# Patient Record
Sex: FEMALE | Race: WHITE | NOT HISPANIC OR LATINO | Employment: OTHER | URBAN - METROPOLITAN AREA
[De-identification: names, ages, dates, MRNs, and addresses within clinical notes are randomized per-mention and may not be internally consistent; named-entity substitution may affect disease eponyms.]

---

## 2017-05-12 ENCOUNTER — TRANSCRIBE ORDERS (OUTPATIENT)
Dept: ADMINISTRATIVE | Facility: HOSPITAL | Age: 72
End: 2017-05-12

## 2017-05-12 ENCOUNTER — APPOINTMENT (OUTPATIENT)
Dept: LAB | Facility: MEDICAL CENTER | Age: 72
End: 2017-05-12
Payer: COMMERCIAL

## 2017-05-12 DIAGNOSIS — C34.90 MALIGNANT NEOPLASM OF LUNG, UNSPECIFIED LATERALITY, UNSPECIFIED PART OF LUNG (HCC): Primary | ICD-10-CM

## 2017-05-12 DIAGNOSIS — C34.90 MALIGNANT NEOPLASM OF UNSPECIFIED PART OF UNSPECIFIED BRONCHUS OR LUNG (HCC): ICD-10-CM

## 2017-05-12 LAB
ALBUMIN SERPL BCP-MCNC: 3.8 G/DL (ref 3.5–5)
ALP SERPL-CCNC: 95 U/L (ref 46–116)
ALT SERPL W P-5'-P-CCNC: 30 U/L (ref 12–78)
ANION GAP SERPL CALCULATED.3IONS-SCNC: 7 MMOL/L (ref 4–13)
AST SERPL W P-5'-P-CCNC: 19 U/L (ref 5–45)
BASOPHILS # BLD AUTO: 0.03 THOUSANDS/ΜL (ref 0–0.1)
BASOPHILS NFR BLD AUTO: 0 % (ref 0–1)
BILIRUB SERPL-MCNC: 0.37 MG/DL (ref 0.2–1)
BUN SERPL-MCNC: 12 MG/DL (ref 5–25)
CALCIUM ALBUM COR SERPL-MCNC: 10.6 MG/DL (ref 8.3–10.1)
CALCIUM SERPL-MCNC: 10.4 MG/DL (ref 8.3–10.1)
CHLORIDE SERPL-SCNC: 105 MMOL/L (ref 100–108)
CO2 SERPL-SCNC: 28 MMOL/L (ref 21–32)
CREAT SERPL-MCNC: 0.65 MG/DL (ref 0.6–1.3)
EOSINOPHIL # BLD AUTO: 0.17 THOUSAND/ΜL (ref 0–0.61)
EOSINOPHIL NFR BLD AUTO: 2 % (ref 0–6)
ERYTHROCYTE [DISTWIDTH] IN BLOOD BY AUTOMATED COUNT: 13.2 % (ref 11.6–15.1)
GFR SERPL CREATININE-BSD FRML MDRD: >60 ML/MIN/1.73SQ M
GLUCOSE P FAST SERPL-MCNC: 98 MG/DL (ref 65–99)
HCT VFR BLD AUTO: 46.8 % (ref 34.8–46.1)
HGB BLD-MCNC: 15.8 G/DL (ref 11.5–15.4)
LYMPHOCYTES # BLD AUTO: 2.02 THOUSANDS/ΜL (ref 0.6–4.47)
LYMPHOCYTES NFR BLD AUTO: 27 % (ref 14–44)
MCH RBC QN AUTO: 33.5 PG (ref 26.8–34.3)
MCHC RBC AUTO-ENTMCNC: 33.8 G/DL (ref 31.4–37.4)
MCV RBC AUTO: 99 FL (ref 82–98)
MONOCYTES # BLD AUTO: 0.43 THOUSAND/ΜL (ref 0.17–1.22)
MONOCYTES NFR BLD AUTO: 6 % (ref 4–12)
NEUTROPHILS # BLD AUTO: 4.75 THOUSANDS/ΜL (ref 1.85–7.62)
NEUTS SEG NFR BLD AUTO: 65 % (ref 43–75)
NRBC BLD AUTO-RTO: 0 /100 WBCS
PLATELET # BLD AUTO: 293 THOUSANDS/UL (ref 149–390)
PMV BLD AUTO: 11.5 FL (ref 8.9–12.7)
POTASSIUM SERPL-SCNC: 4.7 MMOL/L (ref 3.5–5.3)
PROT SERPL-MCNC: 7.9 G/DL (ref 6.4–8.2)
RBC # BLD AUTO: 4.71 MILLION/UL (ref 3.81–5.12)
SODIUM SERPL-SCNC: 140 MMOL/L (ref 136–145)
WBC # BLD AUTO: 7.43 THOUSAND/UL (ref 4.31–10.16)

## 2017-05-12 PROCEDURE — 36415 COLL VENOUS BLD VENIPUNCTURE: CPT

## 2017-05-12 PROCEDURE — 85025 COMPLETE CBC W/AUTO DIFF WBC: CPT

## 2017-05-12 PROCEDURE — 80053 COMPREHEN METABOLIC PANEL: CPT

## 2017-05-15 ENCOUNTER — HOSPITAL ENCOUNTER (OUTPATIENT)
Dept: CT IMAGING | Facility: HOSPITAL | Age: 72
Discharge: HOME | End: 2017-05-15
Attending: INTERNAL MEDICINE
Payer: COMMERCIAL

## 2017-05-15 DIAGNOSIS — C34.90 MALIGNANT NEOPLASM OF LUNG, UNSPECIFIED LATERALITY, UNSPECIFIED PART OF LUNG (HCC): ICD-10-CM

## 2017-05-15 PROCEDURE — 74177 CT ABD & PELVIS W/CONTRAST: CPT

## 2017-05-15 PROCEDURE — 71260 CT THORAX DX C+: CPT

## 2017-05-15 RX ADMIN — IOHEXOL 100 ML: 350 INJECTION, SOLUTION INTRAVENOUS at 13:26

## 2017-05-22 ENCOUNTER — ALLSCRIPTS OFFICE VISIT (OUTPATIENT)
Dept: OTHER | Facility: OTHER | Age: 72
End: 2017-05-22

## 2017-06-08 ENCOUNTER — HOSPITAL ENCOUNTER (OUTPATIENT)
Dept: RADIOLOGY | Age: 72
Discharge: HOME | End: 2017-06-08
Payer: COMMERCIAL

## 2017-06-08 DIAGNOSIS — C34.90 MALIGNANT NEOPLASM OF LUNG, UNSPECIFIED LATERALITY, UNSPECIFIED PART OF LUNG (HCC): ICD-10-CM

## 2017-06-08 LAB — GLUCOSE SERPL-MCNC: 107 MG/DL (ref 65–140)

## 2017-06-08 PROCEDURE — A9552 F18 FDG: HCPCS

## 2017-06-08 PROCEDURE — 78815 PET IMAGE W/CT SKULL-THIGH: CPT

## 2017-06-08 PROCEDURE — 82948 REAGENT STRIP/BLOOD GLUCOSE: CPT

## 2017-06-08 RX ADMIN — IOHEXOL 5 ML: 240 INJECTION, SOLUTION INTRATHECAL; INTRAVASCULAR; INTRAVENOUS; ORAL at 12:14

## 2017-06-12 ENCOUNTER — TRANSCRIBE ORDERS (OUTPATIENT)
Dept: ADMINISTRATIVE | Facility: HOSPITAL | Age: 72
End: 2017-06-12

## 2017-06-12 DIAGNOSIS — C34.90 MALIGNANT NEOPLASM OF LUNG, UNSPECIFIED LATERALITY, UNSPECIFIED PART OF LUNG (HCC): Primary | ICD-10-CM

## 2017-11-13 ENCOUNTER — HOSPITAL ENCOUNTER (OUTPATIENT)
Dept: CT IMAGING | Facility: HOSPITAL | Age: 72
Discharge: HOME | End: 2017-11-13
Attending: INTERNAL MEDICINE
Payer: COMMERCIAL

## 2017-11-13 DIAGNOSIS — C34.90 MALIGNANT NEOPLASM OF UNSPECIFIED PART OF UNSPECIFIED BRONCHUS OR LUNG (HCC): ICD-10-CM

## 2017-11-13 PROCEDURE — 71250 CT THORAX DX C-: CPT

## 2017-11-20 ENCOUNTER — ALLSCRIPTS OFFICE VISIT (OUTPATIENT)
Dept: OTHER | Facility: OTHER | Age: 72
End: 2017-11-20

## 2017-11-20 ENCOUNTER — TRANSCRIBE ORDERS (OUTPATIENT)
Dept: ADMINISTRATIVE | Facility: HOSPITAL | Age: 72
End: 2017-11-20

## 2017-11-20 DIAGNOSIS — C34.90 MALIGNANT NEOPLASM OF LUNG, UNSPECIFIED LATERALITY, UNSPECIFIED PART OF LUNG (HCC): Primary | ICD-10-CM

## 2017-11-21 NOTE — PROGRESS NOTES
Assessment    1  Malignant neoplasm of bronchus or lung (162 9) (C34 90)    Plan  Malignant neoplasm of bronchus or lung    · Follow-up visit in 6 months Evaluation and Treatment  Follow-up  Status: Complete Done: 36FWR6989 02:20PM   Ordered;Malignant neoplasm of bronchus or lung; Ordered By: Lyndsay Enriquez Performed:  Due: 07NIW7569; Last Updated By: Thu Peters; 11/20/2017 2:31:28 PM   · * CT CHEST WO CONTRAST; Status:Need Information - Financial Authorization; Requested for:63Rpo3682 01:00PM;    Perform:[facility]; RBL:33HZY5977; Last Updated By:Deann Nolasco; 11/20/2017 2:34:25 PM;Ordered;neoplasm of bronchus or lung; Ordered By:Donna Hansen;            CT CHEST, ABD, PELVIS W/CONTRAST  Status: Need Information - Financial Authorization  Requested for: 97EAP9884 01:00PM Ordered; For: Lung Cancer (162 9); Ordered By: Lyndsay Enriquez  Perform: [facility]  Due: 17YGV8010; Last Updated By: Ruchi Charles  CT CHEST, ABD, PELVIS W/CONTRAST  Status: Need Information - Financial Authorization  Requested for: 51RKW6810 01:00PM Ordered; For: Lung Cancer (162 9); Ordered By: Lyndsay Enriqeuz  Perform: [facility]  Due: 01VZU3286; Last Updated By: Ruchi Charles   Discussion/Summary  Discussion Summary:   A 74year old female with stage IIIA non-small cell lung cancer with adenocarcinoma, diagnosed in February 2013  She completed concurrent chemoradiation followed by 2 cycle of consolidation chemotherapy with carboplatin and paclitaxel with minimal side effects, resulting in complete remission  She is still active smoker  She has significant COPD  Based on the recent imaging study, she has no evidence of recurrence  She continued to have left pleural based opacity as well as subtle density in the left upper lobe measuring 7 mm  I recommended her to have CT scan of chest without contrast in 6 months followed by office visit  She is in agreement with my recommendations  Chief Complaint  Chief Complaint: Chief Complaint:  The patient presents to the office today with Non-small cell lung cancer with adenocarcinoma histology, stage IIIA, diagnosed in February 2013  History of Present Illness  HPI: Non-small cell lung cancer with adenocarcinoma histology, stage IIIA, diagnosed in February 2013  Previous Therapy:   1  Concurrent chemoradiation with weekly carboplatin and paclitaxel completed on April 22, 20132 cycle of consolidation chemotherapy with carboplatin and paclitaxel completed in June 2013  Current Therapy: Observation   Disease Status: No evidence of progressive disease  Interval History: A 74year old female with stage IIIA non-small cell lung cancer with adenocarcinoma histology  Her lung cancer was diagnosed incidentally  She underwent concurrent chemoradiation with weekly low-dose carboplatin and paclitaxel followed by 2 cycles of consolidation chemotherapy which was completed on June 2013 resulted in at least good partial response  She came in today for routine followup  She continued to have intentional weight loss  She has to smoked 1 pack and half of cigarettes per day  She has chronic cough with sputum production but no hemoptysis  She has exertion or shortness of breast  She denied any pain  Her previous back pain and leg pain has improved, after she lost significant weight  Recent CT scan showed stable left pleural based opacity  Her performance status is 1/4 on the ECOG scale  Review of Systems  Complete-Female:  Constitutional: No fever, no chills, feels well, no tiredness, no recent weight gain or weight loss  Eyes: No complaints of eye pain, no red eyes, no eyesight problems, no discharge, no dry eyes, no itching of eyes  ENT: no complaints of earache, no loss of hearing, no nose bleeds, no nasal discharge, no sore throat, no hoarseness    Cardiovascular: No complaints of slow heart rate, no fast heart rate, no chest pain, no palpitations, no leg claudication, no lower extremity edema  Respiratory: cough, but-- as noted in HPI  Gastrointestinal: No complaints of abdominal pain, no constipation, no nausea or vomiting, no diarrhea, no bloody stools  Genitourinary: No complaints of dysuria, no incontinence, no pelvic pain, no dysmenorrhea, no vaginal discharge or bleeding  Musculoskeletal: No complaints of arthralgias, no myalgias, no joint swelling or stiffness, no limb pain or swelling  Integumentary: No complaints of skin rash or lesions, no itching, no skin wounds, no breast pain or lump  Neurological: No complaints of headache, no confusion, no convulsions, no numbness, no dizziness or fainting, no tingling, no limb weakness, no difficulty walking  Psychiatric: Not suicidal, no sleep disturbance, no anxiety or depression, no change in personality, no emotional problems  Endocrine: No complaints of proptosis, no hot flashes, no muscle weakness, no deepening of the voice, no feelings of weakness  Hematologic/Lymphatic: No complaints of swollen glands, no swollen glands in the neck, does not bleed easily, does not bruise easily  ROS Reviewed:   ROS reviewed  Active Problems  1  Adenocarcinoma of bladder (188 9) (C67 9)   2  Chest pain (786 50) (R07 9)   3  Chronic obstructive pulmonary disease (496) (J44 9)   4  Cough (786 2) (R05)   5  Elevated C-reactive protein (790 95) (R79 82)   6  Flu vaccine need (V04 81) (Z23)   7  Hypercholesterolemia (272 0) (E78 00)   8  Hyperlipidemia (272 4) (E78 5)   9  Hypothyroidism (244 9) (E03 9)   10  Malignant neoplasm of bronchus or lung (162 9) (C34 90)   11  Myalgia (729 1) (M79 1)   12  Need for vaccination with 13-polyvalent pneumococcal conjugate vaccine (V03 82) (Z23)   13  Neoplasm of skin (239 2) (D49 2)   14  Non-small cell carcinoma of lung (162 9) (C34 90)   15  Obesity (278 00) (E66 9)   16  Pain of both hip joints (719 45) (M25 551,M25 552)   17  Shortness of breath (786 05) (R06 02)   18  Squamous cell carcinoma of leg (173 72) (C44 721)   19  Tobacco use (305 1) (Z72 0)    Past Medical History  1  History of chronic obstructive lung disease (V12 69) (Z87 09)   2  History of hypothyroidism (V12 29) (Z86 39)    Surgical History  1  History of Cholecystectomy   2  History of Cholecystectomy   3  History of Diagnostic Cystoscopy   4  History of Diagnostic Cystoscopy   5  History of Hysterectomy   6  History of Hysterectomy   7  History of Transurethral Resection    Family History  Mother    1  Family history of chronic obstructive pulmonary disease (V17 6) (Z82 5)    Social History     · Current every day smoker (305 1) (F17 200)   · Current Every Day Smoker (305 1)   · Never Drank Alcohol   · Tobacco use (305 1) (Z72 0)    Current Meds   1  Aspirin 81 MG TABS; Take 1 tablet daily Recorded   2  Calcium 600 MG Oral Tablet; 1 TAB BID Recorded   3  Fish Oil 1200 MG Oral Capsule; Take as directed Recorded   4  GNP Vitamin E 400 UNIT Oral Capsule; 1 TAB DAILY Recorded   5  GTF Chromium 200 MCG Oral Tablet; Take 1 tablet daily Recorded   6  Levothyroxine Sodium 125 MCG Oral Tablet; take one tablet by mouth one time daily; Therapy: 56DJA2292 to (Evaluate:23Gny0472)  Requested for: 53Hce0648; Last Rx:91Zox1507 Ordered   7  Multivitamins TABS; Take 1 tablet daily Recorded   8  Spiriva HandiHaler 18 MCG Inhalation Capsule; inhale contents of 1 capsule once daily; Therapy: 01Apr2016 to (Brandi Dakotah)  Requested for: 69IRZ3395; Last Rx:93Tcj4018 Ordered   9  Ventolin  (90 Base) MCG/ACT Inhalation Aerosol Solution; INHALE 2 PUFFS EVERY 4-6 HOURS AS NEEDED; Therapy: 45PRR2578 to (ZZKHQ:47NCO7904)  Requested for: 88QPS6327; Last Rx:21Tfa7581 Ordered    Allergies  1  Erythromycin Derivatives   2  Penicillins   3  Sulfa Drugs   4   Tetracyclines    Vitals  Vital Signs    Recorded: 06JXF1804 02:12PM   Temperature 96 F   Heart Rate 105   Respiration 18   Systolic 513   Diastolic 72   Height 5 ft 1 in   Weight 174 lb    BMI Calculated 32 88   BSA Calculated 1 78   O2 Saturation 93       Physical Exam   Constitutional  General appearance: No acute distress, well appearing and well nourished  Eyes  Conjunctiva and lids: No swelling, erythema or discharge  Pupils and irises: Equal, round and reactive to light  Ears, Nose, Mouth, and Throat  External inspection of ears and nose: Normal    Otoscopic examination: Tympanic membranes translucent with normal light reflex  Canals patent without erythema  Nasal mucosa, septum, and turbinates: Normal without edema or erythema  Oropharynx: Normal with no erythema, edema, exudate or lesions  Pulmonary  Respiratory effort: No increased work of breathing or signs of respiratory distress  Auscultation of lungs: Abnormal  -- Minimal bilateral wheezing  Cardiovascular  Palpation of heart: Normal PMI, no thrills  Auscultation of heart: Normal rate and rhythm, normal S1 and S2, without murmurs  Examination of extremities for edema and/or varicosities: Normal    Abdomen  Abdomen: Non-tender, no masses  Liver and spleen: No hepatomegaly or splenomegaly  Lymphatic  Palpation of lymph nodes in neck: No lymphadenopathy  Musculoskeletal  Gait and station: Normal    Digits and nails: Normal without clubbing or cyanosis  Inspection/palpation of joints, bones, and muscles: Normal    Skin  Skin and subcutaneous tissue: Normal without rashes or lesions  Neurologic  Cranial nerves: Cranial nerves 2-12 intact  Reflexes: 2+ and symmetric  Sensation: No sensory loss  Psychiatric  Orientation to person, place, and time: Normal    Mood and affect: Normal    Diabetic Foot Screen: Normal       ECOG 1       Results/Data  Results Form:   Results  Radiology CT scan of chest in November 2017 showed stable pleural based opacity in the lateral aspect of the lung on the left side  Subtle new density in the left upper lobe measuring 7 mm  Close follow-up is recommended    Lab Results CBC and CMP are within normal limits and May 2017 except calcium 10 4  Health Management  Health Maintenance   * MAMMO SCREENING BILATERAL W CAD; every 1 year; Last 81HYQ8934; Next Due: 22RMM0946; Overdue    Future Appointments    Date/Time Provider Specialty Site   02/05/2018 02:15 PM MANUEL Gamboa   [address] [facility]       Signatures   Electronically signed by : MANUEL Gould ; Nov 20 2017  2:38PM EST                       (Author)

## 2018-01-09 NOTE — RESULT NOTES
Verified Results  NM MYOCARDIAL PERFUSION SPECT (RX STRESS AND/OR REST) 25TSH7973 08:35AM Charlet Slider     Test Name Result Flag Reference   NM MYOCARDIAL PERFUSION SPECT (RX STRESS AND/OR REST) (Report)     [facility]   [address]  [place], [address]   [phone]     Rest/Stress Gated SPECT Myocardial Perfusion Imaging After Regadenoson     Patient: Ceci Ragland   MR number: [id number]   Account number: [de-identified]   : 1945   Age: 66 years   Gender: Female   Status: Outpatient   Location: [address] [place] [address] [facility] [place]   Height: 61 in   Weight: 209 lb   BP: 151/ 78 mmHg     Allergies: ERYTHROMYCIN, PENICILLINS, SULFA ANTIBIOTICS, TETRACYCLINES RELATED     Diagnosis: J44 9 - Chronic obstructive pulmonary disease, unspecified, R07 9 -   Chest pain, unspecified     Primary Physician: Yudith Cowart MD   Technician: Sully Swann   RN: Ceci Rhodes RN BSN   Referring Physician: Yudith Cowart MD   Group: [facility]   Report Prepared By[de-identified] Ceci Rhodes RN BSN   Interpreting Physician: Yary Herrera DO     INDICATIONS: Detection of coronary artery disease  HISTORY: CANCER:LUNG/BLADDER/SKIN   ADAIR/COPD CURRNT LONG TIME SMOKER   BL HIP PAIN/GAIT DISTURBANCE The patient is a 66year old  female  Chest pain status: chest pain  Other symptoms: dyspnea and decreased effort   tolerance  Coronary artery disease risk factors: dyslipidemia, smoking, and   post-menopausal state  Cardiovascular history: none significant  Co-morbidity:   history of lung disease and obesity  Medications: no cardiac drugs  PHYSICAL EXAM: Baseline physical exam screening: no wheezes audible  REST ECG: Normal sinus rhythm  Normal baseline ECG  PROCEDURE: The study was performed at the [address] [place] [address]  A   regadenoson infusion pharmacologic stress test was performed   Gated SPECT   myocardial perfusion imaging was performed after stress and at rest  Systolic   blood pressure was 151 mmHg, at the start of the study  Diastolic blood   pressure was 78 mmHg, at the start of the study  The heart rate was 81 bpm, at   the start of the study  IV double checked  Regadenoson protocol:   HR bpm SBP mmHg DBP mmHg Symptoms   Baseline 81 151 78 none   Immediate -- -- -- mild dyspnea   1 min -- -- -- moderate dyspnea   2 min 91 130 70 same as above   3 min -- -- -- subsiding   4 min -- -- -- subsiding   5 min 90 135 74 none   The patient also performed low level exercise with leg lifts  STRESS SUMMARY: Duration of pharmacologic stress was Maximal heart rate during   stress was 104 bpm  There was normal resting blood pressure with an appropriate   response to stress  The rate-pressure product for the peak heart rate and blood   pressure was 68911  There was no chest pain during stress  The stress test was   terminated due to protocol completion  Pre oxygen saturation: 95 %  Peak oxygen   saturation: 97 %  The stress ECG was negative for ischemia  There were no   stress arrhythmias or conduction abnormalities  ISOTOPE ADMINISTRATION:   Resting isotope administration Stress isotope administration   Agent Tetrofosmin Tetrofosmin   Dose 10 2 mCi 32 3 mCi   Date 05/05/2016 05/05/2016   Injection time 08:55 10:35   Imaging time 09:25 11:35   Injection-image interval 30 min 45 min     The radiopharmaceutical was injected at the peak effect of pharmacologic   stress  MYOCARDIAL PERFUSION IMAGING:   The image quality was good  Rotating projection images reveal mild breast   attenuation  Left ventricular size was normal  The TID ratio was 1 15  PERFUSION DEFECTS:   - There were no perfusion defects  GATED SPECT:   The calculated left ventricular ejection fraction was 50 %  Left ventricular   ejection fraction was within normal limits by visual estimate  There was no   left ventricular regional abnormality       SUMMARY:   - Stress results: There was no chest pain during stress  - ECG conclusions: The stress ECG was negative for ischemia  - Perfusion imaging: There were no perfusion defects    - Gated SPECT: The calculated left ventricular ejection fraction was 50 %  Left ventricular ejection fraction was within normal limits by visual estimate  There was no left ventricular regional abnormality  IMPRESSIONS: Normal study after pharmacologic vasodilation  Myocardial   perfusion imaging was normal at rest and with stress   Left ventricular systolic   function was normal      Prepared and signed by     Matilda Hendricks DO   Signed 05/05/2016 14:23:20

## 2018-01-13 VITALS
HEART RATE: 105 BPM | DIASTOLIC BLOOD PRESSURE: 72 MMHG | RESPIRATION RATE: 18 BRPM | SYSTOLIC BLOOD PRESSURE: 122 MMHG | TEMPERATURE: 96 F | WEIGHT: 174 LBS | OXYGEN SATURATION: 93 % | HEIGHT: 61 IN | BODY MASS INDEX: 32.85 KG/M2

## 2018-01-13 NOTE — RESULT NOTES
Verified Results  * XR HIPS BILATERAL WITH AP PELVIS 13Apr2016 12:57PM [facility]: [id number]     Test Name Result Flag Reference   XR HIPS BILATERAL WITH AP PELVIS (Report)     BILATERAL HIPS AND PELVIS     INDICATION: Bilateral hip pain  COMPARISON: None     VIEWS: AP pelvis and coned down views of each hip; 5 images      FINDINGS:     No acute pelvic fracture or pathologic bone lesions  Visualized bony pelvis appears intact  LEFT HIP:   Joint space narrowing  Subchondral sclerosis and osteophytosis in the left acetabulum  No evidence of erosive arthropathy   Bony alignment is maintained  Soft tissues are unremarkable  RIGHT HIP:   Joint space narrowing, more severe than on the left side, with subchondral sclerosis and osteophytosis as well as subchondral cysts in the femoral head  No evidence of erosive arthropathy  Bony alignment is maintained  Soft tissues are unremarkable  IMPRESSION:     Osteoarthritis of both hips, more severe on the right  No acute osseous abnormality in the pelvis or hips  Workstation performed: [id number]     Signed by:   Miriam Chaves MD   4/14/16     * XR SPINE LUMBAR MINIMUM 4 VIEWS 13Apr2016 12:57PM [facility] Number: [id number]     Test Name Result Flag Reference   XR SPINE LUMBAR MINIMUM 4 VIEWS (Report)     LUMBAR SPINE     INDICATION: Lower back pain  COMPARISON: None     VIEWS: AP, lateral, bilateral oblique and coned down projections; 6 images     FINDINGS:     Alignment is unremarkable  There is no radiographic evidence of acute fracture or destructive osseous lesion  Mild facet degenerative change L5-S1  Visualized soft tissues appear unremarkable  IMPRESSION:     Mild facet degenerative change L5-S1          Workstation performed: [id number]     Signed by:   Leonardo Chamberlain DO   4/14/16

## 2018-01-14 VITALS
WEIGHT: 187.25 LBS | TEMPERATURE: 97.1 F | DIASTOLIC BLOOD PRESSURE: 82 MMHG | HEIGHT: 61 IN | SYSTOLIC BLOOD PRESSURE: 140 MMHG | HEART RATE: 102 BPM | BODY MASS INDEX: 35.35 KG/M2 | RESPIRATION RATE: 20 BRPM | OXYGEN SATURATION: 91 %

## 2018-02-02 RX ORDER — LEVOTHYROXINE SODIUM 0.12 MG/1
1 TABLET ORAL DAILY
COMMUNITY
Start: 2013-10-15 | End: 2018-02-05 | Stop reason: SDUPTHER

## 2018-02-02 RX ORDER — IBUPROFEN 200 MG
1 CAPSULE ORAL 2 TIMES DAILY
COMMUNITY

## 2018-02-02 RX ORDER — VITAMIN E 268 MG
1 CAPSULE ORAL DAILY
COMMUNITY
End: 2020-02-03 | Stop reason: ALTCHOICE

## 2018-02-02 RX ORDER — AMOXICILLIN 500 MG
CAPSULE ORAL
COMMUNITY

## 2018-02-02 RX ORDER — CHROMIUM 200 MCG
1 TABLET ORAL DAILY
COMMUNITY

## 2018-02-02 RX ORDER — ALBUTEROL SULFATE 90 UG/1
2 AEROSOL, METERED RESPIRATORY (INHALATION)
COMMUNITY
Start: 2014-02-24 | End: 2018-02-05

## 2018-02-02 RX ORDER — ASPIRIN 325 MG
0.5 TABLET ORAL DAILY
COMMUNITY

## 2018-02-05 ENCOUNTER — OFFICE VISIT (OUTPATIENT)
Dept: FAMILY MEDICINE CLINIC | Facility: CLINIC | Age: 73
End: 2018-02-05
Payer: COMMERCIAL

## 2018-02-05 VITALS
BODY MASS INDEX: 34.2 KG/M2 | WEIGHT: 181 LBS | SYSTOLIC BLOOD PRESSURE: 122 MMHG | OXYGEN SATURATION: 93 % | HEART RATE: 89 BPM | DIASTOLIC BLOOD PRESSURE: 80 MMHG

## 2018-02-05 DIAGNOSIS — Z11.59 NEED FOR HEPATITIS C SCREENING TEST: ICD-10-CM

## 2018-02-05 DIAGNOSIS — J43.9 PULMONARY EMPHYSEMA, UNSPECIFIED EMPHYSEMA TYPE (HCC): ICD-10-CM

## 2018-02-05 DIAGNOSIS — E78.00 HYPERCHOLESTEROLEMIA: ICD-10-CM

## 2018-02-05 DIAGNOSIS — Z12.39 SCREENING FOR MALIGNANT NEOPLASM OF BREAST: ICD-10-CM

## 2018-02-05 DIAGNOSIS — C34.90 NON-SMALL CELL CARCINOMA OF LUNG (HCC): ICD-10-CM

## 2018-02-05 DIAGNOSIS — Z13.820 SCREENING FOR OSTEOPOROSIS: ICD-10-CM

## 2018-02-05 DIAGNOSIS — Z00.00 MEDICARE ANNUAL WELLNESS VISIT, INITIAL: ICD-10-CM

## 2018-02-05 DIAGNOSIS — C67.9 ADENOCARCINOMA OF BLADDER (HCC): ICD-10-CM

## 2018-02-05 DIAGNOSIS — E03.9 HYPOTHYROIDISM, UNSPECIFIED TYPE: ICD-10-CM

## 2018-02-05 DIAGNOSIS — Z00.00 MEDICARE WELCOME EXAM: Primary | ICD-10-CM

## 2018-02-05 PROCEDURE — 99214 OFFICE O/P EST MOD 30 MIN: CPT | Performed by: FAMILY MEDICINE

## 2018-02-05 PROCEDURE — G0438 PPPS, INITIAL VISIT: HCPCS | Performed by: FAMILY MEDICINE

## 2018-02-05 PROCEDURE — 3725F SCREEN DEPRESSION PERFORMED: CPT | Performed by: FAMILY MEDICINE

## 2018-02-05 PROCEDURE — 1160F RVW MEDS BY RX/DR IN RCRD: CPT | Performed by: FAMILY MEDICINE

## 2018-02-05 RX ORDER — MAGNESIUM 30 MG
30 TABLET ORAL 2 TIMES DAILY
COMMUNITY

## 2018-02-05 RX ORDER — LEVOTHYROXINE SODIUM 0.12 MG/1
125 TABLET ORAL DAILY
Qty: 90 TABLET | Refills: 3 | Status: SHIPPED | OUTPATIENT
Start: 2018-02-05 | End: 2019-02-08 | Stop reason: SDUPTHER

## 2018-02-05 RX ORDER — ALBUTEROL SULFATE 90 UG/1
2 AEROSOL, METERED RESPIRATORY (INHALATION) EVERY 6 HOURS PRN
Qty: 1 INHALER | Refills: 5 | Status: SHIPPED | OUTPATIENT
Start: 2018-02-05 | End: 2021-09-09 | Stop reason: ALTCHOICE

## 2018-02-05 RX ORDER — UREA 10 %
10 LOTION (ML) TOPICAL
COMMUNITY

## 2018-02-05 NOTE — PROGRESS NOTES
HPI:  Jacqui Sorto is a 74 y o  female here for her Initial Wellness Visit      Patient Active Problem List   Diagnosis    Adenocarcinoma of bladder (HCC)    Chronic obstructive pulmonary disease (HCC)    Elevated C-reactive protein    Hypercholesterolemia    Hypothyroidism    Malignant neoplasm of bronchus or lung    Non-small cell carcinoma of lung ([facility] )    Obesity    Squamous cell carcinoma of leg     Past Medical History:   Diagnosis Date    COPD (chronic obstructive pulmonary disease) ([facility] )     Hypothyroidism      Past Surgical History:   Procedure Laterality Date    CHOLECYSTECTOMY      CYSTOSCOPY      4/10 AND 8/10, 3/12/2015 4/18/2016  DIAGNOSTIC     HYSTERECTOMY      OTHER SURGICAL HISTORY      TRANSURETHERAL RESECTION      Family History   Problem Relation Age of Onset    COPD Mother      History   Smoking Status    Current Every Day Smoker    Packs/day: 2 00    Types: Cigarettes   Smokeless Tobacco    Never Used     History   Alcohol Use No      History   Drug Use No       Current Outpatient Prescriptions   Medication Sig Dispense Refill    aspirin 81 MG tablet Take 1 tablet by mouth daily      Calcium 600 MG tablet Take 1 tablet by mouth 2 (two) times a day      Chromium 200 MCG TABS Take 1 tablet by mouth daily      levothyroxine 125 mcg tablet Take 1 tablet (125 mcg total) by mouth daily 90 tablet 3    magnesium 30 MG tablet Take 30 mg by mouth 2 (two) times a day      melatonin 1 mg Take 1 mg by mouth daily at bedtime      Multiple Vitamin (MULTIVITAMINS PO) Take 1 tablet by mouth daily      Omega-3 Fatty Acids (FISH OIL) 1200 MG CAPS Take by mouth      tiotropium (SPIRIVA HANDIHALER) 18 mcg inhalation capsule Place 1 capsule (18 mcg total) into inhaler and inhale daily 30 capsule 11    vitamin E, tocopherol, (GNP VITAMIN E) 400 units capsule Take 1 tablet by mouth daily      albuterol (VENTOLIN HFA) 90 mcg/act inhaler Inhale 2 puffs every 6 (six) hours as needed for wheezing 1 Inhaler 5     No current facility-administered medications for this visit  Allergies   Allergen Reactions    Erythromycin     Penicillins     Sulfa Antibiotics     Tetracyclines & Related      Immunization History   Administered Date(s) Administered    Influenza Split High Dose Preservative Free IM 11/30/2012, 04/01/2016, 10/25/2017    Influenza TIV (IM) 01/11/2010, 09/20/2010, 10/27/2011    Pneumococcal Conjugate 13-Valent 04/01/2016    Pneumococcal Polysaccharide PPV23 08/18/2005, 08/19/2005, 12/14/2012       Patient Care Team:  Mirza Connor MD as PCP - General  MD Emily Galeano MD Gwenevere First, MD Pearlene Gibney, MD Donnelly Soda, MD      Medicare Screening Tests and Risk Assessments:  [facility]     ISAR:   Previous hospitalizations?:  No       Once in a Lifetime Medicare Screening:   EKG performed?:  No    AAA screening performed? (if performed, please add date to Health Maintenance):  No       Medicare Screening Tests and Risk Assessment:   AAA Risk Assessment    None Indicated:  Yes    Osteoporosis Risk Assessment     Female:  Yes   :  Yes :  No   Age over 48:  Yes Low body weight (<127lbs):  No   Tobacco use:   Yes Alcohol use:  No   Low calcium diet:  No PMHX of fractures:  No   FHX of fractures:  No    HIV Risk Assessment    None indicated:  Yes        Drug and Alcohol Use:   Tobacco use    Cigarettes:  current smoker    Tobacco use duration    Packs per day:  2    Tobacco Cessation Readiness    Readiness to quit:  declined    Alcohol use    Alcohol use:  never    Alcohol Treatment Readiness   Illicit Drug Use    Drug use:  never        Diet & Exercise:   Diet   What is your diet?:  Low Carb   How many servings a day of the following:   Fruits and Vegetables:  3-4 Meat:  3-4   Whole Grains:  0 Simple Carbs:  0   Dairy:  0 Soda:  0   Coffee:  0 Tea:  0   Exercise    Do you currently exercise?:  unable to exercise       Cognitive Impairment Screening:   Anxiety screenings preformed:  Yes    Depression screening preformed:  Yes    Depression screening results:  negative for symptoms   Cognitive Impairment Screening    Do you have difficulty learning or retaining new information?:  No Do you have difficulty handling new tasks?:  No   Do you have difficulty with reasoning?:  No Do you have difficulty with spatial ability and orientation?:  No   Do you have difficulty with language?:  No Do you have difficulty with behavior?:  No       Functional Ability/Level of Safety:   Hearing    Hearing difficulties:  No Bilateral:  normal   Left:  normal Right:  normal   Hearing aid:  No    Hearing Impairment Assessment    Current Activities    Help needed with the folllowing:    Using the phone:  No Transportation:  No   Shopping:  No Preparing Meals:  No   Doing Housework:  No Doing Laundry:  No   Managing Medications:  No Managing Money:  No   ADL    Fall Risk   Have you fallen in the last 12 months?:  Yes    How many times?:  1    Injury History       Home Safety:   Home Safety Risk Factors   Unfamilar with surroundings:  No Uneven floors:  No   Stairs or handrail saftey risk:  No Loose rugs:  No   Household clutter:  No Poor household lighting:  No   No grab bars in bathroom:  No Further evaluation needed:  No       Advanced Directives:   Advanced Directives    Living Will:  No Durable POA for healthcare:  No   Advanced directive:  No    Patient's End of Life Decisions        Urinary Incontinence:   Do you have urinary incontinence?:  Yes        Glaucoma:            Provider Screening    No data filed        No exam data present    Assessment and Plan:  1  Medicare welcome exam  CANCELED: POCT ECG   2  Pulmonary emphysema, unspecified emphysema type (HCC)  CBC    albuterol (VENTOLIN HFA) 90 mcg/act inhaler    tiotropium (SPIRIVA HANDIHALER) 18 mcg inhalation capsule   3   Hypercholesterolemia  Comprehensive metabolic panel    Hemoglobin A1c    Lipid Panel with Direct LDL reflex   4  Non-small cell carcinoma of lung ([facility] [address] )     5  Hypothyroidism, unspecified type  Hemoglobin A1c    TSH, 3rd generation with T4 reflex    levothyroxine 125 mcg tablet   6  Need for hepatitis C screening test  Hepatitis C antibody   7  Adenocarcinoma of bladder [facility])  Ambulatory referral to Urogynecology       Health Maintenance Due   Topic Date Due    DTaP,Tdap,and Td Vaccines (1 - Tdap) 03/18/1952    GLAUCOMA SCREENING 67+ YR  03/18/2010        patient presents today for follow-up for Medicare wellness visit as well as her chronic health issues  She was of course encouraged to quit smoking  I would like her to have a DEXA scan as well as a mammogram   She is up-to-date on colonoscopy screening  She did have 1 fall  Recently but did not sustain injury and she notes she tripped and does not feel she is at high risk for repeat falls  She is not  Having issues with depression or anxiety  She has no cognitive concerns and completes all of her own ADLs  She has had a flu shot of both of her pneumonia vaccines  Tetanus is up-to-date  As she notes she had 1 in 2013  Gordon Whittington Consider giving her the Shingrix vaccine for shingles once this is covered by insurance

## 2018-02-05 NOTE — PROGRESS NOTES
FAMILY PRACTICE OFFICE VISIT       NAME: Shayan Martin  AGE: 74 y o  SEX: female       : 1945        MRN: [id number]    DATE: 2018  TIME: 3:15 PM    Assessment and Plan     Problem List Items Addressed This Visit     Chronic obstructive pulmonary disease ([facility] )      I discussed the importance of quitting smoking  She notes she has a prescription for Chantix at home and I would encourage her to start utilizing this  Relevant Medications    tiotropium (SPIRIVA HANDIHALER) 18 mcg inhalation capsule    albuterol (VENTOLIN HFA) 90 mcg/act inhaler    Other Relevant Orders    CBC    Hypercholesterolemia    Relevant Orders    Comprehensive metabolic panel    Hemoglobin A1c    Lipid Panel with Direct LDL reflex    Hypothyroidism      Check TSH         Relevant Medications    levothyroxine 125 mcg tablet    Other Relevant Orders    Hemoglobin A1c    TSH, 3rd generation with T4 reflex    Non-small cell carcinoma of lung (HCC)    Relevant Medications    tiotropium (SPIRIVA HANDIHALER) 18 mcg inhalation capsule    albuterol (VENTOLIN HFA) 90 mcg/act inhaler      Other Visit Diagnoses     Medicare welcome exam    -  Primary    Need for hepatitis C screening test        Relevant Orders    Hepatitis C antibody          [facility] bladder ([facility] )  Follows routinely with Dr Alex Vital    Hypothyroidism   Check TSH    Chronic obstructive pulmonary disease ([facility] )   I discussed the importance of quitting smoking  She notes she has a prescription for Chantix at home and I would encourage her to start utilizing this  There are no Patient Instructions on file for this visit  Chief Complaint     Chief Complaint   Patient presents with    Medicare Wellness Visit     awv       History of Present Illness   Shayan Martin is a 74y o -year-old female who   Presents today for follow-up for her chronic health issues  She has not seen us in a few years    She does have known COPD and continues to smoke 2 packs a day  She does have a chronic intermittent wheeze and some intermittent shortness of breath and coughing  She has a history of lung cancer and does follow routinely with Oncology in this regard  Fortunately, recent CT scan was stable  She has a  History of hypothyroidism  She had lost a lot of weight recently intentionally by watching her carbohydrates  She is having some recent increased weight gain likely secondary to diet  She does not have excessive fatigue but notes when she eats poorly she does feel more fatigued  She has a history of hyperlipidemia but is not on medication for this at this time  She remains on melatonin at bedtime for chronic insomnia  Review of Systems   Review of Systems   Constitutional: Negative for appetite change, chills, fatigue, fever and unexpected weight change  HENT: Negative for ear pain and trouble swallowing  Eyes: Negative for visual disturbance  Respiratory: Positive for cough, shortness of breath and wheezing  Negative for chest tightness  Cardiovascular: Negative for chest pain  Gastrointestinal: Negative for abdominal distention, abdominal pain, blood in stool, constipation and diarrhea  Endocrine: Negative for polyuria  Genitourinary: Negative for difficulty urinating and flank pain  Musculoskeletal: Negative for arthralgias and myalgias  Skin: Negative for rash  Neurological: Negative for dizziness and light-headedness  Hematological: Negative for adenopathy  Does not bruise/bleed easily  Psychiatric/Behavioral: Negative for sleep disturbance         Active Problem List     Patient Active Problem List   Diagnosis    Adenocarcinoma of bladder (HCC)    Chronic obstructive pulmonary disease (HCC)    Elevated C-reactive protein    Hypercholesterolemia    Hypothyroidism    Malignant neoplasm of bronchus or lung    Non-small cell carcinoma of lung (HCC)    Obesity    Squamous cell carcinoma of leg         Past Medical History:  Past Medical History:   Diagnosis Date    [facility]ronic obstructive [facility] disease) ([facility] )     Hypothyroidism        Past Surgical History:  Past Surgical History:   Procedure Laterality Date    CHOLECYSTECTOMY      CYSTOSCOPY      4/10 AND 8/10, 3/12/2015 4/18/2016  DIAGNOSTIC     HYSTERECTOMY      OTHER SURGICAL HISTORY      TRANSURETHERAL RESECTION        Family History:  Family History   Problem Relation Age of Onset    COPD Mother        Social History:  Social History     Social History    Marital status: Single     Spouse name: N/A    Number of children: N/A    Years of education: N/A     Occupational History    Not on file  Social History Main Topics    Smoking status: Current Every Day Smoker     Packs/day: 2 00     Types: Cigarettes    Smokeless tobacco: Never Used    Alcohol use No    Drug use: No    Sexual activity: Not on file     Other Topics Concern    Not on file     Social History Narrative    No narrative on file       Objective     Vitals:    02/05/18 1425   BP: 122/80   Pulse: 89   SpO2: 93%     Wt Readings from Last 3 Encounters:   02/05/18 82 1 kg (181 lb)   11/20/17 78 9 kg (174 lb)   05/22/17 84 9 kg (187 lb 4 oz)       Physical Exam   Constitutional: She is oriented to person, place, and time  She appears well-developed and well-nourished  No distress  HENT:   Head: Normocephalic  Eyes: Pupils are equal, round, and reactive to light  Neck: No tracheal deviation present  No thyromegaly present  Cardiovascular: Normal rate, regular rhythm and normal heart sounds  No murmur heard  Pulmonary/Chest: Effort normal  No respiratory distress  She has wheezes  She has no rales  Abdominal: Soft  She exhibits no distension  There is no tenderness  Musculoskeletal: Normal range of motion  Neurological: She is alert and oriented to person, place, and time  No cranial nerve deficit  Skin: Skin is warm  She is not diaphoretic     Psychiatric: She has a normal mood and affect   Judgment and thought content normal        Pertinent Laboratory/Diagnostic Studies:  Lab Results   Component Value Date    GLUCOSE 125 11/16/2016    BUN 12 05/12/2017    CREATININE 0 65 05/12/2017    CALCIUM 10 4 (H) 05/12/2017     05/12/2017    K 4 7 05/12/2017    CO2 28 05/12/2017     05/12/2017     Lab Results   Component Value Date    ALT 30 05/12/2017    AST 19 05/12/2017    ALKPHOS 95 05/12/2017    BILITOT 0 37 05/12/2017       Lab Results   Component Value Date    WBC 7 43 05/12/2017    HGB 15 8 (H) 05/12/2017    HCT 46 8 (H) 05/12/2017    MCV 99 (H) 05/12/2017     05/12/2017       No results found for: TSH    Lab Results   Component Value Date    CHOL 214 03/14/2014     Lab Results   Component Value Date    TRIG 260 03/14/2014     Lab Results   Component Value Date    HDL 29 03/14/2014     Lab Results   Component Value Date    LDLCALC 133 (H) 03/14/2014     No results found for: HGBA1C    Results for orders placed or performed during the hospital encounter of 06/08/17   Fingerstick Glucose (POCT)   Result Value Ref Range    POC Glucose 107 65 - 140 mg/dl       Orders Placed This Encounter   Procedures    CBC    Comprehensive metabolic panel    Hemoglobin A1c    TSH, 3rd generation with T4 reflex    Lipid Panel with Direct LDL reflex    Hepatitis C antibody       ALLERGIES:  Allergies   Allergen Reactions    Erythromycin     Penicillins     Sulfa Antibiotics     Tetracyclines & Related        Current Medications     Current Outpatient Prescriptions   Medication Sig Dispense Refill    aspirin 81 MG tablet Take 1 tablet by mouth daily      Calcium 600 MG tablet Take 1 tablet by mouth 2 (two) times a day      Chromium 200 MCG TABS Take 1 tablet by mouth daily      levothyroxine 125 mcg tablet Take 1 tablet by mouth daily      magnesium 30 MG tablet Take 30 mg by mouth 2 (two) times a day      melatonin 1 mg Take 1 mg by mouth daily at bedtime      Multiple Vitamin (MULTIVITAMINS PO) Take 1 tablet by mouth daily      Omega-3 Fatty Acids (FISH OIL) 1200 MG CAPS Take by mouth      tiotropium (SPIRIVA HANDIHALER) 18 mcg inhalation capsule Place 1 capsule into inhaler and inhale daily      vitamin E, tocopherol, (GNP VITAMIN E) 400 units capsule Take 1 tablet by mouth daily      albuterol (VENTOLIN HFA) 90 mcg/act inhaler Inhale 2 puffs every 6 (six) hours as needed for wheezing 1 Inhaler 5     No current facility-administered medications for this visit            Health Maintenance     Health Maintenance   Topic Date Due    DTaP,Tdap,and Td Vaccines (1 - Tdap) [birth year]/1950    GLAUCOMA SCREENING 67+ YR  03/18/2010    INFLUENZA VACCINE  Completed    PNEUMOCOCCAL POLYSACCHARIDE VACCINE AGE 72 AND OVER  Completed     Immunization History   Administered Date(s) Administered    Influenza Split High Dose Preservative Free IM 11/30/2012, 04/01/2016, 10/25/2017    Influenza TIV (IM) 01/11/2010, 09/20/2010, 10/27/2011    Pneumococcal Conjugate 13-Valent 04/01/2016    Pneumococcal Polysaccharide PPV23 08/18/2005, 08/19/2005, 12/14/2012       Yenny Martin MD

## 2018-02-05 NOTE — ASSESSMENT & PLAN NOTE
I discussed the importance of quitting smoking  She notes she has a prescription for Chantix at home and I would encourage her to start utilizing this

## 2018-02-23 ENCOUNTER — HOSPITAL ENCOUNTER (OUTPATIENT)
Dept: MAMMOGRAPHY | Facility: MEDICAL CENTER | Age: 73
Discharge: HOME | End: 2018-02-23
Payer: COMMERCIAL

## 2018-02-23 ENCOUNTER — HOSPITAL ENCOUNTER (OUTPATIENT)
Dept: BONE DENSITY | Facility: MEDICAL CENTER | Age: 73
Discharge: HOME | End: 2018-02-23
Payer: COMMERCIAL

## 2018-02-23 DIAGNOSIS — Z13.820 SCREENING FOR OSTEOPOROSIS: ICD-10-CM

## 2018-02-23 PROCEDURE — 77080 DXA BONE DENSITY AXIAL: CPT

## 2018-02-23 PROCEDURE — 77063 BREAST TOMOSYNTHESIS BI: CPT

## 2018-02-23 PROCEDURE — 77067 SCR MAMMO BI INCL CAD: CPT

## 2018-05-12 DIAGNOSIS — J43.9 PULMONARY EMPHYSEMA, UNSPECIFIED EMPHYSEMA TYPE (HCC): ICD-10-CM

## 2018-05-13 RX ORDER — TIOTROPIUM BROMIDE 18 UG/1
CAPSULE ORAL; RESPIRATORY (INHALATION)
Qty: 90 CAPSULE | Refills: 3 | Status: SHIPPED | OUTPATIENT
Start: 2018-05-13 | End: 2019-03-20 | Stop reason: SDUPTHER

## 2018-05-14 ENCOUNTER — HOSPITAL ENCOUNTER (OUTPATIENT)
Dept: CT IMAGING | Facility: HOSPITAL | Age: 73
Discharge: HOME | End: 2018-05-14
Attending: INTERNAL MEDICINE
Payer: COMMERCIAL

## 2018-05-14 DIAGNOSIS — C34.90 MALIGNANT NEOPLASM OF LUNG, UNSPECIFIED LATERALITY, UNSPECIFIED PART OF LUNG (HCC): ICD-10-CM

## 2018-05-14 PROCEDURE — 71250 CT THORAX DX C-: CPT

## 2018-05-21 ENCOUNTER — OFFICE VISIT (OUTPATIENT)
Dept: HEMATOLOGY ONCOLOGY | Facility: CLINIC | Age: 73
End: 2018-05-21
Payer: COMMERCIAL

## 2018-05-21 VITALS
SYSTOLIC BLOOD PRESSURE: 118 MMHG | DIASTOLIC BLOOD PRESSURE: 80 MMHG | RESPIRATION RATE: 20 BRPM | WEIGHT: 196 LBS | HEIGHT: 61 IN | BODY MASS INDEX: 37 KG/M2 | OXYGEN SATURATION: 92 % | TEMPERATURE: 96.6 F | HEART RATE: 111 BPM

## 2018-05-21 DIAGNOSIS — C34.90 NON-SMALL CELL CARCINOMA OF LUNG (HCC): Primary | ICD-10-CM

## 2018-05-21 PROCEDURE — 99214 OFFICE O/P EST MOD 30 MIN: CPT | Performed by: INTERNAL MEDICINE

## 2018-05-21 PROCEDURE — 4040F PNEUMOC VAC/ADMIN/RCVD: CPT | Performed by: INTERNAL MEDICINE

## 2018-05-21 NOTE — PROGRESS NOTES
Hematology / Oncology Outpatient Follow Up Note    Flor Gould 74 y o  female :1945 UXI:[id number]         Date:  2018    Assessment / Plan:  A 74year old female with stage IIIA non-small cell lung cancer with adenocarcinoma, diagnosed in 2013  She completed concurrent chemoradiation followed by 2 cycle of consolidation chemotherapy with carboplatin and paclitaxel with minimal side effects, resulting in complete remission  Clinically as well as radiographically, she has no evidence recurrent disease  I would continue with surveillance, just because she is active smoker  I will see her again in a year with CT scan of chest   I strongly recommended her to stop smoking  She understood  Subjective:     HPI:          Interval History:  A 74year old female with stage IIIA non-small cell lung cancer with adenocarcinoma histology  Her lung cancer was diagnosed incidentally  She underwent concurrent chemoradiation with weekly low-dose carboplatin and paclitaxel followed by 2 cycles of consolidation chemotherapy which was completed on 2013 resulted in at least good partial response  She came in today for routine followup  She has 8-9 lb of interval weight gain  She still smokes more than 1 pack per day  She has exertional shortness of breath as well as chronic cough but no hemoptysis  She has no complaint of pain  Recent CT scan showed no evidence of recurrent disease  Her performance status is 1/4 on the ECOG scale  Objective:     Primary Diagnosis:    Non-small cell lung cancer with adenocarcinoma histology, stage IIIA, diagnosed in 2013  Cancer Staging:  Cancer Staging  No matching staging information was found for the patient  Previous Hematologic/ Oncologic Treatment:     1   Concurrent chemoradiation with weekly carboplatin and paclitaxel completed on 2013  2  2 cycle of consolidation chemotherapy with carboplatin and paclitaxel completed in June 2013  Current Hematologic/ Oncologic Treatment:      Observation  Disease Status:     Complete remission  Test Results:    Pathology:        Radiology:    CT scan of chest in May 2018 showed stable pleural based opacity in the left lateral aspect of the left lung  Stable post radiation change in the left perihilar region  Background emphysema  Laboratory:        Physical Exam:      General Appearance:    Alert, oriented        Eyes:    PERRL   Ears:    Normal external ear canals, both ears   Nose:   Nares normal, septum midline   Throat:   Mucosa moist  Pharynx without injection  Neck:   Supple       Lungs:     Clear to auscultation bilaterally   Chest Wall:    No tenderness or deformity    Heart:    Regular rate and rhythm       Abdomen:     Soft, non-tender, bowel sounds +, no organomegaly           Extremities:   Extremities no cyanosis or edema       Skin:   no rash or icterus  Lymph nodes:   Cervical, supraclavicular, and axillary nodes normal   Neurologic:   CNII-XII intact, normal strength, sensation and reflexes     Throughout          Breast exam:   NA         ROS: Review of Systems   Respiratory:        Exertional shortness of breath, chronic cough  All other systems reviewed and are negative  Imaging: Ct Chest Wo Contrast    Result Date: 5/18/2018  Narrative: CT CHEST WITHOUT IV CONTRAST INDICATION:  History of lung cancer  Status post radiation therapy 5 years ago  Additional history of bladder cancer  COMPARISON: CT scan 11/13/2017  TECHNIQUE: CT examination of the chest was performed without intravenous contrast   Reformatted images were created in axial, sagittal, and coronal planes  Radiation dose length product (DLP) for this visit:  371 mGy-cm     This examination, like all CT scans performed in the [facility], was performed utilizing techniques to minimize radiation dose exposure, including the use of iterative reconstruction and automated exposure control  FINDINGS: LUNGS:  Stable background emphysema  Post radiation changes are noted in the left perihilar region  Stable pleural-based opacity in the lateral aspect of the measuring 1 8 x 1 2 cm  Stable tiny right apical nodules  No other new abnormalities are seen  in the lungs  PLEURA:  Unremarkable  HEART/GREAT VESSELS:  Unchanged MEDIASTINUM AND IVETTE:  Unchanged CHEST WALL AND LOWER NECK: Normal  VISUALIZED STRUCTURES IN THE UPPER ABDOMEN:  Stable left adrenal adenoma  Stable splenomegaly measuring 14 cm  OSSEOUS STRUCTURES:  No acute fracture  No destructive osseous lesion  Impression: 1  Stable pleural-based opacity in the lateral aspect of the left lung  2  Stable post radiation changes in the left perihilar region  3   Stable background emphysema  4   Stable splenomegaly and benign left adrenal gland adenoma  Workstation performed: [id number]         Labs:   Lab Results   Component Value Date    WBC 7 43 05/12/2017    HGB 15 8 (H) 05/12/2017    HCT 46 8 (H) 05/12/2017    MCV 99 (H) 05/12/2017     05/12/2017     Lab Results   Component Value Date     05/12/2017    K 4 7 05/12/2017     05/12/2017    CO2 28 05/12/2017    ANIONGAP 7 05/12/2017    BUN 12 05/12/2017    CREATININE 0 65 05/12/2017    GLUCOSE 125 11/16/2016    GLUF 98 05/12/2017    CALCIUM 10 4 (H) 05/12/2017    CORRECTEDCA 10 6 (H) 05/12/2017    AST 19 05/12/2017    ALT 30 05/12/2017    ALKPHOS 95 05/12/2017    PROT 7 9 05/12/2017    BILITOT 0 37 05/12/2017    EGFR >60 0 05/12/2017       Current Medications: Reviewed  Allergies: Reviewed  PMH/FH/SH:  Reviewed      Vital Sign:    Body surface area is 1 87 meters squared      Wt Readings from Last 3 Encounters:   05/21/18 88 9 kg (196 lb)   02/05/18 82 1 kg (181 lb)   11/20/17 78 9 kg (174 lb)        Temp Readings from Last 3 Encounters:   05/21/18 (!) 96 6 °F (35 9 °C) (Tympanic)   11/20/17 (!) 96 °F (35 6 °C)   05/22/17 (!) 97 1 °F (36 2 °C)        BP Readings from Last 3 Encounters:   05/21/18 118/80   02/05/18 122/80   11/20/17 122/72         Pulse Readings from Last 3 Encounters:   05/21/18 (!) 111   02/05/18 89   11/20/17 105     @LASTSAO2(3)@

## 2018-10-01 ENCOUNTER — IMMUNIZATION (OUTPATIENT)
Dept: FAMILY MEDICINE CLINIC | Facility: CLINIC | Age: 73
End: 2018-10-01
Payer: COMMERCIAL

## 2018-10-01 DIAGNOSIS — Z23 NEED FOR INFLUENZA VACCINATION: Primary | ICD-10-CM

## 2018-10-01 PROCEDURE — 90662 IIV NO PRSV INCREASED AG IM: CPT | Performed by: FAMILY MEDICINE

## 2018-10-01 PROCEDURE — G0008 ADMIN INFLUENZA VIRUS VAC: HCPCS | Performed by: FAMILY MEDICINE

## 2019-01-15 ENCOUNTER — TRANSITIONAL CARE MANAGEMENT (OUTPATIENT)
Dept: FAMILY MEDICINE CLINIC | Facility: CLINIC | Age: 74
End: 2019-01-15

## 2019-02-08 DIAGNOSIS — E03.9 HYPOTHYROIDISM, UNSPECIFIED TYPE: ICD-10-CM

## 2019-02-10 RX ORDER — LEVOTHYROXINE SODIUM 0.12 MG/1
TABLET ORAL
Qty: 90 TABLET | Refills: 3 | Status: SHIPPED | OUTPATIENT
Start: 2019-02-10 | End: 2020-02-25 | Stop reason: SDUPTHER

## 2019-03-20 DIAGNOSIS — J43.9 PULMONARY EMPHYSEMA, UNSPECIFIED EMPHYSEMA TYPE (HCC): ICD-10-CM

## 2019-05-13 ENCOUNTER — TELEPHONE (OUTPATIENT)
Dept: HEMATOLOGY ONCOLOGY | Facility: CLINIC | Age: 74
End: 2019-05-13

## 2019-05-13 ENCOUNTER — HOSPITAL ENCOUNTER (OUTPATIENT)
Dept: CT IMAGING | Facility: HOSPITAL | Age: 74
Discharge: HOME | End: 2019-05-13
Attending: INTERNAL MEDICINE
Payer: COMMERCIAL

## 2019-05-13 DIAGNOSIS — C34.90 NON-SMALL CELL CARCINOMA OF LUNG (HCC): ICD-10-CM

## 2019-05-13 PROCEDURE — 71250 CT THORAX DX C-: CPT

## 2019-05-23 ENCOUNTER — OFFICE VISIT (OUTPATIENT)
Dept: HEMATOLOGY ONCOLOGY | Facility: CLINIC | Age: 74
End: 2019-05-23
Payer: COMMERCIAL

## 2019-05-23 VITALS
WEIGHT: 209 LBS | DIASTOLIC BLOOD PRESSURE: 80 MMHG | OXYGEN SATURATION: 93 % | BODY MASS INDEX: 39.46 KG/M2 | HEART RATE: 107 BPM | SYSTOLIC BLOOD PRESSURE: 124 MMHG | RESPIRATION RATE: 22 BRPM | TEMPERATURE: 97.5 F | HEIGHT: 61 IN

## 2019-05-23 DIAGNOSIS — C34.90 NON-SMALL CELL CARCINOMA OF LUNG (HCC): Primary | ICD-10-CM

## 2019-05-23 PROCEDURE — 99214 OFFICE O/P EST MOD 30 MIN: CPT | Performed by: INTERNAL MEDICINE

## 2019-05-28 ENCOUNTER — TELEPHONE (OUTPATIENT)
Dept: UROLOGY | Facility: AMBULATORY SURGERY CENTER | Age: 74
End: 2019-05-28

## 2019-05-28 DIAGNOSIS — Z85.51 HISTORY OF BLADDER CANCER: ICD-10-CM

## 2019-05-28 DIAGNOSIS — N39.0 ACUTE UTI: Primary | ICD-10-CM

## 2019-05-31 DIAGNOSIS — N39.0 URINARY TRACT INFECTION WITHOUT HEMATURIA, SITE UNSPECIFIED: Primary | ICD-10-CM

## 2019-05-31 LAB
APPEARANCE UR: CLEAR
BACTERIA UR QL AUTO: ABNORMAL /HPF
BILIRUB UR QL STRIP: NEGATIVE
CLINICAL INFO: NORMAL
COLOR UR: YELLOW
GLUCOSE UR QL STRIP: NEGATIVE
HGB UR QL STRIP: NEGATIVE
HYALINE CASTS #/AREA URNS LPF: ABNORMAL /LPF
KETONES UR QL STRIP: NEGATIVE
LEUKOCYTE ESTERASE UR QL STRIP: ABNORMAL
NITRITE UR QL STRIP: NEGATIVE
PATH REPORT.FINAL DX SPEC: NORMAL
PH UR STRIP: 6.5 [PH] (ref 5–8)
PROT UR QL STRIP: NEGATIVE
RBC #/AREA URNS HPF: ABNORMAL /HPF
SP GR UR STRIP: 1.01 (ref 1–1.03)
SPECIMEN SOURCE: NORMAL
SQUAMOUS #/AREA URNS HPF: ABNORMAL /HPF
WBC #/AREA URNS HPF: ABNORMAL /HPF

## 2019-05-31 RX ORDER — CIPROFLOXACIN 500 MG/1
500 TABLET, FILM COATED ORAL 2 TIMES DAILY
Qty: 10 TABLET | Refills: 0 | Status: SHIPPED | OUTPATIENT
Start: 2019-05-31 | End: 2019-06-05

## 2019-07-17 ENCOUNTER — PROCEDURE VISIT (OUTPATIENT)
Dept: UROLOGY | Facility: CLINIC | Age: 74
End: 2019-07-17
Payer: COMMERCIAL

## 2019-07-17 VITALS
WEIGHT: 211 LBS | HEART RATE: 101 BPM | DIASTOLIC BLOOD PRESSURE: 88 MMHG | BODY MASS INDEX: 39.84 KG/M2 | SYSTOLIC BLOOD PRESSURE: 144 MMHG | HEIGHT: 61 IN

## 2019-07-17 DIAGNOSIS — N39.0 URINARY TRACT INFECTION WITHOUT HEMATURIA, SITE UNSPECIFIED: ICD-10-CM

## 2019-07-17 DIAGNOSIS — N30.00 ACUTE CYSTITIS WITHOUT HEMATURIA: ICD-10-CM

## 2019-07-17 DIAGNOSIS — Z85.51 HISTORY OF BLADDER CANCER: Primary | ICD-10-CM

## 2019-07-17 DIAGNOSIS — C67.9 MALIGNANT NEOPLASM OF URINARY BLADDER, UNSPECIFIED SITE (HCC): ICD-10-CM

## 2019-07-17 LAB
SL AMB  POCT GLUCOSE, UA: NORMAL
SL AMB LEUKOCYTE ESTERASE,UA: NORMAL
SL AMB POCT BILIRUBIN,UA: NORMAL
SL AMB POCT BLOOD,UA: NORMAL
SL AMB POCT CLARITY,UA: CLEAR
SL AMB POCT COLOR,UA: YELLOW
SL AMB POCT KETONES,UA: NORMAL
SL AMB POCT NITRITE,UA: NORMAL
SL AMB POCT PH,UA: 6.5
SL AMB POCT SPECIFIC GRAVITY,UA: 1.02
SL AMB POCT URINE PROTEIN: NORMAL
SL AMB POCT UROBILINOGEN: NORMAL

## 2019-07-17 PROCEDURE — 52000 CYSTOURETHROSCOPY: CPT | Performed by: UROLOGY

## 2019-07-17 PROCEDURE — 81002 URINALYSIS NONAUTO W/O SCOPE: CPT | Performed by: UROLOGY

## 2019-07-17 NOTE — PROGRESS NOTES
Cystoscopy  Date/Time: 7/17/2019 1:35 PM  Performed by: Latasha Ferrer MD  Authorized by: Latasha Ferrer MD     Procedure details: cystoscopy        Art Chowdhury is a 61-year-old female last seen in 2016 with a history of bladder cancer dating back to 2010  She had a recent UTI revealing E coli  This was treated with antibiotics  She now presents to undergo surveillance cystoscopy  Urine cytology is negative for malignant cells  She denies seen any recent gross hematuria  Her last upper tract imaging was less than 2 years ago with a CT scan of the abdomen and pelvis with contrast        Cystoscopy Procedure note    Risk and benefits of flexible cystoscopy were discussed  Informed consent was obtained  A urine dip is adequate for cystoscopy  The patient was placed into the modified supine position  Her genitalia was prepped and draped in a sterile fashion  Viscous lidocaine was instilled into the urethra  Flexible cystoscopy was then performed  The bladder was thoroughly inspected  Both ureteral orifices were visualized with clear efflux of urine  The bladder mucosa was thoroughly inspected  Mild posterior wall erythema was identified  This was more consistent with inflammatory changes then with recurrent urothelial carcinoma  Perhaps this was sequelae from recent UTI  Otherwise there was no sign of urothelial carcinoma the bladder identified  My impression is resolved UTI, history of urothelial carcinoma the bladder without recurrence  Although I do not feel that the erythema on the posterior wall of the bladder is recurrent urothelial carcinoma, I do recommend repeating cystoscopy and cytology in approximately 4 months time  If cystoscopy remains stable she can then return on a yearly basis for cystoscopy and cytology

## 2019-10-21 ENCOUNTER — IMMUNIZATIONS (OUTPATIENT)
Dept: FAMILY MEDICINE CLINIC | Facility: CLINIC | Age: 74
End: 2019-10-21
Payer: COMMERCIAL

## 2019-10-21 DIAGNOSIS — Z23 FLU VACCINE NEED: Primary | ICD-10-CM

## 2019-10-21 PROCEDURE — 90662 IIV NO PRSV INCREASED AG IM: CPT

## 2019-10-21 PROCEDURE — G0008 ADMIN INFLUENZA VIRUS VAC: HCPCS

## 2019-11-11 ENCOUNTER — APPOINTMENT (OUTPATIENT)
Dept: LAB | Facility: MEDICAL CENTER | Age: 74
End: 2019-11-11
Payer: COMMERCIAL

## 2019-11-11 DIAGNOSIS — Z85.51 HISTORY OF BLADDER CANCER: ICD-10-CM

## 2019-11-11 DIAGNOSIS — N39.0 URINARY TRACT INFECTION WITHOUT HEMATURIA, SITE UNSPECIFIED: ICD-10-CM

## 2019-11-11 LAB
BACTERIA UR QL AUTO: NORMAL /HPF
BILIRUB UR QL STRIP: NEGATIVE
CLARITY UR: CLEAR
COLOR UR: YELLOW
GLUCOSE UR STRIP-MCNC: NEGATIVE MG/DL
HGB UR QL STRIP.AUTO: NEGATIVE
HYALINE CASTS #/AREA URNS LPF: NORMAL /LPF
KETONES UR STRIP-MCNC: NEGATIVE MG/DL
LEUKOCYTE ESTERASE UR QL STRIP: NEGATIVE
NITRITE UR QL STRIP: NEGATIVE
NON-SQ EPI CELLS URNS QL MICRO: NORMAL /HPF
PH UR STRIP.AUTO: 6.5 [PH]
PROT UR STRIP-MCNC: NEGATIVE MG/DL
RBC #/AREA URNS AUTO: NORMAL /HPF
SP GR UR STRIP.AUTO: 1 (ref 1–1.03)
UROBILINOGEN UR QL STRIP.AUTO: 0.2 E.U./DL
WBC #/AREA URNS AUTO: NORMAL /HPF

## 2019-11-11 PROCEDURE — 87086 URINE CULTURE/COLONY COUNT: CPT

## 2019-11-11 PROCEDURE — 81001 URINALYSIS AUTO W/SCOPE: CPT | Performed by: UROLOGY

## 2019-11-11 PROCEDURE — 88112 CYTOPATH CELL ENHANCE TECH: CPT | Performed by: PATHOLOGY

## 2019-11-12 LAB — BACTERIA UR CULT: NORMAL

## 2019-11-18 ENCOUNTER — PROCEDURE VISIT (OUTPATIENT)
Dept: UROLOGY | Facility: CLINIC | Age: 74
End: 2019-11-18
Payer: COMMERCIAL

## 2019-11-18 VITALS
WEIGHT: 211 LBS | HEART RATE: 95 BPM | DIASTOLIC BLOOD PRESSURE: 84 MMHG | BODY MASS INDEX: 39.84 KG/M2 | HEIGHT: 61 IN | SYSTOLIC BLOOD PRESSURE: 136 MMHG

## 2019-11-18 DIAGNOSIS — C67.4 MALIGNANT NEOPLASM OF POSTERIOR WALL OF URINARY BLADDER (HCC): ICD-10-CM

## 2019-11-18 DIAGNOSIS — Z85.51 HISTORY OF BLADDER CANCER: Primary | ICD-10-CM

## 2019-11-18 LAB
SL AMB  POCT GLUCOSE, UA: NORMAL
SL AMB LEUKOCYTE ESTERASE,UA: NORMAL
SL AMB POCT BILIRUBIN,UA: NORMAL
SL AMB POCT BLOOD,UA: NORMAL
SL AMB POCT CLARITY,UA: CLEAR
SL AMB POCT COLOR,UA: YELLOW
SL AMB POCT KETONES,UA: NORMAL
SL AMB POCT NITRITE,UA: NORMAL
SL AMB POCT PH,UA: NORMAL
SL AMB POCT SPECIFIC GRAVITY,UA: 1
SL AMB POCT URINE PROTEIN: 5
SL AMB POCT UROBILINOGEN: NORMAL

## 2019-11-18 PROCEDURE — 81002 URINALYSIS NONAUTO W/O SCOPE: CPT | Performed by: UROLOGY

## 2019-11-18 PROCEDURE — 52000 CYSTOURETHROSCOPY: CPT | Performed by: UROLOGY

## 2019-11-18 RX ORDER — SODIUM CHLORIDE 9 MG/ML
125 INJECTION, SOLUTION INTRAVENOUS CONTINUOUS
Status: CANCELLED | OUTPATIENT
Start: 2019-11-18

## 2019-11-18 RX ORDER — CIPROFLOXACIN 2 MG/ML
400 INJECTION, SOLUTION INTRAVENOUS ONCE
Status: CANCELLED | OUTPATIENT
Start: 2019-11-18 | End: 2019-11-18

## 2019-11-18 RX ORDER — RIBOFLAVIN (VITAMIN B2) 100 MG
100 TABLET ORAL DAILY
COMMUNITY

## 2019-11-18 NOTE — PROGRESS NOTES
11/18/2019    Frederick Torrez  1945  [id number]        Assessment  Recurrent bladder cancer      Discussion  Based on cystoscopic findings in the office today I recommend cystoscopy with bladder biopsy, possible TURBT, fulguration, and intravesical instillation of mitomycin in the operating room  Risk and benefits of the procedure discussed and reviewed  Informed consent obtained  History of Present Illness  66 y o  female with a history of bladder cancer dating back to 2010  She recently had cystoscopy performed in the office in July 2019 which revealed some posterior wall erythema without simeon recurrence of urothelial carcinoma  She returns in follow-up today  She denies any gross hematuria  Urine cytology is negative for malignant cells  She presents for repeat cystoscopy  AUA Symptom Score      Review of Systems  Review of Systems   Constitutional: Negative  HENT: Negative  Eyes: Negative  Respiratory: Negative  Cardiovascular: Negative  Gastrointestinal: Negative  Endocrine: Negative  Genitourinary: Negative  Musculoskeletal: Negative  Skin: Negative  Allergic/Immunologic: Negative  Neurological: Negative  Hematological: Negative  Psychiatric/Behavioral: Negative            Past Medical History  Past Medical History:   Diagnosis Date    COPD [facility]chronic obstructive [facility] disease) ([facility] )     Hypothyroidism        Past Social History  Past Surgical History:   Procedure Laterality Date    CHOLECYSTECTOMY      CYSTOSCOPY      4/10 AND 8/10, 3/12/2015 4/18/2016  DIAGNOSTIC     HYSTERECTOMY      OTHER SURGICAL HISTORY      TRANSURETHERAL RESECTION        Past Family History  Family History   Problem Relation Age of Onset    COPD Mother        Past Social history  Social History     Socioeconomic History    Marital status: Single     Spouse name: Not on file    Number of children: Not on file    Years of education: Not on file    Highest education level: Not on file   Occupational History    Not on file   Social Needs    Financial resource strain: Not on file    Food insecurity:     Worry: Not on file     Inability: Not on file    Transportation needs:     Medical: Not on file     Non-medical: Not on file   Tobacco Use    Smoking status: Current Every Day Smoker     Packs/day: 2 00     Types: Cigarettes    Smokeless tobacco: Never Used   Substance and Sexual Activity    Alcohol use: No    Drug use: No    Sexual activity: Not on file   Lifestyle    Physical activity:     Days per week: Not on file     Minutes per session: Not on file    Stress: Not on file   Relationships    Social connections:     Talks on phone: Not on file     Gets together: Not on file     Attends [employer] service: Not on file     Active member of club or organization: Not on file     Attends meetings of clubs or organizations: Not on file     Relationship status: Not on file    Intimate partner violence:     Fear of current or ex partner: Not on file     Emotionally abused: Not on file     Physically abused: Not on file     Forced sexual activity: Not on file   Other Topics Concern    Not on file   Social History Narrative    Not on file       Current Medications  Current Outpatient Medications   Medication Sig Dispense Refill    Ascorbic Acid (VITAMIN C) 100 MG tablet Take 100 mg by mouth daily      aspirin 81 MG tablet Take 1 tablet by mouth daily      Calcium 600 MG tablet Take 1 tablet by mouth 2 (two) times a day      Chromium 200 MCG TABS Take 1 tablet by mouth daily      levothyroxine 125 mcg tablet TAKE ONE TABLET BY MOUTH ONE TIME DAILY 90 tablet 3    magnesium 30 MG tablet Take 30 mg by mouth 2 (two) times a day      melatonin 1 mg Take 1 mg by mouth daily at bedtime      Multiple Vitamin (MULTIVITAMINS PO) Take 1 tablet by mouth daily      Omega-3 Fatty Acids (FISH OIL) 1200 MG CAPS Take by mouth      tiotropium (SPIRIVA HANDIHALER) 18 mcg inhalation capsule Place 1 capsule (18 mcg total) into inhaler and inhale daily 90 capsule 3    albuterol (VENTOLIN HFA) 90 mcg/act inhaler Inhale 2 puffs every 6 (six) hours as needed for wheezing (Patient not taking: Reported on 7/17/2019) 1 Inhaler 5    vitamin E, tocopherol, (GNP VITAMIN E) 400 units capsule Take 1 tablet by mouth daily       No current facility-administered medications for this visit  Allergies  Allergies   Allergen Reactions    Erythromycin     Penicillins     Sulfa Antibiotics     Tetracyclines & Related        Past Medical History, Social History, Family History, medications and allergies were reviewed  Vitals  Vitals:    11/18/19 1340   BP: 136/84   Pulse: 95   Weight: 95 7 kg (211 lb)   Height: 5' 1" (1 549 m)       Physical Exam  Physical Exam      On examination she is in no acute distress  Lungs clear  Cardiac is regular  Abdomen is soft nontender nondistended   examination reveals no CVA tenderness  Skin is warm  Extremities without edema    Neurologic is grossly intact and nonfocal   Gait normal   Affect    Results  No results found for: PSA  Lab Results   Component Value Date    GLUCOSE 83 11/20/2015    CALCIUM 10 4 (H) 05/12/2017     11/20/2015    K 4 7 05/12/2017    CO2 28 05/12/2017     05/12/2017    BUN 12 05/12/2017    CREATININE 0 65 05/12/2017     Lab Results   Component Value Date    WBC 7 43 05/12/2017    HGB 15 8 (H) 05/12/2017    HCT 46 8 (H) 05/12/2017    MCV 99 (H) 05/12/2017     05/12/2017         Office Urine Dip  Recent Results (from the past 1 hour(s))   POCT urine dip    Collection Time: 11/18/19  1:50 PM   Result Value Ref Range    LEUKOCYTE ESTERASE,UA n     NITRITE,UA n     SL AMB POCT UROBILINOGEN n     POCT URINE PROTEIN 5 0      PH,UA n     BLOOD,UA n     SPECIFIC GRAVITY,UA 1 005     KETONES,UA n     BILIRUBIN,UA n     GLUCOSE, UA n      COLOR,UA yellow     CLARITY,UA clear    ]      Cystoscopy  Date/Time: 11/18/2019 2:32 PM  Performed by: Leslie Leiva MD  Authorized by: Leslie Leiva MD     Procedure details: cystoscopy         Cystoscopy Procedure note    Risk and benefits of flexible cystoscopy were discussed  Informed consent was obtained  A urine dip is adequate for cystoscopy  The patient was placed into the modified supine position  Her genitalia was prepped and draped in a sterile fashion  Viscous lidocaine was instilled into the urethra  Flexible cystoscopy was then performed  The bladder was thoroughly inspected  Both ureteral orifices were visualized with clear efflux of urine  The bladder mucosa was thoroughly inspected  Along the posterior wall of the bladder there appeared to be superficial appearing recurrence of urothelial carcinoma

## 2019-11-19 PROBLEM — Z85.51 HISTORY OF BLADDER CANCER: Status: ACTIVE | Noted: 2019-11-19

## 2019-11-20 ENCOUNTER — PREP FOR PROCEDURE (OUTPATIENT)
Dept: UROLOGY | Facility: CLINIC | Age: 74
End: 2019-11-20

## 2019-11-20 ENCOUNTER — TELEPHONE (OUTPATIENT)
Dept: UROLOGY | Facility: CLINIC | Age: 74
End: 2019-11-20

## 2019-11-20 DIAGNOSIS — N39.0 URINARY TRACT INFECTION WITHOUT HEMATURIA, SITE UNSPECIFIED: ICD-10-CM

## 2019-11-20 DIAGNOSIS — C67.9 MALIGNANT NEOPLASM OF URINARY BLADDER, UNSPECIFIED SITE (HCC): Primary | ICD-10-CM

## 2019-11-20 DIAGNOSIS — Z01.818 PREOP EXAMINATION: ICD-10-CM

## 2019-11-20 DIAGNOSIS — E03.9 HYPOTHYROIDISM, UNSPECIFIED TYPE: ICD-10-CM

## 2019-11-20 NOTE — TELEPHONE ENCOUNTER
I spoke with pt and scheduled her for a cysto/TURBT/ Pedro installation at the [facility] with Dr Jinny Leonardo on 12/23/19  I verbally went over all of pt 's pre-op instructions and prep information, pt verbalized understanding  She will have labs done as soon as possible at a [place] [address] facility, and her EKF will be done on 12/16/19 with her PCP  Pt is also aware that she is to have a urine culture done 1 week prior to the surgery to make sure she does not have a UTI  Per pt 's request I mailed her a copy of her surgical packet and instructed her to call me with any questions or concerns regarding this procedure

## 2019-12-06 ENCOUNTER — APPOINTMENT (OUTPATIENT)
Dept: LAB | Facility: MEDICAL CENTER | Age: 74
End: 2019-12-06
Payer: COMMERCIAL

## 2019-12-06 DIAGNOSIS — E03.9 HYPOTHYROIDISM, UNSPECIFIED TYPE: ICD-10-CM

## 2019-12-06 DIAGNOSIS — N39.0 URINARY TRACT INFECTION WITHOUT HEMATURIA, SITE UNSPECIFIED: ICD-10-CM

## 2019-12-06 DIAGNOSIS — Z01.818 PREOP EXAMINATION: ICD-10-CM

## 2019-12-06 DIAGNOSIS — C67.9 MALIGNANT NEOPLASM OF URINARY BLADDER, UNSPECIFIED SITE (HCC): ICD-10-CM

## 2019-12-06 LAB
ALBUMIN SERPL BCP-MCNC: 4.1 G/DL (ref 3.5–5)
ANION GAP SERPL CALCULATED.3IONS-SCNC: 5 MMOL/L (ref 4–13)
BASOPHILS # BLD AUTO: 0.05 THOUSANDS/ΜL (ref 0–0.1)
BASOPHILS NFR BLD AUTO: 1 % (ref 0–1)
BUN SERPL-MCNC: 16 MG/DL (ref 5–25)
CALCIUM ALBUM COR SERPL-MCNC: 10.2 MG/DL (ref 8.3–10.1)
CALCIUM SERPL-MCNC: 10.3 MG/DL (ref 8.3–10.1)
CALCIUM SERPL-MCNC: 10.3 MG/DL (ref 8.3–10.1)
CHLORIDE SERPL-SCNC: 108 MMOL/L (ref 100–108)
CO2 SERPL-SCNC: 28 MMOL/L (ref 21–32)
CREAT SERPL-MCNC: 0.8 MG/DL (ref 0.6–1.3)
EOSINOPHIL # BLD AUTO: 0.23 THOUSAND/ΜL (ref 0–0.61)
EOSINOPHIL NFR BLD AUTO: 4 % (ref 0–6)
ERYTHROCYTE [DISTWIDTH] IN BLOOD BY AUTOMATED COUNT: 12.3 % (ref 11.6–15.1)
GFR SERPL CREATININE-BSD FRML MDRD: 72 ML/MIN/1.73SQ M
GLUCOSE P FAST SERPL-MCNC: 108 MG/DL (ref 65–99)
HCT VFR BLD AUTO: 44.9 % (ref 34.8–46.1)
HGB BLD-MCNC: 14.5 G/DL (ref 11.5–15.4)
IMM GRANULOCYTES # BLD AUTO: 0.02 THOUSAND/UL (ref 0–0.2)
IMM GRANULOCYTES NFR BLD AUTO: 0 % (ref 0–2)
LYMPHOCYTES # BLD AUTO: 1.83 THOUSANDS/ΜL (ref 0.6–4.47)
LYMPHOCYTES NFR BLD AUTO: 28 % (ref 14–44)
MCH RBC QN AUTO: 32.2 PG (ref 26.8–34.3)
MCHC RBC AUTO-ENTMCNC: 32.3 G/DL (ref 31.4–37.4)
MCV RBC AUTO: 100 FL (ref 82–98)
MONOCYTES # BLD AUTO: 0.34 THOUSAND/ΜL (ref 0.17–1.22)
MONOCYTES NFR BLD AUTO: 5 % (ref 4–12)
NEUTROPHILS # BLD AUTO: 3.99 THOUSANDS/ΜL (ref 1.85–7.62)
NEUTS SEG NFR BLD AUTO: 62 % (ref 43–75)
NRBC BLD AUTO-RTO: 0 /100 WBCS
PLATELET # BLD AUTO: 324 THOUSANDS/UL (ref 149–390)
PMV BLD AUTO: 12 FL (ref 8.9–12.7)
POTASSIUM SERPL-SCNC: 4.9 MMOL/L (ref 3.5–5.3)
RBC # BLD AUTO: 4.51 MILLION/UL (ref 3.81–5.12)
SODIUM SERPL-SCNC: 141 MMOL/L (ref 136–145)
WBC # BLD AUTO: 6.46 THOUSAND/UL (ref 4.31–10.16)

## 2019-12-06 PROCEDURE — 85025 COMPLETE CBC W/AUTO DIFF WBC: CPT

## 2019-12-06 PROCEDURE — 82040 ASSAY OF SERUM ALBUMIN: CPT

## 2019-12-06 PROCEDURE — 80048 BASIC METABOLIC PNL TOTAL CA: CPT

## 2019-12-06 PROCEDURE — 36415 COLL VENOUS BLD VENIPUNCTURE: CPT

## 2019-12-16 ENCOUNTER — APPOINTMENT (OUTPATIENT)
Dept: LAB | Facility: MEDICAL CENTER | Age: 74
End: 2019-12-16
Payer: COMMERCIAL

## 2019-12-16 ENCOUNTER — CONSULT (OUTPATIENT)
Dept: FAMILY MEDICINE CLINIC | Facility: CLINIC | Age: 74
End: 2019-12-16
Payer: COMMERCIAL

## 2019-12-16 VITALS
DIASTOLIC BLOOD PRESSURE: 72 MMHG | BODY MASS INDEX: 40.7 KG/M2 | TEMPERATURE: 96.5 F | HEIGHT: 61 IN | WEIGHT: 215.6 LBS | OXYGEN SATURATION: 95 % | RESPIRATION RATE: 24 BRPM | HEART RATE: 96 BPM | SYSTOLIC BLOOD PRESSURE: 138 MMHG

## 2019-12-16 DIAGNOSIS — N39.0 URINARY TRACT INFECTION WITHOUT HEMATURIA, SITE UNSPECIFIED: ICD-10-CM

## 2019-12-16 DIAGNOSIS — Z01.818 PREOP EXAMINATION: ICD-10-CM

## 2019-12-16 DIAGNOSIS — E03.9 HYPOTHYROIDISM, UNSPECIFIED TYPE: ICD-10-CM

## 2019-12-16 DIAGNOSIS — C67.9 ADENOCARCINOMA OF BLADDER (HCC): ICD-10-CM

## 2019-12-16 DIAGNOSIS — Z01.818 PRE-OPERATIVE CLEARANCE: Primary | ICD-10-CM

## 2019-12-16 LAB — TSH SERPL DL<=0.05 MIU/L-ACNC: 1.74 UIU/ML (ref 0.36–3.74)

## 2019-12-16 PROCEDURE — 87086 URINE CULTURE/COLONY COUNT: CPT

## 2019-12-16 PROCEDURE — 93000 ELECTROCARDIOGRAM COMPLETE: CPT | Performed by: INTERNAL MEDICINE

## 2019-12-16 PROCEDURE — 36415 COLL VENOUS BLD VENIPUNCTURE: CPT | Performed by: INTERNAL MEDICINE

## 2019-12-16 PROCEDURE — 3725F SCREEN DEPRESSION PERFORMED: CPT | Performed by: INTERNAL MEDICINE

## 2019-12-16 PROCEDURE — 84443 ASSAY THYROID STIM HORMONE: CPT | Performed by: INTERNAL MEDICINE

## 2019-12-16 PROCEDURE — 99214 OFFICE O/P EST MOD 30 MIN: CPT | Performed by: INTERNAL MEDICINE

## 2019-12-16 NOTE — PROGRESS NOTES
Assessment/Plan:     Diagnoses and all orders for this visit:    Pre-operative clearance  -     POCT ECG    Adenocarcinoma of bladder (HCC)    Hypothyroidism, unspecified type  -     TSH, 3rd generation with Free T4 reflex      Yamilet Rhodes was seen and examined in the office today  Repeat BP was much improved after checking this again  She does have known hypothyroidism but has not had testing in some time  Also given the myalgias, it should be checked  EKG was completed in the office and looks largely stable  She has not had any issues with anesthesia in the past     No further cardiac workup is recommended and is cleared for the upcoming procedure  Subjective:      Patient ID: Vik Steve is a 66 y o  female  Yamilet Rhodes is here today for a preoperative clearance examination  She is going to undergo transurethral resection of a bladder tumor/biopsy with mitocin installation  She has a known history of bladder cancer  Other chart was reviewed and updated  She is largely doing okay but notes some generalized myalgias without any other specific complaints  The following portions of the patient's history were reviewed and updated as appropriate: allergies, current medications, past family history, past medical history, past social history, past surgical history and problem list     Review of Systems   Constitutional: Negative for chills, fever and unexpected weight change  HENT: Negative for sinus pressure, sinus pain and sore throat  Eyes: Negative for visual disturbance  Respiratory: Negative for cough, chest tightness, shortness of breath and wheezing  Cardiovascular: Negative for chest pain, palpitations and leg swelling  Gastrointestinal: Negative for abdominal pain, blood in stool, constipation, diarrhea, nausea and vomiting  Genitourinary: Negative for difficulty urinating and dysuria  Musculoskeletal: Positive for myalgias  Negative for arthralgias and back pain     Neurological: Negative for dizziness, syncope, numbness and headaches  Psychiatric/Behavioral: Negative for dysphoric mood and sleep disturbance  The patient is not nervous/anxious  Objective:      /72   Pulse 96   Temp (!) 96 5 °F (35 8 °C)   Resp (!) 24   Ht 5' 1" (1 549 m)   Wt 97 8 kg (215 lb 9 6 oz)   SpO2 95%   BMI 40 74 kg/m²          Physical Exam   Constitutional: She is oriented to person, place, and time  She appears well-developed and well-nourished  No distress  HENT:   Head: Normocephalic and atraumatic  Right Ear: External ear normal    Left Ear: External ear normal    Mouth/Throat: Oropharynx is clear and moist    Eyes: Pupils are equal, round, and reactive to light  Conjunctivae and EOM are normal  Right eye exhibits no discharge  Left eye exhibits no discharge  Neck: Normal range of motion  Neck supple  No thyromegaly present  Cardiovascular: Normal rate, regular rhythm and normal heart sounds  Pulmonary/Chest: Effort normal and breath sounds normal  No respiratory distress  She has no wheezes  Abdominal: Soft  Bowel sounds are normal  There is no tenderness  Musculoskeletal: Normal range of motion  She exhibits no edema  Lymphadenopathy:     She has no cervical adenopathy  Neurological: She is alert and oriented to person, place, and time  Skin: Skin is warm and dry  She is not diaphoretic  Keratotic lesion of the right ear region   Psychiatric: She has a normal mood and affect  Her speech is normal and behavior is normal  Judgment and thought content normal    Vitals reviewed

## 2019-12-17 LAB — BACTERIA UR CULT: NORMAL

## 2019-12-20 ENCOUNTER — ANESTHESIA EVENT (OUTPATIENT)
Dept: PERIOP | Facility: HOSPITAL | Age: 74
End: 2019-12-20
Payer: COMMERCIAL

## 2019-12-20 NOTE — ANESTHESIA PREPROCEDURE EVALUATION
Review of Systems/Medical History          Cardiovascular  Negative cardio ROS Exercise tolerance (METS): <4,  Hyperlipidemia,    Pulmonary  Smoker (quit 1 yr ago ) ex-smoker  Cumulative Pack Years: 80, COPD (h/o non small cell CA of left lung stage 3) ,   Comment: Lung CA 2013  Got chemo and radiation      GI/Hepatic  Negative GI/hepatic ROS          Negative  ROS Genitourinary malignancy ( bladderm CA since 2010  with new exacerbation ) Bladder cancer,        Endo/Other  History of thyroid disease , hypothyroidism,   Obesity    GYN  Negative gynecology ROS          Hematology  Negative hematology ROS      Musculoskeletal  Negative musculoskeletal ROS        Neurology  Negative neurology ROS      Psychology   Negative psychology ROS              Physical Exam    Airway    Mallampati score: II  TM Distance: >3 FB  Neck ROM: full     Dental   upper dentures and lower dentures,     Cardiovascular  Comment: Negative ROS, Cardiovascular exam normal    Pulmonary  Pulmonary exam normal     Other Findings        Anesthesia Plan  ASA Score- 3     Anesthesia Type- general with ASA Monitors  Additional Monitors:   Airway Plan: LMA  Plan Factors-Patient not instructed to abstain from smoking on day of procedure  Patient did not smoke on day of surgery  Induction- intravenous  Postoperative Plan-     Informed Consent- Anesthetic plan and risks discussed with patient  I personally reviewed this patient with the CRNA  Discussed and agreed on the Anesthesia Plan with the CRNA  Brian Blake           No results found for: HGBA1C    Lab Results   Component Value Date     11/20/2015    K 4 9 12/06/2019     12/06/2019    CO2 28 12/06/2019    ANIONGAP 3 (L) 11/20/2015    BUN 16 12/06/2019    CREATININE 0 80 12/06/2019    GLUCOSE 83 11/20/2015    GLUF 108 (H) 12/06/2019    CALCIUM 10 3 (H) 12/06/2019    CORRECTEDCA 10 2 (H) 12/06/2019    AST 19 05/12/2017    ALT 30 05/12/2017    ALKPHOS 95 05/12/2017    PROT 7 7 11/20/2015    BILITOT 0 47 11/20/2015    EGFR 72 12/06/2019       Lab Results   Component Value Date    WBC 6 46 12/06/2019    HGB 14 5 12/06/2019    HCT 44 9 12/06/2019     (H) 12/06/2019     12/06/2019   ekg ok as per PCP

## 2019-12-23 ENCOUNTER — ANESTHESIA (OUTPATIENT)
Dept: PERIOP | Facility: HOSPITAL | Age: 74
End: 2019-12-23
Payer: COMMERCIAL

## 2019-12-23 ENCOUNTER — TELEPHONE (OUTPATIENT)
Dept: UROLOGY | Facility: CLINIC | Age: 74
End: 2019-12-23

## 2019-12-23 ENCOUNTER — HOSPITAL ENCOUNTER (OUTPATIENT)
Facility: HOSPITAL | Age: 74
Setting detail: OUTPATIENT SURGERY
Discharge: HOME | End: 2019-12-23
Attending: UROLOGY | Admitting: UROLOGY
Payer: COMMERCIAL

## 2019-12-23 VITALS
HEART RATE: 76 BPM | HEIGHT: 61 IN | WEIGHT: 215 LBS | OXYGEN SATURATION: 95 % | SYSTOLIC BLOOD PRESSURE: 146 MMHG | TEMPERATURE: 98.2 F | RESPIRATION RATE: 18 BRPM | BODY MASS INDEX: 40.59 KG/M2 | DIASTOLIC BLOOD PRESSURE: 69 MMHG

## 2019-12-23 DIAGNOSIS — Z85.51 HISTORY OF BLADDER CANCER: ICD-10-CM

## 2019-12-23 PROCEDURE — 87086 URINE CULTURE/COLONY COUNT: CPT | Performed by: UROLOGY

## 2019-12-23 PROCEDURE — 87077 CULTURE AEROBIC IDENTIFY: CPT | Performed by: UROLOGY

## 2019-12-23 PROCEDURE — 87186 SC STD MICRODIL/AGAR DIL: CPT | Performed by: UROLOGY

## 2019-12-23 PROCEDURE — 88305 TISSUE EXAM BY PATHOLOGIST: CPT | Performed by: PATHOLOGY

## 2019-12-23 PROCEDURE — NC001 PR NO CHARGE: Performed by: UROLOGY

## 2019-12-23 PROCEDURE — 52204 CYSTOSCOPY W/BIOPSY(S): CPT | Performed by: UROLOGY

## 2019-12-23 RX ORDER — GLYCINE 1.5 G/100ML
SOLUTION IRRIGATION AS NEEDED
Status: DISCONTINUED | OUTPATIENT
Start: 2019-12-23 | End: 2019-12-23 | Stop reason: HOSPADM

## 2019-12-23 RX ORDER — ONDANSETRON 2 MG/ML
INJECTION INTRAMUSCULAR; INTRAVENOUS AS NEEDED
Status: DISCONTINUED | OUTPATIENT
Start: 2019-12-23 | End: 2019-12-23 | Stop reason: SURG

## 2019-12-23 RX ORDER — HYDROCODONE BITARTRATE AND ACETAMINOPHEN 5; 325 MG/1; MG/1
1 TABLET ORAL EVERY 6 HOURS PRN
Qty: 6 TABLET | Refills: 0 | Status: SHIPPED | OUTPATIENT
Start: 2019-12-23 | End: 2020-01-02

## 2019-12-23 RX ORDER — CIPROFLOXACIN 2 MG/ML
400 INJECTION, SOLUTION INTRAVENOUS ONCE
Status: COMPLETED | OUTPATIENT
Start: 2019-12-23 | End: 2019-12-23

## 2019-12-23 RX ORDER — HYDROCODONE BITARTRATE AND ACETAMINOPHEN 5; 325 MG/1; MG/1
2 TABLET ORAL EVERY 4 HOURS PRN
Status: DISCONTINUED | OUTPATIENT
Start: 2019-12-23 | End: 2019-12-23 | Stop reason: HOSPADM

## 2019-12-23 RX ORDER — LIDOCAINE HYDROCHLORIDE 10 MG/ML
INJECTION, SOLUTION EPIDURAL; INFILTRATION; INTRACAUDAL; PERINEURAL AS NEEDED
Status: DISCONTINUED | OUTPATIENT
Start: 2019-12-23 | End: 2019-12-23 | Stop reason: SURG

## 2019-12-23 RX ORDER — FENTANYL CITRATE 50 UG/ML
INJECTION, SOLUTION INTRAMUSCULAR; INTRAVENOUS AS NEEDED
Status: DISCONTINUED | OUTPATIENT
Start: 2019-12-23 | End: 2019-12-23 | Stop reason: SURG

## 2019-12-23 RX ORDER — DEXAMETHASONE SODIUM PHOSPHATE 10 MG/ML
INJECTION, SOLUTION INTRAMUSCULAR; INTRAVENOUS AS NEEDED
Status: DISCONTINUED | OUTPATIENT
Start: 2019-12-23 | End: 2019-12-23 | Stop reason: SURG

## 2019-12-23 RX ORDER — PROPOFOL 10 MG/ML
INJECTION, EMULSION INTRAVENOUS AS NEEDED
Status: DISCONTINUED | OUTPATIENT
Start: 2019-12-23 | End: 2019-12-23 | Stop reason: SURG

## 2019-12-23 RX ORDER — SODIUM CHLORIDE, SODIUM LACTATE, POTASSIUM CHLORIDE, CALCIUM CHLORIDE 600; 310; 30; 20 MG/100ML; MG/100ML; MG/100ML; MG/100ML
50 INJECTION, SOLUTION INTRAVENOUS CONTINUOUS
Status: DISCONTINUED | OUTPATIENT
Start: 2019-12-23 | End: 2019-12-23 | Stop reason: HOSPADM

## 2019-12-23 RX ORDER — SODIUM CHLORIDE 9 MG/ML
125 INJECTION, SOLUTION INTRAVENOUS CONTINUOUS
Status: DISCONTINUED | OUTPATIENT
Start: 2019-12-23 | End: 2019-12-23 | Stop reason: HOSPADM

## 2019-12-23 RX ADMIN — FENTANYL CITRATE 25 MCG: 50 INJECTION INTRAMUSCULAR; INTRAVENOUS at 14:09

## 2019-12-23 RX ADMIN — SODIUM CHLORIDE, SODIUM LACTATE, POTASSIUM CHLORIDE, AND CALCIUM CHLORIDE: .6; .31; .03; .02 INJECTION, SOLUTION INTRAVENOUS at 13:25

## 2019-12-23 RX ADMIN — FENTANYL CITRATE 25 MCG: 50 INJECTION INTRAMUSCULAR; INTRAVENOUS at 14:03

## 2019-12-23 RX ADMIN — FENTANYL CITRATE 25 MCG: 50 INJECTION INTRAMUSCULAR; INTRAVENOUS at 13:50

## 2019-12-23 RX ADMIN — ONDANSETRON HYDROCHLORIDE 4 MG: 2 INJECTION, SOLUTION INTRAMUSCULAR; INTRAVENOUS at 13:57

## 2019-12-23 RX ADMIN — LIDOCAINE HYDROCHLORIDE 50 MG: 10 INJECTION, SOLUTION EPIDURAL; INFILTRATION; INTRACAUDAL; PERINEURAL at 13:46

## 2019-12-23 RX ADMIN — FENTANYL CITRATE 25 MCG: 50 INJECTION INTRAMUSCULAR; INTRAVENOUS at 13:48

## 2019-12-23 RX ADMIN — DEXAMETHASONE SODIUM PHOSPHATE 4 MG: 10 INJECTION, SOLUTION INTRAMUSCULAR; INTRAVENOUS at 13:56

## 2019-12-23 RX ADMIN — CIPROFLOXACIN: 2 INJECTION INTRAVENOUS at 13:40

## 2019-12-23 RX ADMIN — PROPOFOL 200 MG: 10 INJECTION, EMULSION INTRAVENOUS at 13:46

## 2019-12-23 NOTE — ANESTHESIA POSTPROCEDURE EVALUATION
Post-Op Assessment Note    CV Status:  Stable  Pain Score: 0    Pain management: adequate     Mental Status:  Alert and awake   Hydration Status:  Euvolemic   PONV Controlled:  Controlled   Airway Patency:  Patent   Post Op Vitals Reviewed: Yes      Staff: CRNA           BP   144/76   Temp (P) 98 8 °F (37 1 °C) (12/23/19 1415)    Pulse  86   Resp (P) 16 (12/23/19 1415)    SpO2 100

## 2019-12-23 NOTE — OP NOTE
OPERATIVE REPORT  PATIENT NAME: Frederick Torrez    :  1945  MRN: [id number]  Pt Location:  OR ROOM 10    SURGERY DATE: 2019    Surgeon(s) and Role:     * Trevon Aguilar MD - Primary    Preop Diagnosis:  Recurrent bladder cancer    Post-Op Diagnosis Codes:  Recurrent bladder cancer    Procedure(s) (LRB):  BLADDER BIOPSY; BLADDER FULGERATION with mitomycin (N/A)    Specimen(s):  ID Type Source Tests Collected by Time Destination   1 : Posterior Bladder Wall Tumor Tissue Urinary Bladder TISSUE EXAM Trevon Aguilar MD 2019 1400    A : Urine Culture Urine Urine, Cystoscopic URINE CULTURE Trevon Aguilar MD 2019 1355        Estimated Blood Loss:   Minimal    Drains:  Urethral Catheter 20 Fr  (Active)   Number of days: 0       Anesthesia Type:   General    Operative Indications:  History of bladder cancer [Z85 [id number]]      Operative Findings:  Small frondular appearing tumor recurrence along the posterior wall in the midline and towards the right ureteral orifice  Tumor removed with cold cup biopsy forceps, fulgurated, and intravesical mitomycin instilled  The remainder of the bladder was free and clear mucosal abnormalities or lesions  Complications:   None    Procedure and Technique:  Khalida Zazueta is a 70-year-old female with a history of TCC of the bladder  Cystoscopy in the office revealed recurrence along the posterior wall  Cystoscopy with bladder biopsy, possible TURBT, and intravesical mitomycin was recommended  Risk and benefits of the procedure discussed and reviewed  Informed consent obtained  Patient was brought to the operating room on 2019  After the smooth induction of general LMA anesthesia, patient was placed in dorsal lithotomy position  Her genitalia was prepped and draped in sterile fashion  Intravenous antibiotics were administered  A time-out was performed with all members of the operative team confirming the patient's identity procedure to be performed      A 25 Greenlandic rigid cystoscope with 30° lens was passed  The bladder was thoroughly inspected  There was no evidence of mucosal abnormalities or lesions  The bladder was completely inspected with a 30 degree lens without difficulty  Attention was focused on the posterior wall abnormality  Cold cup biopsy forceps were utilized to take biopsies x3 to remove all of the tumor  Bugbee electrocautery was used for hemostasis  Hemostasis was excellent  The cystoscope was removed  A 20 Greenlandic Pierce catheter was inserted  The catheter was easily irrigated and clear  40 mg of intravesical mitomycin was then instilled into the bladder  The Pierce catheter was capped with a catheter plug  Overall the patient tolerated the procedure well and were no complications  The patient was extubated in the operating room and transferred to the PACU in stable condition at the conclusion of the case      Patient Disposition:  PACU stable and extubated    SIGNATURE: Candelaria Norris MD  DATE: December 23, 2019  TIME: 2:13 PM

## 2019-12-23 NOTE — NURSING NOTE
Dr Samantha Jolly spoke with patient prior to discharge  No distress  Left via wheelchair with staff member

## 2019-12-23 NOTE — DISCHARGE INSTRUCTIONS
Today I removed your bladder tumor  Call for follow-up with Dr Debbie Rodriguez in the next 2-3 weeks to discuss and review biopsy results  [phone]      Bladder Cancer   AMBULATORY CARE:   Bladder cancer  starts in the cells that line your bladder  Common symptoms include the following:   · Blood in your urine or urine that is pink or orange    · A sudden need to urinate, or urinating more often than usual    · Trouble starting the stream of urine or urinating very little    · Pain or burning when you urinate    · Pain in your abdomen or pelvis     · Unexplained weight loss    · Feeling tired or weak  Call 911 for any of the following:   · You suddenly feel lightheaded and short of breath  · You cough up blood  Seek care immediately if:   · Your arm or leg feels warm, tender, and painful  It may look swollen and red  · You are unable to urinate  Contact your healthcare provider or oncologist if:   · You have a fever  · You vomit and cannot keep any liquids or food down  · You have new or worsening pain  · Your pain gets worse or does not go away after you take pain medicine  · You have questions or concerns about your condition or care  Treatment for bladder cancer  may include any of the following:  · Transurethral resection of bladder tumor (TURBT)  is a procedure used to remove the tumor  Bladder muscle near the tumor may also be removed  This procedure is done by inserting tools through your urethra and into your bladder  · Immunotherapy  is medicine given to help your immune system kill cancer cells  It is injected into your vein or directly into your bladder  · Chemotherapy  is medicine given to kill cancer cells  It may be given to you as a pill or an injection into your vein or muscle  It may also be injected directly into your bladder  · Radiation therapy  uses high-energy x-ray beams to kill cancer cells  · Surgery  may be needed to remove your bladder  Surrounding organs and lymph nodes may also be removed  Self-care:   · Do not smoke  Nicotine can damage blood vessels and make it hard to manage your bladder cancer  Smoking also increases your risk for new or returning cancer  Ask your healthcare provider for information if you currently smoke and need help to quit  E-cigarettes or smokeless tobacco still contain nicotine  Talk to your healthcare provider before you use these products  · Limit or do not drink alcohol  Alcohol may cause you to become dehydrated  Ask your oncologist if it is safe for you to drink alcohol, and how much is safe to drink  · Eat healthy foods  Healthy foods include fruit, vegetables, whole-grain breads, low-fat dairy products, beans, lean meats, and fish  Your healthcare provider may recommend that you eat less red meat  You need to eat enough calories to help prevent weight loss and increase your energy level  You also need protein to give you strength  If you do not feel hungry, eat small amounts often instead of large meals  · Drink liquids as directed  You may need to drink more liquids than usual to prevent dehydration  Ask how much liquid to drink each day and which liquids are best for you  · Exercise as directed  Exercise may help increase your energy level and appetite  Ask your healthcare provider how much exercise you need and which exercises are best for you  For more information and support: It may be difficult for you and your family to go through cancer and cancer treatments  Join a support group or talk with others who have gone through treatment  · Abhishek Lowry [address] [place] [address]  Phone: 3- [phone]  Web Address: http://[employer]/  org  · [address] [place] [address], [address] [place] [address]  Phone: 7- 795 [phone]  Web Address: http://[employer]/  mmCHANNEL  Follow up with your healthcare provider or oncologist as directed: You will need to see your oncologist for ongoing treatment  Write down your questions so you remember to ask them during your visits  © 2017 [employer] Information is for End User's use only and may not be sold, redistributed or otherwise used for commercial purposes  All illustrations and images included in CareNotes® are the copyrighted property of A D A M , Inc  or Vicente Nolan  The above information is an  only  It is not intended as medical advice for individual conditions or treatments  Talk to your doctor, nurse or pharmacist before following any medical regimen to see if it is safe and effective for you

## 2019-12-23 NOTE — NURSING NOTE
Voided 250 ml's of clear yellow urine  Denies pain  Anxious to go home  IV removed  Discharge instructions given

## 2019-12-23 NOTE — NURSING NOTE
Pt states she has oxygen at home, but chooses not to use it  "I dont like it "  Also states that she gets very winded climbing stairs and has to sit for awhile until she can get full breaths  VSS  O2 at this time sitting 95%  Dr Tiffanie Galeas advised  Pt ambulated around unit with Nurse  HR increased to 116 and O2 sat 90%  Dr Tiffanie Galeas aware of results and patient resting  VSS with no complaints

## 2019-12-23 NOTE — H&P
Scott Lopez is a 70-year-old female with a history of urothelial carcinoma the bladder dating back to 2010  I recently performed cystoscopy in the office and identified frondular appearing superficial disease recurrence on the posterior wall of the bladder  /80   Pulse 100   Temp 97 7 °F (36 5 °C)   Resp 20   Ht 5' 1" (1 549 m)   Wt 97 5 kg (215 lb)   SpO2 94%   Breastfeeding? No   BMI 40 62 kg/m²     On examination she is in no acute distress  Lungs clear  Cardiac regular    Impression:  Recurrent urothelial carcinoma the bladder    Plan:  I recommend cystoscopy with bladder biopsy and possible transurethral resection of the bladder tumor recurrence along the posterior wall of the bladder  I will then instill intravesical mitomycin 40 mg   Risk and benefits of the procedure discussed and reviewed  Informed consent obtained

## 2019-12-23 NOTE — TELEPHONE ENCOUNTER
Patient underwent TURBT with mitomycin  Patient needs follow-up with Dr Jaida Jones next 4-5 weeks to review pathology  Patient needs surveillance cystoscopy approximately in 1st week of April 2020

## 2019-12-23 NOTE — NURSING NOTE
Returned from PACU at 1450  Alert and oriented  Denies pain  Catheter in place with plug  Bladder to be drained at 1505 and catheter to be removed at that time  Respirations easy and non labored  IV fluids continue  Call bell in reach

## 2019-12-27 LAB
BACTERIA UR CULT: ABNORMAL
BACTERIA UR CULT: ABNORMAL

## 2020-01-06 ENCOUNTER — TELEPHONE (OUTPATIENT)
Dept: UROLOGY | Facility: CLINIC | Age: 75
End: 2020-01-06

## 2020-01-06 ENCOUNTER — OFFICE VISIT (OUTPATIENT)
Dept: UROLOGY | Facility: CLINIC | Age: 75
End: 2020-01-06
Payer: COMMERCIAL

## 2020-01-06 VITALS
DIASTOLIC BLOOD PRESSURE: 78 MMHG | BODY MASS INDEX: 40.59 KG/M2 | HEART RATE: 86 BPM | SYSTOLIC BLOOD PRESSURE: 130 MMHG | WEIGHT: 215 LBS | HEIGHT: 61 IN

## 2020-01-06 DIAGNOSIS — N39.0 ACUTE UTI: ICD-10-CM

## 2020-01-06 DIAGNOSIS — N30.00 ACUTE CYSTITIS WITHOUT HEMATURIA: ICD-10-CM

## 2020-01-06 DIAGNOSIS — R31.9 HEMATURIA, UNSPECIFIED TYPE: Primary | ICD-10-CM

## 2020-01-06 DIAGNOSIS — R31.9 HEMATURIA, UNSPECIFIED TYPE: ICD-10-CM

## 2020-01-06 DIAGNOSIS — D09.0 CARCINOMA IN SITU OF BLADDER: ICD-10-CM

## 2020-01-06 DIAGNOSIS — Z85.51 HISTORY OF BLADDER CANCER: Primary | ICD-10-CM

## 2020-01-06 LAB
BACTERIA UR QL AUTO: ABNORMAL /HPF
BILIRUB UR QL STRIP: NEGATIVE
CLARITY UR: ABNORMAL
COLOR UR: YELLOW
GLUCOSE UR STRIP-MCNC: NEGATIVE MG/DL
HGB UR QL STRIP.AUTO: NEGATIVE
HYALINE CASTS #/AREA URNS LPF: ABNORMAL /LPF
KETONES UR STRIP-MCNC: ABNORMAL MG/DL
LEUKOCYTE ESTERASE UR QL STRIP: ABNORMAL
NITRITE UR QL STRIP: NEGATIVE
NON-SQ EPI CELLS URNS QL MICRO: ABNORMAL /HPF
PH UR STRIP.AUTO: 6 [PH]
PROT UR STRIP-MCNC: ABNORMAL MG/DL
RBC #/AREA URNS AUTO: ABNORMAL /HPF
SL AMB  POCT GLUCOSE, UA: NORMAL
SL AMB LEUKOCYTE ESTERASE,UA: NORMAL
SL AMB POCT BILIRUBIN,UA: NORMAL
SL AMB POCT BLOOD,UA: NORMAL
SL AMB POCT CLARITY,UA: NORMAL
SL AMB POCT COLOR,UA: NORMAL
SL AMB POCT KETONES,UA: NORMAL
SL AMB POCT NITRITE,UA: NORMAL
SL AMB POCT PH,UA: 6
SL AMB POCT SPECIFIC GRAVITY,UA: 1.25
SL AMB POCT URINE PROTEIN: NORMAL
SL AMB POCT UROBILINOGEN: 0.2
SP GR UR STRIP.AUTO: 1.02 (ref 1–1.03)
UROBILINOGEN UR QL STRIP.AUTO: 0.2 E.U./DL
WBC #/AREA URNS AUTO: ABNORMAL /HPF

## 2020-01-06 PROCEDURE — 87086 URINE CULTURE/COLONY COUNT: CPT | Performed by: UROLOGY

## 2020-01-06 PROCEDURE — 81002 URINALYSIS NONAUTO W/O SCOPE: CPT | Performed by: UROLOGY

## 2020-01-06 PROCEDURE — 1160F RVW MEDS BY RX/DR IN RCRD: CPT | Performed by: UROLOGY

## 2020-01-06 PROCEDURE — 81001 URINALYSIS AUTO W/SCOPE: CPT | Performed by: UROLOGY

## 2020-01-06 PROCEDURE — 87077 CULTURE AEROBIC IDENTIFY: CPT | Performed by: UROLOGY

## 2020-01-06 PROCEDURE — 99214 OFFICE O/P EST MOD 30 MIN: CPT | Performed by: UROLOGY

## 2020-01-06 RX ORDER — CEPHALEXIN 500 MG/1
500 CAPSULE ORAL EVERY 8 HOURS SCHEDULED
Qty: 15 CAPSULE | Refills: 0 | Status: SHIPPED | OUTPATIENT
Start: 2020-01-06 | End: 2020-01-11

## 2020-01-06 NOTE — TELEPHONE ENCOUNTER
Patient seen today by Dr Samantha Jolly, 6 week course of BCG is needed  Starting date 2/3/20 and ending on 3/9/20  Cysto scheduled for 4/20/20

## 2020-01-06 NOTE — PROGRESS NOTES
1/6/2020    Beto Gamboa  1945  [id number]        Assessment  High-grade urothelial carcinoma the bladder (Ta) and carcinoma in situ of the bladder      Discussion  We discussed her pathology revealing both high-grade and superficial urothelial carcinoma the bladder along with carcinoma in situ  We discussed treatment options for carcinoma in situ of the bladder including both BCG as well as radical cystectomy  Based on carcinoma in situ I recommend induction with BCG x6  I will then performed cystoscopy after the BCG is been completed  Keflex 500 mg t i d  For a 5 day course was prescribed  The patient does have a rash with penicillin and we discussed low cross reactivity with cephalosporins  History of Present Illness  66 y o  female with a history of bladder cancer dating back to 2010  Her on recent surveillance cystoscopy recurrence was identified adjacent to the right ureteral orifice  Patient went to the operating room where I performed a cold cup biopsy with fulguration  She returns in follow-up today based on urgency and frequency and some dysuria  She believes she may have another urinary tract infection  A culture was sent from the office today  We discussed and reviewed pathology revealing high-grade but superficial urothelial carcinoma the bladder along with new carcinoma in situ  AUA Symptom Score      Review of Systems  Review of Systems   Constitutional: Negative  HENT: Negative  Eyes: Negative  Respiratory: Negative  Cardiovascular: Negative  Gastrointestinal: Negative  Endocrine: Negative  Genitourinary:        UTI like symptoms per HPI   Musculoskeletal: Negative  Skin: Negative  Allergic/Immunologic: Negative  Neurological: Negative  Hematological: Negative  Psychiatric/Behavioral: Negative            Past Medical History  Past Medical History:   Diagnosis Date    [facility] [address] )     COPD (chronic obstructive pulmonary disease) ([facility] )     Hypothyroidism     Lung cancer ([facility] )     stage 3    Skin cancer     squamous cell of the leg    UTI (urinary tract infection) 12/2019    recent e coli treated and resolved       Past Social History  Past Surgical History:   Procedure Laterality Date    CHOLECYSTECTOMY      CYSTOSCOPY      4/10 AND 8/10, 3/12/2015 4/18/2016  DIAGNOSTIC     HYSTERECTOMY      OTHER SURGICAL HISTORY      TRANSURETHERAL RESECTION     ND CYSTOURETHROSCOPY,FULGUR <0 5 CM LESN N/A 12/23/2019    Procedure: BLADDER BIOPSY; BLADDER Carballo Pair with mitomycin;  Surgeon: Jamilah Kraus MD;  Location: [facility] MAIN OR;  Service: Urology       Past Family History  Family History   Problem Relation Age of Onset    Cancer Mother         Mouth     Mental illness Sister     Colon cancer Paternal Grandmother        Past Social history  Social History     Socioeconomic History    Marital status: Single     Spouse name: Not on file    Number of children: Not on file    Years of education: Not on file    Highest education level: Not on file   Occupational History    Not on file   Social Needs    Financial resource strain: Not on file    Food insecurity:     Worry: Not on file     Inability: Not on file    Transportation needs:     Medical: Not on file     Non-medical: Not on file   Tobacco Use    Smoking status: Former Smoker     Packs/day: 2 00     Types: Cigarettes    Smokeless tobacco: Never Used   Substance and Sexual Activity    Alcohol use: No    Drug use: No    Sexual activity: Not on file   Lifestyle    Physical activity:     Days per week: Not on file     Minutes per session: Not on file    Stress: Not on file   Relationships    Social connections:     Talks on phone: Not on file     Gets together: Not on file     Attends Zoroastrianism service: Not on file     Active member of club or organization: Not on file     Attends meetings of clubs or organizations: Not on file     Relationship status: Not on file   [facility] Intimate partner violence:     Fear of current or ex partner: Not on file     Emotionally abused: Not on file     Physically abused: Not on file     Forced sexual activity: Not on file   Other Topics Concern    Not on file   Social History Narrative    Not on file       Current Medications  Current Outpatient Medications   Medication Sig Dispense Refill    albuterol (VENTOLIN HFA) 90 mcg/act inhaler Inhale 2 puffs every 6 (six) hours as needed for wheezing 1 Inhaler 5    Ascorbic Acid (VITAMIN C) 100 MG tablet Take 100 mg by mouth daily      aspirin 81 MG tablet Take 1 tablet by mouth daily      Calcium 600 MG tablet Take 1 tablet by mouth 2 (two) times a day      Chromium 200 MCG TABS Take 1 tablet by mouth daily      levothyroxine 125 mcg tablet TAKE ONE TABLET BY MOUTH ONE TIME DAILY 90 tablet 3    magnesium 30 MG tablet Take 30 mg by mouth 2 (two) times a day      melatonin 1 mg Take 1 mg by mouth daily at bedtime      Multiple Vitamin (MULTIVITAMINS PO) Take 1 tablet by mouth daily      Omega-3 Fatty Acids (FISH OIL) 1200 MG CAPS Take by mouth      tiotropium (SPIRIVA HANDIHALER) 18 mcg inhalation capsule Place 1 capsule (18 mcg total) into inhaler and inhale daily 90 capsule 3    cephalexin (KEFLEX) 500 mg capsule Take 1 capsule (500 mg total) by mouth every 8 (eight) hours for 5 days 15 capsule 0    vitamin E, tocopherol, (GNP VITAMIN E) 400 units capsule Take 1 tablet by mouth daily       Current Facility-Administered Medications   Medication Dose Route Frequency Provider Last Rate Last Dose    [START ON 2/3/2020] BCG (MALIHA BCG) intravesical suspension 50 mg  50 mg Intravesical Weekly Richard Nicholas MD           Allergies  Allergies   Allergen Reactions    Erythromycin     Penicillins     Sulfa Antibiotics     Tetracyclines & Related        Past Medical History, Social History, Family History, medications and allergies were reviewed      Vitals  Vitals:    01/06/20 1125   BP: 130/78   BP Location: Right arm   Patient Position: Sitting   Cuff Size: Adult   Pulse: 86   Weight: 97 5 kg (215 lb)   Height: 5' 1" (1 549 m)       Physical Exam  Physical Exam    On examination she is in no acute distress  Gait normal   Affect normal    Results  No results found for: PSA  Lab Results   Component Value Date    GLUCOSE 83 11/20/2015    CALCIUM 10 3 (H) 12/06/2019     11/20/2015    K 4 9 12/06/2019    CO2 28 12/06/2019     12/06/2019    BUN 16 12/06/2019    CREATININE 0 80 12/06/2019     Lab Results   Component Value Date    WBC 6 46 12/06/2019    HGB 14 5 12/06/2019    HCT 44 9 12/06/2019     (H) 12/06/2019     12/06/2019         Office Urine Dip  Recent Results (from the past 1 hour(s))   POCT urine dip    Collection Time: 01/06/20 11:34 AM   Result Value Ref Range    LEUKOCYTE ESTERASE,UA moderate     NITRITE,UA -     SL AMB POCT UROBILINOGEN 0 2     POCT URINE PROTEIN trace      PH,UA 6 0     BLOOD,UA moderate no hemolyzed     SPECIFIC GRAVITY,UA 1 25     KETONES,UA -     BILIRUBIN,UA -     GLUCOSE, UA -      COLOR,UA Dark yellow     CLARITY,UA cloudy    ]      Total visit time was 25 minutes of which over 50% was spent on counseling

## 2020-01-06 NOTE — PATIENT INSTRUCTIONS
[address]  for U[facility]      BCG (Bacillus Calmette-Jyoti)  Aftercare Instructions     Restrict fluids several hours prior to scheduled BCG instillation and for 2 hours after instillation   Do not urinate for the first 2 hours after BCG instillation   During the 2 hours after BCG is instilled, rotate positions  Lay on back for 15 min, lay on right side for 15 min, lay on stomach for 15 min, then lay on left side for 15 min then repeat or reposition frequently   Pour 2 cups of bleach in the toilet before urinating  Sit to urinate  After urinating, close lid and wait 15-20 min before flushing  You will need to pour bleach in the toilet every time you urinate for 24 hours   Do not use a public restroom for 24 hour after receiving BCG   After holding BCG for 2 hour and urinating, increase your fluid intake   You may develop flu-like symptoms or irritation of bladder (ex  Urinary urgency, frequency, burning with urination, fatigue, low-grade fevers, urinary incontinence, bladder spasms or blood in the urine)   Call office immediately ([phone]) for blood in the urine, Temp > 101, or chills   If sexually active, wear condoms with intercourse throughout entire treatment cycle   Urinary Incontinence: immediately wash clothes in clothes washer  Do not wash with other clothes   If wearing an incontinence pad or brief, pour bleach on the pad, allow to soak in, then place in plastic bag and discard in the trash  Bacillus Calmette-Jyoti (BCG, MALIHA®, TheraCys®)  Pronounced: [id number]-dna luciano-met alvarado  Classification: Biologic Response Modifier    About Bacillus Calmette-Jyoti (BCG, MALIHA®, TheraCys®)   BacillusCalmette-Jyoti (BCG) is a weakened strain of Mycobacteriumbovis, which can be used to prevent tuberculosis  It works against cancer as a biologic response modifier   Biological response modifiers are substances that have no direct antitumor effect, but are able to trigger the immune system to attack tumors  BCG is thought to work by stimulating an immune response and causing inflammation of the bladder wall that, in turn, destroys cancer cells within the bladder  How to Take BacillusCalmette-Jyoti     BCG is given directly into the bladder (called intravesicular) through a catheter  The medicine is left in the bladder for 1-2 hours   The dosage and schedule is determined by your healthcare provider  It is not uncommon to have urinary frequency (need to go often) or painful urination for 48 hours after treatment  If this continues after 48 hours, call your doctor or nurse  How the Intravesicular Treatment is Given   You should limit your fluid intake starting the night prior to the procedure and have no fluids for 4 hours before  This is so you will be able to hold your urine in during the procedure for the full treatment time  In addition, the area receives more concentrated (and effective) doses of the medicine with less urine output during the procedure   If you take a diuretic (water pill), you will be told to not take this for at least 4 hours before the procedure   A urinary catheter is inserted into the bladder and any urine is drained   The BCG is given through the catheter, into the bladder  The catheter may be removed or clamped and remain in place based on your providers recommendation   You will need to hold the BCG in your bladder for 1-2 hours  You may need to change positions every 15 minutes to be sure the medicine reaches all areas of the bladder  Do this by rolling on your side, back, other side and stomach  Precautions After Treatment   Try not to urinate for 1-2 hours after the procedure   You should sit to urinate for 6 hours after the treatment to prevent splashing urine on the skin or exposing others to the medication   Do not use public toilets or urinate outside     For the first six hours after treatment, after each time you urinate:  o Add 2 cups of household bleach to the toilet bowl and close the lid   o Wait 15-20 minutes and then flush the toilet with the lid down   o Wash your hands and genital area with soap and water after urinating to remove any traces of the medication from your skin and prevent skin irritation  o Drink plenty of fluids, starting after the first time you urinate and for 8-12 hours after your treatment to flush your bladder  If you have urine incontinence, immediately wash your clothes in the washer  Do not wash with other clothes  If using an incontinence pad, pour bleach on the pad, allow to soak in and place it in plastic bag and discard with trash  Call your provider if you develop a fever (greater than 101 3° F/38 5°C) or shaking chills  Bacillus Calmette-Jyoti (BCG, MALIHA®, TheraCys®)  Information Provided By: www oncolink  org  © 2019 Trustees of [facility]    Medication Interactions    Immunosuppressants and/or bone marrow depressants and/or radiation interfere with the development of the immune response and should not be used in combination with BCG  Antimicrobial therapy for other infections may interfere with the effectiveness of AMLIHA®BCG  Be sure to tell your healthcare provider about all medications and supplements you take  Possible Side Effects of BCG  There are a number of things you can do to manage the side effects of BCG  Talk to yourcare teamabout these recommendations  They can help you decide what will work best for you  These are some of the most commonor important side effects:    Bladder Irritability  This medication can cause irritation to the bladder, including difficulty or painful urination (dysuria), blood in the urine (hematuria), and increased urgency (strong feeling of need to urinate) or frequency of urination  Patients are advised to increase fluid intake after administration of this medication to "flush" the bladder   You should report any of the urinary symptoms listed above that last more that 48 hours to your healthcare team for further management instructions  Fatigue  Fatigue is very common during cancer treatment and is an overwhelming feeling of exhaustion that is not usually relieved by rest  While on cancer treatment, and for a period after, you may need to adjust your schedule to manage fatigue  Plan times to rest during the day and conserve energy for more important activities  Exercise can help combat fatigue; a simple daily walk with a friend can help  Talk to your healthcare team for helpful tips on dealing with this side effect  Flu-like Symptoms  Your doctor or nurse can recommend medication and other strategies to relieve aches, pains and generalized malaise  BCG Infection Reaction  This rare reaction to BCG can occur following exposure to BCG, when given within one week of a biopsy, TUR (trans-urethral resection) surgery, or traumatic bladder catheterization  Symptoms of a BCG reaction include unexplained high fever [id number] hours or more, chills, confusion, dizziness or lightheadedness (symptoms of low blood pressure), or shortness of breath  You should notify your doctor immediately if you experience any of these symptoms  BCG reaction can also cause pneumonitis (inflammation of the lungs), hepatitis, prostatitis (infection or inflammation of the prostate),epididymal-orchitis(inflammation of  the testicles), respiratory distress and other symptoms of sepsis (widespread infection)  Reproductive Concerns   Men having this treatment can pass on BCG during sex  To protect your partner from coming into contact with BCG, you should not have sex for 48 hours after each treatment  Use a condom if you have sex at other times during the treatment course and for six weeks after treatment has ended   This medication should not be given to a pregnant woman except when clearly needed  Women should be advised not to become pregnant while on therapy  Breastfeeding while receiving this medication is not recommended  [employer] is designed for educational purposes only and is not engaged in rendering medical advice or professional services  The information provided through [id number] should not be used for  diagnosing or treating a health problem or a disease  It is not a substitute for professional care  If you have or suspect you may have a health problem or have questions or concerns about the  medication that you have been prescribed, you should consult your health care provider

## 2020-01-06 NOTE — TELEPHONE ENCOUNTER
Patient managed by Dr Cristhian Sy at the [facility] office for urothelial  cancer of the bladder  Patient s/p bladder biopsy, bladder fulguration 12/23/2019  Patient seen today by Dr Cristina Gimenez for pathology discussion  During the visit patient mentioned to Dr Cristina Gimenez she feels like she has an urinary tract infection and MD prescribed Keflex 500 TID  Urine culture sent  Contacted and spoke with patient  Patient reports she wears a pad and when she got home the pad was blood tinged with small clots  Discussed with patient if she does have an urinary tract infection the bleeding may be due to the infection  Advised patient to begin the Keflex and hydrate well with water  Observe for now and await urine culture results

## 2020-01-09 LAB
BACTERIA UR CULT: ABNORMAL
BACTERIA UR CULT: ABNORMAL

## 2020-01-10 NOTE — TELEPHONE ENCOUNTER
The patient's urine is back in revealing aerococcus urinae  This is susceptible to most cephalosporins therefore the patient should continue and complete her entire course of Keflex

## 2020-01-13 ENCOUNTER — HOSPITAL ENCOUNTER (OUTPATIENT)
Dept: ULTRASOUND IMAGING | Facility: MEDICAL CENTER | Age: 75
Discharge: HOME | End: 2020-01-13
Payer: COMMERCIAL

## 2020-01-13 DIAGNOSIS — D09.0 CARCINOMA IN SITU OF BLADDER: ICD-10-CM

## 2020-01-13 PROCEDURE — 76770 US EXAM ABDO BACK WALL COMP: CPT

## 2020-01-13 NOTE — TELEPHONE ENCOUNTER
Called and spoke with patient  Informed patient that her urine culture shows an infection  Patient is to complete the entire course of Keflex  Patient stated that she has completed the entire course 2 days ago and is feeling better

## 2020-01-20 NOTE — TELEPHONE ENCOUNTER
Called patient and reviewed that BCG stock is available and claimed for her  She reports she has read the instructions provided at last visit and does not have any questions  I reviewed the need for a urine sample provided in the office each visit to check for UTI and hematuria  Patient verbalized understanding  Given recent positive UTI will also order formal UA C&S to be done late this week or early next week to ensure no infection prior to starting BCG on 2/3/20  Orders placed and patient verbalized understanding of plan

## 2020-01-27 ENCOUNTER — TELEPHONE (OUTPATIENT)
Dept: OTHER | Facility: HOSPITAL | Age: 75
End: 2020-01-27

## 2020-01-27 ENCOUNTER — APPOINTMENT (OUTPATIENT)
Dept: LAB | Facility: MEDICAL CENTER | Age: 75
End: 2020-01-27
Payer: COMMERCIAL

## 2020-01-27 DIAGNOSIS — R31.9 HEMATURIA, UNSPECIFIED TYPE: ICD-10-CM

## 2020-01-27 DIAGNOSIS — N39.0 ACUTE UTI: ICD-10-CM

## 2020-01-27 LAB
BACTERIA UR QL AUTO: ABNORMAL /HPF
BILIRUB UR QL STRIP: NEGATIVE
CLARITY UR: ABNORMAL
COLOR UR: YELLOW
GLUCOSE UR STRIP-MCNC: NEGATIVE MG/DL
HGB UR QL STRIP.AUTO: ABNORMAL
KETONES UR STRIP-MCNC: NEGATIVE MG/DL
LEUKOCYTE ESTERASE UR QL STRIP: ABNORMAL
NITRITE UR QL STRIP: NEGATIVE
NON-SQ EPI CELLS URNS QL MICRO: ABNORMAL /HPF
PH UR STRIP.AUTO: 5.5 [PH]
PROT UR STRIP-MCNC: ABNORMAL MG/DL
RBC #/AREA URNS AUTO: ABNORMAL /HPF
SP GR UR STRIP.AUTO: 1.02 (ref 1–1.03)
UROBILINOGEN UR QL STRIP.AUTO: 0.2 E.U./DL
WBC #/AREA URNS AUTO: ABNORMAL /HPF

## 2020-01-27 PROCEDURE — 87086 URINE CULTURE/COLONY COUNT: CPT

## 2020-01-27 PROCEDURE — 81001 URINALYSIS AUTO W/SCOPE: CPT

## 2020-01-27 RX ORDER — CEPHALEXIN 500 MG/1
500 CAPSULE ORAL EVERY 12 HOURS SCHEDULED
Qty: 10 CAPSULE | Refills: 0 | Status: SHIPPED | OUTPATIENT
Start: 2020-01-27 | End: 2020-01-27 | Stop reason: SDUPTHER

## 2020-01-27 RX ORDER — CEPHALEXIN 500 MG/1
500 CAPSULE ORAL EVERY 12 HOURS SCHEDULED
Qty: 10 CAPSULE | Refills: 0 | Status: SHIPPED | OUTPATIENT
Start: 2020-01-27 | End: 2020-02-01

## 2020-01-27 NOTE — TELEPHONE ENCOUNTER
Called and spoke with patient to remind her to get urine testing today to ensure infection resolved prior to starting BCG next week  Patient reports she was going to go Friday and wishes she would have because now she is symptomatic again and feels she has another UTI  She reports increased frequency and a "pulling or burning sensation at the end of the stream "  She denies fever, chills, and hematuria  Patient will go for testing this morning  She is requesting antibiotics  Will route to provider for consideration

## 2020-01-27 NOTE — TELEPHONE ENCOUNTER
Can restart antibiotics while we await culture, culture states that aerococcus is typically susceptible to penicillins, cephalosporins, and Macrobid  Because of this a 2nd round of Keflex has been sent to her pharmacy

## 2020-01-27 NOTE — TELEPHONE ENCOUNTER
Called and spoke with patient  Informed patient that Keflex has been sent to her pharmacy for her to take while we wait for the urine culture results

## 2020-01-27 NOTE — TELEPHONE ENCOUNTER
PATIENT IS CALLING IN BECAUSE SHE SPOKE TO THE OFFICE EARLIER TODAY TO GET A SCRIPT FOR UTI SENT OVER TO THE PHARMACY PATIENT STATED SHE WENT TO THE PHARMACY AND IT IS NOT THERE      PAGED PT INFO TO ALAN JASMINE VIA TT

## 2020-01-28 LAB — BACTERIA UR CULT: NORMAL

## 2020-01-28 NOTE — TELEPHONE ENCOUNTER
I reviewed Epic documentation and the script in question was PRINTED instead of being E-scribed  In an effort to expedite the patient's request, I called the prescription into [facility]/pharmacy #[id number] Patient notified of same  No further action required

## 2020-01-28 NOTE — TELEPHONE ENCOUNTER
Called pt, yes, she did  her Keflex and is taking it   Her urine culture should be back tomorrow afternoon  She is scheduled for BCG with nurse at [address] [place] [address],[address] starting 2/3/20  Please routed to [facility] when she calls about results  She may have to postpone treatment depending on results

## 2020-01-28 NOTE — TELEPHONE ENCOUNTER
Patient called back and prescription Keflex is not at pharmacy  I see the prescription and I am not sure if it was sent over  Please check and call patient back at [phone]

## 2020-02-03 ENCOUNTER — PROCEDURE VISIT (OUTPATIENT)
Dept: UROLOGY | Facility: CLINIC | Age: 75
End: 2020-02-03
Payer: COMMERCIAL

## 2020-02-03 ENCOUNTER — TELEPHONE (OUTPATIENT)
Dept: FAMILY MEDICINE CLINIC | Facility: CLINIC | Age: 75
End: 2020-02-03

## 2020-02-03 VITALS
BODY MASS INDEX: 39.75 KG/M2 | HEIGHT: 62 IN | DIASTOLIC BLOOD PRESSURE: 82 MMHG | WEIGHT: 216 LBS | HEART RATE: 80 BPM | SYSTOLIC BLOOD PRESSURE: 134 MMHG

## 2020-02-03 DIAGNOSIS — D09.0 CARCINOMA IN SITU OF BLADDER: Primary | ICD-10-CM

## 2020-02-03 DIAGNOSIS — J43.9 PULMONARY EMPHYSEMA, UNSPECIFIED EMPHYSEMA TYPE (HCC): ICD-10-CM

## 2020-02-03 LAB
SL AMB  POCT GLUCOSE, UA: NORMAL
SL AMB LEUKOCYTE ESTERASE,UA: NORMAL
SL AMB POCT BILIRUBIN,UA: NORMAL
SL AMB POCT BLOOD,UA: NORMAL
SL AMB POCT CLARITY,UA: CLEAR
SL AMB POCT COLOR,UA: YELLOW
SL AMB POCT KETONES,UA: NORMAL
SL AMB POCT NITRITE,UA: NORMAL
SL AMB POCT PH,UA: 6
SL AMB POCT SPECIFIC GRAVITY,UA: 1.02
SL AMB POCT URINE PROTEIN: NORMAL
SL AMB POCT UROBILINOGEN: NORMAL

## 2020-02-03 PROCEDURE — 81002 URINALYSIS NONAUTO W/O SCOPE: CPT

## 2020-02-03 PROCEDURE — 51720 TREATMENT OF BLADDER LESION: CPT

## 2020-02-03 NOTE — TELEPHONE ENCOUNTER
Please attempt to call and schedule pt for a AWV  As She is due for one and has no future appt  Scheduled  Thank you

## 2020-02-03 NOTE — PROGRESS NOTES
2/3/2020    Salvatore Shannon  1945  [id number]    Diagnosis  Chief Complaint     Bladder Cancer          Patient presents for BCG 1 of 6 managed by Dr Sheridan Corrigan This Encounter   Procedures    POCT urine dip     Patient will FU in one week for next treatment  Patient instructed to call with persistent hematuria, fever, or other concerns  Procedure BCG treatment 1 of 6  Vitals:    02/03/20 1124   BP: 134/82   BP Location: Left arm   Patient Position: Sitting   Cuff Size: Large   Pulse: 80   Weight: 98 kg (216 lb)   Height: 5' 2" (1 575 m)       Recent Results (from the past 4 hour(s))   POCT urine dip    Collection Time: 02/03/20 11:28 AM   Result Value Ref Range    LEUKOCYTE ESTERASE,UA trace     NITRITE,UA -     SL AMB POCT UROBILINOGEN -     POCT URINE PROTEIN -      PH,UA 6 0     BLOOD,UA +++     SPECIFIC GRAVITY,UA 1 020     KETONES,UA -     BILIRUBIN,UA -     GLUCOSE, UA -      COLOR,UA yellow     CLARITY,UA clear        Urinary Incontinence Screening      Most Recent Value   Urinary Incontinence   Urinary Incontinence? Yes [2 pads urge]   Incomplete emptying? No   Urinary frequency? No   Urinary urgency? Yes   Urinary hesitancy? No   Dysuria (painful difficult urination)? Yes [pressure/pulling at the end of stream]   Nocturia (waking up to use the bathroom)? Yes [5x]   Straining (having to push to go)? No   Weak stream?  No   Intermittent stream?  No   Post void dribbling? No                Bladder instillation     Date/Time 2/3/2020 1:27 PM     Performed by  Emmett Mcleod RN     Authorized by Beto Mckenna MD      Universal Protocol Consent: Verbal consent obtained  Written consent obtained  Risks and benefits: risks, benefits and alternatives were discussed  Consent given by: patient               Sterile technique employed  Patient prepped and identified  14F Straight catheter placed  Bladder drained, residual approximately 30mL   The following solution was instilled directly into the bladder via catheter: 1 Vial BCG  Catheter removed  Patient discharged  Patient tolerated procedure       Administrations This Visit     BCG (MALIHA BCG) intravesical suspension 50 mg     Admin Date  02/03/2020 Action  Given Dose  50 mg Route  Intravesical Administered By  Briana Ang, RN                  Briana Ang, RN  CURN, BSN

## 2020-02-04 ENCOUNTER — TELEPHONE (OUTPATIENT)
Dept: FAMILY MEDICINE CLINIC | Facility: CLINIC | Age: 75
End: 2020-02-04

## 2020-02-04 NOTE — TELEPHONE ENCOUNTER
Called pt to let her know that her spiriva was approved yesterday ans was sent to her [facility] pharmacy

## 2020-02-04 NOTE — TELEPHONE ENCOUNTER
PT CALLED BACK MADE AN AWV TO SEE DR ARELLANO ON 2/25 WANTS A REFILL ON SPIRIVA HAND HALER 18MG SENT TO [address] AND [facility] TO HOLD HER OVER UNTIL HER APPOINTMENT

## 2020-02-10 ENCOUNTER — PROCEDURE VISIT (OUTPATIENT)
Dept: UROLOGY | Facility: CLINIC | Age: 75
End: 2020-02-10
Payer: COMMERCIAL

## 2020-02-10 ENCOUNTER — TELEPHONE (OUTPATIENT)
Dept: UROLOGY | Facility: CLINIC | Age: 75
End: 2020-02-10

## 2020-02-10 VITALS
WEIGHT: 216.2 LBS | BODY MASS INDEX: 39.79 KG/M2 | HEIGHT: 62 IN | HEART RATE: 102 BPM | DIASTOLIC BLOOD PRESSURE: 90 MMHG | SYSTOLIC BLOOD PRESSURE: 148 MMHG

## 2020-02-10 DIAGNOSIS — D09.0 CARCINOMA IN SITU OF BLADDER: Primary | ICD-10-CM

## 2020-02-10 DIAGNOSIS — R30.0 DYSURIA: ICD-10-CM

## 2020-02-10 LAB
BACTERIA UR QL AUTO: ABNORMAL /HPF
BILIRUB UR QL STRIP: NEGATIVE
CLARITY UR: CLEAR
COLOR UR: YELLOW
GLUCOSE UR STRIP-MCNC: NEGATIVE MG/DL
HGB UR QL STRIP.AUTO: ABNORMAL
HYALINE CASTS #/AREA URNS LPF: ABNORMAL /LPF
KETONES UR STRIP-MCNC: NEGATIVE MG/DL
LEUKOCYTE ESTERASE UR QL STRIP: ABNORMAL
NITRITE UR QL STRIP: NEGATIVE
NON-SQ EPI CELLS URNS QL MICRO: ABNORMAL /HPF
PH UR STRIP.AUTO: 6.5 [PH]
PROT UR STRIP-MCNC: ABNORMAL MG/DL
RBC #/AREA URNS AUTO: ABNORMAL /HPF
SL AMB  POCT GLUCOSE, UA: NORMAL
SL AMB LEUKOCYTE ESTERASE,UA: NORMAL
SL AMB POCT BILIRUBIN,UA: NORMAL
SL AMB POCT BLOOD,UA: NORMAL
SL AMB POCT CLARITY,UA: CLEAR
SL AMB POCT COLOR,UA: YELLOW
SL AMB POCT KETONES,UA: NORMAL
SL AMB POCT NITRITE,UA: NORMAL
SL AMB POCT PH,UA: 6
SL AMB POCT SPECIFIC GRAVITY,UA: 1.02
SL AMB POCT URINE PROTEIN: NORMAL
SL AMB POCT UROBILINOGEN: NORMAL
SP GR UR STRIP.AUTO: 1.02 (ref 1–1.03)
UROBILINOGEN UR QL STRIP.AUTO: 0.2 E.U./DL
WBC #/AREA URNS AUTO: ABNORMAL /HPF

## 2020-02-10 PROCEDURE — 4040F PNEUMOC VAC/ADMIN/RCVD: CPT

## 2020-02-10 PROCEDURE — 99211 OFF/OP EST MAY X REQ PHY/QHP: CPT

## 2020-02-10 PROCEDURE — 3008F BODY MASS INDEX DOCD: CPT

## 2020-02-10 PROCEDURE — 87086 URINE CULTURE/COLONY COUNT: CPT | Performed by: PHYSICIAN ASSISTANT

## 2020-02-10 PROCEDURE — 81001 URINALYSIS AUTO W/SCOPE: CPT | Performed by: PHYSICIAN ASSISTANT

## 2020-02-10 PROCEDURE — 81002 URINALYSIS NONAUTO W/O SCOPE: CPT

## 2020-02-10 PROCEDURE — 1160F RVW MEDS BY RX/DR IN RCRD: CPT

## 2020-02-10 NOTE — TELEPHONE ENCOUNTER
Patient managed by Dr Blanca Cristobal at [facility]   H/o bladder cancer  BCG 2 of 6 held today and UA C&S collected and sent from the office  Please see documentation from today for symptoms assessment  Please monitor for results

## 2020-02-10 NOTE — PROGRESS NOTES
2/10/2020    Shania Spine  1945  [id number]    Diagnosis  Chief Complaint     Bladder Cancer          Patient presents for BCG 2 of 6 managed by Dr Collin Gale This Encounter   Procedures    Urine culture    Urinalysis with microscopic    POCT urine dip     BCG not given today  UA C&S sent to rule out infection  FU in one week for next treatment  Patient instructed to call with persistent hematuria, fever, or other concerns  Assessment:    Vitals:    02/10/20 1112   BP: 148/90   BP Location: Right arm   Patient Position: Sitting   Cuff Size: Standard   Pulse: 102   Weight: 98 1 kg (216 lb 3 2 oz)   Height: 5' 2" (1 575 m)       Recent Results (from the past 4 hour(s))   POCT urine dip    Collection Time: 02/10/20 11:22 AM   Result Value Ref Range    LEUKOCYTE ESTERASE,UA ++     NITRITE,UA -     SL AMB POCT UROBILINOGEN -     POCT URINE PROTEIN -      PH,UA 6 0     BLOOD,UA +++     SPECIFIC GRAVITY,UA 1 020     KETONES,UA -     BILIRUBIN,UA -     GLUCOSE, UA -      COLOR,UA yellow     CLARITY,UA clear        Urinary Incontinence Screening      Most Recent Value   Urinary Incontinence   Urinary Incontinence? Yes [2 pads per day, urge]   Incomplete emptying? No   Urinary frequency? Yes   Urinary urgency? Yes   Urinary hesitancy? No   Dysuria (painful difficult urination)? Yes [pulling and pain at the end of the stream]   Nocturia (waking up to use the bathroom)? Yes Raik Edson x]   Straining (having to push to go)? No   Weak stream?  No   Intermittent stream?  No   Post void dribbling? No        Patient notes she was only able to hold medication in bladder for one hour last week  She was also unable to do the rotations because she found that it increased her leaking  She avoided coffee this morning and other liquids in order to attempt to hold treatment longer  She also still c/o frequency and dysuria    Describes a pain at the end of her urine stream   She feels like this is consistent with prior UTI symptoms  She does admit to drinking large amounts of water and iced tea  Her last culture only grew <10,000 mixed contaminants on 1/27/20and she was provided with keflex prior to the resulting  We started BCG last week on 2/3/20 based on those results despite patient's symptoms  Her symptoms have not changed or worsened  Urine dip today is nitrite negative but increased leukocytes  Spoke with ALAN Hernandez  Will hold BCG and send UA C&S to rule out infection  If culture still negative, symptoms not related to UTI and can continue BCG treatment      Procedures        LEORA SeguraN, BSN

## 2020-02-11 LAB — BACTERIA UR CULT: NORMAL

## 2020-02-12 NOTE — TELEPHONE ENCOUNTER
Urine culture negative  No need for treatment  FU as scheduled Monday for next BCG treatment  Attempted to call patient and phone just rang and rang  No answer no voicemail

## 2020-02-13 NOTE — TELEPHONE ENCOUNTER
Attempted to call patient again today  No answer on home phone number and no voicemail, just rang busy  Called and left a message for emergency contact on mobile to have the patient contact the office to discuss results

## 2020-02-17 ENCOUNTER — PROCEDURE VISIT (OUTPATIENT)
Dept: UROLOGY | Facility: CLINIC | Age: 75
End: 2020-02-17
Payer: COMMERCIAL

## 2020-02-17 VITALS
SYSTOLIC BLOOD PRESSURE: 148 MMHG | BODY MASS INDEX: 39.49 KG/M2 | WEIGHT: 214.6 LBS | HEART RATE: 88 BPM | HEIGHT: 62 IN | DIASTOLIC BLOOD PRESSURE: 80 MMHG

## 2020-02-17 DIAGNOSIS — D09.0 CARCINOMA IN SITU OF BLADDER: Primary | ICD-10-CM

## 2020-02-17 LAB
SL AMB  POCT GLUCOSE, UA: ABNORMAL
SL AMB LEUKOCYTE ESTERASE,UA: ABNORMAL
SL AMB POCT BILIRUBIN,UA: ABNORMAL
SL AMB POCT BLOOD,UA: ABNORMAL
SL AMB POCT CLARITY,UA: CLEAR
SL AMB POCT COLOR,UA: ABNORMAL
SL AMB POCT KETONES,UA: ABNORMAL
SL AMB POCT NITRITE,UA: ABNORMAL
SL AMB POCT PH,UA: 5
SL AMB POCT SPECIFIC GRAVITY,UA: 1.02
SL AMB POCT URINE PROTEIN: ABNORMAL
SL AMB POCT UROBILINOGEN: ABNORMAL

## 2020-02-17 PROCEDURE — 99211 OFF/OP EST MAY X REQ PHY/QHP: CPT

## 2020-02-17 PROCEDURE — 81002 URINALYSIS NONAUTO W/O SCOPE: CPT

## 2020-02-17 PROCEDURE — 1160F RVW MEDS BY RX/DR IN RCRD: CPT

## 2020-02-17 PROCEDURE — 4040F PNEUMOC VAC/ADMIN/RCVD: CPT

## 2020-02-17 NOTE — PROGRESS NOTES
2/17/2020    Jake Alvarado  1945  [id number]    Diagnosis  Chief Complaint     Bladder Cancer          Patient presents for BCG 2 of 6 managed by Dr Emeli Herrera This Encounter   Procedures    POCT urine dip     BCG held today due to gross hematuria  Patient encouraged to rest and hydrate well  She is encouraged to call back if hematuria does not resolve by mid week  She is concerned about another new tumor  Will address with Dr Christa Fowler  At this time patient has had only one dose of BCG  Patient instructed to call with persistent hematuria, fever, or other concerns  Assessment:    Vitals:    02/17/20 1100   BP: 148/80   BP Location: Left arm   Patient Position: Sitting   Cuff Size: Standard   Pulse: 88   Weight: 97 3 kg (214 lb 9 6 oz)   Height: 5' 2" (1 575 m)       Recent Results (from the past 4 hour(s))   POCT urine dip    Collection Time: 02/17/20 11:08 AM   Result Value Ref Range    LEUKOCYTE ESTERASE,UA +++     NITRITE,UA -     SL AMB POCT UROBILINOGEN -     POCT URINE PROTEIN ++      PH,UA 5 0     BLOOD,UA +++     SPECIFIC GRAVITY,UA 1 025     KETONES,UA -     BILIRUBIN,UA -     GLUCOSE, UA -      COLOR,UA saturnino     CLARITY,UA clear        Urinary Incontinence Screening      Most Recent Value   Urinary Incontinence   Urinary Incontinence? Yes [2 pads per day]   Incomplete emptying? No   Urinary frequency? Yes   Urinary urgency? Yes   Urinary hesitancy? No   Dysuria (painful difficult urination)? No [better this week]   Nocturia (waking up to use the bathroom)? Yes [10 x]   Straining (having to push to go)? No   Weak stream?  Yes   Intermittent stream?  Yes   Post void dribbling? Yes        Patient had BCG held last week for continued dysuria and concern for UTI  Her urine testing cam back with no evidence of infection at that time  Patient reports this week dysuria has improved however she started noticing pink urine after last week's visit    We did not give BCG last week  Now this morning patient reports gross hematuria with clots and brings a specimen to the office of dark cherry colored urine  She was able to provide a sample in the office that was more saturnino, clear, however microhematuria still demonstrated  Discussed with Jenniffer Garcia PA  Will hold BCG this week as well  No need for repeat urine testing since otherwise dysuria has improved  Procedures    BCG held again today        LEORA Carlson BSN

## 2020-02-17 NOTE — Clinical Note
Can you advise? Only one dose of BCG so far  Last week held because patient was insistent she had a UTI with dysuria, urine testing negative for infection  Now this week gross hematuria with clots, presumably unprevoked  I encouraged rest and hydration and resume next week assuming hematuria clears  Patient concerned that she needs another cysto

## 2020-02-24 ENCOUNTER — PROCEDURE VISIT (OUTPATIENT)
Dept: UROLOGY | Facility: CLINIC | Age: 75
End: 2020-02-24
Payer: COMMERCIAL

## 2020-02-24 VITALS
HEIGHT: 62 IN | WEIGHT: 217.2 LBS | DIASTOLIC BLOOD PRESSURE: 82 MMHG | BODY MASS INDEX: 39.97 KG/M2 | SYSTOLIC BLOOD PRESSURE: 142 MMHG | HEART RATE: 88 BPM

## 2020-02-24 DIAGNOSIS — D09.0 CARCINOMA IN SITU OF BLADDER: Primary | ICD-10-CM

## 2020-02-24 PROCEDURE — 51720 TREATMENT OF BLADDER LESION: CPT

## 2020-02-24 PROCEDURE — 81002 URINALYSIS NONAUTO W/O SCOPE: CPT

## 2020-02-24 NOTE — PROGRESS NOTES
2/24/2020    Kiara Solis  1945  [id number]    Diagnosis  Chief Complaint     Bladder Cancer          Patient presents for BCG 2 of 6 managed by Dr Cecy Cronin This Encounter   Procedures    POCT urine dip     Patient will FU in one week for next treatment  Patient instructed to call with persistent hematuria, fever, or other concerns  Procedure BCG treatment 2 of 6  Vitals:    02/24/20 1100   BP: 142/82   BP Location: Left arm   Patient Position: Sitting   Cuff Size: Standard   Pulse: 88   Weight: 98 5 kg (217 lb 3 2 oz)   Height: 5' 2" (1 575 m)       Recent Results (from the past 4 hour(s))   POCT urine dip    Collection Time: 02/24/20 11:06 AM   Result Value Ref Range    LEUKOCYTE ESTERASE,UA ++     NITRITE,UA -     SL AMB POCT UROBILINOGEN -     POCT URINE PROTEIN -      PH,UA 6 0     BLOOD,UA ++     SPECIFIC GRAVITY,UA 1 025     KETONES,UA -     BILIRUBIN,UA -     GLUCOSE, UA -      COLOR,UA yellow     CLARITY,UA clear        Urinary Incontinence Screening      Most Recent Value   Urinary Incontinence   Urinary Incontinence? Yes [2 pad s per day]   Incomplete emptying? No   Urinary frequency? Yes   Urinary urgency? Yes   Urinary hesitancy? No   Dysuria (painful difficult urination)? Yes [getting better]   Nocturia (waking up to use the bathroom)? Yes [6-8x]   Straining (having to push to go)? No   Weak stream?  No   Intermittent stream?  No   Post void dribbling? No        BCG delayed two weeks, the first for c/o questionable UTI for persistent dysuria; culture ultimately negative  And last week for spontaneous gross hematuria  Patient reports gross hematuria last Monday cleared before the end of that day  She did note another episode on Friday but it resolved in a few voids  She reports dysuria is improving  I reviewed that her urinary symptoms and dysuria in absence of infection could be related to the mitomycin she received post operatively   She does note symptoms improving and is comfortable to proceed today  No gross hematuria today  Bladder instillation     Date/Time 2/24/2020 11:27 AM     Performed by  Yobany Ugalde RN     Authorized by Margi Pendleton MD      Universal Protocol Consent: Verbal consent obtained  Risks and benefits: risks, benefits and alternatives were discussed  Consent given by: patient               Sterile technique employed  Patient prepped and identified  16F Straight catheter placed  Bladder drained, residual approximately <30mL  The following solution was instilled directly into the bladder via catheter: 1 Vial BCG  Catheter removed  Patient discharged  Patient tolerated procedure       Administrations This Visit     BCG (MALIHA BCG) intravesical suspension 50 mg     Admin Date  02/24/2020 Action  Given Dose  50 mg Route  Intravesical Administered By  LEORA Adamson RN  CURN, BSN

## 2020-02-25 ENCOUNTER — OFFICE VISIT (OUTPATIENT)
Dept: FAMILY MEDICINE CLINIC | Facility: CLINIC | Age: 75
End: 2020-02-25
Payer: COMMERCIAL

## 2020-02-25 VITALS
OXYGEN SATURATION: 90 % | RESPIRATION RATE: 22 BRPM | BODY MASS INDEX: 40.43 KG/M2 | HEIGHT: 62 IN | HEART RATE: 110 BPM | TEMPERATURE: 98.2 F | WEIGHT: 219.7 LBS | SYSTOLIC BLOOD PRESSURE: 130 MMHG | DIASTOLIC BLOOD PRESSURE: 82 MMHG

## 2020-02-25 DIAGNOSIS — E03.9 HYPOTHYROIDISM, UNSPECIFIED TYPE: ICD-10-CM

## 2020-02-25 DIAGNOSIS — Z12.39 SCREENING FOR BREAST CANCER: ICD-10-CM

## 2020-02-25 DIAGNOSIS — J43.9 PULMONARY EMPHYSEMA, UNSPECIFIED EMPHYSEMA TYPE (HCC): ICD-10-CM

## 2020-02-25 DIAGNOSIS — Z00.00 MEDICARE ANNUAL WELLNESS VISIT, SUBSEQUENT: ICD-10-CM

## 2020-02-25 DIAGNOSIS — E66.9 OBESITY WITHOUT SERIOUS COMORBIDITY, UNSPECIFIED CLASSIFICATION, UNSPECIFIED OBESITY TYPE: ICD-10-CM

## 2020-02-25 DIAGNOSIS — C67.9 ADENOCARCINOMA OF BLADDER (HCC): Primary | ICD-10-CM

## 2020-02-25 PROBLEM — Z85.51 HISTORY OF BLADDER CANCER: Status: RESOLVED | Noted: 2019-11-19 | Resolved: 2020-02-25

## 2020-02-25 PROCEDURE — 1125F AMNT PAIN NOTED PAIN PRSNT: CPT | Performed by: INTERNAL MEDICINE

## 2020-02-25 PROCEDURE — 3008F BODY MASS INDEX DOCD: CPT | Performed by: INTERNAL MEDICINE

## 2020-02-25 PROCEDURE — 4040F PNEUMOC VAC/ADMIN/RCVD: CPT | Performed by: INTERNAL MEDICINE

## 2020-02-25 PROCEDURE — 99214 OFFICE O/P EST MOD 30 MIN: CPT | Performed by: INTERNAL MEDICINE

## 2020-02-25 PROCEDURE — 1170F FXNL STATUS ASSESSED: CPT | Performed by: INTERNAL MEDICINE

## 2020-02-25 PROCEDURE — G0402 INITIAL PREVENTIVE EXAM: HCPCS | Performed by: INTERNAL MEDICINE

## 2020-02-25 PROCEDURE — 1036F TOBACCO NON-USER: CPT | Performed by: INTERNAL MEDICINE

## 2020-02-25 PROCEDURE — 1160F RVW MEDS BY RX/DR IN RCRD: CPT | Performed by: INTERNAL MEDICINE

## 2020-02-25 RX ORDER — LEVOTHYROXINE SODIUM 0.12 MG/1
125 TABLET ORAL DAILY
Qty: 90 TABLET | Refills: 3 | Status: SHIPPED | OUTPATIENT
Start: 2020-02-25 | End: 2021-01-20

## 2020-02-25 NOTE — ASSESSMENT & PLAN NOTE
Reports feeling that her breathing feels fair  She notes dyspnea with stairs/walking  Does report having O2 but does not use it regularly  Continue on current inhaler therapy

## 2020-02-25 NOTE — ASSESSMENT & PLAN NOTE
Reviewed today  Pneumonia series completed  Zostavax completed in the past (not shingrix)  Discussed eye exam  Discussed following up with Dermatology

## 2020-02-25 NOTE — ASSESSMENT & PLAN NOTE
Admits that she is not doing well on her diet and is motivated to try and lose some weight  BMI Counseling: Body mass index is 40 18 kg/m²  The BMI is above normal  Nutrition recommendations include decreasing overall calorie intake and 3-5 servings of fruits/vegetables daily

## 2020-02-25 NOTE — PATIENT INSTRUCTIONS
Medicare Preventive Visit Patient Instructions  Thank you for completing your Welcome to Medicare Visit or Medicare Annual Wellness Visit today  Your next wellness visit will be due in one year (2/25/2021)  The screening/preventive services that you may require over the next 5-10 years are detailed below  Some tests may not apply to you based off risk factors and/or age  Screening tests ordered at today's visit but not completed yet may show as past due  Also, please note that scanned in results may not display below  Preventive Screenings:  Service Recommendations Previous Testing/Comments   Colorectal Cancer Screening  * Colonoscopy    * Fecal Occult Blood Test (FOBT)/Fecal Immunochemical Test (FIT)  * Fecal DNA/Cologuard Test  * Flexible Sigmoidoscopy Age: 54-65 years old   Colonoscopy: every 10 years (may be performed more frequently if at higher risk)  OR  FOBT/FIT: every 1 year  OR  Cologuard: every 3 years  OR  Sigmoidoscopy: every 5 years  Screening may be recommended earlier than age 48 if at higher risk for colorectal cancer  Also, an individualized decision between you and your healthcare provider will decide whether screening between the ages of 74-80 would be appropriate  Colonoscopy: 03/25/2014  FOBT/FIT: Not on file  Cologuard: Not on file  Sigmoidoscopy: Not on file         Breast Cancer Screening Age: 36 years old  Frequency: every 1-2 years  Not required if history of left and right mastectomy Mammogram: 02/23/2018       Cervical Cancer Screening Between the ages of 21-29, pap smear recommended once every 3 years  Between the ages of 33-67, can perform pap smear with HPV co-testing every 5 years     Recommendations may differ for women with a history of total hysterectomy, cervical cancer, or abnormal pap smears in past  Pap Smear: Not on file    Screening Not Indicated   Hepatitis C Screening Once for adults born between 1945 and 1965  More frequently in patients at high risk for Hepatitis C Hep C Antibody: Not on file       Diabetes Screening 1-2 times per year if you're at risk for diabetes or have pre-diabetes Fasting glucose: 108 mg/dL   A1C: No results in last 5 years    Screening Current   Cholesterol Screening Once every 5 years if you don't have a lipid disorder  May order more often based on risk factors  Lipid panel: Not on file    Screening Not Indicated  History Lipid Disorder     Other Preventive Screenings Covered by Medicare:  1  Abdominal Aortic Aneurysm (AAA) Screening: covered once if your at risk  You're considered to be at risk if you have a family history of AAA  2  Lung Cancer Screening: covers low dose CT scan once per year if you meet all of the following conditions: (1) Age 50-69; (2) No signs or symptoms of lung cancer; (3) Current smoker or have quit smoking within the last 15 years; (4) You have a tobacco smoking history of at least 30 pack years (packs per day multiplied by number of years you smoked); (5) You get a written order from a healthcare provider  3  Glaucoma Screening: covered annually if you're considered high risk: (1) You have diabetes OR (2) Family history of glaucoma OR (3)  aged 48 and older OR (3)  American aged 72 and older  3  Osteoporosis Screening: covered every 2 years if you meet one of the following conditions: (1) You're estrogen deficient and at risk for osteoporosis based off medical history and other findings; (2) Have a vertebral abnormality; (3) On glucocorticoid therapy for more than 3 months; (4) Have primary hyperparathyroidism; (5) On osteoporosis medications and need to assess response to drug therapy  · Last bone density test (DXA Scan): 02/23/2018  5  HIV Screening: covered annually if you're between the age of 12-76  Also covered annually if you are younger than 13 and older than 72 with risk factors for HIV infection   For pregnant patients, it is covered up to 3 times per pregnancy  Immunizations:  Immunization Recommendations   Influenza Vaccine Annual influenza vaccination during flu season is recommended for all persons aged >= 6 months who do not have contraindications   Pneumococcal Vaccine (Prevnar and Pneumovax)  * Prevnar = PCV13  * Pneumovax = PPSV23   Adults 25-60 years old: 1-3 doses may be recommended based on certain risk factors  Adults 72 years old: Prevnar (PCV13) vaccine recommended followed by Pneumovax (PPSV23) vaccine  If already received PPSV23 since turning 65, then PCV13 recommended at least one year after PPSV23 dose  Hepatitis B Vaccine 3 dose series if at intermediate or high risk (ex: diabetes, end stage renal disease, liver disease)   Tetanus (Td) Vaccine - COST NOT COVERED BY MEDICARE PART B Following completion of primary series, a booster dose should be given every 10 years to maintain immunity against tetanus  Td may also be given as tetanus wound prophylaxis  Tdap Vaccine - COST NOT COVERED BY MEDICARE PART B Recommended at least once for all adults  For pregnant patients, recommended with each pregnancy  Shingles Vaccine (Shingrix) - COST NOT COVERED BY MEDICARE PART B  2 shot series recommended in those aged 48 and above     Health Maintenance Due:      Topic Date Due    CRC Screening: Colonoscopy  03/25/2019     Immunizations Due:      Topic Date Due    DTaP,Tdap,and Td Vaccines (1 - Tdap) 03/18/1956     Advance Directives   What are advance directives? Advance directives are legal documents that state your wishes and plans for medical care  These plans are made ahead of time in case you lose your ability to make decisions for yourself  Advance directives can apply to any medical decision, such as the treatments you want, and if you want to donate organs  What are the types of advance directives? There are many types of advance directives, and each state has rules about how to use them   You may choose a combination of any of the following:  · Living will: This is a written record of the treatment you want  You can also choose which treatments you do not want, which to limit, and which to stop at a certain time  This includes surgery, medicine, IV fluid, and tube feedings  · Durable power of  for healthcare [facility]): This is a written record that states who you want to make healthcare choices for you when you are unable to make them for yourself  This person, called a proxy, is usually a family member or a friend  You may choose more than 1 proxy  · Do not resuscitate (DNR) order:  A DNR order is used in case your heart stops beating or you stop breathing  It is a request not to have certain forms of treatment, such as CPR  A DNR order may be included in other types of advance directives  · Medical directive: This covers the care that you want if you are in a coma, near death, or unable to make decisions for yourself  You can list the treatments you want for each condition  Treatment may include pain medicine, surgery, blood transfusions, dialysis, IV or tube feedings, and a ventilator (breathing machine)  · Values history: This document has questions about your views, beliefs, and how you feel and think about life  This information can help others choose the care that you would choose  Why are advance directives important? An advance directive helps you control your care  Although spoken wishes may be used, it is better to have your wishes written down  Spoken wishes can be misunderstood, or not followed  Treatments may be given even if you do not want them  An advance directive may make it easier for your family to make difficult choices about your care  Urinary Incontinence   Urinary incontinence (UI)  is when you lose control of your bladder  UI develops because your bladder cannot store or empty urine properly  The 3 most common types of UI are stress incontinence, urge incontinence, or both    Medicines:   · May be given to help strengthen your bladder control  Report any side effects of medication to your healthcare provider  Do pelvic muscle exercises often:  Your pelvic muscles help you stop urinating  Squeeze these muscles tight for 5 seconds, then relax for 5 seconds  Gradually work up to squeezing for 10 seconds  Do 3 sets of 15 repetitions a day, or as directed  This will help strengthen your pelvic muscles and improve bladder control  Train your bladder:  Go to the bathroom at set times, such as every 2 hours, even if you do not feel the urge to go  You can also try to hold your urine when you feel the urge to go  For example, hold your urine for 5 minutes when you feel the urge to go  As that becomes easier, hold your urine for 10 minutes  Self-care:   · Keep a UI record  Write down how often you leak urine and how much you leak  Make a note of what you were doing when you leaked urine  · Drink liquids as directed  You may need to limit the amount of liquid you drink to help control your urine leakage  Do not drink any liquid right before you go to bed  Limit or do not have drinks that contain caffeine or alcohol  · Prevent constipation  Eat a variety of high-fiber foods  Good examples are high-fiber cereals, beans, vegetables, and whole-grain breads  Walking is the best way to trigger your intestines to have a bowel movement  · Exercise regularly and maintain a healthy weight  Weight loss and exercise will decrease pressure on your bladder and help you control your leakage  · Use a catheter as directed  to help empty your bladder  A catheter is a tiny, plastic tube that is put into your bladder to drain your urine  · Go to behavior therapy as directed  Behavior therapy may be used to help you learn to control your urge to urinate      Weight Management   Why it is important to manage your weight:  Being overweight increases your risk of health conditions such as heart disease, high blood pressure, type 2 diabetes, and certain types of cancer  It can also increase your risk for osteoarthritis, sleep apnea, and other respiratory problems  Aim for a slow, steady weight loss  Even a small amount of weight loss can lower your risk of health problems  How to lose weight safely:  A safe and healthy way to lose weight is to eat fewer calories and get regular exercise  You can lose up about 1 pound a week by decreasing the number of calories you eat by 500 calories each day  Healthy meal plan for weight management:  A healthy meal plan includes a variety of foods, contains fewer calories, and helps you stay healthy  A healthy meal plan includes the following:  · Eat whole-grain foods more often  A healthy meal plan should contain fiber  Fiber is the part of grains, fruits, and vegetables that is not broken down by your body  Whole-grain foods are healthy and provide extra fiber in your diet  Some examples of whole-grain foods are whole-wheat breads and pastas, oatmeal, brown rice, and bulgur  · Eat a variety of vegetables every day  Include dark, leafy greens such as spinach, kale, julio cesar greens, and mustard greens  Eat yellow and orange vegetables such as carrots, sweet potatoes, and winter squash  · Eat a variety of fruits every day  Choose fresh or canned fruit (canned in its own juice or light syrup) instead of juice  Fruit juice has very little or no fiber  · Eat low-fat dairy foods  Drink fat-free (skim) milk or 1% milk  Eat fat-free yogurt and low-fat cottage cheese  Try low-fat cheeses such as mozzarella and other reduced-fat cheeses  · Choose meat and other protein foods that are low in fat  Choose beans or other legumes such as split peas or lentils  Choose fish, skinless poultry (chicken or turkey), or lean cuts of red meat (beef or pork)  Before you cook meat or poultry, cut off any visible fat  · Use less fat and oil  Try baking foods instead of frying them   Add less fat, such as margarine, sour cream, regular salad dressing and mayonnaise to foods  Eat fewer high-fat foods  Some examples of high-fat foods include french fries, doughnuts, ice cream, and cakes  · Eat fewer sweets  Limit foods and drinks that are high in sugar  This includes candy, cookies, regular soda, and sweetened drinks  Exercise:  Exercise at least 30 minutes per day on most days of the week  Some examples of exercise include walking, biking, dancing, and swimming  You can also fit in more physical activity by taking the stairs instead of the elevator or parking farther away from stores  Ask your healthcare provider about the best exercise plan for you  © Copyright [employer] 2018 Information is for End User's use only and may not be sold, redistributed or otherwise used for commercial purposes   All illustrations and images included in CareNotes® are the copyrighted property of A D A M , Inc  or [address] [place] [address]

## 2020-02-25 NOTE — ASSESSMENT & PLAN NOTE
Follows actively with Dr Cristhian Calvo  She is undergoing BCG treatment  Does feel somewhat chronic pain that is slightly improving

## 2020-02-25 NOTE — PROGRESS NOTES
Assessment/Plan:     1  Adenocarcinoma of bladder [facility])  Assessment & Plan:  Follows actively with Dr Julio Alcocer  She is undergoing BCG treatment  Does feel somewhat chronic pain that is slightly improving  Orders:  -     Comprehensive metabolic panel; Future; Expected date: 08/25/2020  -     CBC and differential; Future; Expected date: 08/25/2020    2  Hypothyroidism, unspecified type  Assessment & Plan:  Stable on current dosing  Medication sent to the pharmacy  Orders:  -     TSH, 3rd generation with Free T4 reflex; Future; Expected date: 08/25/2020  -     levothyroxine 125 mcg tablet; Take 1 tablet (125 mcg total) by mouth daily    3  Medicare annual wellness visit, subsequent  Assessment & Plan:  Reviewed today  Pneumonia series completed  Zostavax completed in the past (not shingrix)  Discussed eye exam  Discussed following up with Dermatology  4  Pulmonary emphysema, unspecified emphysema type ([address] )  Assessment & Plan:  Reports feeling that her breathing feels fair  She notes dyspnea with stairs/walking  Does report having O2 but does not use it regularly  Continue on current inhaler therapy  Orders:  -     tiotropium (Spiriva HandiHaler) 18 mcg inhalation capsule; Place 1 capsule (18 mcg total) into inhaler and inhale daily    5  Obesity without serious comorbidity, unspecified classification, unspecified obesity type  Assessment & Plan:  Admits that she is not doing well on her diet and is motivated to try and lose some weight  BMI Counseling: Body mass index is 40 18 kg/m²  The BMI is above normal  Nutrition recommendations include decreasing overall calorie intake and 3-5 servings of fruits/vegetables daily  Orders:  -     Lipid panel    6  Screening for breast cancer  -     Mammo screening bilateral w 3d & cad; Future; Expected date: 02/25/2020             Subjective:      Patient ID: Lilia Rubi is a 66 y o  female  Jhon Resendiz is here today for a follow up/AWV examination   She reports feeling relatively okay  She has a known history of bladder cancer with reoccurrence, history of lung cancer, and history of skin cancer treated by Dr Africa Gibson  She is currently following with Dr Meagan Gallegos for her known bladder cancer and receiving BCG treatment  Other chart was reviewed  She reports doing okay but notes bladder/pelvic pain and hematuria that she has been dealing with and discussing with Urology  She reports that her diet is not great and would like to improve this  She notes back pain at times that will have pain in her legs as well when standing for a period  The following portions of the patient's history were reviewed and updated as appropriate: She  has a past medical history of Bladder cancer ([facility] ), COPD (chronic obstructive pulmonary disease) ([facility] ), Hypothyroidism, Lung cancer ([facility] ), Skin cancer, and UTI (urinary tract infection) (12/2019)  She   Patient Active Problem List    Diagnosis Date Noted    Medicare annual wellness visit, subsequent 02/25/2020    Squamous cell carcinoma of leg 03/03/2014    Non-small cell carcinoma of left lung, stage 3 ([facility] ) 04/19/2013    Obesity 02/28/2013    [facility]en[facility] 12/13/2012    Chronic obstructive pulmonary disease ([facility] ) 11/30/2012    Elevated C-reactive protein 11/30/2012    Hypercholesterolemia 11/30/2012    Hypothyroidism 08/02/2012     She  has a past surgical history that includes Cholecystectomy; Cystoscopy; Hysterectomy; Other surgical history; and pr cystourethroscopy,fulgur <0 5 cm domenico (N/A, 12/23/2019)  Her family history includes Cancer in her mother; Colon cancer in her paternal grandmother; Mental illness in her sister  She  reports that she has quit smoking  Her smoking use included cigarettes  She smoked 2 00 packs per day  She has never used smokeless tobacco  She reports that she does not drink alcohol or use drugs    Current Outpatient Medications   Medication Sig Dispense Refill    albuterol (VENTOLIN HFA) 90 mcg/act inhaler Inhale 2 puffs every 6 (six) hours as needed for wheezing 1 Inhaler 5    Ascorbic Acid (VITAMIN C) 100 MG tablet Take 100 mg by mouth daily      aspirin 81 MG tablet Take 1 tablet by mouth daily      Calcium 600 MG tablet Take 1 tablet by mouth 2 (two) times a day      Chromium 200 MCG TABS Take 1 tablet by mouth daily      levothyroxine 125 mcg tablet Take 1 tablet (125 mcg total) by mouth daily 90 tablet 3    magnesium 30 MG tablet Take 30 mg by mouth 2 (two) times a day      melatonin 1 mg Take 1 mg by mouth daily at bedtime      Multiple Vitamin (MULTIVITAMINS PO) Take 1 tablet by mouth daily      Omega-3 Fatty Acids (FISH OIL) 1200 MG CAPS Take by mouth      tiotropium (Spiriva HandiHaler) 18 mcg inhalation capsule Place 1 capsule (18 mcg total) into inhaler and inhale daily 90 capsule 1     Current Facility-Administered Medications   Medication Dose Route Frequency Provider Last Rate Last Dose    BCG (MALIHA BCG) intravesical suspension 50 mg  50 mg Intravesical Weekly Lorraine Pinto MD   50 mg at 02/24/20 1123     She is allergic to erythromycin; penicillins; sulfa antibiotics; and tetracyclines & related       Review of Systems   Constitutional: Negative for chills, fatigue and fever  HENT: Negative for sinus pressure, sinus pain and sore throat  Eyes: Positive for visual disturbance  Respiratory: Negative for cough, shortness of breath and wheezing  Cardiovascular: Negative for chest pain, palpitations and leg swelling  Gastrointestinal: Positive for abdominal pain  Negative for constipation, diarrhea and vomiting  Genitourinary: Positive for hematuria and pelvic pain  Musculoskeletal: Positive for arthralgias  Negative for back pain  Skin: Negative  Neurological: Positive for numbness  Negative for dizziness, light-headedness and headaches           Objective:      /82   Pulse (!) 110   Temp 98 2 °F (36 8 °C)   Resp 22   Ht 5' 2" (1 575 m)   Wt 99 7 kg (219 lb 11 2 oz)   SpO2 90%   BMI 40 18 kg/m²          Physical Exam   Constitutional: She is oriented to person, place, and time  She appears well-developed and well-nourished  No distress  HENT:   Head: Normocephalic and atraumatic  Right Ear: External ear normal    Left Ear: External ear normal    Mouth/Throat: Oropharynx is clear and moist    Eyes: Conjunctivae and EOM are normal  Right eye exhibits no discharge  Left eye exhibits no discharge  No scleral icterus  Neck: Normal range of motion  Cardiovascular: Normal rate, regular rhythm and normal heart sounds  No murmur heard  Pulmonary/Chest: Effort normal and breath sounds normal  No respiratory distress  She has no wheezes  Abdominal: Soft  Bowel sounds are normal  There is no tenderness  Musculoskeletal: Normal range of motion  She exhibits no edema  Lymphadenopathy:     She has no cervical adenopathy  Neurological: She is alert and oriented to person, place, and time  Skin: Skin is warm and dry  She is not diaphoretic  No erythema  Psychiatric: She has a normal mood and affect  Her speech is normal and behavior is normal  Judgment and thought content normal    Vitals reviewed

## 2020-02-25 NOTE — PROGRESS NOTES
Assessment and Plan:     Problem List Items Addressed This Visit     None           Preventive health issues were discussed with patient, and age appropriate screening tests were ordered as noted in patient's After Visit Summary  Personalized health advice and appropriate referrals for health education or preventive services given if needed, as noted in patient's After Visit Summary       History of Present Illness:     Patient presents for Medicare Annual Wellness visit    Patient Care Team:  Vito Virk MD as PCP - General  Belenda San, MD Arnetha Combs, MD Donzella Grise , MD Valentino Dupont, MD Danne Emperor, MD     Problem List:     Patient Active Problem List   Diagnosis    Adenocarcinoma of bladder [facility])    Chronic obstructive pulmonary disease ([facility] )    Elevated C-reactive protein    Hypercholesterolemia    Hypothyroidism    Malignant neoplasm of bronchus or lung    Non-small cell carcinoma of left lung, stage 3 ([facility] )    Obesity    Squamous cell carcinoma of leg    History of bladder cancer      Past Medical and Surgical History:     Past Medical History:   Diagnosis Date    Bladder cancer ([facility] )     COPD (chronic obstructive pulmonary disease) ([facility] )     Hypothyroidism     [facility]     stage 3    Skin cancer     squamous cell of the leg    UTI (urinary tract infection) 12/2019    recent e coli treated and resolved     Past Surgical History:   Procedure Laterality Date    CHOLECYSTECTOMY      CYSTOSCOPY      4/10 AND 8/10, 3/12/2015 4/18/2016  DIAGNOSTIC     HYSTERECTOMY      OTHER SURGICAL HISTORY      TRANSURETHERAL RESECTION     [place] CYSTOURETHROSCOPY,FULGUR <0 5 CM LESN N/A 12/23/2019    Procedure: BLADDER BIOPSY; [address] [place] [address] with mitomycin;  Surgeon: Austen Jin MD;  Location: [address] [place] [address];  Service: Urology      Family History:     Family History   Problem Relation Age of Onset    Cancer Mother         Mouth     Mental illness Sister    Perla Laird Colon cancer Paternal Grandmother       Social History:        Social History     Socioeconomic History    Marital status: Single     Spouse name: Not on file    Number of children: Not on file    Years of education: Not on file    Highest education level: Not on file   Occupational History    Not on file   Social Needs    Financial resource strain: Not on file    Food insecurity:     Worry: Not on file     Inability: Not on file    Transportation needs:     Medical: Not on file     Non-medical: Not on file   Tobacco Use    Smoking status: Former Smoker     Packs/day: 2 00     Types: Cigarettes    Smokeless tobacco: Never Used   Substance and Sexual Activity    Alcohol use: No    Drug use: No    Sexual activity: Not on file   Lifestyle    Physical activity:     Days per week: Not on file     Minutes per session: Not on file    Stress: Not on file   Relationships    Social connections:     Talks on phone: Not on file     Gets together: Not on file     Attends [employer] service: Not on file     Active member of club or organization: Not on file     Attends meetings of clubs or organizations: Not on file     Relationship status: Not on file    Intimate partner violence:     Fear of current or ex partner: Not on file     Emotionally abused: Not on file     Physically abused: Not on file     Forced sexual activity: Not on file   Other Topics Concern    Not on file   Social History Narrative    Not on file      Medications and Allergies:     Current Outpatient Medications   Medication Sig Dispense Refill    albuterol (VENTOLIN HFA) 90 mcg/act inhaler Inhale 2 puffs every 6 (six) hours as needed for wheezing 1 Inhaler 5    Ascorbic Acid (VITAMIN C) 100 MG tablet Take 100 mg by mouth daily      aspirin 81 MG tablet Take 1 tablet by mouth daily      Calcium 600 MG tablet Take 1 tablet by mouth 2 (two) times a day      Chromium 200 MCG TABS Take 1 tablet by mouth daily      levothyroxine 125 mcg tablet TAKE ONE TABLET BY MOUTH ONE TIME DAILY 90 tablet 3    magnesium 30 MG tablet Take 30 mg by mouth 2 (two) times a day      melatonin 1 mg Take 1 mg by mouth daily at bedtime      Multiple Vitamin (MULTIVITAMINS PO) Take 1 tablet by mouth daily      Omega-3 Fatty Acids (FISH OIL) 1200 MG CAPS Take by mouth      tiotropium (SPIRIVA HANDIHALER) 18 mcg inhalation capsule Place 1 capsule (18 mcg total) into inhaler and inhale daily 90 capsule 1     Current Facility-Administered Medications   Medication Dose Route Frequency Provider Last Rate Last Dose    BCG (MALIHA BCG) intravesical suspension 50 mg  50 mg Intravesical Weekly Mare Gorman MD   50 mg at 02/24/20 1123     Allergies   Allergen Reactions    Erythromycin     Penicillins     Sulfa Antibiotics     Tetracyclines & Related       Immunizations:     Immunization History   Administered Date(s) Administered    Influenza Split High Dose Preservative Free IM 11/30/2012, 04/01/2016, 10/25/2017    Influenza TIV (IM) 01/11/2010, 09/20/2010, 10/27/2011    Influenza, high dose seasonal 0 5 mL 10/01/2018, 10/21/2019    Pneumococcal Conjugate 13-Valent 04/01/2016    Pneumococcal Polysaccharide PPV23 08/18/2005, 08/19/2005, 12/14/2012    Zoster 01/13/2014      Health Maintenance:         Topic Date Due    CRC Screening: Colonoscopy  03/25/2019         Topic Date Due    DTaP,Tdap,and Td Vaccines (1 - Tdap) 03/18/1956      Medicare Health Risk Assessment:     /82   Pulse (!) 110   Temp 98 2 °F (36 8 °C)   Resp 22   Ht 5' 2" (1 575 m)   Wt 99 7 kg (219 lb 11 2 oz)   SpO2 90%   BMI 40 18 kg/m²      Franck Charles is here for her Subsequent Wellness visit  Health Risk Assessment:   Patient rates overall health as fair  Patient feels that their physical health rating is slightly worse  Eyesight was rated as slightly worse  Hearing was rated as same  Patient feels that their emotional and mental health rating is same   Pain experienced in the last 7 days has been some  Patient's pain rating has been 2/10  Patient states that she has experienced no weight loss or gain in last 6 months  Fall Risk Screening: In the past year, patient has experienced: no history of falling in past year      Urinary Incontinence Screening:   Patient has leaked urine accidently in the last six months  Home Safety:  Patient has trouble with stairs inside or outside of their home  Patient has working smoke alarms and has working carbon monoxide detector  Home safety hazards include: none  Nutrition:   Current diet is Regular and Frequent junk food  Medications:   Patient is currently taking over-the-counter supplements  OTC medications include: see medication list  Patient is able to manage medications  Activities of Daily Living (ADLs)/Instrumental Activities of Daily Living (IADLs):   Walk and transfer into and out of bed and chair?: Yes  Dress and groom yourself?: Yes    Bathe or shower yourself?: Yes    Feed yourself?  Yes  Do your laundry/housekeeping?: Yes  Manage your money, pay your bills and track your expenses?: Yes  Make your own meals?: Yes    Do your own shopping?: Yes    Previous Hospitalizations:   Any hospitalizations or ED visits within the last 12 months?: No      Advance Care Planning:   Living will: No    Durable POA for healthcare: No    Advanced directive: No      Cognitive Screening:   Provider or family/friend/caregiver concerned regarding cognition?: No    PREVENTIVE SCREENINGS      Cardiovascular Screening:    General: Screening Not Indicated and History Lipid Disorder      Diabetes Screening:     General: Screening Current      Colorectal Cancer Screening:     General: Risks and Benefits Discussed      Breast Cancer Screening:       Due for: Mammogram        Cervical Cancer Screening:    General: Screening Not Indicated      Osteoporosis Screening:    General: Screening Current      Abdominal Aortic Aneurysm (AAA) Screening: General: Screening Not Indicated      Lung Cancer Screening:     General: Screening Not Indicated and History Lung Cancer      Hepatitis C Screening:    General: Screening Not Indicated      Thierno Cardona DO

## 2020-03-02 ENCOUNTER — PROCEDURE VISIT (OUTPATIENT)
Dept: UROLOGY | Facility: CLINIC | Age: 75
End: 2020-03-02
Payer: COMMERCIAL

## 2020-03-02 VITALS
BODY MASS INDEX: 39.01 KG/M2 | HEIGHT: 62 IN | WEIGHT: 212 LBS | DIASTOLIC BLOOD PRESSURE: 84 MMHG | HEART RATE: 106 BPM | SYSTOLIC BLOOD PRESSURE: 132 MMHG

## 2020-03-02 DIAGNOSIS — D09.0 CARCINOMA IN SITU OF BLADDER: Primary | ICD-10-CM

## 2020-03-02 DIAGNOSIS — R31.9 HEMATURIA, UNSPECIFIED TYPE: ICD-10-CM

## 2020-03-02 LAB
BACTERIA UR QL AUTO: ABNORMAL /HPF
BILIRUB UR QL STRIP: ABNORMAL
CLARITY UR: ABNORMAL
COLOR UR: ABNORMAL
GLUCOSE UR STRIP-MCNC: NEGATIVE MG/DL
HGB UR QL STRIP.AUTO: ABNORMAL
HYALINE CASTS #/AREA URNS LPF: ABNORMAL /LPF
KETONES UR STRIP-MCNC: NEGATIVE MG/DL
LEUKOCYTE ESTERASE UR QL STRIP: ABNORMAL
NITRITE UR QL STRIP: NEGATIVE
NON-SQ EPI CELLS URNS QL MICRO: ABNORMAL /HPF
PH UR STRIP.AUTO: 6 [PH]
PROT UR STRIP-MCNC: ABNORMAL MG/DL
RBC #/AREA URNS AUTO: ABNORMAL /HPF
SL AMB  POCT GLUCOSE, UA: NORMAL
SL AMB LEUKOCYTE ESTERASE,UA: NORMAL
SL AMB POCT BILIRUBIN,UA: NORMAL
SL AMB POCT BLOOD,UA: NORMAL
SL AMB POCT CLARITY,UA: CLEAR
SL AMB POCT COLOR,UA: NORMAL
SL AMB POCT KETONES,UA: NORMAL
SL AMB POCT NITRITE,UA: NORMAL
SL AMB POCT PH,UA: 5
SL AMB POCT SPECIFIC GRAVITY,UA: 1.01
SL AMB POCT URINE PROTEIN: NORMAL
SL AMB POCT UROBILINOGEN: NORMAL
SP GR UR STRIP.AUTO: 1.02 (ref 1–1.03)
UROBILINOGEN UR QL STRIP.AUTO: 0.2 E.U./DL
WBC #/AREA URNS AUTO: ABNORMAL /HPF

## 2020-03-02 PROCEDURE — 3008F BODY MASS INDEX DOCD: CPT

## 2020-03-02 PROCEDURE — 87077 CULTURE AEROBIC IDENTIFY: CPT | Performed by: UROLOGY

## 2020-03-02 PROCEDURE — 87186 SC STD MICRODIL/AGAR DIL: CPT | Performed by: UROLOGY

## 2020-03-02 PROCEDURE — 4040F PNEUMOC VAC/ADMIN/RCVD: CPT

## 2020-03-02 PROCEDURE — 81002 URINALYSIS NONAUTO W/O SCOPE: CPT

## 2020-03-02 PROCEDURE — 1170F FXNL STATUS ASSESSED: CPT

## 2020-03-02 PROCEDURE — 1160F RVW MEDS BY RX/DR IN RCRD: CPT

## 2020-03-02 PROCEDURE — 87086 URINE CULTURE/COLONY COUNT: CPT | Performed by: UROLOGY

## 2020-03-02 PROCEDURE — 81001 URINALYSIS AUTO W/SCOPE: CPT | Performed by: UROLOGY

## 2020-03-02 PROCEDURE — 99211 OFF/OP EST MAY X REQ PHY/QHP: CPT

## 2020-03-02 NOTE — PROGRESS NOTES
3/2/2020    Anahi Lopez  1945  [id number]    Diagnosis  Chief Complaint     Bladder Cancer          Patient presents for BCG 3 of 6 managed by Dr Kit Greenberg This Encounter   Procedures    POCT urine dip     BCG not given today per Dr Salo Angulo  UA C&S sent to rule out infection  FU in one week for next treatment  Patient instructed to call with persistent hematuria, fever, or other concerns  Vitals:    03/02/20 1100   BP: 132/84   BP Location: Right arm   Patient Position: Sitting   Cuff Size: Adult   Pulse: (!) 106   Weight: 96 2 kg (212 lb)   Height: 5' 2" (1 575 m)       Recent Results (from the past 4 hour(s))   POCT urine dip    Collection Time: 03/02/20 11:02 AM   Result Value Ref Range    LEUKOCYTE ESTERASE,UA ++     NITRITE,UA -     SL AMB POCT UROBILINOGEN -     POCT URINE PROTEIN -      PH,UA 5 0     BLOOD,UA large     SPECIFIC GRAVITY,UA 1 015     KETONES,UA -     BILIRUBIN,UA -     GLUCOSE, UA -      COLOR,UA saturnino     CLARITY,UA clear        BCG delayed two weeks, the first for c/o questionable UTI for persistent dysuria; culture ultimately negative  And the 2nd for spontaneous gross hematuria  She reports dysuria is improving  She does note symptoms improving and is comfortable to proceed today       Will hold BCG and send UA C&S to rule out infection  If culture still negative, symptoms not related to UTI and can continue BCG treatment      Procedures            Nhi Sparks LPN

## 2020-03-04 ENCOUNTER — TELEPHONE (OUTPATIENT)
Dept: UROLOGY | Facility: CLINIC | Age: 75
End: 2020-03-04

## 2020-03-04 DIAGNOSIS — N39.0 URINARY TRACT INFECTION WITHOUT HEMATURIA, SITE UNSPECIFIED: Primary | ICD-10-CM

## 2020-03-04 LAB
BACTERIA UR CULT: ABNORMAL
BACTERIA UR CULT: ABNORMAL

## 2020-03-04 NOTE — TELEPHONE ENCOUNTER
Patient managed by Dr Gloria Borges at [facility]   H/o bladder cancer       BCG 3of 6 held on 3/2/20 and UA C&S collected and sent from the office        Please review results

## 2020-03-05 RX ORDER — NITROFURANTOIN 25; 75 MG/1; MG/1
100 CAPSULE ORAL 2 TIMES DAILY
Qty: 10 CAPSULE | Refills: 0 | Status: SHIPPED | OUTPATIENT
Start: 2020-03-05 | End: 2020-03-10

## 2020-03-05 NOTE — TELEPHONE ENCOUNTER
Spoke with ronny, Advised results of culture were positive for infections  Macrobid sent to pharmacy   Pt verbalized understanding

## 2020-03-05 NOTE — TELEPHONE ENCOUNTER
Culture +, will give macrobid x 5 days and forward to MD for input given the patient is routinely missing BCG

## 2020-03-09 ENCOUNTER — PROCEDURE VISIT (OUTPATIENT)
Dept: UROLOGY | Facility: CLINIC | Age: 75
End: 2020-03-09
Payer: COMMERCIAL

## 2020-03-09 VITALS
HEIGHT: 62 IN | WEIGHT: 214.8 LBS | HEART RATE: 102 BPM | DIASTOLIC BLOOD PRESSURE: 80 MMHG | BODY MASS INDEX: 39.53 KG/M2 | SYSTOLIC BLOOD PRESSURE: 138 MMHG

## 2020-03-09 DIAGNOSIS — D09.0 CARCINOMA IN SITU OF BLADDER: Primary | ICD-10-CM

## 2020-03-09 DIAGNOSIS — N39.0 URINARY TRACT INFECTION WITHOUT HEMATURIA, SITE UNSPECIFIED: ICD-10-CM

## 2020-03-09 LAB
SL AMB  POCT GLUCOSE, UA: NORMAL
SL AMB LEUKOCYTE ESTERASE,UA: NORMAL
SL AMB POCT BILIRUBIN,UA: NORMAL
SL AMB POCT BLOOD,UA: NORMAL
SL AMB POCT CLARITY,UA: CLEAR
SL AMB POCT COLOR,UA: YELLOW
SL AMB POCT KETONES,UA: NORMAL
SL AMB POCT NITRITE,UA: NORMAL
SL AMB POCT PH,UA: 5
SL AMB POCT SPECIFIC GRAVITY,UA: 1.02
SL AMB POCT URINE PROTEIN: NORMAL
SL AMB POCT UROBILINOGEN: NORMAL

## 2020-03-09 PROCEDURE — 81002 URINALYSIS NONAUTO W/O SCOPE: CPT | Performed by: UROLOGY

## 2020-03-09 PROCEDURE — 51720 TREATMENT OF BLADDER LESION: CPT | Performed by: UROLOGY

## 2020-03-09 NOTE — PROGRESS NOTES
3/9/2020    Ever Haynes  1945  [id number]    Diagnosis  Chief Complaint     Bladder Cancer          Patient presents for BCG 3 of 6 managed by Dr Jeff Grider This Encounter   Procedures    POCT urine dip     Patient will FU as scheduled in one week for next treatment  Patient instructed to call with persistent hematuria, fever, or other concerns  Procedure BCG treatment 3 of 6  Vitals:    03/09/20 1109   BP: 138/80   BP Location: Left arm   Patient Position: Sitting   Cuff Size: Standard   Pulse: 102   Weight: 97 4 kg (214 lb 12 8 oz)   Height: 5' 2" (1 575 m)       Recent Results (from the past 4 hour(s))   POCT urine dip    Collection Time: 03/09/20 11:14 AM   Result Value Ref Range    LEUKOCYTE ESTERASE,UA -     NITRITE,UA -     SL AMB POCT UROBILINOGEN -     POCT URINE PROTEIN -      PH,UA 5 0     BLOOD,UA ++     SPECIFIC GRAVITY,UA 1 020     KETONES,UA -     BILIRUBIN,UA -     GLUCOSE, UA -      COLOR,UA yellow     CLARITY,UA clear        Urinary Incontinence Screening      Most Recent Value   Urinary Incontinence   Urinary Incontinence? No   Incomplete emptying? No   Urinary frequency? Yes   Urinary urgency? Yes [at times]   Urinary hesitancy? No   Dysuria (painful difficult urination)? No   Nocturia (waking up to use the bathroom)? Yes [6-10 x]   Straining (having to push to go)? No   Weak stream?  No   Intermittent stream?  No   Post void dribbling? No        Last treatment tolerated well  Held last week due to concern for UTI  Patient denies urinary symptoms today  Bladder instillation     Date/Time 3/9/2020 11:05 AM     Performed by  Zayda Greenfield RN     Authorized by Enrique Elliott MD      Universal Protocol Consent: Verbal consent obtained  Risks and benefits: risks, benefits and alternatives were discussed  Consent given by: patient               Sterile technique employed  Patient prepped and identified  16F Straight catheter placed   Bladder drained, residual approximately <30mL  The following solution was instilled directly into the bladder via catheter: 1 Vial BCG  Catheter removed  Patient discharged  Patient tolerated procedure       Administrations This Visit     BCG (MAILHA BCG) intravesical suspension 50 mg     Admin Date  03/09/2020 Action  Given Dose  50 mg Route  Intravesical Administered By  LEORA Leary RN CURN, BSN

## 2020-03-16 ENCOUNTER — PROCEDURE VISIT (OUTPATIENT)
Dept: UROLOGY | Facility: CLINIC | Age: 75
End: 2020-03-16
Payer: COMMERCIAL

## 2020-03-16 VITALS
SYSTOLIC BLOOD PRESSURE: 128 MMHG | BODY MASS INDEX: 38.79 KG/M2 | DIASTOLIC BLOOD PRESSURE: 84 MMHG | HEART RATE: 102 BPM | WEIGHT: 210.8 LBS | HEIGHT: 62 IN

## 2020-03-16 DIAGNOSIS — D09.0 CARCINOMA IN SITU OF BLADDER: Primary | ICD-10-CM

## 2020-03-16 PROCEDURE — 81002 URINALYSIS NONAUTO W/O SCOPE: CPT

## 2020-03-16 PROCEDURE — 51720 TREATMENT OF BLADDER LESION: CPT

## 2020-03-16 NOTE — PROGRESS NOTES
3/16/2020    Marycruz Pérez  1945  [id number]    Diagnosis  Chief Complaint     Bladder Cancer          Patient presents for BCG 4 of 6 managed by Dr Haroldo Knapp This Encounter   Procedures    POCT urine dip     Patient will FU in one week for next visit  Patient instructed to call with persistent hematuria, fever, or other concerns  Procedure BCG treatment 4 of 6  Vitals:    03/16/20 1134   BP: 128/84   BP Location: Left arm   Patient Position: Sitting   Cuff Size: Standard   Pulse: 102   Weight: 95 6 kg (210 lb 12 8 oz)   Height: 5' 2" (1 575 m)       Recent Results (from the past 4 hour(s))   POCT urine dip    Collection Time: 03/16/20 11:41 AM   Result Value Ref Range    LEUKOCYTE ESTERASE,UA -     NITRITE,UA -     SL AMB POCT UROBILINOGEN -     POCT URINE PROTEIN -      PH,UA 6 0     BLOOD,UA -     SPECIFIC GRAVITY,UA 1 020     KETONES,UA -     BILIRUBIN,UA -     GLUCOSE, UA -      COLOR,UA yellow     CLARITY,UA clear        Urinary Incontinence Screening      Most Recent Value   Urinary Incontinence   Urinary Incontinence? No   Incomplete emptying? No   Urinary frequency? Yes   Urinary urgency? Yes   Urinary hesitancy? No   Dysuria (painful difficult urination)? Yes [just the first day after treatment]   Nocturia (waking up to use the bathroom)? Yes [6-8x]   Straining (having to push to go)? No   Weak stream?  No   Intermittent stream?  No   Post void dribbling? No [occ]        Patient did note dysuria and slight hematuria after last treatment  Symptoms resolved after 24 hours and urine dip normal today  Bladder instillation     Date/Time 3/16/2020 11:45 AM     Performed by  Renetta Fitzgerald RN     Authorized by Austen Jin MD      Universal Protocol Consent: Verbal consent obtained  Risks and benefits: risks, benefits and alternatives were discussed  Consent given by: patient               Sterile technique employed  Patient prepped and identified   16F Straight catheter placed  Bladder drained, residual approximately <30mL  The following solution was instilled directly into the bladder via catheter: 1 Vial BCG  Catheter removed  Patient discharged  Patient tolerated procedure       Administrations This Visit     BCG (MALIHA BCG) intravesical suspension 50 mg     Admin Date  03/16/2020 Action  Given Dose  50 mg Route  Intravesical Administered By  Emanuel Kulkarni, RN                  Emanuel Kulkarni, RN  CHAIMN, BSN

## 2020-03-24 ENCOUNTER — PROCEDURE VISIT (OUTPATIENT)
Dept: UROLOGY | Facility: CLINIC | Age: 75
End: 2020-03-24
Payer: COMMERCIAL

## 2020-03-24 VITALS
HEIGHT: 62 IN | HEART RATE: 94 BPM | DIASTOLIC BLOOD PRESSURE: 82 MMHG | SYSTOLIC BLOOD PRESSURE: 120 MMHG | BODY MASS INDEX: 39.01 KG/M2 | WEIGHT: 212 LBS

## 2020-03-24 DIAGNOSIS — N39.0 URINARY TRACT INFECTION WITHOUT HEMATURIA, SITE UNSPECIFIED: Primary | ICD-10-CM

## 2020-03-24 DIAGNOSIS — D09.0 CARCINOMA IN SITU OF BLADDER: ICD-10-CM

## 2020-03-24 LAB
SL AMB  POCT GLUCOSE, UA: NORMAL
SL AMB LEUKOCYTE ESTERASE,UA: NORMAL
SL AMB POCT BILIRUBIN,UA: NORMAL
SL AMB POCT BLOOD,UA: NORMAL
SL AMB POCT CLARITY,UA: CLEAR
SL AMB POCT COLOR,UA: YELLOW
SL AMB POCT KETONES,UA: NORMAL
SL AMB POCT NITRITE,UA: NORMAL
SL AMB POCT PH,UA: 5
SL AMB POCT SPECIFIC GRAVITY,UA: 1.01
SL AMB POCT URINE PROTEIN: NORMAL
SL AMB POCT UROBILINOGEN: 0.2

## 2020-03-24 PROCEDURE — 51720 TREATMENT OF BLADDER LESION: CPT

## 2020-03-24 PROCEDURE — 81002 URINALYSIS NONAUTO W/O SCOPE: CPT | Performed by: PHYSICIAN ASSISTANT

## 2020-03-24 NOTE — PROGRESS NOTES
3/24/2020    Marycruz Pérez  1945  [id number]    Diagnosis  Chief Complaint     Bladder Cancer          Patient presents for BCG 5 of 6 managed by Dr Haroldo Knapp This Encounter   Procedures    POCT urine dip     Patient will FU in 1 week for final treatment session  Patient instructed to call with persistent hematuria, fever, or other concerns  Procedure BCG treatment 5 of 6  Vitals:    03/24/20 1137   BP: 120/82   BP Location: Left arm   Patient Position: Sitting   Cuff Size: Adult   Pulse: 94   Weight: 96 2 kg (212 lb)   Height: 5' 2" (1 575 m)       Recent Results (from the past 4 hour(s))   POCT urine dip    Collection Time: 03/24/20 11:44 AM   Result Value Ref Range    LEUKOCYTE ESTERASE,UA -     NITRITE,UA -     SL AMB POCT UROBILINOGEN 0 2     POCT URINE PROTEIN trace      PH,UA 5 0     BLOOD,UA -     SPECIFIC GRAVITY,UA 1 015     KETONES,UA trace     BILIRUBIN,UA +     GLUCOSE, UA -      COLOR,UA yellow     CLARITY,UA clear        Urinary Incontinence Screening      Most Recent Value   Urinary Incontinence   Urinary Incontinence? Yes [sometimes]   Incomplete emptying? No   Urinary frequency? Yes   Urinary urgency? Yes   Urinary hesitancy? No   Dysuria (painful difficult urination)? Yes [after procedure]   Nocturia (waking up to use the bathroom)? Yes [6-8x]   Straining (having to push to go)? No   Weak stream?  No   Intermittent stream?  Yes [sometimes]   Post void dribbling? No          Procedures    Sterile technique employed  Patient prepped and identified  14F Straight catheter placed  Bladder drained, residual approximately <30 mL  The following solution was instilled directly into the bladder via catheter: 1 Vial BCG  Catheter removed  Patient discharged  Patient tolerated procedure well without discomfort            Kylie Smith PA-C

## 2020-03-30 ENCOUNTER — TELEPHONE (OUTPATIENT)
Dept: UROLOGY | Facility: CLINIC | Age: 75
End: 2020-03-30

## 2020-03-30 ENCOUNTER — PROCEDURE VISIT (OUTPATIENT)
Dept: UROLOGY | Facility: CLINIC | Age: 75
End: 2020-03-30
Payer: COMMERCIAL

## 2020-03-30 VITALS
HEIGHT: 62 IN | RESPIRATION RATE: 20 BRPM | HEART RATE: 96 BPM | BODY MASS INDEX: 38.46 KG/M2 | SYSTOLIC BLOOD PRESSURE: 130 MMHG | DIASTOLIC BLOOD PRESSURE: 80 MMHG | WEIGHT: 209 LBS

## 2020-03-30 DIAGNOSIS — D09.9 CARCINOMA IN SITU, UNSPECIFIED SITE: Primary | ICD-10-CM

## 2020-03-30 PROCEDURE — 81002 URINALYSIS NONAUTO W/O SCOPE: CPT

## 2020-03-30 PROCEDURE — 51720 TREATMENT OF BLADDER LESION: CPT

## 2020-03-30 PROCEDURE — NC001 PR NO CHARGE

## 2020-03-30 NOTE — PROGRESS NOTES
3/30/2020    Nino Doctor  1945  [id number]    Diagnosis  Chief Complaint     Bladder Cancer; BCG 6 of 6          Patient presents for BCG 6 of 6 managed by Dr Eugene Small This Encounter   Procedures    POCT urine dip     Patient has scheduled cystoscopy visit for 04/27/2020 with Dr Gerson Cortez  Patient instructed to call with persistent hematuria, fever, or other concerns  Procedure BCG treatment 6 of 6  Vitals:    03/30/20 1137   BP: 130/80   Pulse: 96   Resp: 20   Weight: 94 8 kg (209 lb)   Height: 5' 2" (1 575 m)       Recent Results (from the past 4 hour(s))   POCT urine dip    Collection Time: 03/30/20 11:45 AM   Result Value Ref Range    LEUKOCYTE ESTERASE,UA neg     NITRITE,UA neg     SL AMB POCT UROBILINOGEN neg     POCT URINE PROTEIN neg      PH,UA 5     BLOOD,UA small     SPECIFIC GRAVITY,UA 1 020     KETONES,UA neg     BILIRUBIN,UA neg     GLUCOSE, UA neg      COLOR,UA yellow     CLARITY,UA clear                  Bladder instillation     Date/Time 3/30/2020 2:05 PM     Performed by  Elver Hardy RN     Authorized by Chelita Rodrigues MD      [place] Protocol Consent: Verbal consent obtained  Consent given by: patient        Preparation: Patient was prepped and draped in the usual sterile fashion  Site preparation: Betadine   Procedure Details   Patient tolerance: Patient tolerated the procedure well with no immediate complications             Sterile technique employed  Patient prepped and identified  16F Straight catheter placed  Bladder drained, residual approximately 50 mL  The following solution was instilled directly into the bladder via catheter: 1 Vial BCG  Catheter removed  Patient discharged  Patient tolerated procedure             Elver Hardy RN

## 2020-04-14 ENCOUNTER — TELEPHONE (OUTPATIENT)
Dept: UROLOGY | Facility: AMBULATORY SURGERY CENTER | Age: 75
End: 2020-04-14

## 2020-05-01 ENCOUNTER — PROCEDURE VISIT (OUTPATIENT)
Dept: UROLOGY | Facility: CLINIC | Age: 75
End: 2020-05-01
Payer: COMMERCIAL

## 2020-05-01 VITALS
SYSTOLIC BLOOD PRESSURE: 132 MMHG | BODY MASS INDEX: 37.49 KG/M2 | WEIGHT: 205 LBS | HEART RATE: 98 BPM | DIASTOLIC BLOOD PRESSURE: 74 MMHG

## 2020-05-01 DIAGNOSIS — Z85.51 HISTORY OF BLADDER CANCER: Primary | ICD-10-CM

## 2020-05-01 LAB
SL AMB  POCT GLUCOSE, UA: NORMAL
SL AMB LEUKOCYTE ESTERASE,UA: NORMAL
SL AMB POCT BILIRUBIN,UA: NORMAL
SL AMB POCT BLOOD,UA: NORMAL
SL AMB POCT CLARITY,UA: CLEAR
SL AMB POCT COLOR,UA: YELLOW
SL AMB POCT KETONES,UA: 5
SL AMB POCT NITRITE,UA: NORMAL
SL AMB POCT PH,UA: 5
SL AMB POCT SPECIFIC GRAVITY,UA: 1
SL AMB POCT URINE PROTEIN: NORMAL
SL AMB POCT UROBILINOGEN: 0.2

## 2020-05-01 PROCEDURE — 88112 CYTOPATH CELL ENHANCE TECH: CPT | Performed by: PATHOLOGY

## 2020-05-01 PROCEDURE — 1036F TOBACCO NON-USER: CPT | Performed by: UROLOGY

## 2020-05-01 PROCEDURE — 81002 URINALYSIS NONAUTO W/O SCOPE: CPT | Performed by: UROLOGY

## 2020-05-01 PROCEDURE — 1160F RVW MEDS BY RX/DR IN RCRD: CPT | Performed by: UROLOGY

## 2020-05-01 PROCEDURE — 52000 CYSTOURETHROSCOPY: CPT | Performed by: UROLOGY

## 2020-05-01 PROCEDURE — 99214 OFFICE O/P EST MOD 30 MIN: CPT | Performed by: UROLOGY

## 2020-05-01 PROCEDURE — 4040F PNEUMOC VAC/ADMIN/RCVD: CPT | Performed by: UROLOGY

## 2020-05-01 RX ORDER — LEVOFLOXACIN 5 MG/ML
750 INJECTION, SOLUTION INTRAVENOUS ONCE
Status: CANCELLED | OUTPATIENT
Start: 2020-06-01 | End: 2020-05-01

## 2020-05-06 ENCOUNTER — TELEPHONE (OUTPATIENT)
Dept: UROLOGY | Facility: CLINIC | Age: 75
End: 2020-05-06

## 2020-05-08 ENCOUNTER — PREP FOR PROCEDURE (OUTPATIENT)
Dept: UROLOGY | Facility: CLINIC | Age: 75
End: 2020-05-08

## 2020-05-08 DIAGNOSIS — Z11.59 SPECIAL SCREENING EXAMINATION FOR VIRAL DISEASE: ICD-10-CM

## 2020-05-08 DIAGNOSIS — D09.0 CARCINOMA IN SITU OF BLADDER: Primary | ICD-10-CM

## 2020-05-08 DIAGNOSIS — Z01.818 PRE-OP TESTING: ICD-10-CM

## 2020-05-08 DIAGNOSIS — N39.0 ACUTE UTI: ICD-10-CM

## 2020-05-20 ENCOUNTER — TELEPHONE (OUTPATIENT)
Dept: UROLOGY | Facility: MEDICAL CENTER | Age: 75
End: 2020-05-20

## 2020-05-20 ENCOUNTER — CONSULT (OUTPATIENT)
Dept: FAMILY MEDICINE CLINIC | Facility: CLINIC | Age: 75
End: 2020-05-20
Payer: COMMERCIAL

## 2020-05-20 VITALS
RESPIRATION RATE: 20 BRPM | SYSTOLIC BLOOD PRESSURE: 136 MMHG | TEMPERATURE: 97.2 F | DIASTOLIC BLOOD PRESSURE: 80 MMHG | HEIGHT: 62 IN | HEART RATE: 100 BPM | BODY MASS INDEX: 38.53 KG/M2 | WEIGHT: 209.4 LBS | OXYGEN SATURATION: 95 %

## 2020-05-20 DIAGNOSIS — C67.9 ADENOCARCINOMA OF BLADDER (HCC): ICD-10-CM

## 2020-05-20 DIAGNOSIS — E66.9 OBESITY WITHOUT SERIOUS COMORBIDITY, UNSPECIFIED CLASSIFICATION, UNSPECIFIED OBESITY TYPE: ICD-10-CM

## 2020-05-20 DIAGNOSIS — E03.9 HYPOTHYROIDISM, UNSPECIFIED TYPE: ICD-10-CM

## 2020-05-20 DIAGNOSIS — Z01.818 PREOPERATIVE CLEARANCE: Primary | ICD-10-CM

## 2020-05-20 DIAGNOSIS — E78.00 HYPERCHOLESTEROLEMIA: ICD-10-CM

## 2020-05-20 PROCEDURE — 4040F PNEUMOC VAC/ADMIN/RCVD: CPT | Performed by: INTERNAL MEDICINE

## 2020-05-20 PROCEDURE — 3008F BODY MASS INDEX DOCD: CPT | Performed by: INTERNAL MEDICINE

## 2020-05-20 PROCEDURE — 1036F TOBACCO NON-USER: CPT | Performed by: INTERNAL MEDICINE

## 2020-05-20 PROCEDURE — 99214 OFFICE O/P EST MOD 30 MIN: CPT | Performed by: INTERNAL MEDICINE

## 2020-05-20 PROCEDURE — 1160F RVW MEDS BY RX/DR IN RCRD: CPT | Performed by: INTERNAL MEDICINE

## 2020-05-26 ENCOUNTER — APPOINTMENT (OUTPATIENT)
Dept: LAB | Facility: MEDICAL CENTER | Age: 75
End: 2020-05-26
Payer: COMMERCIAL

## 2020-05-26 DIAGNOSIS — Z11.59 SPECIAL SCREENING EXAMINATION FOR VIRAL DISEASE: ICD-10-CM

## 2020-05-26 DIAGNOSIS — C67.9 MALIGNANT NEOPLASM OF URINARY BLADDER, UNSPECIFIED SITE (HCC): ICD-10-CM

## 2020-05-26 DIAGNOSIS — D09.0 CARCINOMA IN SITU OF BLADDER: ICD-10-CM

## 2020-05-26 DIAGNOSIS — Z01.818 PRE-OP TESTING: ICD-10-CM

## 2020-05-26 DIAGNOSIS — N39.0 ACUTE UTI: ICD-10-CM

## 2020-05-26 PROCEDURE — 88112 CYTOPATH CELL ENHANCE TECH: CPT | Performed by: PATHOLOGY

## 2020-05-26 PROCEDURE — 87086 URINE CULTURE/COLONY COUNT: CPT

## 2020-05-26 PROCEDURE — U0003 INFECTIOUS AGENT DETECTION BY NUCLEIC ACID (DNA OR RNA); SEVERE ACUTE RESPIRATORY SYNDROME CORONAVIRUS 2 (SARS-COV-2) (CORONAVIRUS DISEASE [COVID-19]), AMPLIFIED PROBE TECHNIQUE, MAKING USE OF HIGH THROUGHPUT TECHNOLOGIES AS DESCRIBED BY CMS-2020-01-R: HCPCS

## 2020-05-27 LAB — BACTERIA UR CULT: NORMAL

## 2020-05-28 LAB — SARS-COV-2 RNA SPEC QL NAA+PROBE: NOT DETECTED

## 2020-05-29 ENCOUNTER — ANESTHESIA EVENT (OUTPATIENT)
Dept: PERIOP | Facility: HOSPITAL | Age: 75
End: 2020-05-29
Payer: COMMERCIAL

## 2020-06-01 ENCOUNTER — TELEPHONE (OUTPATIENT)
Dept: UROLOGY | Facility: CLINIC | Age: 75
End: 2020-06-01

## 2020-06-01 ENCOUNTER — ANESTHESIA (OUTPATIENT)
Dept: PERIOP | Facility: HOSPITAL | Age: 75
End: 2020-06-01
Payer: COMMERCIAL

## 2020-06-01 ENCOUNTER — HOSPITAL ENCOUNTER (OUTPATIENT)
Facility: HOSPITAL | Age: 75
Setting detail: OUTPATIENT SURGERY
Discharge: HOME | End: 2020-06-01
Attending: UROLOGY | Admitting: UROLOGY
Payer: COMMERCIAL

## 2020-06-01 VITALS
OXYGEN SATURATION: 96 % | BODY MASS INDEX: 37.91 KG/M2 | WEIGHT: 206 LBS | SYSTOLIC BLOOD PRESSURE: 161 MMHG | RESPIRATION RATE: 20 BRPM | HEART RATE: 99 BPM | TEMPERATURE: 97.8 F | HEIGHT: 62 IN | DIASTOLIC BLOOD PRESSURE: 76 MMHG

## 2020-06-01 DIAGNOSIS — C67.9 ADENOCARCINOMA OF BLADDER (HCC): Primary | ICD-10-CM

## 2020-06-01 DIAGNOSIS — Z85.51 HISTORY OF BLADDER CANCER: ICD-10-CM

## 2020-06-01 PROCEDURE — 88305 TISSUE EXAM BY PATHOLOGIST: CPT | Performed by: PATHOLOGY

## 2020-06-01 PROCEDURE — NC001 PR NO CHARGE: Performed by: UROLOGY

## 2020-06-01 PROCEDURE — 87086 URINE CULTURE/COLONY COUNT: CPT | Performed by: UROLOGY

## 2020-06-01 PROCEDURE — 52204 CYSTOSCOPY W/BIOPSY(S): CPT | Performed by: UROLOGY

## 2020-06-01 RX ORDER — HYDROCODONE BITARTRATE AND ACETAMINOPHEN 5; 325 MG/1; MG/1
1 TABLET ORAL EVERY 6 HOURS PRN
Qty: 6 TABLET | Refills: 0 | Status: SHIPPED | OUTPATIENT
Start: 2020-06-01 | End: 2020-06-11

## 2020-06-01 RX ORDER — FENTANYL CITRATE 50 UG/ML
INJECTION, SOLUTION INTRAMUSCULAR; INTRAVENOUS AS NEEDED
Status: DISCONTINUED | OUTPATIENT
Start: 2020-06-01 | End: 2020-06-01 | Stop reason: SURG

## 2020-06-01 RX ORDER — ONDANSETRON 2 MG/ML
INJECTION INTRAMUSCULAR; INTRAVENOUS AS NEEDED
Status: DISCONTINUED | OUTPATIENT
Start: 2020-06-01 | End: 2020-06-01 | Stop reason: SURG

## 2020-06-01 RX ORDER — MAGNESIUM HYDROXIDE 1200 MG/15ML
LIQUID ORAL AS NEEDED
Status: DISCONTINUED | OUTPATIENT
Start: 2020-06-01 | End: 2020-06-01 | Stop reason: HOSPADM

## 2020-06-01 RX ORDER — FENTANYL CITRATE/PF 50 MCG/ML
25 SYRINGE (ML) INJECTION
Status: DISCONTINUED | OUTPATIENT
Start: 2020-06-01 | End: 2020-06-01 | Stop reason: HOSPADM

## 2020-06-01 RX ORDER — PROPOFOL 10 MG/ML
INJECTION, EMULSION INTRAVENOUS AS NEEDED
Status: DISCONTINUED | OUTPATIENT
Start: 2020-06-01 | End: 2020-06-01 | Stop reason: SURG

## 2020-06-01 RX ORDER — HYDROCODONE BITARTRATE AND ACETAMINOPHEN 5; 325 MG/1; MG/1
1 TABLET ORAL EVERY 4 HOURS PRN
Status: DISCONTINUED | OUTPATIENT
Start: 2020-06-01 | End: 2020-06-01 | Stop reason: HOSPADM

## 2020-06-01 RX ORDER — GLYCINE 1.5 G/100ML
SOLUTION IRRIGATION AS NEEDED
Status: DISCONTINUED | OUTPATIENT
Start: 2020-06-01 | End: 2020-06-01 | Stop reason: HOSPADM

## 2020-06-01 RX ORDER — LIDOCAINE HYDROCHLORIDE 20 MG/ML
INJECTION, SOLUTION EPIDURAL; INFILTRATION; INTRACAUDAL; PERINEURAL AS NEEDED
Status: DISCONTINUED | OUTPATIENT
Start: 2020-06-01 | End: 2020-06-01 | Stop reason: SURG

## 2020-06-01 RX ORDER — SODIUM CHLORIDE 9 MG/ML
125 INJECTION, SOLUTION INTRAVENOUS CONTINUOUS
Status: DISCONTINUED | OUTPATIENT
Start: 2020-06-01 | End: 2020-06-01 | Stop reason: HOSPADM

## 2020-06-01 RX ORDER — ONDANSETRON 2 MG/ML
4 INJECTION INTRAMUSCULAR; INTRAVENOUS ONCE AS NEEDED
Status: DISCONTINUED | OUTPATIENT
Start: 2020-06-01 | End: 2020-06-01 | Stop reason: HOSPADM

## 2020-06-01 RX ORDER — LEVOFLOXACIN 5 MG/ML
750 INJECTION, SOLUTION INTRAVENOUS ONCE
Status: COMPLETED | OUTPATIENT
Start: 2020-06-01 | End: 2020-06-01

## 2020-06-01 RX ADMIN — FENTANYL CITRATE 25 MCG: 50 INJECTION, SOLUTION INTRAMUSCULAR; INTRAVENOUS at 16:00

## 2020-06-01 RX ADMIN — LEVOFLOXACIN 750 MG: 5 INJECTION, SOLUTION INTRAVENOUS at 15:30

## 2020-06-01 RX ADMIN — FENTANYL CITRATE 25 MCG: 50 INJECTION, SOLUTION INTRAMUSCULAR; INTRAVENOUS at 16:08

## 2020-06-01 RX ADMIN — ONDANSETRON 4 MG: 2 INJECTION INTRAMUSCULAR; INTRAVENOUS at 16:14

## 2020-06-01 RX ADMIN — SODIUM CHLORIDE: 0.9 INJECTION, SOLUTION INTRAVENOUS at 16:38

## 2020-06-01 RX ADMIN — LIDOCAINE HYDROCHLORIDE 60 MG: 20 INJECTION, SOLUTION EPIDURAL; INFILTRATION; INTRACAUDAL; PERINEURAL at 16:00

## 2020-06-01 RX ADMIN — SODIUM CHLORIDE 125 ML/HR: 0.9 INJECTION, SOLUTION INTRAVENOUS at 14:55

## 2020-06-01 RX ADMIN — FENTANYL CITRATE 25 MCG: 50 INJECTION, SOLUTION INTRAMUSCULAR; INTRAVENOUS at 16:11

## 2020-06-01 RX ADMIN — PROPOFOL 150 MG: 10 INJECTION, EMULSION INTRAVENOUS at 16:00

## 2020-06-01 RX ADMIN — PHENYLEPHRINE HYDROCHLORIDE 100 MCG: 10 INJECTION INTRAVENOUS at 16:14

## 2020-06-01 RX ADMIN — FENTANYL CITRATE 25 MCG: 50 INJECTION, SOLUTION INTRAMUSCULAR; INTRAVENOUS at 16:18

## 2020-06-02 LAB — BACTERIA UR CULT: NORMAL

## 2020-06-04 ENCOUNTER — TELEPHONE (OUTPATIENT)
Dept: UROLOGY | Facility: AMBULATORY SURGERY CENTER | Age: 75
End: 2020-06-04

## 2020-06-15 ENCOUNTER — OFFICE VISIT (OUTPATIENT)
Dept: UROLOGY | Facility: CLINIC | Age: 75
End: 2020-06-15
Payer: COMMERCIAL

## 2020-06-15 ENCOUNTER — TELEPHONE (OUTPATIENT)
Dept: UROLOGY | Facility: CLINIC | Age: 75
End: 2020-06-15

## 2020-06-15 VITALS
HEIGHT: 62 IN | WEIGHT: 205 LBS | TEMPERATURE: 97.5 F | BODY MASS INDEX: 37.73 KG/M2 | SYSTOLIC BLOOD PRESSURE: 122 MMHG | DIASTOLIC BLOOD PRESSURE: 80 MMHG

## 2020-06-15 DIAGNOSIS — D09.0 CIS (CARCINOMA IN SITU OF BLADDER): Primary | ICD-10-CM

## 2020-06-15 PROCEDURE — 4040F PNEUMOC VAC/ADMIN/RCVD: CPT | Performed by: UROLOGY

## 2020-06-15 PROCEDURE — 3008F BODY MASS INDEX DOCD: CPT | Performed by: UROLOGY

## 2020-06-15 PROCEDURE — 1036F TOBACCO NON-USER: CPT | Performed by: UROLOGY

## 2020-06-15 PROCEDURE — 1160F RVW MEDS BY RX/DR IN RCRD: CPT | Performed by: UROLOGY

## 2020-06-15 PROCEDURE — 99214 OFFICE O/P EST MOD 30 MIN: CPT | Performed by: UROLOGY

## 2020-06-15 NOTE — TELEPHONE ENCOUNTER
----- Message from Francois Gillespie MD sent at 6/15/2020  1:22 PM EDT -----  BCG repeat induction X 6 mid july

## 2020-06-16 ENCOUNTER — DOCUMENTATION (OUTPATIENT)
Dept: UROLOGY | Facility: CLINIC | Age: 75
End: 2020-06-16

## 2020-06-16 NOTE — TELEPHONE ENCOUNTER
Called and spoke with patient today  Reception already scheduled her to start 7/20/2020 for BCG  Patient is familiar with treatment and does not need any reminders on aftercare at this time  Stock claimed but needs delivered

## 2020-06-16 NOTE — TELEPHONE ENCOUNTER
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

## 2020-07-20 ENCOUNTER — PROCEDURE VISIT (OUTPATIENT)
Dept: UROLOGY | Facility: CLINIC | Age: 75
End: 2020-07-20
Payer: COMMERCIAL

## 2020-07-20 VITALS
HEIGHT: 62 IN | HEART RATE: 120 BPM | DIASTOLIC BLOOD PRESSURE: 94 MMHG | TEMPERATURE: 97.1 F | SYSTOLIC BLOOD PRESSURE: 168 MMHG | BODY MASS INDEX: 38.76 KG/M2 | WEIGHT: 210.6 LBS

## 2020-07-20 DIAGNOSIS — D09.0 CIS (CARCINOMA IN SITU OF BLADDER): Primary | ICD-10-CM

## 2020-07-20 PROCEDURE — 81002 URINALYSIS NONAUTO W/O SCOPE: CPT

## 2020-07-20 PROCEDURE — 51720 TREATMENT OF BLADDER LESION: CPT

## 2020-07-20 NOTE — PROGRESS NOTES
7/20/2020    Sally Talbot  1945  [id number]    Diagnosis  Chief Complaint     Bladder Cancer          Patient presents for BCG 1 of 6 managed by Dr Dwight Lynch, this is a repeat induction course    Plan  Orders Placed This Encounter   Procedures    POCT urine dip     Patient will Fu in one week for next treatment  Written consent obtained today and another copy of aftercare instructions provided as a reminder  Patient instructed to call with persistent hematuria, fever, or other concerns  Procedure BCG treatment 1 of 6  Vitals:    07/20/20 1336   BP: 168/94   BP Location: Left arm   Patient Position: Sitting   Cuff Size: Standard   Pulse: (!) 120   Temp: (!) 97 1 °F (36 2 °C)   TempSrc: Temporal   Weight: 95 5 kg (210 lb 9 6 oz)   Height: 5' 2" (1 575 m)       Recent Results (from the past 4 hour(s))   POCT urine dip    Collection Time: 07/20/20  1:46 PM   Result Value Ref Range    LEUKOCYTE ESTERASE,UA ++     NITRITE,UA -     SL AMB POCT UROBILINOGEN -     POCT URINE PROTEIN -      PH,UA 6 5     BLOOD,UA ++     SPECIFIC GRAVITY,UA 1 025     KETONES,UA -     BILIRUBIN,UA -     GLUCOSE, UA -      COLOR,UA yellow     CLARITY,UA clear        Urinary Incontinence Screening      Most Recent Value   Urinary Incontinence   Urinary Incontinence? Yes [mixed, 2 pads per day]   Incomplete emptying? No   Urinary frequency? Yes [with tea]   Urinary urgency? Yes   Urinary hesitancy? No   Dysuria (painful difficult urination)? No   Nocturia (waking up to use the bathroom)? Yes [8x]   Straining (having to push to go)? No   Weak stream?  No   Intermittent stream?  No   Post void dribbling? No                Bladder instillation     Date/Time 7/20/2020 1:45 PM     Performed by  Nohemi Garcia RN     Authorized by Mauro Mejia MD      Universal Protocol Consent: Verbal consent obtained    Risks and benefits: risks, benefits and alternatives were discussed  Consent given by: patient               Sterile technique employed  Patient prepped and identified  16F Straight catheter placed  Bladder drained, residual approximately <30mL  The following solution was instilled directly into the bladder via catheter: 1 Vial BCG  Catheter removed  Patient discharged  Patient tolerated procedure          Administrations This Visit     BCG (MALIHA BCG) intravesical suspension 50 mg     Admin Date  07/20/2020 Action  Given Dose  50 mg Route  Intravesical Administered By  Toni Melchor, LEORA Melchor, RN  DMITRIY, BSN

## 2020-07-20 NOTE — PATIENT INSTRUCTIONS
[address]  for [facility]      BCG (Bacillus Calmette-Jyoti)  Aftercare Instructions     Restrict fluids several hours prior to scheduled BCG instillation and for 2 hours after instillation   Do not urinate for the first 2 hours after BCG instillation   During the 2 hours after BCG is instilled, rotate positions  Lay on back for 15 min, lay on right side for 15 min, lay on stomach for 15 min, then lay on left side for 15 min then repeat or reposition frequently   Pour 2 cups of bleach in the toilet before urinating  Sit to urinate  After urinating, close lid and wait 15-20 min before flushing  You will need to pour bleach in the toilet every time you urinate for 24 hours   Do not use a public restroom for 24 hour after receiving BCG   After holding BCG for 2 hour and urinating, increase your fluid intake   You may develop flu-like symptoms or irritation of bladder (ex  Urinary urgency, frequency, burning with urination, fatigue, low-grade fevers, urinary incontinence, bladder spasms or blood in the urine)   Call office immediately ([phone]) for blood in the urine, Temp > 101, or chills   If sexually active, wear condoms with intercourse throughout entire treatment cycle   Urinary Incontinence: immediately wash clothes in clothes washer  Do not wash with other clothes   If wearing an incontinence pad or brief, pour bleach on the pad, allow to soak in, then place in plastic bag and discard in the trash    Bacillus Calmette-Jyoti (BCG, MALIHA®, TheraCys®)  Pronounced: aaz-juof-vtn luciano-met alvarado  Classification: Biologic Response Modifier    About Bacillus Calmette-Jyoti (BCG, MALIHA®, TheraCys®)   BacillusCalmette-Jyoti (BCG) is a weakened strain of Mycobacteriumbovis, which can be used to prevent tuberculosis  It works against cancer as a biologic response modifier   Biological response modifiers are substances that have no direct antitumor effect, but are able to trigger the immune system to attack tumors  BCG is thought to work by stimulating an immune response and causing inflammation of the bladder wall that, in turn, destroys cancer cells within the bladder  How to Take BacillusCalmette-Jyoti     BCG is given directly into the bladder (called intravesicular) through a catheter  The medicine is left in the bladder for 1-2 hours   The dosage and schedule is determined by your healthcare provider  It is not uncommon to have urinary frequency (need to go often) or painful urination for 48 hours after treatment  If this continues after 48 hours, call your doctor or nurse  How the Intravesicular Treatment is Given   You should limit your fluid intake starting the night prior to the procedure and have no fluids for 4 hours before  This is so you will be able to hold your urine in during the procedure for the full treatment time  In addition, the area receives more concentrated (and effective) doses of the medicine with less urine output during the procedure   If you take a diuretic (water pill), you will be told to not take this for at least 4 hours before the procedure   A urinary catheter is inserted into the bladder and any urine is drained   The BCG is given through the catheter, into the bladder  The catheter may be removed or clamped and remain in place based on your providers recommendation   You will need to hold the BCG in your bladder for 1-2 hours  You may need to change positions every 15 minutes to be sure the medicine reaches all areas of the bladder  Do this by rolling on your side, back, other side and stomach  Precautions After Treatment   Try not to urinate for 1-2 hours after the procedure   You should sit to urinate for 6 hours after the treatment to prevent splashing urine on the skin or exposing others to the medication   Do not use public toilets or urinate outside     For the first six hours after treatment, after each time you urinate:  o Add 2 cups of household bleach to the toilet bowl and close the lid   o Wait 15-20 minutes and then flush the toilet with the lid down   o Wash your hands and genital area with soap and water after urinating to remove any traces of the medication from your skin and prevent skin irritation  o Drink plenty of fluids, starting after the first time you urinate and for 8-12 hours after your treatment to flush your bladder  If you have urine incontinence, immediately wash your clothes in the washer  Do not wash with other clothes  If using an incontinence pad, pour bleach on the pad, allow to soak in and place it in plastic bag and discard with trash  Call your provider if you develop a fever (greater than 101 3° F/38 5°C) or shaking chills  Bacillus Calmette-Jyoti (BCG, MALIHA®, TheraCys®)  Information Provided By: www oncolink  org  © 2019 [facility]3] of [facility]    Medication Interactions    Immunosuppressants and/or bone marrow depressants and/or radiation interfere with the development of the immune response and should not be used in combination with BCG  Antimicrobial therapy for other infections may interfere with the effectiveness of MALIHA®BCG  Be sure to tell your healthcare provider about all medications and supplements you take  Possible Side Effects of BCG  There are a number of things you can do to manage the side effects of BCG  Talk to yourcare teamabout these recommendations  They can help you decide what will work best for you  These are some of the most commonor important side effects:    Bladder Irritability  This medication can cause irritation to the bladder, including difficulty or painful urination (dysuria), blood in the urine (hematuria), and increased urgency (strong feeling of need to urinate) or frequency of urination  Patients are advised to increase fluid intake after administration of this medication to "flush" the bladder   You should report any of the urinary symptoms listed above that last more that 48 hours to your healthcare team for further management instructions  Fatigue  Fatigue is very common during cancer treatment and is an overwhelming feeling of exhaustion that is not usually relieved by rest  While on cancer treatment, and for a period after, you may need to adjust your schedule to manage fatigue  Plan times to rest during the day and conserve energy for more important activities  Exercise can help combat fatigue; a simple daily walk with a friend can help  Talk to your healthcare team for helpful tips on dealing with this side effect  Flu-like Symptoms  Your doctor or nurse can recommend medication and other strategies to relieve aches, pains and generalized malaise  BCG Infection Reaction  This rare reaction to BCG can occur following exposure to BCG, when given within one week of a biopsy, TUR (trans-urethral resection) surgery, or traumatic bladder catheterization  Symptoms of a BCG reaction include unexplained high fever [id number] hours or more, chills, confusion, dizziness or lightheadedness (symptoms of low blood pressure), or shortness of breath  You should notify your doctor immediately if you experience any of these symptoms  BCG reaction can also cause pneumonitis (inflammation of the lungs), hepatitis, prostatitis (infection or inflammation of the prostate),epididymal-orchitis(inflammation of  the testicles), respiratory distress and other symptoms of sepsis (widespread infection)  Reproductive Concerns   Men having this treatment can pass on BCG during sex  To protect your partner from coming into contact with BCG, you should not have sex for 48 hours after each treatment  Use a condom if you have sex at other times during the treatment course and for six weeks after treatment has ended   This medication should not be given to a pregnant woman except when clearly needed  Women should be advised not to become pregnant while on therapy  Breastfeeding while receiving this medication is not recommended  [facility] is designed for educational purposes only and is not engaged in rendering medical advice or professional services  The information provided through [address] should not be used for  diagnosing or treating a health problem or a disease  It is not a substitute for professional care  If you have or suspect you may have a health problem or have questions or concerns about the  medication that you have been prescribed, you should consult your health care provider

## 2020-07-27 ENCOUNTER — PROCEDURE VISIT (OUTPATIENT)
Dept: UROLOGY | Facility: CLINIC | Age: 75
End: 2020-07-27
Payer: COMMERCIAL

## 2020-07-27 VITALS
HEART RATE: 88 BPM | DIASTOLIC BLOOD PRESSURE: 88 MMHG | BODY MASS INDEX: 38.28 KG/M2 | TEMPERATURE: 97.3 F | SYSTOLIC BLOOD PRESSURE: 160 MMHG | HEIGHT: 62 IN | WEIGHT: 208 LBS

## 2020-07-27 DIAGNOSIS — D09.0 CIS (CARCINOMA IN SITU OF BLADDER): Primary | ICD-10-CM

## 2020-07-27 PROCEDURE — 81002 URINALYSIS NONAUTO W/O SCOPE: CPT

## 2020-07-27 PROCEDURE — 51720 TREATMENT OF BLADDER LESION: CPT

## 2020-07-27 NOTE — PROGRESS NOTES
7/27/2020    Meryle Sallies  1945  [id number]    Diagnosis  Chief Complaint     Bladder Cancer          Patient presents for BCG 2 of 6 managed by Dr Liseth Willams This Encounter   Procedures    POCT urine dip     Patient will return in one week for next treatment  Patient instructed to call with persistent hematuria, fever, or other concerns  Procedure BCG treatment 2 of 6  Vitals:    07/27/20 1339   BP: 160/88   BP Location: Right arm   Patient Position: Sitting   Cuff Size: Standard   Pulse: 88   Temp: (!) 97 3 °F (36 3 °C)   TempSrc: Temporal   Weight: 94 3 kg (208 lb)   Height: 5' 2" (1 575 m)       Recent Results (from the past 4 hour(s))   POCT urine dip    Collection Time: 07/27/20  1:45 PM   Result Value Ref Range    LEUKOCYTE ESTERASE,UA ++     NITRITE,UA -     SL AMB POCT UROBILINOGEN -     POCT URINE PROTEIN -      PH,UA 5 0     BLOOD,UA -     SPECIFIC GRAVITY,UA 1 025     KETONES,UA -     BILIRUBIN,UA -     GLUCOSE, UA -      COLOR,UA yellow     CLARITY,UA clear        Urinary Incontinence Screening      Most Recent Value   Urinary Incontinence   Urinary Incontinence? Yes [2 pads daytime]   Incomplete emptying? No   Urinary frequency? No   Urinary urgency? Yes [occ]   Urinary hesitancy? No   Dysuria (painful difficult urination)? No   Nocturia (waking up to use the bathroom)? Yes [6-7x]   Straining (having to push to go)? No [just to finish at times]   Weak stream?  No   Intermittent stream?  Yes [occ]   Post void dribbling? Yes [occ]        Patient was able to retain treatment for the full two hours last week and reports no adverse side effects  She has a much harder time with the initial induction treatments earlier this year  She did have mitomycin with resection prior to starting last round of BCG induction which may account for increased urinary symptoms and intolerance             Bladder instillation     Date/Time 7/27/2020 1:45 PM     Performed by  Nighat Roy Mary Sandhu RN     Authorized by Minerva Campos MD      Universal Protocol Consent: Verbal consent obtained  Risks and benefits: risks, benefits and alternatives were discussed  Consent given by: patient               Sterile technique employed  Patient prepped and identified  16F Straight catheter placed  Bladder drained, residual approximately <15mL  The following solution was instilled directly into the bladder via catheter: 1 Vial BCG  Catheter removed  Patient discharged  Patient tolerated procedure         Administrations This Visit     BCG (MALIHA BCG) intravesical suspension 50 mg     Admin Date  07/27/2020 Action  Given Dose  50 mg Route  Intravesical Administered By  LEORA Moreno RN CURN, BSN

## 2020-08-03 ENCOUNTER — PROCEDURE VISIT (OUTPATIENT)
Dept: UROLOGY | Facility: CLINIC | Age: 75
End: 2020-08-03
Payer: COMMERCIAL

## 2020-08-03 VITALS
SYSTOLIC BLOOD PRESSURE: 146 MMHG | BODY MASS INDEX: 38.31 KG/M2 | HEART RATE: 102 BPM | HEIGHT: 62 IN | TEMPERATURE: 97.1 F | DIASTOLIC BLOOD PRESSURE: 78 MMHG | WEIGHT: 208.2 LBS

## 2020-08-03 DIAGNOSIS — D09.0 CIS (CARCINOMA IN SITU OF BLADDER): Primary | ICD-10-CM

## 2020-08-03 DIAGNOSIS — Z85.51 HISTORY OF BLADDER CANCER: ICD-10-CM

## 2020-08-03 PROCEDURE — 51720 TREATMENT OF BLADDER LESION: CPT

## 2020-08-03 PROCEDURE — 81002 URINALYSIS NONAUTO W/O SCOPE: CPT

## 2020-08-03 NOTE — PROGRESS NOTES
8/3/2020    Sally Talbot  1945  [id number]    Diagnosis  Chief Complaint     Bladder Cancer          Patient presents for BCG 3 of 6 managed by Dr Sylvie Gan This Encounter   Procedures    POCT urine dip     Patient will FU in one week for next treatment  Patient instructed to call with persistent hematuria, fever, or other concerns  Procedure BCG treatment 3 of 6  Vitals:    08/03/20 1347   BP: 146/78   BP Location: Left arm   Patient Position: Sitting   Cuff Size: Standard   Pulse: 102   Temp: (!) 97 1 °F (36 2 °C)   TempSrc: Temporal   Weight: 94 4 kg (208 lb 3 2 oz)   Height: 5' 2"       Recent Results (from the past 4 hour(s))   POCT urine dip    Collection Time: 08/03/20  1:53 PM   Result Value Ref Range    LEUKOCYTE ESTERASE,UA -     NITRITE,UA -     SL AMB POCT UROBILINOGEN -     POCT URINE PROTEIN -      PH,UA 6 0     BLOOD,UA trace     SPECIFIC GRAVITY,UA 1 020     KETONES,UA -     BILIRUBIN,UA -     GLUCOSE, UA -      COLOR,UA yellow     CLARITY,UA clear        Urinary Incontinence Screening      Most Recent Value   Urinary Incontinence   Urinary Incontinence? Yes [2 pads per day]   Incomplete emptying? No   Urinary frequency? Yes [occ with tea]   Urinary urgency? Yes   Urinary hesitancy? No   Dysuria (painful difficult urination)? Yes [pressure for the first 12 or so hours with each void after last treatment]   Nocturia (waking up to use the bathroom)? Yes [7-8x]   Straining (having to push to go)? No   Weak stream?  No   Intermittent stream?  No   Post void dribbling? No                Bladder instillation     Date/Time 8/3/2020 1:55 PM     Performed by  Nohemi Garcia RN     Authorized by Lamberto Martinez MD      Universal Protocol Consent: Verbal consent obtained  Risks and benefits: risks, benefits and alternatives were discussed  Consent given by: patient               Sterile technique employed  Patient prepped and identified   16F Straight catheter placed  Bladder drained, residual approximately <30mL  The following solution was instilled directly into the bladder via catheter: 1 Vial BCG  Catheter removed  Patient discharged  Patient tolerated procedure       Administrations This Visit     BCG (MALIHA BCG) intravesical suspension 50 mg     Admin Date  08/03/2020 Action  Given Dose  50 mg Route  Intravesical Administered By  Toni Melchor, LEORA Leiva, BSN

## 2020-08-10 ENCOUNTER — PROCEDURE VISIT (OUTPATIENT)
Dept: UROLOGY | Facility: CLINIC | Age: 75
End: 2020-08-10
Payer: COMMERCIAL

## 2020-08-10 VITALS
DIASTOLIC BLOOD PRESSURE: 68 MMHG | HEART RATE: 120 BPM | TEMPERATURE: 96.9 F | HEIGHT: 62 IN | WEIGHT: 207 LBS | SYSTOLIC BLOOD PRESSURE: 112 MMHG | BODY MASS INDEX: 38.09 KG/M2

## 2020-08-10 DIAGNOSIS — D09.0 CIS (CARCINOMA IN SITU OF BLADDER): Primary | ICD-10-CM

## 2020-08-10 PROCEDURE — 51720 TREATMENT OF BLADDER LESION: CPT

## 2020-08-10 PROCEDURE — 81002 URINALYSIS NONAUTO W/O SCOPE: CPT

## 2020-08-10 NOTE — PROGRESS NOTES
8/10/2020    Kari Headley  1945  [id number]    Diagnosis  Chief Complaint     Bladder Cancer          Patient presents for BCG 4 of 6 managed by Dr Nuñez Mean This Encounter   Procedures    POCT urine dip     Patient will FU as scheduled in one week for next treatment  Patient instructed to call with persistent hematuria, fever, or other concerns  Procedure BCG treatment 4 of 6  Vitals:    08/10/20 1338   BP: 112/68   BP Location: Right arm   Patient Position: Sitting   Cuff Size: Standard   Pulse: (!) 120   Temp: (!) 96 9 °F (36 1 °C)   TempSrc: Temporal   Weight: 93 9 kg (207 lb)   Height: 5' 2" (1 575 m)       Recent Results (from the past 4 hour(s))   POCT urine dip    Collection Time: 08/10/20  1:44 PM   Result Value Ref Range    LEUKOCYTE ESTERASE,UA +     NITRITE,UA -     SL AMB POCT UROBILINOGEN -     POCT URINE PROTEIN -      PH,UA 6 0     BLOOD,UA trace     SPECIFIC GRAVITY,UA 1 025     KETONES,UA -     BILIRUBIN,UA -     GLUCOSE, UA -      COLOR,UA yellow     CLARITY,UA clear        Urinary Incontinence Screening      Most Recent Value   Urinary Incontinence   Urinary Incontinence? Yes [wears pads]   Incomplete emptying? No   Urinary frequency? Yes   Urinary urgency? Yes   Urinary hesitancy? No   Dysuria (painful difficult urination)? Yes [just the first day after treatment]   Nocturia (waking up to use the bathroom)? Yes [8x]   Straining (having to push to go)? No   Weak stream?  No   Intermittent stream?  No   Post void dribbling? Yes [at times]                Bladder instillation     Date/Time 8/10/2020 1:53 PM     Performed by  Alfie Christensen RN     Authorized by Nida Hernandez MD      Universal Protocol Consent: Verbal consent obtained  Risks and benefits: risks, benefits and alternatives were discussed  Consent given by: patient               Sterile technique employed  Patient prepped and identified  16F Straight catheter placed   Bladder drained, residual approximately <30mL  The following solution was instilled directly into the bladder via catheter: 1 Vial BCG  Catheter removed  Patient discharged  Patient tolerated procedure       Administrations This Visit     BCG (MALIHA BCG) intravesical suspension 50 mg     Admin Date  08/10/2020 Action  Given Dose  50 mg Route  Intravesical Administered By  Raúl Elise, LEORA Elise RN  DMITRIY, BSN

## 2020-08-17 ENCOUNTER — PROCEDURE VISIT (OUTPATIENT)
Dept: UROLOGY | Facility: CLINIC | Age: 75
End: 2020-08-17
Payer: COMMERCIAL

## 2020-08-17 VITALS
HEART RATE: 98 BPM | BODY MASS INDEX: 38.64 KG/M2 | HEIGHT: 62 IN | DIASTOLIC BLOOD PRESSURE: 70 MMHG | WEIGHT: 210 LBS | SYSTOLIC BLOOD PRESSURE: 138 MMHG | TEMPERATURE: 97.5 F

## 2020-08-17 DIAGNOSIS — D09.0 CIS (CARCINOMA IN SITU OF BLADDER): Primary | ICD-10-CM

## 2020-08-17 LAB
SL AMB  POCT GLUCOSE, UA: NORMAL
SL AMB LEUKOCYTE ESTERASE,UA: NORMAL
SL AMB POCT BILIRUBIN,UA: NORMAL
SL AMB POCT BLOOD,UA: NORMAL
SL AMB POCT CLARITY,UA: CLEAR
SL AMB POCT COLOR,UA: YELLOW
SL AMB POCT KETONES,UA: NORMAL
SL AMB POCT NITRITE,UA: NORMAL
SL AMB POCT PH,UA: 6
SL AMB POCT SPECIFIC GRAVITY,UA: 1.01
SL AMB POCT URINE PROTEIN: NORMAL
SL AMB POCT UROBILINOGEN: NORMAL

## 2020-08-17 PROCEDURE — 81002 URINALYSIS NONAUTO W/O SCOPE: CPT

## 2020-08-17 PROCEDURE — 51720 TREATMENT OF BLADDER LESION: CPT

## 2020-08-17 NOTE — PROGRESS NOTES
8/17/2020    Zaria Spotted  1945  [id number]    Diagnosis  Chief Complaint     Bladder Cancer          Patient presents for BCG 5 of 6 managed by Dr Holly Chavez This Encounter   Procedures    POCT urine dip     Patient will FU in one week for treatment 6 of 6  Per Dr Aleshia Shelton, will complete first cystoscopy in the office after treatment as scheduled  Patient instructed to call with persistent hematuria, fever, or other concerns  Procedure BCG treatment 5 of 6  Vitals:    08/17/20 1338   BP: 138/70   BP Location: Right arm   Patient Position: Sitting   Cuff Size: Standard   Pulse: 98   Temp: 97 5 °F (36 4 °C)   TempSrc: Temporal   Weight: 95 3 kg (210 lb)   Height: 5' 2" (1 575 m)       Recent Results (from the past 4 hour(s))   POCT urine dip    Collection Time: 08/17/20  1:45 PM   Result Value Ref Range    LEUKOCYTE ESTERASE,UA ++     NITRITE,UA -     SL AMB POCT UROBILINOGEN -     POCT URINE PROTEIN -      PH,UA 6 0     BLOOD,UA -     SPECIFIC GRAVITY,UA 1 015     KETONES,UA -     BILIRUBIN,UA -     GLUCOSE, UA -      COLOR,UA yellow     CLARITY,UA clear        Urinary Incontinence Screening      Most Recent Value   Urinary Incontinence   Urinary Incontinence? Yes [2 PAD S PER DAY]   Incomplete emptying? No   Urinary frequency? Yes   Urinary urgency? Yes   Urinary hesitancy? No   Dysuria (painful difficult urination)? Yes [JUST RIGHT AFTER TREATMENT]   Nocturia (waking up to use the bathroom)? Yes [8x]   Straining (having to push to go)? Yes [occ]   Weak stream?  No   Intermittent stream?  No   Post void dribbling? No                Bladder instillation     Date/Time 8/17/2020 1:50 PM     Performed by  Vanessa Schofield RN     Authorized by Ирина Chaudhary MD      Universal Protocol Consent: Verbal consent obtained    Risks and benefits: risks, benefits and alternatives were discussed  Consent given by: patient        Preparation: Patient was prepped and draped in the usual sterile fashion  Site preparation: Betadine           Sterile technique employed  Patient prepped and identified  16F Straight catheter placed  Bladder drained, residual approximately <15mL  The following solution was instilled directly into the bladder via catheter: 1 Vial BCG  Catheter removed  Patient discharged  Patient tolerated procedure       Administrations This Visit     BCG (MALIHA BCG) intravesical suspension 50 mg     Admin Date  08/17/2020 Action  Given Dose  50 mg Route  Intravesical Administered By  Kalpana Tracy, RN                  Kalpana Tarcy, RN  DMITRIY, BSN

## 2020-08-24 ENCOUNTER — PROCEDURE VISIT (OUTPATIENT)
Dept: UROLOGY | Facility: CLINIC | Age: 75
End: 2020-08-24
Payer: COMMERCIAL

## 2020-08-24 VITALS
BODY MASS INDEX: 38.46 KG/M2 | TEMPERATURE: 97.1 F | WEIGHT: 209 LBS | HEART RATE: 102 BPM | SYSTOLIC BLOOD PRESSURE: 148 MMHG | HEIGHT: 62 IN | DIASTOLIC BLOOD PRESSURE: 88 MMHG

## 2020-08-24 DIAGNOSIS — D09.0 CIS (CARCINOMA IN SITU OF BLADDER): Primary | ICD-10-CM

## 2020-08-24 PROCEDURE — 51720 TREATMENT OF BLADDER LESION: CPT | Performed by: UROLOGY

## 2020-08-24 PROCEDURE — 81002 URINALYSIS NONAUTO W/O SCOPE: CPT

## 2020-08-24 NOTE — PROGRESS NOTES
8/24/2020    Zaria Nichols  1945  [id number]    Diagnosis  Chief Complaint     Bladder Cancer          Patient presents for BCG 6 of 6 managed by Dr Holly Chavez This Encounter   Procedures    POCT urine dip     Patient will follow up in 6 weeks for Cystoscopy urine cytology ptv  Patient instructed to call with persistent hematuria, fever, or other concerns  Procedure BCG treatment 6 of 6  Vitals:    08/24/20 1335   BP: 148/88   BP Location: Right arm   Patient Position: Sitting   Cuff Size: Standard   Pulse: 102   Temp: (!) 97 1 °F (36 2 °C)   TempSrc: Temporal   Weight: 94 8 kg (209 lb)   Height: 5' 2" (1 575 m)       Recent Results (from the past 4 hour(s))   POCT urine dip    Collection Time: 08/24/20  1:42 PM   Result Value Ref Range    LEUKOCYTE ESTERASE,UA ++     NITRITE,UA -     SL AMB POCT UROBILINOGEN -     POCT URINE PROTEIN -      PH,UA 6 0     BLOOD,UA ++     SPECIFIC GRAVITY,UA 1 025     KETONES,UA -     BILIRUBIN,UA -     GLUCOSE, UA -      COLOR,UA yellow     CLARITY,UA clear        Urinary Incontinence Screening      Most Recent Value   Urinary Incontinence   Urinary Incontinence? Yes [2 pads per day]   Incomplete emptying? No   Urinary frequency? Yes   Urinary urgency? Yes   Urinary hesitancy? No   Dysuria (painful difficult urination)? No   Nocturia (waking up to use the bathroom)? Yes   Straining (having to push to go)? No   Weak stream?  No   Intermittent stream?  No   Post void dribbling? No                Bladder instillation     Date/Time 8/24/2020 2:08 PM     Performed by  Angelique Benjamin LPN     Authorized by Ирина Chaudhary MD      Universal Protocol Consent: Verbal consent obtained  Risks and benefits: risks, benefits and alternatives were discussed  Consent given by: patient        Preparation: Patient was prepped and draped in the usual sterile fashion  Site preparation: Betadine           Sterile technique employed   Patient prepped and identified  16F Straight catheter placed  Bladder drained, residual approximately <30mL  The following solution was instilled directly into the bladder via catheter: 1 Vial BCG  Catheter removed  Patient discharged  Patient tolerated procedure       Administrations This Visit     BCG (MALIHA BCG) intravesical suspension 50 mg     Admin Date  08/24/2020 Action  Given Dose  50 mg Route  Intravesical Administered By  BELLE Chow LPN

## 2020-09-14 DIAGNOSIS — J43.9 PULMONARY EMPHYSEMA, UNSPECIFIED EMPHYSEMA TYPE (HCC): ICD-10-CM

## 2020-09-14 RX ORDER — TIOTROPIUM BROMIDE 18 UG/1
CAPSULE ORAL; RESPIRATORY (INHALATION)
Qty: 30 CAPSULE | Refills: 1 | Status: SHIPPED | OUTPATIENT
Start: 2020-09-14 | End: 2020-10-22 | Stop reason: SDUPTHER

## 2020-09-21 ENCOUNTER — TELEPHONE (OUTPATIENT)
Dept: UROLOGY | Facility: CLINIC | Age: 75
End: 2020-09-21

## 2020-09-21 NOTE — TELEPHONE ENCOUNTER
Unable to LVM, phone continuously rings  Pt originally scheduled at [facility] with Dr Leigh Bojorquez on 10/19/20 at 3pm, however, Dr Leigh Bojorquez in [place] that day  Moved patient to [place] location, same date 10/19/20 at 3:30pm (different time) with Dr Leigh Bojorquez for Cysto  Mailed pt directions, letter, and appt card

## 2020-09-23 ENCOUNTER — TELEPHONE (OUTPATIENT)
Dept: UROLOGY | Facility: CLINIC | Age: 75
End: 2020-09-23

## 2020-09-23 NOTE — TELEPHONE ENCOUNTER
Attempted to call pt to notify that appointment has been cancelled for 10/19/20 in [facility], due to pt not wanting to be seen in [facility] location  Mailed letter notifying patient that appt has been cancelled, and to call our office to schedule an appt as soon as she is able

## 2020-09-30 ENCOUNTER — TELEPHONE (OUTPATIENT)
Dept: UROLOGY | Facility: MEDICAL CENTER | Age: 75
End: 2020-09-30

## 2020-09-30 NOTE — TELEPHONE ENCOUNTER
Patient managed by Dr Silva Manzanares  Patient called to reschedule 10/19 appointment  Patient requested to be seen at [facility]  Next procedure slot available at [facility] with Dr Silva Manzanares is December  Patient does not want to wait that long because she has cancer   Patient can be reached at [phone]

## 2020-10-02 NOTE — TELEPHONE ENCOUNTER
Called and spoke to patient  Offered patient 10/5 at the [facility] office  Patient confirmed

## 2020-10-05 ENCOUNTER — PROCEDURE VISIT (OUTPATIENT)
Dept: UROLOGY | Facility: CLINIC | Age: 75
End: 2020-10-05
Payer: COMMERCIAL

## 2020-10-05 VITALS
BODY MASS INDEX: 38.46 KG/M2 | HEIGHT: 62 IN | TEMPERATURE: 97.3 F | WEIGHT: 209 LBS | SYSTOLIC BLOOD PRESSURE: 130 MMHG | DIASTOLIC BLOOD PRESSURE: 80 MMHG

## 2020-10-05 DIAGNOSIS — N30.00 ACUTE CYSTITIS WITHOUT HEMATURIA: ICD-10-CM

## 2020-10-05 DIAGNOSIS — D09.0 CIS (CARCINOMA IN SITU OF BLADDER): Primary | ICD-10-CM

## 2020-10-05 PROCEDURE — 1036F TOBACCO NON-USER: CPT | Performed by: UROLOGY

## 2020-10-05 PROCEDURE — 99214 OFFICE O/P EST MOD 30 MIN: CPT | Performed by: UROLOGY

## 2020-10-05 PROCEDURE — 87077 CULTURE AEROBIC IDENTIFY: CPT | Performed by: UROLOGY

## 2020-10-05 PROCEDURE — 88112 CYTOPATH CELL ENHANCE TECH: CPT | Performed by: PATHOLOGY

## 2020-10-05 PROCEDURE — 52000 CYSTOURETHROSCOPY: CPT | Performed by: UROLOGY

## 2020-10-05 PROCEDURE — 87086 URINE CULTURE/COLONY COUNT: CPT | Performed by: UROLOGY

## 2020-10-05 PROCEDURE — 1160F RVW MEDS BY RX/DR IN RCRD: CPT | Performed by: UROLOGY

## 2020-10-05 PROCEDURE — 87186 SC STD MICRODIL/AGAR DIL: CPT | Performed by: UROLOGY

## 2020-10-05 RX ORDER — SODIUM CHLORIDE 9 MG/ML
125 INJECTION, SOLUTION INTRAVENOUS CONTINUOUS
Status: CANCELLED | OUTPATIENT
Start: 2020-11-02

## 2020-10-05 RX ORDER — CIPROFLOXACIN 2 MG/ML
400 INJECTION, SOLUTION INTRAVENOUS ONCE
Status: CANCELLED | OUTPATIENT
Start: 2020-11-02 | End: 2020-10-05

## 2020-10-05 RX ORDER — CIPROFLOXACIN 500 MG/1
500 TABLET, FILM COATED ORAL 2 TIMES DAILY
Qty: 10 TABLET | Refills: 0 | Status: SHIPPED | OUTPATIENT
Start: 2020-10-05 | End: 2020-10-10

## 2020-10-07 ENCOUNTER — TELEPHONE (OUTPATIENT)
Dept: OTHER | Facility: HOSPITAL | Age: 75
End: 2020-10-07

## 2020-10-07 DIAGNOSIS — N39.0 ACUTE UTI: Primary | ICD-10-CM

## 2020-10-07 LAB
BACTERIA UR CULT: ABNORMAL
BACTERIA UR CULT: ABNORMAL

## 2020-10-07 RX ORDER — CIPROFLOXACIN 500 MG/1
500 TABLET, FILM COATED ORAL 2 TIMES DAILY
Qty: 10 TABLET | Refills: 0 | Status: SHIPPED | OUTPATIENT
Start: 2020-10-07 | End: 2020-10-12

## 2020-10-13 ENCOUNTER — TELEPHONE (OUTPATIENT)
Dept: UROLOGY | Facility: AMBULATORY SURGERY CENTER | Age: 75
End: 2020-10-13

## 2020-10-14 ENCOUNTER — CLINICAL SUPPORT (OUTPATIENT)
Dept: FAMILY MEDICINE CLINIC | Facility: CLINIC | Age: 75
End: 2020-10-14
Payer: COMMERCIAL

## 2020-10-14 ENCOUNTER — PREP FOR PROCEDURE (OUTPATIENT)
Dept: UROLOGY | Facility: AMBULATORY SURGERY CENTER | Age: 75
End: 2020-10-14

## 2020-10-14 VITALS — TEMPERATURE: 97.8 F

## 2020-10-14 DIAGNOSIS — D09.0 CIS (CARCINOMA IN SITU OF BLADDER): Primary | ICD-10-CM

## 2020-10-14 DIAGNOSIS — Z01.818 PREOP EXAMINATION: ICD-10-CM

## 2020-10-14 DIAGNOSIS — Z01.810 PRE-OPERATIVE CARDIOVASCULAR EXAMINATION: ICD-10-CM

## 2020-10-14 DIAGNOSIS — N39.0 URINARY TRACT INFECTION WITHOUT HEMATURIA, SITE UNSPECIFIED: ICD-10-CM

## 2020-10-14 DIAGNOSIS — Z23 NEED FOR VACCINATION: Primary | ICD-10-CM

## 2020-10-14 PROCEDURE — G0008 ADMIN INFLUENZA VIRUS VAC: HCPCS

## 2020-10-14 PROCEDURE — 90662 IIV NO PRSV INCREASED AG IM: CPT

## 2020-10-20 ENCOUNTER — APPOINTMENT (OUTPATIENT)
Dept: LAB | Facility: MEDICAL CENTER | Age: 75
End: 2020-10-20
Payer: COMMERCIAL

## 2020-10-20 DIAGNOSIS — D09.0 CIS (CARCINOMA IN SITU OF BLADDER): ICD-10-CM

## 2020-10-20 DIAGNOSIS — Z01.818 PREOP EXAMINATION: ICD-10-CM

## 2020-10-20 DIAGNOSIS — N39.0 URINARY TRACT INFECTION WITHOUT HEMATURIA, SITE UNSPECIFIED: ICD-10-CM

## 2020-10-20 DIAGNOSIS — Z01.810 PRE-OPERATIVE CARDIOVASCULAR EXAMINATION: ICD-10-CM

## 2020-10-20 LAB
ALBUMIN SERPL BCP-MCNC: 3.8 G/DL (ref 3.5–5)
ALP SERPL-CCNC: 102 U/L (ref 46–116)
ALT SERPL W P-5'-P-CCNC: 41 U/L (ref 12–78)
ANION GAP SERPL CALCULATED.3IONS-SCNC: 5 MMOL/L (ref 4–13)
AST SERPL W P-5'-P-CCNC: 32 U/L (ref 5–45)
BASOPHILS # BLD AUTO: 0.03 THOUSANDS/ΜL (ref 0–0.1)
BASOPHILS NFR BLD AUTO: 1 % (ref 0–1)
BILIRUB SERPL-MCNC: 0.4 MG/DL (ref 0.2–1)
BUN SERPL-MCNC: 14 MG/DL (ref 5–25)
CALCIUM SERPL-MCNC: 10.2 MG/DL (ref 8.3–10.1)
CHLORIDE SERPL-SCNC: 108 MMOL/L (ref 100–108)
CHOLEST SERPL-MCNC: 244 MG/DL (ref 50–200)
CO2 SERPL-SCNC: 28 MMOL/L (ref 21–32)
CREAT SERPL-MCNC: 0.8 MG/DL (ref 0.6–1.3)
EOSINOPHIL # BLD AUTO: 0.18 THOUSAND/ΜL (ref 0–0.61)
EOSINOPHIL NFR BLD AUTO: 3 % (ref 0–6)
ERYTHROCYTE [DISTWIDTH] IN BLOOD BY AUTOMATED COUNT: 12.9 % (ref 11.6–15.1)
GFR SERPL CREATININE-BSD FRML MDRD: 71 ML/MIN/1.73SQ M
GLUCOSE P FAST SERPL-MCNC: 103 MG/DL (ref 65–99)
HCT VFR BLD AUTO: 42.7 % (ref 34.8–46.1)
HDLC SERPL-MCNC: 35 MG/DL
HGB BLD-MCNC: 13.8 G/DL (ref 11.5–15.4)
IMM GRANULOCYTES # BLD AUTO: 0.03 THOUSAND/UL (ref 0–0.2)
IMM GRANULOCYTES NFR BLD AUTO: 1 % (ref 0–2)
LDLC SERPL CALC-MCNC: 129 MG/DL (ref 0–100)
LYMPHOCYTES # BLD AUTO: 1.97 THOUSANDS/ΜL (ref 0.6–4.47)
LYMPHOCYTES NFR BLD AUTO: 31 % (ref 14–44)
MCH RBC QN AUTO: 32.2 PG (ref 26.8–34.3)
MCHC RBC AUTO-ENTMCNC: 32.3 G/DL (ref 31.4–37.4)
MCV RBC AUTO: 100 FL (ref 82–98)
MONOCYTES # BLD AUTO: 0.35 THOUSAND/ΜL (ref 0.17–1.22)
MONOCYTES NFR BLD AUTO: 6 % (ref 4–12)
NEUTROPHILS # BLD AUTO: 3.81 THOUSANDS/ΜL (ref 1.85–7.62)
NEUTS SEG NFR BLD AUTO: 58 % (ref 43–75)
NONHDLC SERPL-MCNC: 209 MG/DL
NRBC BLD AUTO-RTO: 0 /100 WBCS
PLATELET # BLD AUTO: 331 THOUSANDS/UL (ref 149–390)
PMV BLD AUTO: 11.2 FL (ref 8.9–12.7)
POTASSIUM SERPL-SCNC: 4.5 MMOL/L (ref 3.5–5.3)
PROT SERPL-MCNC: 7.9 G/DL (ref 6.4–8.2)
RBC # BLD AUTO: 4.29 MILLION/UL (ref 3.81–5.12)
SODIUM SERPL-SCNC: 141 MMOL/L (ref 136–145)
TRIGL SERPL-MCNC: 398 MG/DL
TSH SERPL DL<=0.05 MIU/L-ACNC: 2.04 UIU/ML (ref 0.36–3.74)
WBC # BLD AUTO: 6.37 THOUSAND/UL (ref 4.31–10.16)

## 2020-10-20 PROCEDURE — 84443 ASSAY THYROID STIM HORMONE: CPT | Performed by: INTERNAL MEDICINE

## 2020-10-20 PROCEDURE — 80061 LIPID PANEL: CPT | Performed by: INTERNAL MEDICINE

## 2020-10-20 PROCEDURE — 85025 COMPLETE CBC W/AUTO DIFF WBC: CPT | Performed by: INTERNAL MEDICINE

## 2020-10-20 PROCEDURE — 36415 COLL VENOUS BLD VENIPUNCTURE: CPT | Performed by: INTERNAL MEDICINE

## 2020-10-20 PROCEDURE — 80053 COMPREHEN METABOLIC PANEL: CPT | Performed by: INTERNAL MEDICINE

## 2020-10-20 PROCEDURE — 87086 URINE CULTURE/COLONY COUNT: CPT

## 2020-10-22 ENCOUNTER — CONSULT (OUTPATIENT)
Dept: FAMILY MEDICINE CLINIC | Facility: CLINIC | Age: 75
End: 2020-10-22
Payer: COMMERCIAL

## 2020-10-22 VITALS
DIASTOLIC BLOOD PRESSURE: 82 MMHG | HEIGHT: 62 IN | OXYGEN SATURATION: 95 % | RESPIRATION RATE: 18 BRPM | WEIGHT: 209 LBS | HEART RATE: 88 BPM | BODY MASS INDEX: 38.46 KG/M2 | TEMPERATURE: 98.2 F | SYSTOLIC BLOOD PRESSURE: 144 MMHG

## 2020-10-22 DIAGNOSIS — D09.0 CIS (CARCINOMA IN SITU OF BLADDER): ICD-10-CM

## 2020-10-22 DIAGNOSIS — E66.9 OBESITY, CLASS II, BMI 35-39.9: ICD-10-CM

## 2020-10-22 DIAGNOSIS — J43.9 PULMONARY EMPHYSEMA, UNSPECIFIED EMPHYSEMA TYPE (HCC): ICD-10-CM

## 2020-10-22 DIAGNOSIS — Z01.818 PRE-OP EXAMINATION: Primary | ICD-10-CM

## 2020-10-22 DIAGNOSIS — Z01.811 PRE-OP CHEST EXAM: ICD-10-CM

## 2020-10-22 LAB — BACTERIA UR CULT: NORMAL

## 2020-10-22 PROCEDURE — 99214 OFFICE O/P EST MOD 30 MIN: CPT | Performed by: INTERNAL MEDICINE

## 2020-10-22 PROCEDURE — 93000 ELECTROCARDIOGRAM COMPLETE: CPT | Performed by: INTERNAL MEDICINE

## 2020-10-22 RX ORDER — FLUOROURACIL 50 MG/G
CREAM TOPICAL
COMMUNITY
Start: 2020-10-13

## 2020-10-22 RX ORDER — TIOTROPIUM BROMIDE 18 UG/1
18 CAPSULE ORAL; RESPIRATORY (INHALATION) DAILY
Qty: 90 CAPSULE | Refills: 0 | Status: SHIPPED | OUTPATIENT
Start: 2020-10-22 | End: 2021-01-25

## 2020-10-30 ENCOUNTER — ANESTHESIA EVENT (OUTPATIENT)
Dept: PERIOP | Facility: HOSPITAL | Age: 75
End: 2020-10-30
Payer: COMMERCIAL

## 2020-11-02 ENCOUNTER — ANESTHESIA (OUTPATIENT)
Dept: PERIOP | Facility: HOSPITAL | Age: 75
End: 2020-11-02
Payer: COMMERCIAL

## 2020-11-02 ENCOUNTER — HOSPITAL ENCOUNTER (OUTPATIENT)
Facility: HOSPITAL | Age: 75
Setting detail: OUTPATIENT SURGERY
Discharge: HOME | End: 2020-11-02
Attending: UROLOGY | Admitting: UROLOGY
Payer: COMMERCIAL

## 2020-11-02 VITALS — HEART RATE: 104 BPM

## 2020-11-02 VITALS
DIASTOLIC BLOOD PRESSURE: 70 MMHG | TEMPERATURE: 97.3 F | HEART RATE: 87 BPM | OXYGEN SATURATION: 93 % | SYSTOLIC BLOOD PRESSURE: 157 MMHG | RESPIRATION RATE: 18 BRPM

## 2020-11-02 DIAGNOSIS — D09.0 CIS (CARCINOMA IN SITU OF BLADDER): ICD-10-CM

## 2020-11-02 PROCEDURE — 88112 CYTOPATH CELL ENHANCE TECH: CPT | Performed by: PATHOLOGY

## 2020-11-02 PROCEDURE — 87086 URINE CULTURE/COLONY COUNT: CPT | Performed by: UROLOGY

## 2020-11-02 PROCEDURE — 52204 CYSTOSCOPY W/BIOPSY(S): CPT | Performed by: UROLOGY

## 2020-11-02 PROCEDURE — 88305 TISSUE EXAM BY PATHOLOGIST: CPT | Performed by: PATHOLOGY

## 2020-11-02 PROCEDURE — 88342 IMHCHEM/IMCYTCHM 1ST ANTB: CPT | Performed by: PATHOLOGY

## 2020-11-02 PROCEDURE — 88341 IMHCHEM/IMCYTCHM EA ADD ANTB: CPT | Performed by: PATHOLOGY

## 2020-11-02 RX ORDER — GLYCINE 1.5 G/100ML
SOLUTION IRRIGATION AS NEEDED
Status: DISCONTINUED | OUTPATIENT
Start: 2020-11-02 | End: 2020-11-02 | Stop reason: HOSPADM

## 2020-11-02 RX ORDER — SODIUM CHLORIDE 9 MG/ML
125 INJECTION, SOLUTION INTRAVENOUS CONTINUOUS
Status: DISCONTINUED | OUTPATIENT
Start: 2020-11-02 | End: 2020-11-02 | Stop reason: HOSPADM

## 2020-11-02 RX ORDER — MEPERIDINE HYDROCHLORIDE 25 MG/ML
12.5 INJECTION INTRAMUSCULAR; INTRAVENOUS; SUBCUTANEOUS ONCE AS NEEDED
Status: DISCONTINUED | OUTPATIENT
Start: 2020-11-02 | End: 2020-11-02 | Stop reason: HOSPADM

## 2020-11-02 RX ORDER — FENTANYL CITRATE 50 UG/ML
INJECTION, SOLUTION INTRAMUSCULAR; INTRAVENOUS AS NEEDED
Status: DISCONTINUED | OUTPATIENT
Start: 2020-11-02 | End: 2020-11-02

## 2020-11-02 RX ORDER — FENTANYL CITRATE/PF 50 MCG/ML
50 SYRINGE (ML) INJECTION
Status: DISCONTINUED | OUTPATIENT
Start: 2020-11-02 | End: 2020-11-02 | Stop reason: HOSPADM

## 2020-11-02 RX ORDER — ONDANSETRON 2 MG/ML
4 INJECTION INTRAMUSCULAR; INTRAVENOUS ONCE AS NEEDED
Status: DISCONTINUED | OUTPATIENT
Start: 2020-11-02 | End: 2020-11-02 | Stop reason: HOSPADM

## 2020-11-02 RX ORDER — PROPOFOL 10 MG/ML
INJECTION, EMULSION INTRAVENOUS AS NEEDED
Status: DISCONTINUED | OUTPATIENT
Start: 2020-11-02 | End: 2020-11-02

## 2020-11-02 RX ORDER — CIPROFLOXACIN 2 MG/ML
400 INJECTION, SOLUTION INTRAVENOUS ONCE
Status: COMPLETED | OUTPATIENT
Start: 2020-11-02 | End: 2020-11-02

## 2020-11-02 RX ORDER — ONDANSETRON 2 MG/ML
INJECTION INTRAMUSCULAR; INTRAVENOUS AS NEEDED
Status: DISCONTINUED | OUTPATIENT
Start: 2020-11-02 | End: 2020-11-02

## 2020-11-02 RX ORDER — HYDROMORPHONE HCL/PF 1 MG/ML
0.5 SYRINGE (ML) INJECTION
Status: DISCONTINUED | OUTPATIENT
Start: 2020-11-02 | End: 2020-11-02 | Stop reason: HOSPADM

## 2020-11-02 RX ADMIN — SODIUM CHLORIDE 125 ML/HR: 0.9 INJECTION, SOLUTION INTRAVENOUS at 13:04

## 2020-11-02 RX ADMIN — FENTANYL CITRATE 50 MCG: 50 INJECTION, SOLUTION INTRAMUSCULAR; INTRAVENOUS at 13:29

## 2020-11-02 RX ADMIN — CIPROFLOXACIN 400 MG: 2 INJECTION, SOLUTION INTRAVENOUS at 13:15

## 2020-11-02 RX ADMIN — FENTANYL CITRATE 50 MCG: 50 INJECTION INTRAMUSCULAR; INTRAVENOUS at 14:18

## 2020-11-02 RX ADMIN — SODIUM CHLORIDE 125 ML/HR: 0.9 INJECTION, SOLUTION INTRAVENOUS at 14:18

## 2020-11-02 RX ADMIN — FENTANYL CITRATE 50 MCG: 50 INJECTION, SOLUTION INTRAMUSCULAR; INTRAVENOUS at 13:38

## 2020-11-02 RX ADMIN — ONDANSETRON 4 MG: 2 INJECTION INTRAMUSCULAR; INTRAVENOUS at 13:44

## 2020-11-02 RX ADMIN — PROPOFOL 150 MG: 10 INJECTION, EMULSION INTRAVENOUS at 13:40

## 2020-11-04 LAB — BACTERIA UR CULT: NORMAL

## 2020-11-06 ENCOUNTER — TELEPHONE (OUTPATIENT)
Dept: UROLOGY | Facility: AMBULATORY SURGERY CENTER | Age: 75
End: 2020-11-06

## 2020-11-20 ENCOUNTER — OFFICE VISIT (OUTPATIENT)
Dept: UROLOGY | Facility: CLINIC | Age: 75
End: 2020-11-20
Payer: COMMERCIAL

## 2020-11-20 VITALS
WEIGHT: 213 LBS | DIASTOLIC BLOOD PRESSURE: 68 MMHG | HEIGHT: 62 IN | HEART RATE: 78 BPM | SYSTOLIC BLOOD PRESSURE: 132 MMHG | BODY MASS INDEX: 39.2 KG/M2

## 2020-11-20 DIAGNOSIS — D09.0 CIS (CARCINOMA IN SITU OF BLADDER): ICD-10-CM

## 2020-11-20 DIAGNOSIS — Z85.51 HISTORY OF BLADDER CANCER: Primary | ICD-10-CM

## 2020-11-20 LAB
SL AMB  POCT GLUCOSE, UA: NORMAL
SL AMB LEUKOCYTE ESTERASE,UA: NORMAL
SL AMB POCT BILIRUBIN,UA: NORMAL
SL AMB POCT BLOOD,UA: NORMAL
SL AMB POCT CLARITY,UA: CLEAR
SL AMB POCT COLOR,UA: YELLOW
SL AMB POCT KETONES,UA: NORMAL
SL AMB POCT NITRITE,UA: NORMAL
SL AMB POCT PH,UA: 6
SL AMB POCT SPECIFIC GRAVITY,UA: 1.01
SL AMB POCT URINE PROTEIN: NORMAL
SL AMB POCT UROBILINOGEN: 0.2

## 2020-11-20 PROCEDURE — 81002 URINALYSIS NONAUTO W/O SCOPE: CPT | Performed by: UROLOGY

## 2020-11-20 PROCEDURE — 99214 OFFICE O/P EST MOD 30 MIN: CPT | Performed by: UROLOGY

## 2020-11-20 PROCEDURE — 1160F RVW MEDS BY RX/DR IN RCRD: CPT | Performed by: UROLOGY

## 2020-11-20 PROCEDURE — 1036F TOBACCO NON-USER: CPT | Performed by: UROLOGY

## 2021-01-20 DIAGNOSIS — E03.9 HYPOTHYROIDISM, UNSPECIFIED TYPE: ICD-10-CM

## 2021-01-20 RX ORDER — LEVOTHYROXINE SODIUM 0.12 MG/1
TABLET ORAL
Qty: 90 TABLET | Refills: 3 | Status: SHIPPED | OUTPATIENT
Start: 2021-01-20 | End: 2021-09-07

## 2021-01-22 DIAGNOSIS — J43.9 PULMONARY EMPHYSEMA, UNSPECIFIED EMPHYSEMA TYPE (HCC): ICD-10-CM

## 2021-01-25 RX ORDER — TIOTROPIUM BROMIDE 18 UG/1
CAPSULE ORAL; RESPIRATORY (INHALATION)
Qty: 30 CAPSULE | Refills: 5 | Status: SHIPPED | OUTPATIENT
Start: 2021-01-25 | End: 2021-05-03

## 2021-02-22 ENCOUNTER — TELEPHONE (OUTPATIENT)
Dept: UROLOGY | Facility: CLINIC | Age: 76
End: 2021-02-22

## 2021-02-22 NOTE — TELEPHONE ENCOUNTER
Tried calling but no VM, called to notify her that her Cysto on 3/5 had to be changed to 4/16 due to Dr HARDIN out of office/ Also Sent a Letter to notify her**

## 2021-03-08 ENCOUNTER — TELEPHONE (OUTPATIENT)
Dept: HEMATOLOGY ONCOLOGY | Facility: CLINIC | Age: 76
End: 2021-03-08

## 2021-03-08 NOTE — TELEPHONE ENCOUNTER
New Patient  Form     Patient Details:     Amos Suárez     1945     [id number]     Background Information:     [address] starts by opening a telephone encounter and gathering the following information     Who is calling to schedule and relationship? self   Who is the referring provider? Cristhian Calvo   Reason for Visit? History Of   Tumor Type?  bladder cancer   Does the patient have confirmed tissue pathology with either biopsy or surgery? yes   When was biopsy/surgery (diagnosis made) and where Original dx approx 10 yrs, recent bx 11/2020   Does the Patient have a Urologist?  Cristhian Calvo   Name of Patient's Urologist    Did the patient have imaging done? Not recent   If yes, when and where     Did patient have blood work done?  no   If Yes, when and where    *Please contact  Navigator for review if Urology is NOT making the referral to Med Onc, or     They answer No to Urologist and Pathology question  *     Scheduling Information:     [facility]   Are there any days the patient cannot be seen? No   Miscellaneous: Dr De Oliveira referring re: immuno therapy  Pt existing pt of Porter Lucas for h/o lung cancer, last seen 5/2019   After completing the above information, please route to finance, nurse navigation and clinical trials for review

## 2021-03-10 ENCOUNTER — PATIENT OUTREACH (OUTPATIENT)
Dept: UROLOGY | Facility: CLINIC | Age: 76
End: 2021-03-10

## 2021-03-10 DIAGNOSIS — C67.9 ADENOCARCINOMA OF BLADDER (HCC): ICD-10-CM

## 2021-03-10 DIAGNOSIS — D09.0 CIS (CARCINOMA IN SITU OF BLADDER): Primary | ICD-10-CM

## 2021-03-10 NOTE — PROGRESS NOTES
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

## 2021-03-17 ENCOUNTER — APPOINTMENT (OUTPATIENT)
Dept: LAB | Facility: MEDICAL CENTER | Age: 76
End: 2021-03-17
Payer: COMMERCIAL

## 2021-03-17 DIAGNOSIS — D09.0 CIS (CARCINOMA IN SITU OF BLADDER): ICD-10-CM

## 2021-03-17 LAB
ALBUMIN SERPL BCP-MCNC: 3.9 G/DL (ref 3.5–5)
ALP SERPL-CCNC: 90 U/L (ref 46–116)
ALT SERPL W P-5'-P-CCNC: 30 U/L (ref 12–78)
ANION GAP SERPL CALCULATED.3IONS-SCNC: 7 MMOL/L (ref 4–13)
AST SERPL W P-5'-P-CCNC: 23 U/L (ref 5–45)
BASOPHILS # BLD AUTO: 0.03 THOUSANDS/ΜL (ref 0–0.1)
BASOPHILS NFR BLD AUTO: 0 % (ref 0–1)
BILIRUB SERPL-MCNC: 0.5 MG/DL (ref 0.2–1)
BUN SERPL-MCNC: 10 MG/DL (ref 5–25)
CALCIUM SERPL-MCNC: 9.9 MG/DL (ref 8.3–10.1)
CHLORIDE SERPL-SCNC: 106 MMOL/L (ref 100–108)
CO2 SERPL-SCNC: 27 MMOL/L (ref 21–32)
CREAT SERPL-MCNC: 0.72 MG/DL (ref 0.6–1.3)
EOSINOPHIL # BLD AUTO: 0.09 THOUSAND/ΜL (ref 0–0.61)
EOSINOPHIL NFR BLD AUTO: 1 % (ref 0–6)
ERYTHROCYTE [DISTWIDTH] IN BLOOD BY AUTOMATED COUNT: 13 % (ref 11.6–15.1)
GFR SERPL CREATININE-BSD FRML MDRD: 81 ML/MIN/1.73SQ M
GLUCOSE SERPL-MCNC: 84 MG/DL (ref 65–140)
HCT VFR BLD AUTO: 44.6 % (ref 34.8–46.1)
HGB BLD-MCNC: 14.6 G/DL (ref 11.5–15.4)
IMM GRANULOCYTES # BLD AUTO: 0.02 THOUSAND/UL (ref 0–0.2)
IMM GRANULOCYTES NFR BLD AUTO: 0 % (ref 0–2)
LYMPHOCYTES # BLD AUTO: 1.7 THOUSANDS/ΜL (ref 0.6–4.47)
LYMPHOCYTES NFR BLD AUTO: 24 % (ref 14–44)
MCH RBC QN AUTO: 32.8 PG (ref 26.8–34.3)
MCHC RBC AUTO-ENTMCNC: 32.7 G/DL (ref 31.4–37.4)
MCV RBC AUTO: 100 FL (ref 82–98)
MONOCYTES # BLD AUTO: 0.38 THOUSAND/ΜL (ref 0.17–1.22)
MONOCYTES NFR BLD AUTO: 5 % (ref 4–12)
NEUTROPHILS # BLD AUTO: 4.79 THOUSANDS/ΜL (ref 1.85–7.62)
NEUTS SEG NFR BLD AUTO: 70 % (ref 43–75)
NRBC BLD AUTO-RTO: 0 /100 WBCS
PLATELET # BLD AUTO: 282 THOUSANDS/UL (ref 149–390)
PMV BLD AUTO: 12.3 FL (ref 8.9–12.7)
POTASSIUM SERPL-SCNC: 3.9 MMOL/L (ref 3.5–5.3)
PROT SERPL-MCNC: 8.1 G/DL (ref 6.4–8.2)
RBC # BLD AUTO: 4.45 MILLION/UL (ref 3.81–5.12)
SODIUM SERPL-SCNC: 140 MMOL/L (ref 136–145)
WBC # BLD AUTO: 7.01 THOUSAND/UL (ref 4.31–10.16)

## 2021-03-17 PROCEDURE — 85025 COMPLETE CBC W/AUTO DIFF WBC: CPT

## 2021-03-17 PROCEDURE — 80053 COMPREHEN METABOLIC PANEL: CPT

## 2021-03-17 PROCEDURE — 36415 COLL VENOUS BLD VENIPUNCTURE: CPT

## 2021-03-22 ENCOUNTER — OFFICE VISIT (OUTPATIENT)
Dept: HEMATOLOGY ONCOLOGY | Facility: CLINIC | Age: 76
End: 2021-03-22
Payer: COMMERCIAL

## 2021-03-22 VITALS
TEMPERATURE: 96.9 F | SYSTOLIC BLOOD PRESSURE: 130 MMHG | HEART RATE: 96 BPM | DIASTOLIC BLOOD PRESSURE: 82 MMHG | HEIGHT: 62 IN | RESPIRATION RATE: 20 BRPM | OXYGEN SATURATION: 95 % | BODY MASS INDEX: 35.81 KG/M2 | WEIGHT: 194.6 LBS

## 2021-03-22 DIAGNOSIS — D09.0 CIS (CARCINOMA IN SITU OF BLADDER): ICD-10-CM

## 2021-03-22 DIAGNOSIS — C34.92 NON-SMALL CELL CARCINOMA OF LEFT LUNG, STAGE 3 (HCC): Primary | ICD-10-CM

## 2021-03-22 PROCEDURE — 1160F RVW MEDS BY RX/DR IN RCRD: CPT | Performed by: INTERNAL MEDICINE

## 2021-03-22 PROCEDURE — 99215 OFFICE O/P EST HI 40 MIN: CPT | Performed by: INTERNAL MEDICINE

## 2021-03-22 PROCEDURE — 1036F TOBACCO NON-USER: CPT | Performed by: INTERNAL MEDICINE

## 2021-03-22 NOTE — PROGRESS NOTES
Hematology / Oncology Outpatient Follow Up Note    Daryle Cosier 64 y o  female :1945 MONIE:[id number]         Date:  3/22/2021    Assessment / Plan:  A 63 year old female with stage IIIA non-small cell lung cancer with adenocarcinoma, diagnosed in 2013  She completed concurrent chemoradiation followed by 2 cycle of consolidation chemotherapy with carboplatin and paclitaxel with minimal side effects, resulting in complete remission  She has no evidence recurrence of lung cancer to date  However, she has BCG resistant urothelial carcinoma in situ  Therefore, she was referred to me to discuss possible checkpoint inhibitor  She has no symptoms from bladder cancer  We had extensive discussion regarding pembrolizumab which was approved by FDA for BCG resistant urothelial carcinoma in situ  Based on the keynote 057 phase 2 trial, complete response rate is 41%  Response duration is 16 months  Since this is phase 2 trial, it is not known if overall survival is improved with pembrolizumab  I highly doubt that patients have survival advantage with pembrolizumab since this is in situ carcinoma  Considering her significant comorbidity as well as her age, overall risk and benefit ratio may not be favorable  Risk of pembrolizumab was thoroughly discussed, including but not limited to hypothyroidism, immune mediated organ toxicities  She is unable to make a decision at this moment  We provided written information on pembrolizumab  She is going to discuss this with her son and get back to us         Subjective:      HPI:             Interval History:  A 63 year old female with stage IIIA non-small cell lung cancer with adenocarcinoma histology  Her lung cancer was diagnosed incidentally  She underwent concurrent chemoradiation with weekly low-dose carboplatin and paclitaxel followed by 2 cycles of consolidation chemotherapy which was completed on 2013 resulted in at least good partial response  She has no evidence recurrence of lung cancer to date  She also has history bladder cancer, which was 1st diagnosed in 2010  She had BCG intravesical treatment which resulting in remission  However, she had recurrence bladder cancer in December 2019  At that time, biopsy showed high-grade invasive urothelial carcinoma with no muscle invasion  Since then, she had BCG intravesical treatment twice  Most recently, she had this treatment in March 2020  Repeated cystoscopy and biopsy in June 2020 as well as November 2020 showed residual urothelial carcinoma in situ  No invasive component was seen  Because of the BCG resistant disease, she was referred to me to discuss treatment with checkpoint inhibitor  She has significant comorbidity with COPD  She has exertional shortness of breath with very mild exertion  She has recent intentional weight loss  She has no complaint of pain  She denied hematuria  However, she has frequent urination  She denied dysuria  Her performance status is 2/4 on the ECOG scale  She quit smoking in 2019 after many years of tobacco use  Objective:      Primary Diagnosis:     1  Non-small cell lung cancer with adenocarcinoma histology, stage IIIA, diagnosed in February 2013      2  BCG resistant urothelial carcinoma in situ      Cancer Staging:  Cancer Staging  No matching staging information was found for the patient         Previous Hematologic/ Oncologic Treatment:      1  Concurrent chemoradiation with weekly carboplatin and paclitaxel completed on April 22, 2013  2  2 cycle of consolidation chemotherapy with carboplatin and paclitaxel completed in June 2013       Current Hematologic/ Oncologic Treatment:       Observation      Disease Status:      Complete remission      Test Results:     Pathology:        In December 2019, bladder biopsy showed high-grade invasive urothelial carcinoma with no muscle invasion        In June 2020 as well as November 2020, bladder biopsy showed urothelial carcinoma in situ  No evidence of invasive carcinoma      Radiology:     CT scan of chest in May 2019 shows stable radiation change      Stable background emphysema      Laboratory:      See below      Physical Exam:        General Appearance:    Alert, oriented          Eyes:    PERRL   Ears:    Normal external ear canals, both ears   Nose:   Nares normal, septum midline   Throat:   Mucosa moist  Pharynx without injection  Neck:   Supple         Lungs:     Clear to auscultation bilaterally   Chest Wall:    No tenderness or deformity    Heart:    Regular rate and rhythm         Abdomen:     Soft, non-tender, bowel sounds +, no organomegaly               Extremities:   Extremities no cyanosis or edema         Skin:   no rash or icterus  Lymph nodes:   Cervical, supraclavicular, and axillary nodes normal   Neurologic:   CNII-XII intact, normal strength, sensation and reflexes     Throughout             Breast exam:   NA            ROS: Review of Systems   Respiratory:        Exertional shortness of breath   Genitourinary:         Frequent urination   All other systems reviewed and are negative  Imaging: No results found  Labs:   Lab Results   Component Value Date    WBC 7 01 03/17/2021    HGB 14 6 03/17/2021    HCT 44 6 03/17/2021     (H) 03/17/2021     03/17/2021     Lab Results   Component Value Date     11/20/2015    K 3 9 03/17/2021     03/17/2021    CO2 27 03/17/2021    ANIONGAP 3 (L) 11/20/2015    BUN 10 03/17/2021    CREATININE 0 72 03/17/2021    GLUCOSE 83 11/20/2015    GLUF 103 (H) 10/20/2020    CALCIUM 9 9 03/17/2021    CORRECTEDCA 10 2 (H) 12/06/2019    AST 23 03/17/2021    ALT 30 03/17/2021    ALKPHOS 90 03/17/2021    PROT 7 7 11/20/2015    BILITOT 0 47 11/20/2015    EGFR 81 03/17/2021         Current Medications: Reviewed  Allergies: Reviewed  PMH/FH/SH:  Reviewed      Vital Sign:    Body surface area is 1 89 meters squared      Wt Readings from Last 3 Encounters:   03/22/21 88 3 kg (194 lb 9 6 oz)   11/20/20 96 6 kg (213 lb)   10/22/20 94 8 kg (209 lb)        Temp Readings from Last 3 Encounters:   03/22/21 (!) 96 9 °F (36 1 °C) (Tympanic)   11/02/20 (!) 97 3 °F (36 3 °C) (Tympanic)   10/22/20 98 2 °F (36 8 °C)        BP Readings from Last 3 Encounters:   03/22/21 130/82   11/20/20 132/68   11/02/20 157/70         Pulse Readings from Last 3 Encounters:   03/22/21 96   11/20/20 78   11/02/20 104     @LASTSAO2(3)@

## 2021-03-22 NOTE — LETTER
2021     Jorge Vega MD  [id number]  [address] [facility] [place]    Patient: Ines Bustamante   YOB: 1945   Date of Visit: 3/22/2021       Dear Dr Dov Ernst: Thank you for referring Ritu Posada to me for evaluation  Below are my notes for this consultation  If you have questions, please do not hesitate to call me  I look forward to following your patient along with you  Sincerely,        Gage Castillo MD        CC: MD Gage Sherman MD  3/22/2021 10:42 AM  Sign when Signing Visit  Hematology / Oncology Outpatient Follow Up Note    Ines Bustamante 64 y o  female :1945 [id number]         Date:  3/22/2021    Assessment / Plan:  A 63 year old female with stage IIIA non-small cell lung cancer with adenocarcinoma, diagnosed in 2013  She completed concurrent chemoradiation followed by 2 cycle of consolidation chemotherapy with carboplatin and paclitaxel with minimal side effects, resulting in complete remission  She has no evidence recurrence of lung cancer to date  However, she has BCG resistant urothelial carcinoma in situ  Therefore, she was referred to me to discuss possible checkpoint inhibitor  She has no symptoms from bladder cancer  We had extensive discussion regarding pembrolizumab which was approved by FDA for BCG resistant urothelial carcinoma in situ  Based on the keynote 057 phase 2 trial, complete response rate is 41%  Response duration is 16 months  Since this is phase 2 trial, it is not known if overall survival is improved with pembrolizumab  I highly doubt that patients have survival advantage with pembrolizumab since this is in situ carcinoma  Considering her significant comorbidity as well as her age, overall risk and benefit ratio may not be favorable  Risk of pembrolizumab was thoroughly discussed, including but not limited to hypothyroidism, immune mediated organ toxicities    She is unable to make a decision at this moment  We provided written information on pembrolizumab  She is going to discuss this with her son and get back to us         Subjective:      HPI:             Interval History:  A 63 year old female with stage IIIA non-small cell lung cancer with adenocarcinoma histology  Her lung cancer was diagnosed incidentally  She underwent concurrent chemoradiation with weekly low-dose carboplatin and paclitaxel followed by 2 cycles of consolidation chemotherapy which was completed on June 2013 resulted in at least good partial response  She has no evidence recurrence of lung cancer to date  She also has history bladder cancer, which was 1st diagnosed in 2010  She had BCG intravesical treatment which resulting in remission  However, she had recurrence bladder cancer in December 2019  At that time, biopsy showed high-grade invasive urothelial carcinoma with no muscle invasion  Since then, she had BCG intravesical treatment twice  Most recently, she had this treatment in March 2020  Repeated cystoscopy and biopsy in June 2020 as well as November 2020 showed residual urothelial carcinoma in situ  No invasive component was seen  Because of the BCG resistant disease, she was referred to me to discuss treatment with checkpoint inhibitor  She has significant comorbidity with COPD  She has exertional shortness of breath with very mild exertion  She has recent intentional weight loss  She has no complaint of pain  She denied hematuria  However, she has frequent urination  She denied dysuria  Her performance status is 2/4 on the ECOG scale  She quit smoking in 2019 after many years of tobacco use  Objective:      Primary Diagnosis:     1  Non-small cell lung cancer with adenocarcinoma histology, stage IIIA, diagnosed in February 2013      2     BCG resistant urothelial carcinoma in situ      Cancer Staging:  Cancer Staging  No matching staging information was found for the patient         Previous Hematologic/ Oncologic Treatment:      1  Concurrent chemoradiation with weekly carboplatin and paclitaxel completed on April 22, 2013  2  2 cycle of consolidation chemotherapy with carboplatin and paclitaxel completed in June 2013       Current Hematologic/ Oncologic Treatment:       Observation      Disease Status:      Complete remission      Test Results:     Pathology:        In December 2019, bladder biopsy showed high-grade invasive urothelial carcinoma with no muscle invasion  In June 2020 as well as November 2020, bladder biopsy showed urothelial carcinoma in situ  No evidence of invasive carcinoma      Radiology:     CT scan of chest in May 2019 shows stable radiation change      Stable background emphysema      Laboratory:      See below      Physical Exam:        General Appearance:    Alert, oriented          Eyes:    PERRL   Ears:    Normal external ear canals, both ears   Nose:   Nares normal, septum midline   Throat:   Mucosa moist  Pharynx without injection  Neck:   Supple         Lungs:     Clear to auscultation bilaterally   Chest Wall:    No tenderness or deformity    Heart:    Regular rate and rhythm         Abdomen:     Soft, non-tender, bowel sounds +, no organomegaly               Extremities:   Extremities no cyanosis or edema         Skin:   no rash or icterus  Lymph nodes:   Cervical, supraclavicular, and axillary nodes normal   Neurologic:   CNII-XII intact, normal strength, sensation and reflexes     Throughout             Breast exam:   NA            ROS: Review of Systems   Respiratory:        Exertional shortness of breath   Genitourinary:         Frequent urination   All other systems reviewed and are negative  Imaging: No results found        Labs:   Lab Results   Component Value Date    WBC 7 01 03/17/2021    HGB 14 6 03/17/2021    HCT 44 6 03/17/2021     (H) 03/17/2021     03/17/2021     Lab Results   Component Value Date  11/20/2015    K 3 9 03/17/2021     03/17/2021    CO2 27 03/17/2021    ANIONGAP 3 (L) 11/20/2015    BUN 10 03/17/2021    CREATININE 0 72 03/17/2021    GLUCOSE 83 11/20/2015    GLUF 103 (H) 10/20/2020    CALCIUM 9 9 03/17/2021    CORRECTEDCA 10 2 (H) 12/06/2019    AST 23 03/17/2021    ALT 30 03/17/2021    ALKPHOS 90 03/17/2021    PROT 7 7 11/20/2015    BILITOT 0 47 11/20/2015    EGFR 81 03/17/2021         Current Medications: Reviewed  Allergies: Reviewed  PMH/FH/SH:  Reviewed      Vital Sign:    Body surface area is 1 89 meters squared      Wt Readings from Last 3 Encounters:   03/22/21 88 3 kg (194 lb 9 6 oz)   11/20/20 96 6 kg (213 lb)   10/22/20 94 8 kg (209 lb)        Temp Readings from Last 3 Encounters:   03/22/21 (!) 96 9 °F (36 1 °C) (Tympanic)   11/02/20 (!) 97 3 °F (36 3 °C) (Tympanic)   10/22/20 98 2 °F (36 8 °C)        BP Readings from Last 3 Encounters:   03/22/21 130/82   11/20/20 132/68   11/02/20 157/70         Pulse Readings from Last 3 Encounters:   03/22/21 96   11/20/20 78   11/02/20 104     @LASTSAO2(3)@

## 2021-03-26 ENCOUNTER — TELEPHONE (OUTPATIENT)
Dept: UROLOGY | Facility: CLINIC | Age: 76
End: 2021-03-26

## 2021-03-30 ENCOUNTER — PATIENT OUTREACH (OUTPATIENT)
Dept: UROLOGY | Facility: AMBULATORY SURGERY CENTER | Age: 76
End: 2021-03-30

## 2021-03-30 NOTE — PROGRESS NOTES
Called Pascual Larios to follow up after her visit with Dr Keron Templeton  She has not made a treatment decision and wants to discuss further with Dr Juany Cortez at her visit with him on 4/2/21  She has concerns about potential side effects to pembrolizumab  She was given my direct contact information and encouraged to call if she has questions or needs assistance

## 2021-04-02 ENCOUNTER — TELEPHONE (OUTPATIENT)
Dept: UROLOGY | Facility: CLINIC | Age: 76
End: 2021-04-02

## 2021-04-02 ENCOUNTER — PROCEDURE VISIT (OUTPATIENT)
Dept: UROLOGY | Facility: CLINIC | Age: 76
End: 2021-04-02
Payer: COMMERCIAL

## 2021-04-02 VITALS — WEIGHT: 192 LBS | BODY MASS INDEX: 35.33 KG/M2 | HEART RATE: 86 BPM | HEIGHT: 62 IN

## 2021-04-02 DIAGNOSIS — C67.9 ADENOCARCINOMA OF BLADDER (HCC): Primary | ICD-10-CM

## 2021-04-02 LAB
SL AMB  POCT GLUCOSE, UA: NORMAL
SL AMB LEUKOCYTE ESTERASE,UA: NORMAL
SL AMB POCT BILIRUBIN,UA: NORMAL
SL AMB POCT BLOOD,UA: NORMAL
SL AMB POCT CLARITY,UA: CLEAR
SL AMB POCT COLOR,UA: NORMAL
SL AMB POCT KETONES,UA: 15
SL AMB POCT NITRITE,UA: NORMAL
SL AMB POCT PH,UA: 5
SL AMB POCT SPECIFIC GRAVITY,UA: 1.03
SL AMB POCT URINE PROTEIN: NORMAL
SL AMB POCT UROBILINOGEN: 0.2

## 2021-04-02 PROCEDURE — 52000 CYSTOURETHROSCOPY: CPT | Performed by: UROLOGY

## 2021-04-02 PROCEDURE — 88112 CYTOPATH CELL ENHANCE TECH: CPT | Performed by: PATHOLOGY

## 2021-04-02 PROCEDURE — 81002 URINALYSIS NONAUTO W/O SCOPE: CPT | Performed by: UROLOGY

## 2021-04-02 PROCEDURE — 99214 OFFICE O/P EST MOD 30 MIN: CPT | Performed by: UROLOGY

## 2021-04-02 NOTE — TELEPHONE ENCOUNTER
Patient seen today by Dr Pineda Jones and will be having CT chest/abd/pelvis on 4/11/21  Per Dr Pineda Jones, follow up after

## 2021-04-02 NOTE — PROGRESS NOTES
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  Previously, I have counseled her for radical cystectomy with ileal conduit creation  She is not interested in aggressive surgery secondary to her multiple medical comorbidities including oxygen-dependent COPD  This aggressive surgery would put her at risk for complications  She was referred to Medical Oncology and was recently evaluated by Dr Cj Pineda  He discussed pembrolizumab with the patient  She is considering this option at this time  She returns to the office today for surveillance cystoscopy  Her most recent creatinine level is 0 72  Her last upper tract imaging was an ultrasound of the kidneys and bladder from January 2020 which was normal   Urine cytology was not performed prior to today's office visit  Cystoscopy Procedure note    Risk and benefits of flexible cystoscopy were discussed  Informed consent was obtained  A urine dip is adequate for cystoscopy  The patient was placed into the modified supine position  Her genitalia was prepped and draped in a sterile fashion  Viscous lidocaine was instilled into the urethra  Flexible cystoscopy was then performed  The bladder was thoroughly inspected  Both ureteral orifices were visualized with clear efflux of urine  The bladder mucosa was thoroughly inspected  There were areas of redness around prior biopsy sites  There was no evidence of simeon recurrence of urothelial carcinoma, however, these areas were possibly suspicious for recurrent carcinoma in situ  Impression: History of carcinoma in situ of the bladder with possible recurrence     Plan:  I again had a lengthy discussion with the patient regarding her Keytruda    I would recommend [facility] as carcinoma in situ can progress to advanced and even metastatic disease on average within 24 months  Her last CT scan was in 2017 and I recommend obtaining a CT scan of the  Chest, abdomen, and pelvis at this time  Urine cytology was sent from the office today  She wishes to defer cystoscopy with bladder biopsy and fulguration at this time  She understands that there could be underlying carcinoma in situ present within the bladder  She will return in in follow-up after the CT scan and cytology have been completed  We will then readdress the operating room  In the interim she will follow with Medical Oncology to readdress [facility]      25 min discussion with patient today re: CT, CIS, and [facility]

## 2021-04-02 NOTE — LETTER
April 2, 2021     Jamal Bermudez MD  [address] [facility]  [place] [address]     Patient: Yvonne López   YOB: 1945   Date of Visit: 4/2/2021       Dear Dr Tohmas Fragoso:    Thank you for referring Alex Kraft to me for evaluation  Below are my notes for this consultation  If you have questions, please do not hesitate to call me  I look forward to following your patient along with you  Sincerely,        Minerva Campos MD        CC: No Recipients  Minerva Campos MD  4/2/2021 10:14 AM  Sign when Signing Visit     Cystoscopy     Date/Time 4/2/2021 9:34 AM     Performed by  Minerva Campos MD     Authorized by Minerva Campos MD            Deneen Bueno is a 47-year-old female with a history of lung cancer as well as 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  Previously, I have counseled her for radical cystectomy with ileal conduit creation  She is not interested in aggressive surgery secondary to her multiple medical comorbidities including oxygen-dependent COPD  This aggressive surgery would put her at risk for complications  She was referred to Medical Oncology and was recently evaluated by Dr Thomas Fragoso  He discussed pembrolizumab with the patient  She is considering this option at this time  She returns to the office today for surveillance cystoscopy  Her most recent creatinine level is 0 72  Her last upper tract imaging was an ultrasound of the kidneys and bladder from January 2020 which was normal   Urine cytology was not performed prior to today's office visit  Cystoscopy Procedure note    Risk and benefits of flexible cystoscopy were discussed  Informed consent was obtained  A urine dip is adequate for cystoscopy  The patient was placed into the modified supine position  Her genitalia was prepped and draped in a sterile fashion  Viscous lidocaine was instilled into the urethra  Flexible cystoscopy was then performed  The bladder was thoroughly inspected  Both ureteral orifices were visualized with clear efflux of urine  The bladder mucosa was thoroughly inspected  There were areas of redness around prior biopsy sites  There was no evidence of simeon recurrence of urothelial carcinoma, however, these areas were possibly suspicious for recurrent carcinoma in situ  Impression: History of carcinoma in situ of the bladder with possible recurrence     Plan:  I again had a lengthy discussion with the patient regarding her Keytruda  I would recommend [facility] as carcinoma in situ can progress to advanced and even metastatic disease on average within 24 months  Her last CT scan was in 2017 and I recommend obtaining a CT scan of the  Chest, abdomen, and pelvis at this time  Urine cytology was sent from the office today  She wishes to defer cystoscopy with bladder biopsy and fulguration at this time  She understands that there could be underlying carcinoma in situ present within the bladder  She will return in in follow-up after the CT scan and cytology have been completed  We will then readdress the operating room  In the interim she will follow with Medical Oncology to readdress [facility]      25 min discussion with patient today re: CT, CIS, and [facility]

## 2021-04-07 ENCOUNTER — PATIENT OUTREACH (OUTPATIENT)
Dept: UROLOGY | Facility: AMBULATORY SURGERY CENTER | Age: 76
End: 2021-04-07

## 2021-04-07 NOTE — PROGRESS NOTES
Jagruti Dc had follow up with Dr Pratik Jolly and is scheduled for CT 4/11/21  Called her to follow up on treatment decision  Called [phone]  No answer

## 2021-04-08 NOTE — TELEPHONE ENCOUNTER
Attempted to reach out to Patient for OV as per Ade's note  Phone just rang with no option to LM  Please try again later

## 2021-04-09 NOTE — TELEPHONE ENCOUNTER
LMOM for son (KRISTEL-on CC) use mobile # to explain calling to schedule Patient for F/U to CT scan with Dr Annalise Salmeron in [place] office 4/28/21  If Patient or son calls back please schedule in spot held as per Tiera Martinez note  Thank you!

## 2021-04-09 NOTE — TELEPHONE ENCOUNTER
2nd attempt to reach Patient, phone just rings no option to LM  Will need to try again later in the day

## 2021-04-11 ENCOUNTER — HOSPITAL ENCOUNTER (OUTPATIENT)
Dept: CT IMAGING | Facility: HOSPITAL | Age: 76
Discharge: HOME | End: 2021-04-11
Attending: UROLOGY
Payer: COMMERCIAL

## 2021-04-11 DIAGNOSIS — C67.9 ADENOCARCINOMA OF BLADDER (HCC): ICD-10-CM

## 2021-04-11 PROCEDURE — 74178 CT ABD&PLV WO CNTR FLWD CNTR: CPT

## 2021-04-11 PROCEDURE — G1004 CDSM NDSC: HCPCS

## 2021-04-11 PROCEDURE — 71270 CT THORAX DX C-/C+: CPT

## 2021-04-11 RX ADMIN — IOHEXOL 100 ML: 350 INJECTION, SOLUTION INTRAVENOUS at 15:07

## 2021-04-13 NOTE — TELEPHONE ENCOUNTER
Unable to lm for Dav Melchor and phone rang and rang - as several attempts have been made to contact Dav Melchor and her son Aldair Ellison will mail letter to notify them of appointment

## 2021-04-28 ENCOUNTER — TELEPHONE (OUTPATIENT)
Dept: FAMILY MEDICINE CLINIC | Facility: CLINIC | Age: 76
End: 2021-04-28

## 2021-04-28 ENCOUNTER — CONSULT (OUTPATIENT)
Dept: FAMILY MEDICINE CLINIC | Facility: CLINIC | Age: 76
End: 2021-04-28
Payer: COMMERCIAL

## 2021-04-28 VITALS
HEIGHT: 62 IN | TEMPERATURE: 97.3 F | OXYGEN SATURATION: 95 % | RESPIRATION RATE: 18 BRPM | DIASTOLIC BLOOD PRESSURE: 60 MMHG | WEIGHT: 186 LBS | SYSTOLIC BLOOD PRESSURE: 98 MMHG | HEART RATE: 97 BPM | BODY MASS INDEX: 34.23 KG/M2

## 2021-04-28 DIAGNOSIS — C67.9 ADENOCARCINOMA OF BLADDER (HCC): ICD-10-CM

## 2021-04-28 DIAGNOSIS — C34.92 NON-SMALL CELL CARCINOMA OF LEFT LUNG, STAGE 3 (HCC): ICD-10-CM

## 2021-04-28 DIAGNOSIS — H25.9 AGE-RELATED CATARACT OF BOTH EYES, UNSPECIFIED AGE-RELATED CATARACT TYPE: Primary | ICD-10-CM

## 2021-04-28 DIAGNOSIS — E78.00 HYPERCHOLESTEROLEMIA: ICD-10-CM

## 2021-04-28 DIAGNOSIS — D09.0 CIS (CARCINOMA IN SITU OF BLADDER): ICD-10-CM

## 2021-04-28 DIAGNOSIS — J43.9 PULMONARY EMPHYSEMA, UNSPECIFIED EMPHYSEMA TYPE (HCC): ICD-10-CM

## 2021-04-28 PROCEDURE — 3725F SCREEN DEPRESSION PERFORMED: CPT | Performed by: FAMILY MEDICINE

## 2021-04-28 PROCEDURE — 99214 OFFICE O/P EST MOD 30 MIN: CPT | Performed by: FAMILY MEDICINE

## 2021-04-28 PROCEDURE — 1160F RVW MEDS BY RX/DR IN RCRD: CPT | Performed by: FAMILY MEDICINE

## 2021-04-28 PROCEDURE — 1101F PT FALLS ASSESS-DOCD LE1/YR: CPT | Performed by: FAMILY MEDICINE

## 2021-04-28 PROCEDURE — 1036F TOBACCO NON-USER: CPT | Performed by: FAMILY MEDICINE

## 2021-04-28 PROCEDURE — 3288F FALL RISK ASSESSMENT DOCD: CPT | Performed by: FAMILY MEDICINE

## 2021-04-28 NOTE — TELEPHONE ENCOUNTER
Please scan cc of pre-op form  Attach cc of office visit to form   And fax to dr ortiz's office  Thanks!

## 2021-04-28 NOTE — PROGRESS NOTES
FAMILY PRACTICE OFFICE VISIT    NAME: Fareed Alex    AGE: 64 y o  SEX: female  : 1945   MRN: [id number]    DATE: 2021  TIME: 5:19 PM    Assessment and Plan   1  Age-related cataract of both eyes, unspecified age-related cataract type  Here today for pre-op visit with dr Austin Scott  Medically cleared for surgery  Will scan cc of pre-op form into chart and then fax over form along with cc of visit to ophthalmologist    Revised cardiac risk index score -ZERO    Note - pulse was elevated upon entering exam room - but pt had a long walk down the [place]  Recheck was within normal range  Pulse ox also came up nicely      Hold ASPIRIN for 7 days prior to surgery (pt is not taking for secondary prevention); no h/o MI or CVA    And do not take any motrin like products, vitamins or supplements for 7 days prior    Bring along ventolin inhaler to surgery - label with name and date of birth  It is OK to take synthroid the am of surgery    Forms for surgeon completed and will be scanned into chart and faxed to their office  2  Hypercholesterolemia  Following with pcp  3  CIS (carcinoma in situ of bladder)  Following with uro  Considering immunotherapy      4  Adenocarcinoma of bladder ([address] )      5  Non-small cell carcinoma of left lung, stage 3 (HCC)      6  Pulmonary emphysema, unspecified emphysema type ([address] )  Stable  Has not had to use albuterol or oxygen  Quit tob over 2 years ago  On spiriva    There are no Patient Instructions on file for this visit  Chief Complaint     Chief Complaint   Patient presents with    Pre-op Exam       History of Present Illness   Fareed Alex is a 64y o -year-old female who presents today for pre-op clearance for cataracts for both eyes -   With dr Austin Scott  Right eye scheduled on 2021  Left eye scheduled on 2021    Had 2nd covid shot last week    H/o lung cancer  And currently being treated for CIS bladder  Considering immunotherapy          Review of Systems   Review of Systems   Constitutional: Positive for unexpected weight change  Negative for chills, diaphoresis and fever  Pt has had intentional weight loss - 26 # since 1/2021 - to help with how she is feeling overall and for her breathing  HENT: Negative for congestion, sinus pain, sore throat and trouble swallowing  Eyes:        C/o blurred vision and night vision difficulty  Wears glasses  Respiratory: Positive for shortness of breath  Negative for apnea, cough and wheezing  Chronic SOB with exertion - unchanged  Has not had to use albuterol  Wears oxygen only if needed - and can only recall using 2x   Cardiovascular: Positive for leg swelling  Negative for chest pain and palpitations  Chronic ankle edema - unchanged     Genitourinary: Negative for dysuria and hematuria  Neurological: Negative for dizziness, seizures, syncope and headaches  Hematological: Does not bruise/bleed easily  No h/o DVT or PE     Psychiatric/Behavioral: Negative for dysphoric mood, self-injury, sleep disturbance and suicidal ideas  The patient is not nervous/anxious          Active Problem List     Patient Active Problem List   Diagnosis    Adenocarcinoma of bladder (HCC)    Chronic obstructive pulmonary disease (HCC)    Elevated C-reactive protein    Hypercholesterolemia    Hypothyroidism    Non-small cell carcinoma of left lung, stage 3 (HCC)    Obesity    Squamous cell carcinoma of leg    Medicare annual wellness visit, subsequent    CIS (carcinoma in situ of bladder)         Past Medical History:  Past Medical History:   Diagnosis Date    Anesthesia     "constipation after surgery"    Bladder [facility] )     Cataract     left    Colon polyp     COPD (chronic obstructive pulmonary disease) ([facility] )     Dry mouth     Full dentures     GERD (gastroesophageal reflux disease)     occas    History of pneumonia     Hyperlipidemia     Hypothyroidism     Lung cancer ([facility] )     stage 3    Pulmonary emphysema (HCC)     Skin cancer     squamous cell of the leg    Sleep difficulties     "gets up frequently to void"    UTI (urinary tract infection) 12/2019    recent e coli treated and resolved    Wears glasses        Past Surgical History:  Past Surgical History:   Procedure Laterality Date    CHOLECYSTECTOMY      COLONOSCOPY      CYSTOSCOPY      4/10 AND 8/10, 3/12/2015 4/18/2016  DIAGNOSTIC     CYSTOSCOPY  05/01/2020    CYSTOSCOPY  04/02/2021    DENTAL SURGERY      EYE SURGERY      HYSTERECTOMY      OTHER SURGICAL HISTORY      TRANSURETHERAL RESECTION     NV CYSTOURETHROSCOPY,BIOPSY N/A 6/1/2020    Procedure: CYSTOSCOPY WITH BIOPSIES, FULGURATION;  Surgeon: Beto Corrigan MD;  Location: [facility];  Service: Urology    NV CYSTOURETHROSCOPY,BIOPSY N/A 11/2/2020    Procedure: CYSTO W/BIOPSIES & FULGURATION;  Surgeon: Beto Corrigan MD;  Location: [facility];  Service: Urology    NV CYSTOURETHROSCOPY,FULGUR <0 5 CM LESN N/A 12/23/2019    Procedure: BLADDER BIOPSY; Kat Meyers with mitomycin;  Surgeon: Beto Corrigan MD;  Location: [facility] MAIN OR;  Service: Urology    WISDOM TOOTH EXTRACTION         Family History:  Family History   Problem Relation Age of Onset    Cancer Mother         Mouth     Mental illness Sister     Colon cancer Paternal Grandmother        Social History:  Social History     Socioeconomic History    Marital status: Single     Spouse name: Not on file    Number of children: Not on file    Years of education: Not on file    Highest education level: Not on file   Occupational History    Not on file   Social Needs    Financial resource strain: Not on file    Food insecurity     Worry: Not on file     Inability: Not on file    Transportation needs     Medical: Not on file     Non-medical: Not on file   Tobacco Use    Smoking status: Former Smoker     Packs/day: 2 00     Types: Cigarettes     Quit date: 1/8/2019     Years since quittin 3    Smokeless tobacco: Never Used   Substance and Sexual Activity    Alcohol use: No    Drug use: No    Sexual activity: Not on file   Lifestyle    Physical activity     Days per week: Not on file     Minutes per session: Not on file    Stress: Not on file   Relationships    Social connections     Talks on phone: Not on file     Gets together: Not on file     Attends Nondenominational service: Not on file     Active member of club or organization: Not on file     Attends meetings of clubs or organizations: Not on file     Relationship status: Not on file    Intimate partner violence     Fear of current or ex partner: Not on file     Emotionally abused: Not on file     Physically abused: Not on file     Forced sexual activity: Not on file   Other Topics Concern    Not on file   Social History Narrative    Not on file       Objective     Vitals:    21 1706   Pulse: 104   Resp: 18   Temp: (!) 97 3 °F (36 3 °C)   SpO2: 93%     Wt Readings from Last 3 Encounters:   21 84 4 kg (186 lb)   21 87 1 kg (192 lb)   21 88 3 kg (194 lb 9 6 oz)       Physical Exam  Vitals signs and nursing note reviewed  Constitutional:       General: She is not in acute distress  Appearance: Normal appearance  She is not ill-appearing or toxic-appearing  HENT:      Right Ear: Tympanic membrane normal  There is no impacted cerumen  Left Ear: Tympanic membrane normal  There is no impacted cerumen  Nose: Nose normal  No congestion  Mouth/Throat:      Mouth: Mucous membranes are moist       Pharynx: No oropharyngeal exudate or posterior oropharyngeal erythema  Eyes:      General: No scleral icterus  Right eye: No discharge  Left eye: No discharge  Extraocular Movements: Extraocular movements intact  Conjunctiva/sclera: Conjunctivae normal       Pupils: Pupils are equal, round, and reactive to light  Neck:      Musculoskeletal: No neck rigidity  Vascular: No carotid bruit  Cardiovascular:      Rate and Rhythm: Normal rate and regular rhythm  Pulses: Normal pulses  Heart sounds: Normal heart sounds  No murmur  Pulmonary:      Effort: Pulmonary effort is normal  No respiratory distress  Breath sounds: Normal breath sounds  No stridor  No wheezing, rhonchi or rales  Comments: Lungs completely clear  No use of accessory respiratory muscles    Abdominal:      General: Abdomen is flat  There is no distension  Palpations: Abdomen is soft  Tenderness: There is no abdominal tenderness  Musculoskeletal:         General: No tenderness  Comments: Mild ankle edema  No calf tenderness     Lymphadenopathy:      Cervical: No cervical adenopathy  Skin:     General: Skin is warm  Coloration: Skin is not jaundiced  Neurological:      General: No focal deficit present  Mental Status: She is alert and oriented to person, place, and time  Psychiatric:         Mood and Affect: Mood normal          Behavior: Behavior normal          Thought Content:  Thought content normal          Judgment: Judgment normal          Pertinent Laboratory/Diagnostic Studies:  Lab Results   Component Value Date    GLUCOSE 83 11/20/2015    BUN 10 03/17/2021    CREATININE 0 72 03/17/2021    CALCIUM 9 9 03/17/2021     11/20/2015    K 3 9 03/17/2021    CO2 27 03/17/2021     03/17/2021     Lab Results   Component Value Date    ALT 30 03/17/2021    AST 23 03/17/2021    ALKPHOS 90 03/17/2021    BILITOT 0 47 11/20/2015       Lab Results   Component Value Date    WBC 7 01 03/17/2021    HGB 14 6 03/17/2021    HCT 44 6 03/17/2021     (H) 03/17/2021     03/17/2021       No results found for: TSH    Lab Results   Component Value Date    CHOL 214 03/14/2014     Lab Results   Component Value Date    TRIG 398 (H) 10/20/2020     Lab Results   Component Value Date    HDL 35 (L) 10/20/2020     Lab Results   Component Value Date    LDLCALC 129 (H) 10/20/2020     No results found for: HGBA1C    Results for orders placed or performed in visit on 04/02/21   POCT urine dip   Result Value Ref Range    LEUKOCYTE ESTERASE,UA trace     NITRITE,UA -     SL AMB POCT UROBILINOGEN 0 2     POCT URINE PROTEIN -      PH,UA 5 0     BLOOD,UA -     SPECIFIC GRAVITY,UA 1 030     KETONES,UA 15     BILIRUBIN,UA -     GLUCOSE, UA -      COLOR,UA saturnino     CLARITY,UA clear    Cytology, urine   Result Value Ref Range    Case Report       Non-gynecologic Cytology                          Case: [id number]                                  Authorizing Provider:  Kingston Payne MD         Collected:           04/02/2021 1636              Ordering Location:     [address] [place] [address] For        Received:            04/02/2021 1636                                     Urology [facility]                                                             Pathologist:           Ирина Covarrubias MD                                                                 Specimen:    Urine, Clean Catch                                                                         Final Diagnosis       A  Urine, Clean Catch:  Atypical urothelial cells (AUC) - see comment  Rare groups and individual of atypical urothelial cells with increased nuclear/cytoplasmic ratios and irregular nuclear membranes  Numerous crystals present  Few neutrophils  Satisfactory for evaluation  Comment:  The above diagnostic category is from the recently published book, [employer] [place] [employer] Urinary Cytology, and is in keeping with the ongoing effort for utilization of standardized diagnostic terminology in urine cytology  *    *[employer] [place] [employer] Urinary Cytology  Asuncion Trujillo; 2016             Gross Description          A   45ml, dark yellow, slightly cloudy, received in [facility]           Additional Information       [employer]'s FDA approved ,  and ThinPrep Imaging Duo System are utilized with strict adherence to the 's instruction manual to prepare gynecologic and non-gynecologic cytology specimens for the production of ThinPrep slides as well as for gynecologic ThinPrep imaging  These processes have been validated by our laboratory and/or by the   These tests were developed and their performance characteristics determined by [facility] or [address] [place] [address]  They may not be cleared or approved by the [place]  Food and Drug Administration  The FDA has determined that such clearance or approval is not necessary  These tests are used for clinical purposes  They should not be regarded as investigational or for research  This laboratory has been approved by [facility], designated as a high-complexity laboratory and is qualified to perform these tests  No orders of the defined types were placed in this encounter        ALLERGIES:  Allergies   Allergen Reactions    Erythromycin Hives    Penicillins Hives    Sulfa Antibiotics Hives    Tetracyclines & Related Hives       Current Medications     Current Outpatient Medications   Medication Sig Dispense Refill    Ascorbic Acid (VITAMIN C) 100 MG tablet Take 100 mg by mouth daily      aspirin 325 mg tablet Take 0 5 tablets by mouth daily       Calcium 600 MG tablet Take 1 tablet by mouth 2 (two) times a day      Chromium 200 MCG TABS Take 1 tablet by mouth daily      fluorouracil (EFUDEX) 5 % cream Use as directed      levothyroxine 125 mcg tablet TAKE 1 TABLET BY MOUTH EVERY DAY 90 tablet 3    magnesium 30 MG tablet Take 30 mg by mouth 2 (two) times a day      melatonin 1 mg Take 10 mg by mouth daily at bedtime       Multiple Vitamin (MULTIVITAMINS PO) Take 1 tablet by mouth daily      Omega-3 Fatty Acids (FISH OIL) 1200 MG CAPS Take by mouth      OXYGEN-HELIUM IN Inhale as needed Only uses rarely      Spiriva HandiHaler 18 MCG inhalation capsule INHALE 1 CAPSULE VIA HANDIHALER ONCE DAILY AT THE SAME TIME EVERY DAY 30 capsule 5    albuterol (VENTOLIN HFA) 90 mcg/act inhaler Inhale 2 puffs every 6 (six) hours as needed for wheezing (Patient not taking: Reported on 10/22/2020) 1 Inhaler 5     No current facility-administered medications for this visit  Health Maintenance     Health Maintenance   Topic Date Due    DTaP,Tdap,and Td Vaccines (1 - Tdap) Never done    Colonoscopy Surveillance  03/25/2019    Medicare Annual Wellness Visit (AWV)  02/25/2021    BMI: Followup Plan  02/25/2021    Fall Risk  04/28/2022    Depression Screening PHQ  04/28/2022    BMI: Adult  04/28/2022    Pneumococcal Vaccine: 65+ Years  Completed    Influenza Vaccine  Completed    COVID-19 Vaccine  Completed    HIB Vaccine  Aged Out    Hepatitis B Vaccine  Aged Out    IPV Vaccine  Aged Out    Hepatitis A Vaccine  Aged Out    Meningococcal ACWY Vaccine  Aged Out    HPV Vaccine  Aged Out     Immunization History   Administered Date(s) Administered    Influenza Split High Dose Preservative Free IM 11/30/2012, 04/01/2016, 10/25/2017    Influenza, high dose seasonal 0 7 mL 10/01/2018, 10/21/2019, 10/14/2020    Influenza, seasonal, injectable 01/11/2010, 09/20/2010, 10/27/2011    Pneumococcal Conjugate 13-Valent 04/01/2016    Pneumococcal Polysaccharide PPV23 08/18/2005, 08/19/2005, 12/14/2012    SARS-CoV-2 / COVID-19 mRNA IM (Carey Sida) 03/24/2021, 04/21/2021    Zoster 01/13/2014     BMI Counseling: Body mass index is 34 02 kg/m²  The BMI is above normal  Nutrition recommendations include decreasing portion sizes, encouraging healthy choices of fruits and vegetables, decreasing fast food intake, consuming healthier snacks, limiting drinks that contain sugar, moderation in carbohydrate intake, increasing intake of lean protein, reducing intake of saturated and trans fat and reducing intake of cholesterol  Exercise recommendations include exercising 3-5 times per week   No pharmacotherapy was ordered             Charlotte Ragland DO

## 2021-04-28 NOTE — PATIENT INSTRUCTIONS
Hold ASPIRIN for 7 days prior to surgery  And do not take any motrin like products, vitamins or supplements for 7 days prior    Bring along ventolin inhaler to surgery - label with name and date of birth      It is OK to take synthroid the am of surgery

## 2021-04-29 DIAGNOSIS — J43.9 PULMONARY EMPHYSEMA, UNSPECIFIED EMPHYSEMA TYPE (HCC): ICD-10-CM

## 2021-05-03 RX ORDER — TIOTROPIUM BROMIDE 18 UG/1
CAPSULE ORAL; RESPIRATORY (INHALATION)
Qty: 90 CAPSULE | Refills: 1 | Status: SHIPPED | OUTPATIENT
Start: 2021-05-03 | End: 2022-01-07

## 2021-05-04 ENCOUNTER — TELEPHONE (OUTPATIENT)
Dept: UROLOGY | Facility: AMBULATORY SURGERY CENTER | Age: 76
End: 2021-05-04

## 2021-05-04 NOTE — TELEPHONE ENCOUNTER
Patient called and canceled cysto appointment with Abbi Alcocer  She stated she already had you done on 4/2/21  She stated if she need to get any procedure please reschedule her in [place] office  She is also having upcoming cataract surgery   James

## 2021-05-04 NOTE — TELEPHONE ENCOUNTER
Dr Lynne Gamboa ordered a CT scan of the chest abdomen pelvis with and without contrast which was completed    She was to follow-up with Dr Lynne Gamboa to discuss results of her recent testing

## 2021-05-05 NOTE — TELEPHONE ENCOUNTER
Please see note below and advise when to schedule pt whom only wants to go to [facility] location for MRI FU appt  Thank you

## 2021-05-06 NOTE — TELEPHONE ENCOUNTER
Placed a call to patient 3 times, phone just rings  Left a vm on son's cell phone   We can have pt come to [facility] 5/7/21 @ 1:30pm

## 2021-05-07 NOTE — TELEPHONE ENCOUNTER
Patient tentatively scheduled on 5/21/2021 at [facility] with Dr Tutu Chen  Please call Patient to inform/confirm  Thank you

## 2021-05-12 NOTE — TELEPHONE ENCOUNTER
Called number on file and it was her son that answered, patient currently at another appointment  I told son I would call back later

## 2021-05-21 ENCOUNTER — TELEPHONE (OUTPATIENT)
Dept: UROLOGY | Facility: CLINIC | Age: 76
End: 2021-05-21

## 2021-05-21 ENCOUNTER — OFFICE VISIT (OUTPATIENT)
Dept: UROLOGY | Facility: CLINIC | Age: 76
End: 2021-05-21
Payer: COMMERCIAL

## 2021-05-21 VITALS
SYSTOLIC BLOOD PRESSURE: 98 MMHG | WEIGHT: 179.2 LBS | HEART RATE: 95 BPM | DIASTOLIC BLOOD PRESSURE: 72 MMHG | BODY MASS INDEX: 32.97 KG/M2 | HEIGHT: 62 IN

## 2021-05-21 DIAGNOSIS — D09.0 CIS (CARCINOMA IN SITU OF BLADDER): Primary | ICD-10-CM

## 2021-05-21 PROCEDURE — 1036F TOBACCO NON-USER: CPT | Performed by: UROLOGY

## 2021-05-21 PROCEDURE — 1160F RVW MEDS BY RX/DR IN RCRD: CPT | Performed by: UROLOGY

## 2021-05-21 PROCEDURE — 99213 OFFICE O/P EST LOW 20 MIN: CPT | Performed by: UROLOGY

## 2021-05-21 NOTE — LETTER
May 21, 2021     Laura Veloz MD  [address] [id number]  [address] [place] [address]    Patient: Gigi Rosas   YOB: 1945   Date of Visit: 5/21/2021       Dear Dr Liz Carney:    Thank you for referring Les Bernal to me for evaluation  Below are my notes for this consultation  If you have questions, please do not hesitate to call me  I look forward to following your patient along with you  Sincerely,        Elkin Wick MD        CC: No Recipients  Elkin Wick MD  5/21/2021  2:29 PM  Sign when Signing Visit  5/21/2021    Gigi Rosas  1945  [id number]        Assessment   history of carcinoma in situ of the bladder, history of lung cancer      Discussion   I discussed with the patient the CT scan of the chest abdomen and pelvis shows no significant findings  As it relates to her urinary tract  She would still be considered a candidate for Keytruda for her BCG refractory carcinoma in situ  She is unsure if she wishes to continue with this medication  She is scheduled to see Medical Oncology in the future  She will return in the next 3-4 months with cystoscopy and cytology  The patient is amenable with this plan  History of Present Illness  64 y o  female with a history of  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  She has been followed with Medical Oncology  She is previously counseled for [facility] but had been hesitant  I recently performed cystoscopy and identified a few erythematous areas but no simeon recurrence of bladder cancer  We discussed an additional bladder biopsy in the operating room versus continued surveillance follow-up  She returns in follow-up today after recent CT scan of the   Chest, abdomen and pelvis was performed  The only significant finding was a subcentimeter scar identified in the lower lobe of the left lung  She states that she recently lost 35 lb  She states that she recently had glaucoma surgery      AUA Symptom Score      Review of Systems  Review of Systems   Constitutional: Negative  HENT: Negative  Eyes: Negative  Respiratory: Negative  Cardiovascular: Negative  Gastrointestinal: Negative  Endocrine: Negative  Genitourinary:        Per HPI   Musculoskeletal: Negative  Skin: Negative  Allergic/Immunologic: Negative  Neurological: Negative  Hematological: Negative  Psychiatric/Behavioral: Negative  All other systems reviewed and are negative          Past Medical History  Past Medical History:   Diagnosis Date    Anesthesia     "constipation after surgery"    [facility] ([facility] )     Cataract     left    Colon polyp     [facility]     Dry mouth     Full dentures     GERD (gastroesophageal reflux disease)     occas    History of pneumonia     Hyperlipidemia     Hypothyroidism     [facility] )     stage 3    Pulmonary emphysema (HCC)     Skin cancer     squamous cell of the leg    Sleep difficulties     "gets up frequently to void"    UTI (urinary tract infection) 12/2019    recent e coli treated and resolved    Wears glasses        Past Social History  Past Surgical History:   Procedure Laterality Date    CHOLECYSTECTOMY      COLONOSCOPY      CYSTOSCOPY      4/10 AND 8/10, 3/12/2015 4/18/2016  DIAGNOSTIC     CYSTOSCOPY  05/01/2020    CYSTOSCOPY  04/02/2021    DENTAL SURGERY      EYE SURGERY      HYSTERECTOMY      OTHER SURGICAL HISTORY      TRANSURETHERAL RESECTION     VT CYSTOURETHROSCOPY,BIOPSY N/A 6/1/2020    Procedure: CYSTOSCOPY WITH BIOPSIES, FULGURATION;  Surgeon: Pattie Rhodes MD;  [facility];  Service: Urology    VT CYSTOURETHROSCOPY,BIOPSY N/A 11/2/2020    Procedure: CYSTO W/BIOPSIES & FULGURATION;  Surgeon: Pattie Rhodes MD;  [facility];  Service: Urology    VT CYSTOURETHROSCOPY,FULGUR <0 5 CM LESN N/A 12/23/2019    Procedure: BLADDER BIOPSY; BLADDER FULGERATION with mitomycin; Surgeon: Kenton Khan MD;  Location: [address] [place] [address] OR;  Service: Urology    WISDOM TOOTH EXTRACTION         Past Family History  Family History   Problem Relation Age of Onset    Cancer Mother         Mouth     Mental illness Sister     Colon cancer Paternal Grandmother        Past Social history  Social History     Socioeconomic History    Marital status: Single     Spouse name: Not on file    Number of children: Not on file    Years of education: Not on file    Highest education level: Not on file   Occupational History    Not on file   Social Needs    Financial resource strain: Not on file    Food insecurity     Worry: Not on file     Inability: Not on file    Transportation needs     Medical: Not on file     Non-medical: Not on file   Tobacco Use    Smoking status: Former Smoker     Packs/day: 2 00     Types: Cigarettes     Quit date: 2019     Years since quittin 3    Smokeless tobacco: Never Used   Substance and Sexual Activity    Alcohol use: No    Drug use: No    Sexual activity: Not on file   Lifestyle    Physical activity     Days per week: Not on file     Minutes per session: Not on file    Stress: Not on file   Relationships    Social connections     Talks on phone: Not on file     Gets together: Not on file     Attends Shinto service: Not on file     Active member of club or organization: Not on file     Attends meetings of clubs or organizations: Not on file     Relationship status: Not on file    Intimate partner violence     Fear of current or ex partner: Not on file     Emotionally abused: Not on file     Physically abused: Not on file     Forced sexual activity: Not on file   Other Topics Concern    Not on file   Social History Narrative    Not on file       Current Medications  Current Outpatient Medications   Medication Sig Dispense Refill    Ascorbic Acid (VITAMIN C) 100 MG tablet Take 100 mg by mouth daily      aspirin 325 mg tablet Take 0 5 tablets by mouth daily       Calcium 600 MG tablet Take 1 tablet by mouth 2 (two) times a day      Chromium 200 MCG TABS Take 1 tablet by mouth daily      fluorouracil (EFUDEX) 5 % cream Use as directed      levothyroxine 125 mcg tablet TAKE 1 TABLET BY MOUTH EVERY DAY 90 tablet 3    magnesium 30 MG tablet Take 30 mg by mouth 2 (two) times a day      melatonin 1 mg Take 10 mg by mouth daily at bedtime       Multiple Vitamin (MULTIVITAMINS PO) Take 1 tablet by mouth daily      Omega-3 Fatty Acids (FISH OIL) 1200 MG CAPS Take by mouth      OXYGEN-HELIUM IN Inhale as needed Only uses rarely      Spiriva HandiHaler 18 MCG inhalation capsule PLACE 1 CAPSULE INTO INHALER AND INHALE DAILY VIA HANDIHALER AT THE SAME TIME EVERY DAY 90 capsule 1    albuterol (VENTOLIN HFA) 90 mcg/act inhaler Inhale 2 puffs every 6 (six) hours as needed for wheezing (Patient not taking: Reported on 10/22/2020) 1 Inhaler 5     No current facility-administered medications for this visit  Allergies  Allergies   Allergen Reactions    Erythromycin Hives    Penicillins Hives    Sulfa Antibiotics Hives    Tetracyclines & Related Hives       Past Medical History, Social History, Family History, medications and allergies were reviewed  Vitals  Vitals:    05/21/21 1337   BP: 98/72   Pulse: 95   Weight: 81 3 kg (179 lb 3 2 oz)   Height: 5' 2" (1 575 m)       Physical Exam  Physical Exam    On examination she is in no acute distress    Gait normal   Affect normal      Results  No results found for: PSA  Lab Results   Component Value Date    GLUCOSE 83 11/20/2015    CALCIUM 9 9 03/17/2021     11/20/2015    K 3 9 03/17/2021    CO2 27 03/17/2021     03/17/2021    BUN 10 03/17/2021    CREATININE 0 72 03/17/2021     Lab Results   Component Value Date    WBC 7 01 03/17/2021    HGB 14 6 03/17/2021    HCT 44 6 03/17/2021     (H) 03/17/2021     03/17/2021         Office Urine Dip  No results found for this or any previous visit (from the past 1 hour(s))  ]      Total visit time was 20 minutes of which over 50% was spent on counseling

## 2021-05-21 NOTE — PROGRESS NOTES
5/21/2021    Rosa Maria Ko  1945  [id number]        Assessment   history of carcinoma in situ of the bladder, history of lung cancer      Discussion   I discussed with the patient the CT scan of the chest abdomen and pelvis shows no significant findings  As it relates to her urinary tract  She would still be considered a candidate for Keytruda for her BCG refractory carcinoma in situ  She is unsure if she wishes to continue with this medication  She is scheduled to see Medical Oncology in the future  She will return in the next 3-4 months with cystoscopy and cytology  The patient is amenable with this plan  History of Present Illness  64 y o  female with a history of  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  She has been followed with Medical Oncology  She is previously counseled for [facility] but had been hesitant  I recently performed cystoscopy and identified a few erythematous areas but no simeon recurrence of bladder cancer  We discussed an additional bladder biopsy in the operating room versus continued surveillance follow-up  She returns in follow-up today after recent CT scan of the   Chest, abdomen and pelvis was performed  The only significant finding was a subcentimeter scar identified in the lower lobe of the left lung  She states that she recently lost 35 lb  She states that she recently had glaucoma surgery  AUA Symptom Score      Review of Systems  Review of Systems   Constitutional: Negative  HENT: Negative  Eyes: Negative  Respiratory: Negative  Cardiovascular: Negative  Gastrointestinal: Negative  Endocrine: Negative  Genitourinary:        Per HPI   Musculoskeletal: Negative  Skin: Negative  Allergic/Immunologic: Negative  Neurological: Negative  Hematological: Negative  Psychiatric/Behavioral: Negative  All other systems reviewed and are negative          Past Medical History  Past Medical History:   Diagnosis Date    Anesthesia "constipation after surgery"    [facility]     Cataract     left    Colon polyp     COPD (chronic obstructive pulmonary disease) (HCC)     Dry mouth     Full dentures     GERD (gastroesophageal reflux disease)     occas    History of pneumonia     Hyperlipidemia     Hypothyroidism     [facility] )     stage 3    Pulmonary emphysema (HCC)     Skin cancer     squamous cell of the leg    Sleep difficulties     "gets up frequently to void"    UTI (urinary tract infection) 12/2019    recent e coli treated and resolved    Wears glasses        Past Social History  Past Surgical History:   Procedure Laterality Date    CHOLECYSTECTOMY      COLONOSCOPY      CYSTOSCOPY      4/10 AND 8/10, 3/12/2015 4/18/2016  DIAGNOSTIC     CYSTOSCOPY  05/01/2020    CYSTOSCOPY  04/02/2021    DENTAL SURGERY      EYE SURGERY      HYSTERECTOMY      OTHER SURGICAL HISTORY      TRANSURETHERAL RESECTION     GA CYSTOURETHROSCOPY,BIOPSY N/A 6/1/2020    Procedure: CYSTOSCOPY WITH BIOPSIES, FULGURATION;  Surgeon: Beto Corrigan MD;  [facility];  Service: Urology    GA CYSTOURETHROSCOPY,BIOPSY N/A 11/2/2020    Procedure: CYSTO W/BIOPSIES & FULGURATION;  Surgeon: Beto Corrigan MD;  [facility];  Service: Urology    GA CYSTOURETHROSCOPY,FULGUR <0 5 CM LESN N/A 12/23/2019    Procedure: BLADDER BIOPSY; BLADDER FULGERATION with mitomycin;  Surgeon: Beto Corrigan MD;  Location: [address] [place] [address] MAIN OR;  Service: Urology    WISDOM TOOTH EXTRACTION         Past Family History  Family History   Problem Relation Age of Onset    Cancer Mother         Mouth     Mental illness Sister     Colon cancer Paternal Grandmother        Past Social history  Social History     Socioeconomic History    Marital status: Single     Spouse name: Not on file    Number of children: Not on file    Years of education: Not on file    Highest education level: Not on file   Occupational History    Not on file   Social Needs  Financial resource strain: Not on file    Food insecurity     Worry: Not on file     Inability: Not on file   [employer] needs     Medical: Not on file     Non-medical: Not on file   Tobacco Use    Smoking status: Former Smoker     Packs/day: 2 00     Types: Cigarettes     Quit date: 2019     Years since quittin 3    Smokeless tobacco: Never Used   Substance and Sexual Activity    Alcohol use: No    Drug use: No    Sexual activity: Not on file   Lifestyle    Physical activity     Days per week: Not on file     Minutes per session: Not on file    Stress: Not on file   Relationships    Social connections     Talks on phone: Not on file     Gets together: Not on file     Attends Mandaen service: Not on file     Active member of club or organization: Not on file     Attends meetings of clubs or organizations: Not on file     Relationship status: Not on file    Intimate partner violence     Fear of current or ex partner: Not on file     Emotionally abused: Not on file     Physically abused: Not on file     Forced sexual activity: Not on file   Other Topics Concern    Not on file   Social History Narrative    Not on file       Current Medications  Current Outpatient Medications   Medication Sig Dispense Refill    Ascorbic Acid (VITAMIN C) 100 MG tablet Take 100 mg by mouth daily      aspirin 325 mg tablet Take 0 5 tablets by mouth daily       Calcium 600 MG tablet Take 1 tablet by mouth 2 (two) times a day      Chromium 200 MCG TABS Take 1 tablet by mouth daily      fluorouracil (EFUDEX) 5 % cream Use as directed      levothyroxine 125 mcg tablet TAKE 1 TABLET BY MOUTH EVERY DAY 90 tablet 3    magnesium 30 MG tablet Take 30 mg by mouth 2 (two) times a day      melatonin 1 mg Take 10 mg by mouth daily at bedtime       Multiple Vitamin (MULTIVITAMINS PO) Take 1 tablet by mouth daily      Omega-3 Fatty Acids (FISH OIL) 1200 MG CAPS Take by mouth      OXYGEN-HELIUM IN Inhale as needed Only uses rarely      Spiriva HandiHaler 18 MCG inhalation capsule PLACE 1 CAPSULE INTO INHALER AND INHALE DAILY VIA HANDIHALER AT THE SAME TIME EVERY DAY 90 capsule 1    albuterol (VENTOLIN HFA) 90 mcg/act inhaler Inhale 2 puffs every 6 (six) hours as needed for wheezing (Patient not taking: Reported on 10/22/2020) 1 Inhaler 5     No current facility-administered medications for this visit  Allergies  Allergies   Allergen Reactions    Erythromycin Hives    Penicillins Hives    Sulfa Antibiotics Hives    Tetracyclines & Related Hives       Past Medical History, Social History, Family History, medications and allergies were reviewed  Vitals  Vitals:    05/21/21 1337   BP: 98/72   Pulse: 95   Weight: 81 3 kg (179 lb 3 2 oz)   Height: 5' 2" (1 575 m)       Physical Exam  Physical Exam    On examination she is in no acute distress  Gait normal   Affect normal      Results  No results found for: PSA  Lab Results   Component Value Date    GLUCOSE 83 11/20/2015    CALCIUM 9 9 03/17/2021     11/20/2015    K 3 9 03/17/2021    CO2 27 03/17/2021     03/17/2021    BUN 10 03/17/2021    CREATININE 0 72 03/17/2021     Lab Results   Component Value Date    WBC 7 01 03/17/2021    HGB 14 6 03/17/2021    HCT 44 6 03/17/2021     (H) 03/17/2021     03/17/2021         Office Urine Dip  No results found for this or any previous visit (from the past 1 hour(s))  ]      Total visit time was 20 minutes of which over 50% was spent on counseling

## 2021-07-14 ENCOUNTER — TELEPHONE (OUTPATIENT)
Dept: FAMILY MEDICINE CLINIC | Facility: CLINIC | Age: 76
End: 2021-07-14

## 2021-08-31 DIAGNOSIS — E03.9 HYPOTHYROIDISM, UNSPECIFIED TYPE: ICD-10-CM

## 2021-08-31 NOTE — TELEPHONE ENCOUNTER
Left vm for the pt to call the office for an appt  [Tinnitus - Grade 0] : Tinnitus - Grade 0 [Blurred Vision: Grade 0] : Blurred Vision: Grade 0 [Mucositis Oral: Grade 0] : Mucositis Oral: Grade 0  [Xerostomia: Grade 0] : Xerostomia: Grade 0 [Oral Pain: Grade 0] : Oral Pain: Grade 0 [Salivary duct inflammation: Grade 0] : Salivary duct inflammation: Grade 0 [Dysgeusia: Grade 1- Altered taste but no change in diet] : Dysgeusia: Grade 1 - Altered taste but no change in diet [Hoarseness: Grade 0] : Hoarseness: Grade 0 [Alopecia: Grade 0] : Alopecia: Grade 0 [Pruritus: Grade 0] : Pruritus: Grade 0 [Skin Atrophy: Grade 0] : Skin Atrophy: Grade 0 [Skin Hyperpigmentation: Grade 0] : Skin Hyperpigmentation: Grade 0 [Skin Induration: Grade 0] : Skin Induration: Grade 0 [Dermatitis Radiation: Grade 0] : Dermatitis Radiation: Grade 0 [FreeTextEntry6] : feeding tube [de-identified] : feeding tube

## 2021-09-07 RX ORDER — LEVOTHYROXINE SODIUM 0.12 MG/1
TABLET ORAL
Qty: 90 TABLET | Refills: 3 | Status: SHIPPED | OUTPATIENT
Start: 2021-09-07

## 2021-09-07 NOTE — TELEPHONE ENCOUNTER
Pt is coming for a visit on Thursday   She is out of pills please send some over to pharmacy as she needs it before appointment

## 2021-09-09 ENCOUNTER — OFFICE VISIT (OUTPATIENT)
Dept: FAMILY MEDICINE CLINIC | Facility: CLINIC | Age: 76
End: 2021-09-09
Payer: COMMERCIAL

## 2021-09-09 VITALS
OXYGEN SATURATION: 96 % | HEART RATE: 102 BPM | TEMPERATURE: 97.8 F | RESPIRATION RATE: 12 BRPM | WEIGHT: 178 LBS | BODY MASS INDEX: 32.76 KG/M2 | SYSTOLIC BLOOD PRESSURE: 110 MMHG | DIASTOLIC BLOOD PRESSURE: 60 MMHG | HEIGHT: 62 IN

## 2021-09-09 DIAGNOSIS — Z00.00 MEDICARE ANNUAL WELLNESS VISIT, SUBSEQUENT: Primary | ICD-10-CM

## 2021-09-09 DIAGNOSIS — C67.9 ADENOCARCINOMA OF BLADDER (HCC): ICD-10-CM

## 2021-09-09 DIAGNOSIS — Z23 NEED FOR PROPHYLACTIC VACCINATION WITH COMBINED DIPHTHERIA-TETANUS-PERTUSSIS (DTP) VACCINE: ICD-10-CM

## 2021-09-09 DIAGNOSIS — M85.80 OSTEOPENIA, UNSPECIFIED LOCATION: ICD-10-CM

## 2021-09-09 DIAGNOSIS — Z12.31 ENCOUNTER FOR SCREENING MAMMOGRAM FOR MALIGNANT NEOPLASM OF BREAST: ICD-10-CM

## 2021-09-09 DIAGNOSIS — E66.9 OBESITY, CLASS II, BMI 35-39.9: ICD-10-CM

## 2021-09-09 DIAGNOSIS — C34.92 NON-SMALL CELL CARCINOMA OF LEFT LUNG, STAGE 3 (HCC): ICD-10-CM

## 2021-09-09 PROCEDURE — 99214 OFFICE O/P EST MOD 30 MIN: CPT | Performed by: INTERNAL MEDICINE

## 2021-09-09 PROCEDURE — G0439 PPPS, SUBSEQ VISIT: HCPCS | Performed by: INTERNAL MEDICINE

## 2021-09-09 PROCEDURE — 3725F SCREEN DEPRESSION PERFORMED: CPT | Performed by: INTERNAL MEDICINE

## 2021-09-09 PROCEDURE — 1170F FXNL STATUS ASSESSED: CPT | Performed by: INTERNAL MEDICINE

## 2021-09-09 PROCEDURE — 1125F AMNT PAIN NOTED PAIN PRSNT: CPT | Performed by: INTERNAL MEDICINE

## 2021-09-09 PROCEDURE — 1036F TOBACCO NON-USER: CPT | Performed by: INTERNAL MEDICINE

## 2021-09-09 PROCEDURE — 3288F FALL RISK ASSESSMENT DOCD: CPT | Performed by: INTERNAL MEDICINE

## 2021-09-09 PROCEDURE — 1160F RVW MEDS BY RX/DR IN RCRD: CPT | Performed by: INTERNAL MEDICINE

## 2021-09-09 NOTE — PROGRESS NOTES
Assessment and Plan:     Problem List Items Addressed This Visit        Other    Medicare annual wellness visit, subsequent - Primary      Other Visit Diagnoses     Obesity, Class II, BMI 35-39 9        Relevant Orders    TSH, 3rd generation with Free T4 reflex    Lipid panel    Vitamin D 25 hydroxy    UA (URINE) with reflex to Scope    HEMOGLOBIN A1C W/ EAG ESTIMATION    Need for prophylactic vaccination with combined diphtheria-tetanus-pertussis (DTP) vaccine        Relevant Medications    tetanus-diphtheria-acellular pertussis (ADACEL) 5-2-15 5 LF-mcg/0 5 injection    Body mass index (BMI) 45 0-49 9, adult ([facility] )        Encounter for screening mammogram for malignant neoplasm of breast        Relevant Orders    Mammo screening bilateral w 3d & cad    Osteopenia, unspecified location        Relevant Orders    DXA bone density spine hip and pelvis           Preventive health issues were discussed with patient, and age appropriate screening tests were ordered as noted in patient's After Visit Summary  Personalized health advice and appropriate referrals for health education or preventive services given if needed, as noted in patient's After Visit Summary       History of Present Illness:     Patient presents for Medicare Annual Wellness visit    Patient Care Team:  Madhuri Quinn MD as PCP - MD Karlo Rivas MD Selinda Rosier, MD Juan Chestnut, MD Earlie Hugger, MD     Problem List:     Patient Active Problem List   Diagnosis    Adenocarcinoma of bladder [facility])    Chronic obstructive pulmonary disease ([place] [address] )    Elevated C-reactive protein    Hypercholesterolemia    Hypothyroidism    Non-small cell carcinoma of left lung, stage 3 ([place] [address] )    Obesity    Squamous cell carcinoma of leg    Medicare annual wellness visit, subsequent    CIS (carcinoma in situ of bladder)      Past Medical and Surgical History:     Past Medical History:   Diagnosis Date    Anesthesia "constipation after surgery"    [facility]     Cataract     left    Colon polyp     COPD (chronic obstructive pulmonary disease) (HCC)     Dry mouth     Full dentures     GERD (gastroesophageal reflux disease)     occas    History of pneumonia     Hyperlipidemia     Hypothyroidism     Lung cancer ([facility] )     stage 3    Pulmonary emphysema (HCC)     Skin cancer     squamous cell of the leg    Sleep difficulties     "gets up frequently to void"    UTI (urinary tract infection) 12/2019    recent e coli treated and resolved    Wears glasses      Past Surgical History:   Procedure Laterality Date    CHOLECYSTECTOMY      COLONOSCOPY      CYSTOSCOPY      4/10 AND 8/10, 3/12/2015 4/18/2016  DIAGNOSTIC     CYSTOSCOPY  05/01/2020    CYSTOSCOPY  04/02/2021    DENTAL SURGERY      EYE SURGERY      HYSTERECTOMY      OTHER SURGICAL HISTORY      TRANSURETHERAL RESECTION     CA CYSTOURETHROSCOPY,BIOPSY N/A 6/1/2020    Procedure: CYSTOSCOPY WITH BIOPSIES, FULGURATION;  Surgeon: Dani Mendes MD;  Location: [facility];  Service: Urology    CA CYSTOURETHROSCOPY,BIOPSY N/A 11/2/2020    Procedure: CYSTO W/BIOPSIES & FULGURATION;  Surgeon: Dani Mendes MD;  Location: [facility];  Service: Urology    CA CYSTOURETHROSCOPY,FULGUR <0 5 CM LESN N/A 12/23/2019    Procedure: BLADDER BIOPSY; Brijesh Martin with mitomycin;  Surgeon: Dani Mendes MD;  Location: [address] [place] [address] MAIN OR;  Service: Urology    WISDOM TOOTH EXTRACTION        Family History:     Family History   Problem Relation Age of Onset    Cancer Mother         Mouth     Mental illness Sister     Colon cancer Paternal Grandmother       Social History:     Social History     Socioeconomic History    Marital status: Single     Spouse name: None    Number of children: None    Years of education: None    Highest education level: None   Occupational History    None   Tobacco Use    Smoking status: Former Smoker     Packs/day: 2 00     Types: Cigarettes     Quit date: 2019     Years since quittin 6    Smokeless tobacco: Never Used   Vaping Use    Vaping Use: Former   Substance and Sexual Activity    Alcohol use: No    Drug use: No    Sexual activity: None   Other Topics Concern    None   Social History Narrative    None     Social Determinants of Health     Financial Resource Strain:     Difficulty of Paying Living Expenses:    Food Insecurity:     Worried About Running Out of Food in the Last Year:     Ran Out of Food in the Last Year:    Transportation Needs:     Lack of Transportation (Medical):      Lack of Transportation (Non-Medical):    Physical Activity:     Days of Exercise per Week:     Minutes of Exercise per Session:    Stress:     Feeling of Stress :    Social Connections:     Frequency of Communication with Friends and Family:     Frequency of Social Gatherings with Friends and Family:     Attends Jewish Services:     Active Member of Clubs or Organizations:     Attends Club or Organization Meetings:     Marital Status:    Intimate Partner Violence:     Fear of Current or Ex-Partner:     Emotionally Abused:     Physically Abused:     Sexually Abused:       Medications and Allergies:     Current Outpatient Medications   Medication Sig Dispense Refill    Ascorbic Acid (VITAMIN C) 100 MG tablet Take 100 mg by mouth daily      aspirin 325 mg tablet Take 0 5 tablets by mouth daily       Calcium 600 MG tablet Take 1 tablet by mouth 2 (two) times a day      Chromium 200 MCG TABS Take 1 tablet by mouth daily      fluorouracil (EFUDEX) 5 % cream Use as directed      levothyroxine 125 mcg tablet TAKE 1 TABLET BY MOUTH EVERY DAY 90 tablet 3    magnesium 30 MG tablet Take 30 mg by mouth 2 (two) times a day      melatonin 1 mg Take 10 mg by mouth daily at bedtime       Multiple Vitamin (MULTIVITAMINS PO) Take 1 tablet by mouth daily      Omega-3 Fatty Acids (FISH OIL) 1200 MG CAPS Take by mouth      OXYGEN-HELIUM IN Inhale as needed Only uses rarely      Spiriva HandiHaler 18 MCG inhalation capsule PLACE 1 CAPSULE INTO INHALER AND INHALE DAILY VIA HANDIHALER AT THE SAME TIME EVERY DAY 90 capsule 1    tetanus-diphtheria-acellular pertussis (ADACEL) 5-2-15 5 LF-mcg/0 5 injection Inject 0 5 mL into a muscle once for 1 dose 0 5 mL 0     No current facility-administered medications for this visit  Allergies   Allergen Reactions    Erythromycin Hives    Penicillins Hives    Sulfa Antibiotics Hives    Tetracyclines & Related Hives      Immunizations:     Immunization History   Administered Date(s) Administered    Influenza Split High Dose Preservative Free IM 11/30/2012, 04/01/2016, 10/25/2017    Influenza, high dose seasonal 0 7 mL 10/01/2018, 10/21/2019, 10/14/2020    Influenza, seasonal, injectable 01/11/2010, 09/20/2010, 10/27/2011    Pneumococcal Conjugate 13-Valent 04/01/2016    Pneumococcal Polysaccharide PPV23 08/18/2005, 08/19/2005, 12/14/2012    SARS-CoV-2 / COVID-19 mRNA IM (Moderna) 03/24/2021, 04/21/2021    Zoster 01/13/2014      Health Maintenance:         Topic Date Due    Hepatitis C Screening  Never done    Breast Cancer Screening: Mammogram  02/23/2019    Colorectal Cancer Screening  03/25/2019         Topic Date Due    DTaP,Tdap,and Td Vaccines (1 - Tdap) Never done    Influenza Vaccine (1) 09/01/2021      Medicare Health Risk Assessment:     /60 (BP Location: Left arm, Patient Position: Sitting, Cuff Size: Standard)   Pulse 102   Temp 97 8 °F (36 6 °C)   Resp 12   Ht 5' 2" (1 575 m)   Wt 80 7 kg (178 lb)   SpO2 96%   BMI 32 56 kg/m²      Tutu Ramirez is here for her Subsequent Wellness visit  Health Risk Assessment:   Patient rates overall health as fair  Patient feels that their physical health rating is same  Patient is satisfied with their life  Eyesight was rated as same  Hearing was rated as same   Patient feels that their emotional and mental health rating is same  Patients states they are never, rarely angry  Patient states they are often unusually tired/fatigued  Pain experienced in the last 7 days has been none  Patient states that she has experienced weight loss or gain in last 6 months  Depression Screening:   PHQ-2 Score: 3  PHQ-9 Score: 6      Fall Risk Screening: In the past year, patient has experienced: no history of falling in past year      Urinary Incontinence Screening:   Patient has leaked urine accidently in the last six months  Home Safety:  Patient has trouble with stairs inside or outside of their home  Patient has working smoke alarms and has working carbon monoxide detector  Home safety hazards include: none  Nutrition:   Current diet is Low Carb and Regular  Medications:   Patient is currently taking over-the-counter supplements  OTC medications include: see medication list  Patient is able to manage medications  Activities of Daily Living (ADLs)/Instrumental Activities of Daily Living (IADLs):   Walk and transfer into and out of bed and chair?: Yes  Dress and groom yourself?: Yes    Bathe or shower yourself?: Yes    Feed yourself?  Yes  Do your laundry/housekeeping?: Yes  Manage your money, pay your bills and track your expenses?: Yes  Make your own meals?: Yes    Do your own shopping?: Yes    Previous Hospitalizations:   Any hospitalizations or ED visits within the last 12 months?: No      Advance Care Planning:   Living will: No    Durable POA for healthcare: No    Advanced directive: No      PREVENTIVE SCREENINGS      Cardiovascular Screening:    General: Screening Not Indicated and History Lipid Disorder      Diabetes Screening:     General: Screening Current      Cervical Cancer Screening:    General: Screening Not Indicated      Lung Cancer Screening:     General: Screening Not Indicated and History Lung Cancer    Screening, Brief Intervention, and Referral to Treatment (SBIRT)    Screening  Typical number of drinks in a day: 0  Typical number of drinks in a week: 0  Interpretation: Low risk drinking behavior      Single Item Drug Screening:  How often have you used an illegal drug (including marijuana) or a prescription medication for non-medical reasons in the past year? never    Single Item Drug Screen Score: 0  Interpretation: Negative screen for possible drug use disorder      Nichol Quijano MD

## 2021-09-09 NOTE — PATIENT INSTRUCTIONS
Medicare Preventive Visit Patient Instructions  Thank you for completing your Welcome to Medicare Visit or Medicare Annual Wellness Visit today  Your next wellness visit will be due in one year (9/10/2022)  The screening/preventive services that you may require over the next 5-10 years are detailed below  Some tests may not apply to you based off risk factors and/or age  Screening tests ordered at today's visit but not completed yet may show as past due  Also, please note that scanned in results may not display below  Preventive Screenings:  Service Recommendations Previous Testing/Comments   Colorectal Cancer Screening  * Colonoscopy    * Fecal Occult Blood Test (FOBT)/Fecal Immunochemical Test (FIT)  * Fecal DNA/Cologuard Test  * Flexible Sigmoidoscopy Age: 54-65 years old   Colonoscopy: every 10 years (may be performed more frequently if at higher risk)  OR  FOBT/FIT: every 1 year  OR  Cologuard: every 3 years  OR  Sigmoidoscopy: every 5 years  Screening may be recommended earlier than age 48 if at higher risk for colorectal cancer  Also, an individualized decision between you and your healthcare provider will decide whether screening between the ages of 74-80 would be appropriate  Colonoscopy: 03/25/2014  FOBT/FIT: Not on file  Cologuard: Not on file  Sigmoidoscopy: Not on file          Breast Cancer Screening Age: 36 years old  Frequency: every 1-2 years  Not required if history of left and right mastectomy Mammogram: 02/23/2018        Cervical Cancer Screening Between the ages of 21-29, pap smear recommended once every 3 years  Between the ages of 33-67, can perform pap smear with HPV co-testing every 5 years     Recommendations may differ for women with a history of total hysterectomy, cervical cancer, or abnormal pap smears in past  Pap Smear: Not on file    Screening Not Indicated   Hepatitis C Screening Once for adults born between [facility]  More frequently in patients at high risk for Hepatitis C Hep C Antibody: Not on file        Diabetes Screening 1-2 times per year if you're at risk for diabetes or have pre-diabetes Fasting glucose: 103 mg/dL   A1C: No results in last 5 years    Screening Current   Cholesterol Screening Once every 5 years if you don't have a lipid disorder  May order more often based on risk factors  Lipid panel: 10/20/2020    Screening Not Indicated  History Lipid Disorder     Other Preventive Screenings Covered by Medicare:  1  Abdominal Aortic Aneurysm (AAA) Screening: covered once if your at risk  You're considered to be at risk if you have a family history of AAA  2  Lung Cancer Screening: covers low dose CT scan once per year if you meet all of the following conditions: (1) Age 50-69; (2) No signs or symptoms of lung cancer; (3) Current smoker or have quit smoking within the last 15 years; (4) You have a tobacco smoking history of at least 30 pack years (packs per day multiplied by number of years you smoked); (5) You get a written order from a healthcare provider  3  Glaucoma Screening: covered annually if you're considered high risk: (1) You have diabetes OR (2) Family history of glaucoma OR (3)  aged 48 and older OR (3)  American aged 72 and older  3  Osteoporosis Screening: covered every 2 years if you meet one of the following conditions: (1) You're estrogen deficient and at risk for osteoporosis based off medical history and other findings; (2) Have a vertebral abnormality; (3) On glucocorticoid therapy for more than 3 months; (4) Have primary hyperparathyroidism; (5) On osteoporosis medications and need to assess response to drug therapy  · Last bone density test (DXA Scan): 02/23/2018  5  HIV Screening: covered annually if you're between the age of 12-76  Also covered annually if you are younger than 13 and older than 72 with risk factors for HIV infection   For pregnant patients, it is covered up to 3 times per pregnancy  Immunizations:  Immunization Recommendations   Influenza Vaccine Annual influenza vaccination during flu season is recommended for all persons aged >= 6 months who do not have contraindications   Pneumococcal Vaccine (Prevnar and Pneumovax)  * Prevnar = PCV13  * Pneumovax = PPSV23   Adults 25-60 years old: 1-3 doses may be recommended based on certain risk factors  Adults 72 years old: Prevnar (PCV13) vaccine recommended followed by Pneumovax (PPSV23) vaccine  If already received PPSV23 since turning 65, then PCV13 recommended at least one year after PPSV23 dose  Hepatitis B Vaccine 3 dose series if at intermediate or high risk (ex: diabetes, end stage renal disease, liver disease)   Tetanus (Td) Vaccine - COST NOT COVERED BY MEDICARE PART B Following completion of primary series, a booster dose should be given every 10 years to maintain immunity against tetanus  Td may also be given as tetanus wound prophylaxis  Tdap Vaccine - COST NOT COVERED BY MEDICARE PART B Recommended at least once for all adults  For pregnant patients, recommended with each pregnancy  Shingles Vaccine (Shingrix) - COST NOT COVERED BY MEDICARE PART B  2 shot series recommended in those aged 48 and above     Health Maintenance Due:      Topic Date Due    Hepatitis C Screening  Never done    Breast Cancer Screening: Mammogram  02/23/2019    Colorectal Cancer Screening  03/25/2019     Immunizations Due:      Topic Date Due    DTaP,Tdap,and Td Vaccines (1 - Tdap) Never done    Influenza Vaccine (1) 09/01/2021     Advance Directives   What are advance directives? Advance directives are legal documents that state your wishes and plans for medical care  These plans are made ahead of time in case you lose your ability to make decisions for yourself  Advance directives can apply to any medical decision, such as the treatments you want, and if you want to donate organs  What are the types of advance directives?   There are many types of advance directives, and each state has rules about how to use them  You may choose a combination of any of the following:  · Living will: This is a written record of the treatment you want  You can also choose which treatments you do not want, which to limit, and which to stop at a certain time  This includes surgery, medicine, IV fluid, and tube feedings  · Durable power of  for healthcare [facility]): This is a written record that states who you want to make healthcare choices for you when you are unable to make them for yourself  This person, called a proxy, is usually a family member or a friend  You may choose more than 1 proxy  · Do not resuscitate (DNR) order:  A DNR order is used in case your heart stops beating or you stop breathing  It is a request not to have certain forms of treatment, such as CPR  A DNR order may be included in other types of advance directives  · Medical directive: This covers the care that you want if you are in a coma, near death, or unable to make decisions for yourself  You can list the treatments you want for each condition  Treatment may include pain medicine, surgery, blood transfusions, dialysis, IV or tube feedings, and a ventilator (breathing machine)  · Values history: This document has questions about your views, beliefs, and how you feel and think about life  This information can help others choose the care that you would choose  Why are advance directives important? An advance directive helps you control your care  Although spoken wishes may be used, it is better to have your wishes written down  Spoken wishes can be misunderstood, or not followed  Treatments may be given even if you do not want them  An advance directive may make it easier for your family to make difficult choices about your care  Depression   Depression  is a medical condition that causes feelings of sadness or hopelessness that do not go away   Depression may cause you to lose interest in things you used to enjoy  These feelings may interfere with your daily life  Call your local emergency number (911 in the [address]) if:   · You think about harming yourself or someone else  · You have done something on purpose to hurt yourself  The following resources are available at any time to help you, if needed:   · [address]: 7-387.983.6480 (0-393-582-GDEJ)   · Suicide Hotline: 7-647.294.4524 (4-806-CNSXAIL)   · For a list of international numbers: https://save org/find-help/international-resources/  Treatment for depression may include medicine to relieve depression  Medicine is often used together with therapy  Therapy is a way for you to talk about your feelings and anything that may be causing depression  Therapy can be done alone or in a group  It may also be done with family members or a significant other  · Get regular physical activity  · Create a regular sleep schedule  · Eat a variety of healthy foods  · Do not drink alcohol or use drugs  Urinary Incontinence   Urinary incontinence (UI)  is when you lose control of your bladder  UI develops because your bladder cannot store or empty urine properly  The 3 most common types of UI are stress incontinence, urge incontinence, or both  Medicines:   · May be given to help strengthen your bladder control  Report any side effects of medication to your healthcare provider  Do pelvic muscle exercises often:  Your pelvic muscles help you stop urinating  Squeeze these muscles tight for 5 seconds, then relax for 5 seconds  Gradually work up to squeezing for 10 seconds  Do 3 sets of 15 repetitions a day, or as directed  This will help strengthen your pelvic muscles and improve bladder control  Train your bladder:  Go to the bathroom at set times, such as every 2 hours, even if you do not feel the urge to go  You can also try to hold your urine when you feel the urge to go   For example, hold your urine for 5 minutes when you feel the urge to go  As that becomes easier, hold your urine for 10 minutes  Self-care:   · Keep a UI record  Write down how often you leak urine and how much you leak  Make a note of what you were doing when you leaked urine  · Drink liquids as directed  You may need to limit the amount of liquid you drink to help control your urine leakage  Do not drink any liquid right before you go to bed  Limit or do not have drinks that contain caffeine or alcohol  · Prevent constipation  Eat a variety of high-fiber foods  Good examples are high-fiber cereals, beans, vegetables, and whole-grain breads  Walking is the best way to trigger your intestines to have a bowel movement  · Exercise regularly and maintain a healthy weight  Weight loss and exercise will decrease pressure on your bladder and help you control your leakage  · Use a catheter as directed  to help empty your bladder  A catheter is a tiny, plastic tube that is put into your bladder to drain your urine  · Go to behavior therapy as directed  Behavior therapy may be used to help you learn to control your urge to urinate  Weight Management   Why it is important to manage your weight:  Being overweight increases your risk of health conditions such as heart disease, high blood pressure, type 2 diabetes, and certain types of cancer  It can also increase your risk for osteoarthritis, sleep apnea, and other respiratory problems  Aim for a slow, steady weight loss  Even a small amount of weight loss can lower your risk of health problems  How to lose weight safely:  A safe and healthy way to lose weight is to eat fewer calories and get regular exercise  You can lose up about 1 pound a week by decreasing the number of calories you eat by 500 calories each day  Healthy meal plan for weight management:  A healthy meal plan includes a variety of foods, contains fewer calories, and helps you stay healthy   A healthy meal plan includes the following:  · Eat whole-grain foods more often  A healthy meal plan should contain fiber  Fiber is the part of grains, fruits, and vegetables that is not broken down by your body  Whole-grain foods are healthy and provide extra fiber in your diet  Some examples of whole-grain foods are whole-wheat breads and pastas, oatmeal, brown rice, and bulgur  · Eat a variety of vegetables every day  Include dark, leafy greens such as spinach, kale, julio cesar greens, and mustard greens  Eat yellow and orange vegetables such as carrots, sweet potatoes, and winter squash  · Eat a variety of fruits every day  Choose fresh or canned fruit (canned in its own juice or light syrup) instead of juice  Fruit juice has very little or no fiber  · Eat low-fat dairy foods  Drink fat-free (skim) milk or 1% milk  Eat fat-free yogurt and low-fat cottage cheese  Try low-fat cheeses such as mozzarella and other reduced-fat cheeses  · Choose meat and other protein foods that are low in fat  Choose beans or other legumes such as split peas or lentils  Choose fish, skinless poultry (chicken or turkey), or lean cuts of red meat (beef or pork)  Before you cook meat or poultry, cut off any visible fat  · Use less fat and oil  Try baking foods instead of frying them  Add less fat, such as margarine, sour cream, regular salad dressing and mayonnaise to foods  Eat fewer high-fat foods  Some examples of high-fat foods include french fries, doughnuts, ice cream, and cakes  · Eat fewer sweets  Limit foods and drinks that are high in sugar  This includes candy, cookies, regular soda, and sweetened drinks  Exercise:  Exercise at least 30 minutes per day on most days of the week  Some examples of exercise include walking, biking, dancing, and swimming  You can also fit in more physical activity by taking the stairs instead of the elevator or parking farther away from stores  Ask your healthcare provider about the best exercise plan for you        © Copyright [employer] 2018 Information is for [employer] use only and may not be sold, redistributed or otherwise used for commercial purposes   All illustrations and images included in CareNotes® are the copyrighted property of [employer]  or [facility]

## 2021-09-30 ENCOUNTER — HOSPITAL ENCOUNTER (OUTPATIENT)
Dept: BONE DENSITY | Facility: MEDICAL CENTER | Age: 76
Discharge: HOME | End: 2021-09-30
Payer: COMMERCIAL

## 2021-09-30 ENCOUNTER — HOSPITAL ENCOUNTER (OUTPATIENT)
Dept: MAMMOGRAPHY | Facility: MEDICAL CENTER | Age: 76
Discharge: HOME | End: 2021-09-30
Payer: COMMERCIAL

## 2021-09-30 VITALS — BODY MASS INDEX: 32.76 KG/M2 | WEIGHT: 178 LBS | HEIGHT: 62 IN

## 2021-09-30 DIAGNOSIS — M85.80 OSTEOPENIA, UNSPECIFIED LOCATION: ICD-10-CM

## 2021-09-30 DIAGNOSIS — Z12.31 ENCOUNTER FOR SCREENING MAMMOGRAM FOR MALIGNANT NEOPLASM OF BREAST: ICD-10-CM

## 2021-09-30 PROCEDURE — 77080 DXA BONE DENSITY AXIAL: CPT

## 2021-09-30 PROCEDURE — 77063 BREAST TOMOSYNTHESIS BI: CPT

## 2021-09-30 PROCEDURE — 77067 SCR MAMMO BI INCL CAD: CPT

## 2021-10-29 ENCOUNTER — PROCEDURE VISIT (OUTPATIENT)
Dept: UROLOGY | Facility: CLINIC | Age: 76
End: 2021-10-29
Payer: COMMERCIAL

## 2021-10-29 ENCOUNTER — IMMUNIZATIONS (OUTPATIENT)
Dept: FAMILY MEDICINE CLINIC | Facility: CLINIC | Age: 76
End: 2021-10-29
Payer: COMMERCIAL

## 2021-10-29 VITALS
HEIGHT: 62 IN | SYSTOLIC BLOOD PRESSURE: 120 MMHG | BODY MASS INDEX: 32.76 KG/M2 | DIASTOLIC BLOOD PRESSURE: 70 MMHG | WEIGHT: 178 LBS | HEART RATE: 83 BPM

## 2021-10-29 DIAGNOSIS — D09.0 CIS (CARCINOMA IN SITU OF BLADDER): Primary | ICD-10-CM

## 2021-10-29 DIAGNOSIS — Z23 NEED FOR INFLUENZA VACCINATION: Primary | ICD-10-CM

## 2021-10-29 LAB
SL AMB  POCT GLUCOSE, UA: NORMAL
SL AMB LEUKOCYTE ESTERASE,UA: NORMAL
SL AMB POCT BILIRUBIN,UA: NORMAL
SL AMB POCT BLOOD,UA: NORMAL
SL AMB POCT CLARITY,UA: CLEAR
SL AMB POCT COLOR,UA: YELLOW
SL AMB POCT KETONES,UA: NORMAL
SL AMB POCT NITRITE,UA: NORMAL
SL AMB POCT PH,UA: 6
SL AMB POCT SPECIFIC GRAVITY,UA: 1.02
SL AMB POCT URINE PROTEIN: NORMAL
SL AMB POCT UROBILINOGEN: 0.2

## 2021-10-29 PROCEDURE — 90662 IIV NO PRSV INCREASED AG IM: CPT

## 2021-10-29 PROCEDURE — G0008 ADMIN INFLUENZA VIRUS VAC: HCPCS

## 2021-10-29 PROCEDURE — 88112 CYTOPATH CELL ENHANCE TECH: CPT | Performed by: PATHOLOGY

## 2021-10-29 PROCEDURE — 52000 CYSTOURETHROSCOPY: CPT | Performed by: UROLOGY

## 2021-10-29 PROCEDURE — 81002 URINALYSIS NONAUTO W/O SCOPE: CPT | Performed by: UROLOGY

## 2021-12-17 ENCOUNTER — OFFICE VISIT (OUTPATIENT)
Dept: URGENT CARE | Age: 76
End: 2021-12-17
Payer: COMMERCIAL

## 2021-12-17 VITALS
RESPIRATION RATE: 18 BRPM | OXYGEN SATURATION: 96 % | TEMPERATURE: 97.4 F | HEART RATE: 97 BPM | DIASTOLIC BLOOD PRESSURE: 80 MMHG | BODY MASS INDEX: 32.56 KG/M2 | WEIGHT: 178 LBS | SYSTOLIC BLOOD PRESSURE: 131 MMHG

## 2021-12-17 DIAGNOSIS — H61.22 IMPACTED CERUMEN OF LEFT EAR: ICD-10-CM

## 2021-12-17 DIAGNOSIS — H69.91 DISORDER OF RIGHT EUSTACHIAN TUBE: Primary | ICD-10-CM

## 2021-12-17 PROCEDURE — 99213 OFFICE O/P EST LOW 20 MIN: CPT | Performed by: PHYSICIAN ASSISTANT

## 2021-12-17 RX ORDER — FLUTICASONE PROPIONATE 50 MCG
2 SPRAY, SUSPENSION (ML) NASAL DAILY
Qty: 1 G | Refills: 0 | Status: SHIPPED | OUTPATIENT
Start: 2021-12-17

## 2022-06-22 DIAGNOSIS — J43.9 PULMONARY EMPHYSEMA, UNSPECIFIED EMPHYSEMA TYPE (HCC): ICD-10-CM

## 2022-06-23 RX ORDER — TIOTROPIUM BROMIDE 18 UG/1
CAPSULE ORAL; RESPIRATORY (INHALATION)
Qty: 90 CAPSULE | Refills: 1 | Status: SHIPPED | OUTPATIENT
Start: 2022-06-23

## 2022-08-04 NOTE — PROGRESS NOTES
GI Consult Note                  Reason for Consultation: Abdominal pain and vomiting  Date of Consultation: 2022  Patient: Rebecca Stanton  MRN: [id number]  :  1957  Attending: Apurva Osorio MD            History of Present Illness:            65-year-old lady admitted to the hospital yesterday with complaints of nausea vomiting and right lower  quadrant abdominal pain, she carries the diagnosis of hypertensive cardiovascular disease, atherosclerosis including coronary artery disease and a narrowing of the celiac artery.  She has history of diverticular disease and anemia    She describes sudden onset right-sided abdominal pain and lower chest discomfort together with feeling weak.  Several bouts of bilious emesis were reported.    Today she has had no further emesis    She was given solid food today with good tolerance but with some nausea    She was at [facility] in April of last year and had an MI.  Describes intense pain in the right groin area at the time of onset of this illness.  She describes a protruding hernia in the right groin area off and on for quite some time.    She apparently spoke with a surgeon at [facility] in the past in regards to the hernia.  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    Home Meds:  Medications Prior to Admission   Medication Sig Dispense Refill   • DULoxetine (CYMBALTA) 60 MG capsule Take 60 mg by mouth daily.     • enalapril (VASOTEC) 20 MG tablet Take 20 mg by mouth in the morning and 20 mg in the  Assessment/Plan:    No problem-specific Assessment & Plan notes found for this encounter  Diagnoses and all orders for this visit:    Medicare annual wellness visit, subsequent    Obesity, Class II, BMI 35-39 9  -     TSH, 3rd generation with Free T4 reflex; Future  -     Lipid panel; Future  -     Vitamin D 25 hydroxy; Future  -     UA (URINE) with reflex to Scope  -     HEMOGLOBIN A1C W/ EAG ESTIMATION; Future    Need for prophylactic vaccination with combined diphtheria-tetanus-pertussis (DTP) vaccine  -     tetanus-diphtheria-acellular pertussis (ADACEL) 5-2-15 5 LF-mcg/0 5 injection; Inject 0 5 mL into a muscle once for 1 dose    Body mass index (BMI) 45 0-49 9, [facility]    Encounter for screening mammogram for malignant neoplasm of breast  -     Mammo screening bilateral w 3d & cad; Future    Osteopenia, unspecified location  -     DXA bone density spine hip and pelvis; Future    Non-small cell carcinoma of left lung, stage 3 (HCC)    Adenocarcinoma of bladder (HCC)          Subjective:      Patient ID: Ion Cazares is a 64 y o  female  Patient came today for annual Medicare wellness visit  She does not report any active complaints except of frequency of urination  She follows up with Urology because of the history of bladder cancer  She has COPD, she said that she is doing okay being on Spiriva, she said that she has some shortness of breath with physical exertion but that remains stable over the years  She also has history of lung cancer and she is currently in remission, she follows up with Oncology  Her vital signs are okay today  She is up-to-date on her vaccinations except of the tetanus shot, the script was given to her so she will do it in the pharmacy  She said that she lost a lot of weight recently but it was all intentional after she started to follow low-carbohydrate diet    She did have mammogram done for long time, we discussed with her that she may do it now and then if it evening.     • amLODIPine (NORVASC) 10 MG tablet Take 10 mg by mouth daily.     • Ferrous Sulfate (Iron) 325 (65 Fe) MG Tab Take 1 tablet by mouth daily.     • Multiple Vitamins-Minerals (ONE-A-DAY WITHIN) Tab Take 1 tablet by mouth daily.     • polyethylene glycol (MIRALAX) 17 g packet Take 17 g by mouth daily as needed. Stir and dissolve powder in any 4 to 8 ounces of beverage, then drink.         Inpatient Meds:  Current Facility-Administered Medications   Medication Dose Route Frequency Provider Last Rate Last Admin   • pantoprazole (PROTONIX) EC tablet 40 mg  40 mg Oral Nightly Apurva Osorio MD       • amLODIPine (NORVASC) tablet 10 mg  10 mg Oral Daily Apurva Osorio MD   10 mg at 08/04/22 0843   • DULoxetine (CYMBALTA) capsule 60 mg  60 mg Oral Daily Apurva Osorio MD   60 mg at 08/04/22 0844   • enalapril (VASOTEC) tablet 20 mg  20 mg Oral BID Apurva Osorio MD   20 mg at 08/04/22 0844   • ferrous sulfate (65 mg Fe per 325 mg) tablet 325 mg  325 mg Oral Daily Apurva Osorio MD   325 mg at 08/04/22 0844   • childrens multivitamin chewable tablet 1 tablet  1 tablet Oral Daily Apurva Osorio MD   1 tablet at 08/04/22 0843   • polyethylene glycol (MIRALAX) packet 17 g  17 g Oral Daily PRN Apurva Osorio MD       • ondansetron (ZOFRAN) injection 4 mg  4 mg Intravenous Q6H PRN Apurva Osorio MD       • acetaminophen (TYLENOL) tablet 325 mg  325 mg Oral Q4H PRN Apurva Osorio MD       • heparin (porcine) injection 5,000 Units  5,000 Units Subcutaneous 3 times per day Apurva Osorio MD   5,000 Units at 08/04/22 1338   • regadenoson (LEXISCAN) injection 0.4 mg  0.4 mg Intravenous Once Apurva Osorio MD       • ondansetron (ZOFRAN ODT) disintegrating tablet 4 mg  4 mg Oral Once Tyler Castro DO           Immunizations & Other History:    There is no immunization history on file for this patient.    Social History     Socioeconomic History   • Marital status:      Spouse name: Not on file   • Number of children: Not on file   • Years of education: Not  will be normal probably we can stop the screening  She also due for her DEXA scan  The following portions of the patient's history were reviewed and updated as appropriate: allergies, current medications, past family history, past medical history, past social history, past surgical history, and problem list     Review of Systems   Constitutional: Negative for chills, fever and unexpected weight change  Respiratory: Positive for shortness of breath (Baseline)  Negative for cough and chest tightness  Cardiovascular: Negative for chest pain  Gastrointestinal: Negative for abdominal pain, diarrhea, nausea and vomiting  Musculoskeletal: Positive for arthralgias  Negative for myalgias  Neurological: Negative for dizziness, numbness and headaches  Psychiatric/Behavioral: Negative for sleep disturbance  Objective:      /60 (BP Location: Left arm, Patient Position: Sitting, Cuff Size: Standard)   Pulse 102   Temp 97 8 °F (36 6 °C)   Resp 12   Ht 5' 2" (1 575 m)   Wt 80 7 kg (178 lb)   SpO2 96%   BMI 32 56 kg/m²          Physical Exam  Vitals and nursing note reviewed  Constitutional:       General: She is not in acute distress  Appearance: Normal appearance  She is not ill-appearing or toxic-appearing  HENT:      Right Ear: Tympanic membrane normal  There is no impacted cerumen  Left Ear: Tympanic membrane normal  There is no impacted cerumen  Nose: Nose normal  No congestion  Mouth/Throat:      Mouth: Mucous membranes are moist       Pharynx: No oropharyngeal exudate or posterior oropharyngeal erythema  Eyes:      General: No scleral icterus  Right eye: No discharge  Left eye: No discharge  Extraocular Movements: Extraocular movements intact  Conjunctiva/sclera: Conjunctivae normal       Pupils: Pupils are equal, round, and reactive to light  Neck:      Vascular: No carotid bruit     Cardiovascular:      Rate and Rhythm: Normal rate and regular rhythm  Pulses: Normal pulses  Heart sounds: Normal heart sounds  No murmur heard  Pulmonary:      Effort: Pulmonary effort is normal  No respiratory distress  Breath sounds: Normal breath sounds  No stridor  No wheezing, rhonchi or rales  Comments: Lungs completely clear  No use of accessory respiratory muscles    Abdominal:      General: Abdomen is flat  There is no distension  Palpations: Abdomen is soft  Tenderness: There is no abdominal tenderness  Musculoskeletal:         General: No tenderness  Cervical back: No rigidity  Comments: Mild ankle edema  No calf tenderness     Lymphadenopathy:      Cervical: No cervical adenopathy  Skin:     General: Skin is warm  Coloration: Skin is not jaundiced  Neurological:      General: No focal deficit present  Mental Status: She is alert and oriented to person, place, and time  Psychiatric:         Mood and Affect: Mood normal          Behavior: Behavior normal          Thought Content:  Thought content normal          Judgment: Judgment normal                Current Outpatient Medications:     Ascorbic Acid (VITAMIN C) 100 MG tablet, Take 100 mg by mouth daily, Disp: , Rfl:     aspirin 325 mg tablet, Take 0 5 tablets by mouth daily , Disp: , Rfl:     Calcium 600 MG tablet, Take 1 tablet by mouth 2 (two) times a day, Disp: , Rfl:     Chromium 200 MCG TABS, Take 1 tablet by mouth daily, Disp: , Rfl:     fluorouracil (EFUDEX) 5 % cream, Use as directed, Disp: , Rfl:     levothyroxine 125 mcg tablet, TAKE 1 TABLET BY MOUTH EVERY DAY, Disp: 90 tablet, Rfl: 3    magnesium 30 MG tablet, Take 30 mg by mouth 2 (two) times a day, Disp: , Rfl:     melatonin 1 mg, Take 10 mg by mouth daily at bedtime , Disp: , Rfl:     Multiple Vitamin (MULTIVITAMINS PO), Take 1 tablet by mouth daily, Disp: , Rfl:     Omega-3 Fatty Acids (FISH OIL) 1200 MG CAPS, Take by mouth, Disp: , Rfl:     OXYGEN-HELIUM IN, Inhale on file   • Highest education level: Not on file   Occupational History   • Not on file   Tobacco Use   • Smoking status: Never Smoker   • Smokeless tobacco: Never Used   Substance and Sexual Activity   • Alcohol use: Not Currently   • Drug use: Not Currently   • Sexual activity: Not on file   Other Topics Concern   • Not on file   Social History Narrative   • Not on file     Social Determinants of Health     Financial Resource Strain: Not on file   Food Insecurity: Not on file   Transportation Needs: Not on file   Physical Activity: Not on file   Stress: Not on file   Social Connections: Not on file   Intimate Partner Violence: Not At Risk   • Social Determinants: Intimate Partner Violence Past Fear: No   • Social Determinants: Intimate Partner Violence Current Fear: No       History reviewed. No pertinent family history.    Review of Systems  Review of Systems   See history of present illness.  Other systems reviewed in the chart were noted     Objective:            Vitals Ranges and Last Value:  Temp:  [97.9 °F (36.6 °C)-98.8 °F (37.1 °C)] 98.8 °F (37.1 °C)  Heart Rate:  [60-72] 66  Resp:  [16-18] 16  BP: (136-159)/(76-93) 136/85    Intake/Output last 3 shifts:  I/O last 3 completed shifts:  In: 250 [P.O.:250]  Out: -     Intake/Output this shift:  No intake/output data recorded.     Exam:  Physical Exam:     She was in the process of eating dinner without visible problems    He appeared slightly anxious but in no visible pain or distress    There are no stigmata to indicate chronic liver disease.  There is no skin rash.  No lesions visible in the oral cavity.  The lungs showed few rhonchi the abdomen was soft there was a small right inguinal hernia that is reducible.  No palpable bowel contents    LABS:    Admission on 08/03/2022   Component Date Value Ref Range Status   • Sodium 08/02/2022 137  135 - 145 mmol/L Final   • Potassium 08/02/2022 3.1 (A) 3.4 - 5.1 mmol/L Final   • Chloride 08/02/2022 105  97 - 110  as needed Only uses rarely, Disp: , Rfl:     Spiriva HandiHaler 18 MCG inhalation capsule, PLACE 1 CAPSULE INTO INHALER AND INHALE DAILY VIA HANDIHALER AT THE SAME TIME EVERY DAY, Disp: 90 capsule, Rfl: 1    tetanus-diphtheria-acellular pertussis (ADACEL) 5-2-15 5 LF-mcg/0 5 injection, Inject 0 5 mL into a muscle once for 1 dose, Disp: 0 5 mL, Rfl: 0 mmol/L Final   • Carbon Dioxide 08/02/2022 25  21 - 32 mmol/L Final   • Anion Gap 08/02/2022 10  7 - 19 mmol/L Final   • Glucose 08/02/2022 107 (A) 70 - 99 mg/dL Final   • BUN 08/02/2022 11  6 - 20 mg/dL Final   • Creatinine 08/02/2022 0.62  0.51 - 0.95 mg/dL Final   • Glomerular Filtration Rate 08/02/2022 >90  >=60 Final    eGFR results = or >60 mL/min/1.73m2 = Normal kidney function. Estimated GFR calculated using the CKD-EPI-R (2021) equation that does not include race in the creatinine calculation.   • BUN/ Creatinine Ratio 08/02/2022 18  7 - 25 Final   • Calcium 08/02/2022 9.5  8.4 - 10.2 mg/dL Final   • Bilirubin, Total 08/02/2022 0.3  0.2 - 1.0 mg/dL Final   • GOT/AST 08/02/2022 16  <=37 Units/L Final   • GPT/ALT 08/02/2022 17  <64 Units/L Final   • Alkaline Phosphatase 08/02/2022 81  45 - 117 Units/L Final   • Albumin 08/02/2022 4.3  3.6 - 5.1 g/dL Final   • Protein, Total 08/02/2022 8.1  6.4 - 8.2 g/dL Final   • Globulin 08/02/2022 3.8  2.0 - 4.0 g/dL Final   • A/G Ratio 08/02/2022 1.1  1.0 - 2.4 Final   • Lipase 08/02/2022 113  73 - 393 Units/L Final   • Ventricular Rate EKG/Min (BPM) 08/02/2022 84   Final   • Atrial Rate (BPM) 08/02/2022 84   Final   • NM-Interval (MSEC) 08/02/2022 168   Final   • QRS-Interval (MSEC) 08/02/2022 88   Final   • QT-Interval (MSEC) 08/02/2022 416   Final   • QTc 08/02/2022 492   Final   • P Axis (Degrees) 08/02/2022 -9   Final   • R Axis (Degrees) 08/02/2022 24   Final   • T Axis (Degrees) 08/02/2022 -33   Final   • REPORT TEXT 08/02/2022    Final                    Value:Sinus rhythm  with frequent  premature ventricular complexes  ST And  T wave abnormality, consider inferior ischemia  Abnormal ECG  No previous ECGs available  Confirmed by NIKKI HERRON MD ([id number]) on 8/2/2022 9:22:13 PM     • Troponin I, High Sensitivity 08/02/2022 11  <52 ng/L Final   • WBC 08/02/2022 6.6  4.2 - 11.0 K/mcL Final   • RBC 08/02/2022 4.71  4.00 - 5.20 mil/mcL Final   • HGB 08/02/2022 12.4   12.0 - 15.5 g/dL Final   • HCT 08/02/2022 38.5  36.0 - 46.5 % Final   • MCV 08/02/2022 81.7  78.0 - 100.0 fl Final   • MCH 08/02/2022 26.3  26.0 - 34.0 pg Final   • MCHC 08/02/2022 32.2  32.0 - 36.5 g/dL Final   • RDW-CV 08/02/2022 18.9 (A) 11.0 - 15.0 % Final   • RDW-SD 08/02/2022 56.7 (A) 39.0 - 50.0 fL Final   • PLT 08/02/2022 358  140 - 450 K/mcL Final   • NRBC 08/02/2022 0  <=0 /100 WBC Final   • Neutrophil, Percent 08/02/2022 83  % Final   • Lymphocytes, Percent 08/02/2022 9  % Final   • Mono, Percent 08/02/2022 6  % Final   • Eosinophils, Percent 08/02/2022 0  % Final   • Basophils, Percent 08/02/2022 1  % Final   • Immature Granulocytes 08/02/2022 1  % Final   • Absolute Neutrophils 08/02/2022 5.5  1.8 - 7.7 K/mcL Final   • Absolute Lymphocytes 08/02/2022 0.6 (A) 1.0 - 4.0 K/mcL Final   • Absolute Monocytes 08/02/2022 0.4  0.3 - 0.9 K/mcL Final   • Absolute Eosinophils  08/02/2022 0.0  0.0 - 0.5 K/mcL Final   • Absolute Basophils 08/02/2022 0.1  0.0 - 0.3 K/mcL Final   • Absolute Immmature Granulocytes 08/02/2022 0.0  0.0 - 0.2 K/mcL Final   • Extra Tube 08/02/2022 Hold for Add Ons   Final   • Extra Tube 08/02/2022 Hold for Add Ons   Final   • Extra Tube 08/02/2022 Hold for Add Ons   Final   • Extra Tube 08/02/2022 Hold for Add Ons   Final   • Troponin I, High Sensitivity 08/03/2022 12  <52 ng/L Final   • Troponin I, High Sensitivity 08/03/2022 12  <52 ng/L Final   • Rapid SARS-COV-2 by PCR 08/03/2022 Not Detected  Not Detected / Detected / Presumptive Positive / Inhibitors present Final   • Isolation Guidelines 08/03/2022    Final    Do not use this test result as the sole decision-maker for discontinuation of isolation.   Clinical evaluation should be considered for other respiratory illness requiring transmission-based isolation.    -    No fever (<99.0 F/37.2 C) for at least 24 hours without the use of fever-reducing medications    AND  -    Respiratory symptoms have improved or resolved (e.g. cough,  shortness of breath)     AND  -    COVID-19 negative test    See COVID-19 Deisolation Resource Guide   • Procedural Comment 08/03/2022    Final    SARS-CoV-2 nucleic acid has not been detected indicating the absence of COVID-19.       Testing was performed using the [employer] Xpert Xpress SARS-CoV-2 RT-PCR assay that has been given Emergency Use Authorization (EUA) by the [place] Food and Drug Administration (FDA).  These results are considered definitive and do not need to be confirmed by another method.   • Sodium 08/02/2022 138  135 - 145 mmol/L Final   • Potassium 08/02/2022 3.2 (A) 3.4 - 5.1 mmol/L Final   • Chloride 08/02/2022 105  97 - 110 mmol/L Final   • Carbon Dioxide 08/02/2022 24  21 - 32 mmol/L Final   • Anion Gap 08/02/2022 12  7 - 19 mmol/L Final   • Glucose 08/02/2022 101 (A) 70 - 99 mg/dL Final   • BUN 08/02/2022 12  6 - 20 mg/dL Final   • Creatinine 08/02/2022 0.55  0.51 - 0.95 mg/dL Final   • Glomerular Filtration Rate 08/02/2022 >90  >=60 Final    eGFR results = or >60 mL/min/1.73m2 = Normal kidney function. Estimated GFR calculated using the CKD-EPI-R (2021) equation that does not include race in the creatinine calculation.   • BUN/ Creatinine Ratio 08/02/2022 22  7 - 25 Final   • Calcium 08/02/2022 9.5  8.4 - 10.2 mg/dL Final   • Troponin I, High Sensitivity 08/02/2022 9  <52 ng/L Final   • WBC 08/03/2022 5.9  4.2 - 11.0 K/mcL Final   • RBC 08/03/2022 4.33  4.00 - 5.20 mil/mcL Final   • HGB 08/03/2022 11.3 (A) 12.0 - 15.5 g/dL Final   • HCT 08/03/2022 35.8 (A) 36.0 - 46.5 % Final   • MCV 08/03/2022 82.7  78.0 - 100.0 fl Final   • MCH 08/03/2022 26.1  26.0 - 34.0 pg Final   • MCHC 08/03/2022 31.6 (A) 32.0 - 36.5 g/dL Final   • PLT 08/03/2022 321  140 - 450 K/mcL Final   • RDW-CV 08/03/2022 18.8 (A) 11.0 - 15.0 % Final   • RDW-SD 08/03/2022 57.0 (A) 39.0 - 50.0 fL Final   • NRBC 08/03/2022 0  <=0 /100 WBC Final   • Sodium 08/03/2022 139  135 - 145 mmol/L Final   • Potassium 08/03/2022 4.2   3.4 - 5.1 mmol/L Final   • Chloride 08/03/2022 106  97 - 110 mmol/L Final   • Carbon Dioxide 08/03/2022 27  21 - 32 mmol/L Final   • Anion Gap 08/03/2022 10  7 - 19 mmol/L Final   • Glucose 08/03/2022 100 (A) 70 - 99 mg/dL Final   • BUN 08/03/2022 10  6 - 20 mg/dL Final   • Creatinine 08/03/2022 0.57  0.51 - 0.95 mg/dL Final   • Glomerular Filtration Rate 08/03/2022 >90  >=60 Final    eGFR results = or >60 mL/min/1.73m2 = Normal kidney function. Estimated GFR calculated using the CKD-EPI-R (2021) equation that does not include race in the creatinine calculation.   • BUN/ Creatinine Ratio 08/03/2022 18  7 - 25 Final   • Calcium 08/03/2022 8.7  8.4 - 10.2 mg/dL Final   • Predicted HR Max 08/03/2022 153  BPM Final   • Sodium 08/04/2022 139  135 - 145 mmol/L Final   • Potassium 08/04/2022 3.6  3.4 - 5.1 mmol/L Final   • Chloride 08/04/2022 107  97 - 110 mmol/L Final   • Carbon Dioxide 08/04/2022 25  21 - 32 mmol/L Final   • Anion Gap 08/04/2022 11  7 - 19 mmol/L Final   • Glucose 08/04/2022 88  70 - 99 mg/dL Final   • BUN 08/04/2022 8  6 - 20 mg/dL Final   • Creatinine 08/04/2022 0.58  0.51 - 0.95 mg/dL Final   • Glomerular Filtration Rate 08/04/2022 >90  >=60 Final    eGFR results = or >60 mL/min/1.73m2 = Normal kidney function. Estimated GFR calculated using the CKD-EPI-R (2021) equation that does not include race in the creatinine calculation.   • BUN/ Creatinine Ratio 08/04/2022 14  7 - 25 Final   • Calcium 08/04/2022 8.7  8.4 - 10.2 mg/dL Final   • WBC 08/04/2022 2.9 (A) 4.2 - 11.0 K/mcL Final   • RBC 08/04/2022 4.05  4.00 - 5.20 mil/mcL Final   • HGB 08/04/2022 10.8 (A) 12.0 - 15.5 g/dL Final   • HCT 08/04/2022 33.2 (A) 36.0 - 46.5 % Final   • MCV 08/04/2022 82.0  78.0 - 100.0 fl Final   • MCH 08/04/2022 26.7  26.0 - 34.0 pg Final   • MCHC 08/04/2022 32.5  32.0 - 36.5 g/dL Final   • PLT 08/04/2022 283  140 - 450 K/mcL Final   • RDW-CV 08/04/2022 18.8 (A) 11.0 - 15.0 % Final   • RDW-SD 08/04/2022 56.5 (A) 39.0 -  50.0 fL Final   • NRBC 08/04/2022 0  <=0 /100 WBC Final   • WBC 08/04/2022 3.1 (A) 4.2 - 11.0 K/mcL Final   • NRBC 08/04/2022 0  <=0 /100 WBC Final         IMAGING:    CT ABDOMEN PELVIS WO CONTRAST   Final Result   1.  No appendicitis.   2.  Diverticulosis of the colon without diverticulitis.   3.  No other acute findings.            Electronically signed by Ed Serrano MD on 08 03 22 at 05:41      XR CHEST PA AND LATERAL 2 VIEWS   Final Result   Mild cardiomegaly.  No focal lung consolidation seen.      Electronically Signed by: JUANCARLOS COTA MD    Signed on: 8/2/2022 10:23 PM                  Impression and Recommendations:          65-year-old lady who comes in with right lower abdominal pain nausea and vomiting  There is a right inguinal hernia present on physical exam with some tenderness in the area but no palpable entrapped bowel  Her clinical presentation and medical course is mostly compatible with intermittent entrapment of a loop of small bowel in the inguinal hernia with spontaneous resolution/reduction  Question of celiac artery stenosis  Her clinical syndrome is not suggestive of abdominal angina or chronic mesenteric ischemia      CAT scan showing diverticulosis without diverticulitis    History of coronary artery disease/atherosclerosis  The patient should be evaluated electively by his surgeon for her right inguinal hernia to prevent future entrapment and or incarceration.    She is okay for discharge from a GI standpoint  This was discussed with the patient and explained to her in simple terms    Case also discussed with the attending physician          Annalisa Taveras MD, FACP.  Attending Physician Gastroenterology  8/4/2022  3:59 PM    This note may have been transcribed with voice recognition software/M*Modal Fluency dictation system in part or in whole.  There may be voice recognition errors which should not be taken to alter content or meaning.

## 2022-11-14 DIAGNOSIS — E03.9 HYPOTHYROIDISM, UNSPECIFIED TYPE: ICD-10-CM

## 2022-11-15 RX ORDER — LEVOTHYROXINE SODIUM 0.12 MG/1
TABLET ORAL
Qty: 30 TABLET | Refills: 0 | Status: SHIPPED | OUTPATIENT
Start: 2022-11-15

## 2022-11-16 NOTE — TELEPHONE ENCOUNTER
Tried calling pt to let her know her prescriptions were approved and to set up an appt, but she did not answer her phone

## 2022-12-12 ENCOUNTER — OFFICE VISIT (OUTPATIENT)
Dept: FAMILY MEDICINE CLINIC | Facility: CLINIC | Age: 77
End: 2022-12-12

## 2022-12-12 VITALS
BODY MASS INDEX: 35.19 KG/M2 | OXYGEN SATURATION: 95 % | RESPIRATION RATE: 12 BRPM | HEART RATE: 100 BPM | SYSTOLIC BLOOD PRESSURE: 110 MMHG | WEIGHT: 192.4 LBS | DIASTOLIC BLOOD PRESSURE: 70 MMHG

## 2022-12-12 DIAGNOSIS — Z00.00 MEDICARE ANNUAL WELLNESS VISIT, SUBSEQUENT: ICD-10-CM

## 2022-12-12 DIAGNOSIS — Z23 NEED FOR INFLUENZA VACCINATION: ICD-10-CM

## 2022-12-12 DIAGNOSIS — E66.9 OBESITY, CLASS II, BMI 35-39.9: ICD-10-CM

## 2022-12-12 DIAGNOSIS — Z23 NEED FOR PROPHYLACTIC VACCINATION AGAINST STREPTOCOCCUS PNEUMONIAE (PNEUMOCOCCUS): ICD-10-CM

## 2022-12-12 DIAGNOSIS — E03.9 HYPOTHYROIDISM, UNSPECIFIED TYPE: ICD-10-CM

## 2022-12-12 DIAGNOSIS — J43.9 PULMONARY EMPHYSEMA, UNSPECIFIED EMPHYSEMA TYPE (HCC): ICD-10-CM

## 2022-12-12 DIAGNOSIS — C34.92 NON-SMALL CELL CARCINOMA OF LEFT LUNG, STAGE 3 (HCC): ICD-10-CM

## 2022-12-12 DIAGNOSIS — Z00.00 MEDICARE ANNUAL WELLNESS VISIT, INITIAL: Primary | ICD-10-CM

## 2022-12-12 RX ORDER — LEVOTHYROXINE SODIUM 0.12 MG/1
125 TABLET ORAL DAILY
Qty: 30 TABLET | Refills: 0 | Status: SHIPPED | OUTPATIENT
Start: 2022-12-12 | End: 2022-12-19

## 2022-12-12 RX ORDER — TIOTROPIUM BROMIDE 18 UG/1
18 CAPSULE ORAL; RESPIRATORY (INHALATION) DAILY
Qty: 90 CAPSULE | Refills: 1 | Status: SHIPPED | OUTPATIENT
Start: 2022-12-12 | End: 2023-03-12

## 2022-12-12 NOTE — ASSESSMENT & PLAN NOTE
She did not have any blood work for few years, recheck TSH  Continue the same dose of levothyroxine as for now

## 2022-12-12 NOTE — PROGRESS NOTES
Assessment and Plan:     Problem List Items Addressed This Visit        Endocrine    Hypothyroidism     She did not have any blood work for few years, recheck TSH  Continue the same dose of levothyroxine as for now  Relevant Medications    levothyroxine 125 mcg tablet       Respiratory    Chronic obstructive pulmonary disease ([facility] )     Looks like controlled on her current inhalers  Continue the same  Relevant Medications    tiotropium (Spiriva HandiHaler) 18 mcg inhalation capsule    Non-small cell carcinoma of left lung, stage 3 (HCC)    Relevant Medications    tiotropium (Spiriva HandiHaler) 18 mcg inhalation capsule       Other    Medicare annual wellness visit, subsequent    Body mass index (BMI) 45 0-49 9, adult ([facility] )   Other Visit Diagnoses     Medicare annual wellness visit, initial    -  Primary    Need for influenza vaccination        Relevant Orders    influenza vaccine, high-dose, PF 0 7 mL (FLUZONE HIGH-DOSE) (Completed)    Obesity, Class II, BMI 35-39 9        Relevant Orders    Comprehensive metabolic panel    TSH, 3rd generation with Free T4 reflex    CBC and differential    Lipid panel    Vitamin D 25 hydroxy    UA (URINE) with reflex to Scope    HEMOGLOBIN A1C W/ EAG ESTIMATION    Need for prophylactic vaccination against Streptococcus pneumoniae (pneumococcus)        Relevant Orders    Pneumococcal Conjugate Vaccine 20-valent (Pcv20) (Completed)          Depression Screening and Follow-up Plan: Patient's depression screening was positive with a PHQ-2 score of 3  Their PHQ-9 score was 3  Preventive health issues were discussed with patient, and age appropriate screening tests were ordered as noted in patient's After Visit Summary  Personalized health advice and appropriate referrals for health education or preventive services given if needed, as noted in patient's After Visit Summary       History of Present Illness:     Patient presents for a [employer] Visit    Patient came today for her Medicare annual wellness visit and to follow-up on her chronic problems  Her vital signs are okay  She agreed for her pneumonia shot and flu shot today  She had mammogram done not too long ago and was negative, probably we should stop the screening due to her age  She is not pursuing colon cancer screening anymore  She lives with her son, she has problems with transportation so she does not want to do any physical therapy as for now but she will think about it  Patient Care Team:  Germán Ford MD as PCP - General (Internal Medicine)  MD Sophie Ellis MD Elmon Millman, MD Patriciaann Bring, MD Simona Solian, MD     Review of Systems:     Review of Systems   Constitutional: Negative for chills, fever and unexpected weight change  Respiratory: Positive for shortness of breath (Baseline)  Negative for cough and chest tightness  Cardiovascular: Negative for chest pain  Gastrointestinal: Negative for abdominal pain, diarrhea, nausea and vomiting  Musculoskeletal: Positive for arthralgias  Negative for myalgias  Neurological: Negative for dizziness, numbness and headaches  Psychiatric/Behavioral: Negative for sleep disturbance          Problem List:     Patient Active Problem List   Diagnosis   • Adenocarcinoma of bladder (HCC)   • Chronic obstructive pulmonary disease (HCC)   • Elevated C-reactive protein   • Hypercholesterolemia   • Hypothyroidism   • Non-small cell carcinoma of left lung, stage 3 (HCC)   • Obesity   • Squamous cell carcinoma of leg   • Medicare annual wellness visit, subsequent   • CIS (carcinoma in situ of bladder)   • Body mass index (BMI) 45 0-49 9, adult [facility])      Past Medical and Surgical History:     Past Medical History:   Diagnosis Date   • Anesthesia     "constipation after surgery"   • Bladder cancer [facility])    • Cataract     left   • Colon polyp    • COPD (chronic obstructive pulmonary disease) ([facility] )    • Dry mouth    • Full dentures    • GERD (gastroesophageal reflux disease)     occas   • History of pneumonia    • Hyperlipidemia    • Hypothyroidism    • Lung cancer ([facility] )     stage 3   • Pulmonary emphysema (HCC)    • Skin cancer     squamous cell of the leg   • Sleep difficulties     "gets up frequently to void"   • UTI (urinary tract infection) 12/2019    recent e coli treated and resolved   • Wears glasses      Past Surgical History:   Procedure Laterality Date   • CHOLECYSTECTOMY     • COLONOSCOPY     • CYSTOSCOPY      4/10 AND 8/10, 3/12/2015 4/18/2016  DIAGNOSTIC    • CYSTOSCOPY  05/01/2020   • CYSTOSCOPY  04/02/2021   • CYSTOSCOPY  10/29/2021   • DENTAL SURGERY     • EYE SURGERY     • HYSTERECTOMY     • OTHER SURGICAL HISTORY      TRANSURETHERAL RESECTION    • DE CYSTO W/REMOVAL OF LESIONS SMALL N/A 12/23/2019    Procedure: BLADDER BIOPSY; BLADDER FULGERATION with mitomycin;  Surgeon: Luther Reese MD;  Location: [address] [place] [address] MAIN OR;  Service: Urology   • DE CYSTOURETHROSCOPY WITH BIOPSY N/A 6/1/2020    Procedure: CYSTOSCOPY WITH BIOPSIES, FULGURATION;  Surgeon: Luther Reese MD;  Location: [facility] Main OR;  Service: Urology   • DE CYSTOURETHROSCOPY WITH BIOPSY N/A 11/2/2020    Procedure: CYSTO W/BIOPSIES & FULGURATION;  Surgeon: Luther Reese MD;  Location: [facility] OR;  Service: Urology   • WISDOM TOOTH EXTRACTION        Family History:     Family History   Problem Relation Age of Onset   • Cancer Mother         Mouth    • Mental illness Sister    • Colon cancer Paternal Grandmother    • No Known Problems Daughter       Social History:     Social History     Socioeconomic History   • Marital status: Single     Spouse name: None   • Number of children: None   • Years of education: None   • Highest education level: None   Occupational History   • None   Tobacco Use   • Smoking status: Former     Packs/day: 2 00     Types: Cigarettes     Quit date: 1/8/2019     Years since quitting: 3 9 • Smokeless tobacco: Never   Vaping Use   • Vaping Use: Former   Substance and Sexual Activity   • Alcohol use: No   • Drug use: No   • Sexual activity: None   Other Topics Concern   • None   Social History Narrative   • None     Social Determinants of Health     Financial Resource Strain: Low Risk    • Difficulty of Paying Living Expenses: Not very hard   Food Insecurity: Not on file   Transportation Needs: No Transportation Needs   • Lack of Transportation (Medical): No   • Lack of Transportation (Non-Medical): No   Physical Activity: Not on file   Stress: Not on file   Social Connections: Not on file   Intimate Partner Violence: Not on file   Housing Stability: Not on file      Medications and Allergies:     Current Outpatient Medications   Medication Sig Dispense Refill   • levothyroxine 125 mcg tablet Take 1 tablet (125 mcg total) by mouth daily 30 tablet 0   • tiotropium (Spiriva HandiHaler) 18 mcg inhalation capsule Place 1 capsule (18 mcg total) into inhaler and inhale daily 90 capsule 1   • Ascorbic Acid (VITAMIN C) 100 MG tablet Take 100 mg by mouth daily     • aspirin 325 mg tablet Take 0 5 tablets by mouth daily      • Calcium 600 MG tablet Take 1 tablet by mouth 2 (two) times a day     • Chromium 200 MCG TABS Take 1 tablet by mouth daily     • fluorouracil (EFUDEX) 5 % cream Use as directed     • fluticasone (FLONASE) 50 mcg/act nasal spray 2 sprays into each nostril daily 1 g 0   • loratadine-pseudoephedrine (CLARITIN-D 12-HOUR) 5-120 mg per tablet Take 1 tablet by mouth 2 (two) times a day 60 tablet 0   • magnesium 30 MG tablet Take 30 mg by mouth 2 (two) times a day     • melatonin 1 mg Take 10 mg by mouth daily at bedtime      • Multiple Vitamin (MULTIVITAMINS PO) Take 1 tablet by mouth daily     • Omega-3 Fatty Acids (FISH OIL) 1200 MG CAPS Take by mouth     • OXYGEN-HELIUM IN Inhale as needed Only uses rarely       No current facility-administered medications for this visit       Allergies Allergen Reactions   • Erythromycin Hives   • Penicillins Hives   • Sulfa Antibiotics Hives   • Tetracyclines & Related Hives      Immunizations:     Immunization History   Administered Date(s) Administered   • COVID-19 MODERNA VACC 0 5 ML IM 03/24/2021, 04/21/2021, 02/11/2022   • Influenza Split High Dose Preservative Free IM 11/30/2012, 04/01/2016, 10/25/2017   • Influenza, high dose seasonal 0 7 mL 10/01/2018, 10/21/2019, 10/14/2020, 10/29/2021, 12/12/2022   • Influenza, seasonal, injectable 01/11/2010, 09/20/2010, 10/27/2011   • Pneumococcal Conjugate 13-Valent 04/01/2016   • Pneumococcal Conjugate Vaccine 20-valent (Pcv20), Polysace 12/12/2022   • Pneumococcal Polysaccharide PPV23 08/18/2005, 08/19/2005, 12/14/2012   • Zoster 01/13/2014      Health Maintenance:         Topic Date Due   • Hepatitis C Screening  Never done   • Colorectal Cancer Screening  12/12/2023 (Originally 3/18/1990)   • Breast Cancer Screening: Mammogram  12/12/2023 (Originally 9/30/2022)         Topic Date Due   • COVID-19 Vaccine (4 - Booster for Moderna series) 06/11/2022      Medicare Screening Tests and Risk Assessments:     Jessica Rosas is here for her Subsequent Wellness visit  Health Risk Assessment:   Patient rates overall health as fair  Patient feels that their physical health rating is slightly worse  Patient is satisfied with their life  Eyesight was rated as same  Hearing was rated as same  Patient feels that their emotional and mental health rating is same  Patients states they are never, rarely angry  Patient states they are often unusually tired/fatigued  Pain experienced in the last 7 days has been some  Patient's pain rating has been 2/10  Patient states that she has experienced no weight loss or gain in last 6 months  Fall Risk Screening:    In the past year, patient has experienced: history of falling in past year    Number of falls: 1  Injured during fall?: No    Feels unsteady when standing or walking?: Yes Worried about falling?: Yes      Urinary Incontinence Screening:   Patient has leaked urine accidently in the last six months  Home Safety:  Patient has trouble with stairs inside or outside of their home  Patient has working smoke alarms and has working carbon monoxide detector  Home safety hazards include: none  Nutrition:   Current diet is Low Carb  Medications:   Patient is currently taking over-the-counter supplements  OTC medications include: see medication list  Patient is able to manage medications  Activities of Daily Living (ADLs)/Instrumental Activities of Daily Living (IADLs):   Walk and transfer into and out of bed and chair?: Yes  Dress and groom yourself?: Yes    Bathe or shower yourself?: Yes    Feed yourself? Yes  Do your laundry/housekeeping?: Yes  Manage your money, pay your bills and track your expenses?: Yes  Make your own meals?: Yes    Do your own shopping?: Yes    Previous Hospitalizations:   Any hospitalizations or ED visits within the last 12 months?: No      Advance Care Planning:   Living will: Yes    Durable POA for healthcare: Yes    Advanced directive: Yes      PREVENTIVE SCREENINGS      Cardiovascular Screening:    General: Screening Not Indicated and History Lipid Disorder      Breast Cancer Screening:     General: Screening Current      Cervical Cancer Screening:    General: Screening Not Indicated      Lung Cancer Screening:     General: Screening Not Indicated and History Lung Cancer    Screening, Brief Intervention, and Referral to Treatment (SBIRT)    Screening  Typical number of drinks in a day: 0  Typical number of drinks in a week: 0  Interpretation: Low risk drinking behavior      Single Item Drug Screening:  How often have you used an illegal drug (including marijuana) or a prescription medication for non-medical reasons in the past year? never    Single Item Drug Screen Score: 0  Interpretation: Negative screen for possible drug use disorder    No results found      Physical Exam:     /70 (BP Location: Left arm, Patient Position: Sitting, Cuff Size: Standard)   Pulse 100   Resp 12   Wt 87 3 kg (192 lb 6 4 oz)   SpO2 95%   BMI 35 19 kg/m²     Physical Exam  Constitutional:       General: She is not in acute distress  Appearance: Normal appearance  She is not toxic-appearing  Cardiovascular:      Rate and Rhythm: Normal rate and regular rhythm  Heart sounds: Murmur heard  No friction rub  No gallop  Pulmonary:      Effort: No respiratory distress  Breath sounds: No wheezing or rales  Abdominal:      General: There is no distension  Palpations: There is no mass  Tenderness: There is no abdominal tenderness  There is no rebound  Hernia: No hernia is present  Musculoskeletal:         General: Tenderness present  No swelling or deformity  Neurological:      General: No focal deficit present  Mental Status: She is alert and oriented to person, place, and time     Psychiatric:         Mood and Affect: Mood normal          Behavior: Behavior normal           Roseline Tenorio MD

## 2022-12-12 NOTE — PATIENT INSTRUCTIONS
Medicare Preventive Visit Patient Instructions  Thank you for completing your Welcome to Medicare Visit or Medicare Annual Wellness Visit today  Your next wellness visit will be due in one year (12/13/2023)  The screening/preventive services that you may require over the next 5-10 years are detailed below  Some tests may not apply to you based off risk factors and/or age  Screening tests ordered at today's visit but not completed yet may show as past due  Also, please note that scanned in results may not display below  Preventive Screenings:  Service Recommendations Previous Testing/Comments   Colorectal Cancer Screening  * Colonoscopy    * Fecal Occult Blood Test (FOBT)/Fecal Immunochemical Test (FIT)  * Fecal DNA/Cologuard Test  * Flexible Sigmoidoscopy Age: 39-70 years old   Colonoscopy: every 10 years (may be performed more frequently if at higher risk)  OR  FOBT/FIT: every 1 year  OR  Cologuard: every 3 years  OR  Sigmoidoscopy: every 5 years  Screening may be recommended earlier than age 39 if at higher risk for colorectal cancer  Also, an individualized decision between you and your healthcare provider will decide whether screening between the ages of 74-80 would be appropriate  Colonoscopy: 03/25/2014  FOBT/FIT: Not on file  Cologuard: Not on file  Sigmoidoscopy: Not on file          Breast Cancer Screening Age: 36 years old  Frequency: every 1-2 years  Not required if history of left and right mastectomy Mammogram: 09/30/2021    Screening Current   Cervical Cancer Screening Between the ages of 21-29, pap smear recommended once every 3 years  Between the ages of 33-67, can perform pap smear with HPV co-testing every 5 years     Recommendations may differ for women with a history of total hysterectomy, cervical cancer, or abnormal pap smears in past  Pap Smear: Not on file    Screening Not Indicated   Hepatitis C Screening Once for adults born between 1945 and 1965  More frequently in patients at high risk for Hepatitis C Hep C Antibody: Not on file        Diabetes Screening 1-2 times per year if you're at risk for diabetes or have pre-diabetes Fasting glucose: 103 mg/dL (10/20/2020)  A1C: No results in last 5 years (No results in last 5 years)      Cholesterol Screening Once every 5 years if you don't have a lipid disorder  May order more often based on risk factors  Lipid panel: 10/20/2020    Screening Not Indicated  History Lipid Disorder     Other Preventive Screenings Covered by Medicare:  1  Abdominal Aortic Aneurysm (AAA) Screening: covered once if your at risk  You're considered to be at risk if you have a family history of AAA  2  Lung Cancer Screening: covers low dose CT scan once per year if you meet all of the following conditions: (1) Age 50-69; (2) No signs or symptoms of lung cancer; (3) Current smoker or have quit smoking within the last 15 years; (4) You have a tobacco smoking history of at least 20 pack years (packs per day multiplied by number of years you smoked); (5) You get a written order from a healthcare provider  3  Glaucoma Screening: covered annually if you're considered high risk: (1) You have diabetes OR (2) Family history of glaucoma OR (3)  aged 48 and older OR (3)  American aged 72 and older  3  Osteoporosis Screening: covered every 2 years if you meet one of the following conditions: (1) You're estrogen deficient and at risk for osteoporosis based off medical history and other findings; (2) Have a vertebral abnormality; (3) On glucocorticoid therapy for more than 3 months; (4) Have primary hyperparathyroidism; (5) On osteoporosis medications and need to assess response to drug therapy  · Last bone density test (DXA Scan): 09/30/2021   5  HIV Screening: covered annually if you're between the age of 15-65  Also covered annually if you are younger than 13 and older than 72 with risk factors for HIV infection   For pregnant patients, it is covered up to 3 times per pregnancy  Immunizations:  Immunization Recommendations   Influenza Vaccine Annual influenza vaccination during flu season is recommended for all persons aged >= 6 months who do not have contraindications   Pneumococcal Vaccine   * Pneumococcal conjugate vaccine = PCV13 (Prevnar 13), PCV15 (Vaxneuvance), PCV20 (Prevnar 20)  * Pneumococcal polysaccharide vaccine = PPSV23 (Pneumovax) Adults 25-60 years old: 1-3 doses may be recommended based on certain risk factors  Adults 72 years old: 1-2 doses may be recommended based off what pneumonia vaccine you previously received   Hepatitis B Vaccine 3 dose series if at intermediate or high risk (ex: diabetes, end stage renal disease, liver disease)   Tetanus (Td) Vaccine - COST NOT COVERED BY MEDICARE PART B Following completion of primary series, a booster dose should be given every 10 years to maintain immunity against tetanus  Td may also be given as tetanus wound prophylaxis  Tdap Vaccine - COST NOT COVERED BY MEDICARE PART B Recommended at least once for all adults  For pregnant patients, recommended with each pregnancy  Shingles Vaccine (Shingrix) - COST NOT COVERED BY MEDICARE PART B  2 shot series recommended in those aged 48 and above     Health Maintenance Due:      Topic Date Due   • Hepatitis C Screening  Never done   • Colorectal Cancer Screening  12/12/2023 (Originally 3/18/1990)   • Breast Cancer Screening: Mammogram  12/12/2023 (Originally 9/30/2022)     Immunizations Due:      Topic Date Due   • COVID-19 Vaccine (4 - Booster for Moderna series) 06/11/2022   • Influenza Vaccine (1) 09/01/2022     Advance Directives   What are advance directives? Advance directives are legal documents that state your wishes and plans for medical care  These plans are made ahead of time in case you lose your ability to make decisions for yourself   Advance directives can apply to any medical decision, such as the treatments you want, and if you want to donate organs  What are the types of advance directives? There are many types of advance directives, and each state has rules about how to use them  You may choose a combination of any of the following:  · Living will: This is a written record of the treatment you want  You can also choose which treatments you do not want, which to limit, and which to stop at a certain time  This includes surgery, medicine, IV fluid, and tube feedings  · Durable power of  for healthcare [facility]): This is a written record that states who you want to make healthcare choices for you when you are unable to make them for yourself  This person, called a proxy, is usually a family member or a friend  You may choose more than 1 proxy  · Do not resuscitate (DNR) order:  A DNR order is used in case your heart stops beating or you stop breathing  It is a request not to have certain forms of treatment, such as CPR  A DNR order may be included in other types of advance directives  · Medical directive: This covers the care that you want if you are in a coma, near death, or unable to make decisions for yourself  You can list the treatments you want for each condition  Treatment may include pain medicine, surgery, blood transfusions, dialysis, IV or tube feedings, and a ventilator (breathing machine)  · Values history: This document has questions about your views, beliefs, and how you feel and think about life  This information can help others choose the care that you would choose  Why are advance directives important? An advance directive helps you control your care  Although spoken wishes may be used, it is better to have your wishes written down  Spoken wishes can be misunderstood, or not followed  Treatments may be given even if you do not want them  An advance directive may make it easier for your family to make difficult choices about your care     Depression   Depression  is a medical condition that causes feelings of sadness or hopelessness that do not go away  Depression may cause you to lose interest in things you used to enjoy  These feelings may interfere with your daily life  Call your local emergency number (911 in the [address]) if:   · You think about harming yourself or someone else  · You have done something on purpose to hurt yourself  The following resources are available at any time to help you, if needed:   · [address]: 9-644.406.1959 (0-453-171-ZF)   · Suicide Hotline: 3-558.802.1323 (0-528-PJSJCKS)   · For a list of international numbers: https://save org/find-help/international-resources/  Treatment for depression may include medicine to relieve depression  Medicine is often used together with therapy  Therapy is a way for you to talk about your feelings and anything that may be causing depression  Therapy can be done alone or in a group  It may also be done with family members or a significant other  · Get regular physical activity  · Create a regular sleep schedule  · Eat a variety of healthy foods  · Do not drink alcohol or use drugs  Fall Prevention    Fall prevention  includes ways to make your home and other areas safer  It also includes ways you can move more carefully to prevent a fall  Health conditions that cause changes in your blood pressure, vision, or muscle strength and coordination may increase your risk for falls  Medicines may also increase your risk for falls if they make you dizzy, weak, or sleepy  Fall prevention tips:   · Stand or sit up slowly  · Use assistive devices as directed  · Wear shoes that fit well and have soles that   · Wear a personal alarm  · Stay active  · Manage your medical conditions  Home Safety Tips:  · Add items to prevent falls in the bathroom  · Keep paths clear  · Install bright lights in your home  · Keep items you use often on shelves within reach      · Paint or place reflective tape on the edges of your stairs  Urinary Incontinence   Urinary incontinence (UI)  is when you lose control of your bladder  UI develops because your bladder cannot store or empty urine properly  The 3 most common types of UI are stress incontinence, urge incontinence, or both  Medicines:   · May be given to help strengthen your bladder control  Report any side effects of medication to your healthcare provider  Do pelvic muscle exercises often:  Your pelvic muscles help you stop urinating  Squeeze these muscles tight for 5 seconds, then relax for 5 seconds  Gradually work up to squeezing for 10 seconds  Do 3 sets of 15 repetitions a day, or as directed  This will help strengthen your pelvic muscles and improve bladder control  Train your bladder:  Go to the bathroom at set times, such as every 2 hours, even if you do not feel the urge to go  You can also try to hold your urine when you feel the urge to go  For example, hold your urine for 5 minutes when you feel the urge to go  As that becomes easier, hold your urine for 10 minutes  Self-care:   · Keep a UI record  Write down how often you leak urine and how much you leak  Make a note of what you were doing when you leaked urine  · Drink liquids as directed  You may need to limit the amount of liquid you drink to help control your urine leakage  Do not drink any liquid right before you go to bed  Limit or do not have drinks that contain caffeine or alcohol  · Prevent constipation  Eat a variety of high-fiber foods  Good examples are high-fiber cereals, beans, vegetables, and whole-grain breads  Walking is the best way to trigger your intestines to have a bowel movement  · Exercise regularly and maintain a healthy weight  Weight loss and exercise will decrease pressure on your bladder and help you control your leakage  · Use a catheter as directed  to help empty your bladder  A catheter is a tiny, plastic tube that is put into your bladder to drain your urine     · Go to behavior therapy as directed  Behavior therapy may be used to help you learn to control your urge to urinate  © Copyright [employer] 2018 Information is for End User's use only and may not be sold, redistributed or otherwise used for commercial purposes   All illustrations and images included in CareNotes® are the copyrighted property of A D A M , Inc  or [address] [place] [address]

## 2022-12-15 ENCOUNTER — APPOINTMENT (OUTPATIENT)
Dept: LAB | Facility: MEDICAL CENTER | Age: 77
End: 2022-12-15

## 2022-12-15 DIAGNOSIS — E66.9 OBESITY, CLASS II, BMI 35-39.9: ICD-10-CM

## 2022-12-15 LAB
ALBUMIN SERPL BCP-MCNC: 3.8 G/DL (ref 3.5–5)
ALP SERPL-CCNC: 87 U/L (ref 46–116)
ALT SERPL W P-5'-P-CCNC: 22 U/L (ref 12–78)
ANION GAP SERPL CALCULATED.3IONS-SCNC: 7 MMOL/L (ref 4–13)
AST SERPL W P-5'-P-CCNC: 17 U/L (ref 5–45)
BACTERIA UR QL AUTO: ABNORMAL /HPF
BASOPHILS # BLD AUTO: 0.02 THOUSANDS/ÂΜL (ref 0–0.1)
BASOPHILS NFR BLD AUTO: 0 % (ref 0–1)
BILIRUB SERPL-MCNC: 0.38 MG/DL (ref 0.2–1)
BILIRUB UR QL STRIP: NEGATIVE
BUN SERPL-MCNC: 14 MG/DL (ref 5–25)
CALCIUM SERPL-MCNC: 9.7 MG/DL (ref 8.3–10.1)
CHLORIDE SERPL-SCNC: 104 MMOL/L (ref 96–108)
CHOLEST SERPL-MCNC: 197 MG/DL
CLARITY UR: CLEAR
CO2 SERPL-SCNC: 28 MMOL/L (ref 21–32)
COLOR UR: YELLOW
CREAT SERPL-MCNC: 0.66 MG/DL (ref 0.6–1.3)
EOSINOPHIL # BLD AUTO: 0.14 THOUSAND/ÂΜL (ref 0–0.61)
EOSINOPHIL NFR BLD AUTO: 2 % (ref 0–6)
ERYTHROCYTE [DISTWIDTH] IN BLOOD BY AUTOMATED COUNT: 12.5 % (ref 11.6–15.1)
EST. AVERAGE GLUCOSE BLD GHB EST-MCNC: 94 MG/DL
GFR SERPL CREATININE-BSD FRML MDRD: 84 ML/MIN/1.73SQ M
GLUCOSE P FAST SERPL-MCNC: 92 MG/DL (ref 65–99)
GLUCOSE UR STRIP-MCNC: NEGATIVE MG/DL
HBA1C MFR BLD: 4.9 %
HCT VFR BLD AUTO: 39.9 % (ref 34.8–46.1)
HDLC SERPL-MCNC: 42 MG/DL
HGB BLD-MCNC: 13 G/DL (ref 11.5–15.4)
HGB UR QL STRIP.AUTO: NEGATIVE
IMM GRANULOCYTES # BLD AUTO: 0.02 THOUSAND/UL (ref 0–0.2)
IMM GRANULOCYTES NFR BLD AUTO: 0 % (ref 0–2)
KETONES UR STRIP-MCNC: ABNORMAL MG/DL
LDLC SERPL CALC-MCNC: 124 MG/DL (ref 0–100)
LEUKOCYTE ESTERASE UR QL STRIP: ABNORMAL
LYMPHOCYTES # BLD AUTO: 1.69 THOUSANDS/ÂΜL (ref 0.6–4.47)
LYMPHOCYTES NFR BLD AUTO: 26 % (ref 14–44)
MCH RBC QN AUTO: 32.9 PG (ref 26.8–34.3)
MCHC RBC AUTO-ENTMCNC: 32.6 G/DL (ref 31.4–37.4)
MCV RBC AUTO: 101 FL (ref 82–98)
MONOCYTES # BLD AUTO: 0.33 THOUSAND/ÂΜL (ref 0.17–1.22)
MONOCYTES NFR BLD AUTO: 5 % (ref 4–12)
MUCOUS THREADS UR QL AUTO: ABNORMAL
NEUTROPHILS # BLD AUTO: 4.19 THOUSANDS/ÂΜL (ref 1.85–7.62)
NEUTS SEG NFR BLD AUTO: 67 % (ref 43–75)
NITRITE UR QL STRIP: NEGATIVE
NON-SQ EPI CELLS URNS QL MICRO: ABNORMAL /HPF
NONHDLC SERPL-MCNC: 155 MG/DL
NRBC BLD AUTO-RTO: 0 /100 WBCS
PH UR STRIP.AUTO: 6 [PH]
PLATELET # BLD AUTO: 294 THOUSANDS/UL (ref 149–390)
PMV BLD AUTO: 12.4 FL (ref 8.9–12.7)
POTASSIUM SERPL-SCNC: 4 MMOL/L (ref 3.5–5.3)
PROT SERPL-MCNC: 7.9 G/DL (ref 6.4–8.4)
PROT UR STRIP-MCNC: ABNORMAL MG/DL
RBC # BLD AUTO: 3.95 MILLION/UL (ref 3.81–5.12)
RBC #/AREA URNS AUTO: ABNORMAL /HPF
SODIUM SERPL-SCNC: 139 MMOL/L (ref 135–147)
SP GR UR STRIP.AUTO: 1.02 (ref 1–1.03)
TRIGL SERPL-MCNC: 157 MG/DL
TSH SERPL DL<=0.05 MIU/L-ACNC: 1.45 UIU/ML (ref 0.45–4.5)
UROBILINOGEN UR STRIP-ACNC: <2 MG/DL
WBC # BLD AUTO: 6.39 THOUSAND/UL (ref 4.31–10.16)
WBC #/AREA URNS AUTO: ABNORMAL /HPF

## 2022-12-18 LAB — 25(OH)D3 SERPL-MCNC: 39.7 NG/ML (ref 30–100)

## 2022-12-19 ENCOUNTER — TELEPHONE (OUTPATIENT)
Dept: FAMILY MEDICINE CLINIC | Facility: CLINIC | Age: 77
End: 2022-12-19

## 2022-12-19 DIAGNOSIS — E03.9 HYPOTHYROIDISM, UNSPECIFIED TYPE: ICD-10-CM

## 2022-12-19 RX ORDER — LEVOTHYROXINE SODIUM 0.12 MG/1
TABLET ORAL
Qty: 90 TABLET | Refills: 1 | Status: SHIPPED | OUTPATIENT
Start: 2022-12-19

## 2023-01-03 ENCOUNTER — TELEPHONE (OUTPATIENT)
Dept: FAMILY MEDICINE CLINIC | Facility: CLINIC | Age: 78
End: 2023-01-03

## 2023-02-27 DIAGNOSIS — J43.9 PULMONARY EMPHYSEMA, UNSPECIFIED EMPHYSEMA TYPE (HCC): ICD-10-CM

## 2023-02-27 RX ORDER — TIOTROPIUM BROMIDE 18 UG/1
18 CAPSULE ORAL; RESPIRATORY (INHALATION) DAILY
Qty: 90 CAPSULE | Refills: 1 | Status: SHIPPED | OUTPATIENT
Start: 2023-02-27 | End: 2023-05-28

## 2023-02-27 NOTE — TELEPHONE ENCOUNTER
Sent pt her last labs from December 2022 via the address we have on file  Her labs were sent in a flow sheet formate

## 2023-05-22 DIAGNOSIS — J43.9 PULMONARY EMPHYSEMA, UNSPECIFIED EMPHYSEMA TYPE (HCC): Primary | ICD-10-CM

## 2023-05-22 RX ORDER — ALBUTEROL SULFATE 90 UG/1
2 AEROSOL, METERED RESPIRATORY (INHALATION) EVERY 6 HOURS PRN
COMMUNITY
End: 2023-05-22 | Stop reason: SDUPTHER

## 2023-05-23 RX ORDER — ALBUTEROL SULFATE 90 UG/1
2 AEROSOL, METERED RESPIRATORY (INHALATION) EVERY 6 HOURS PRN
Qty: 18 G | Refills: 1 | Status: SHIPPED | OUTPATIENT
Start: 2023-05-23

## 2023-05-25 DIAGNOSIS — E03.9 HYPOTHYROIDISM, UNSPECIFIED TYPE: ICD-10-CM

## 2023-05-25 RX ORDER — LEVOTHYROXINE SODIUM 0.12 MG/1
TABLET ORAL
Qty: 90 TABLET | Refills: 1 | Status: SHIPPED | OUTPATIENT
Start: 2023-05-25

## 2023-07-15 DIAGNOSIS — J43.9 PULMONARY EMPHYSEMA, UNSPECIFIED EMPHYSEMA TYPE (HCC): ICD-10-CM

## 2023-07-15 RX ORDER — TIOTROPIUM BROMIDE 18 UG/1
CAPSULE ORAL; RESPIRATORY (INHALATION)
Qty: 90 CAPSULE | Refills: 1 | Status: SHIPPED | OUTPATIENT
Start: 2023-07-15

## 2023-07-24 ENCOUNTER — TELEPHONE (OUTPATIENT)
Dept: FAMILY MEDICINE CLINIC | Facility: CLINIC | Age: 78
End: 2023-07-24

## 2023-07-28 DIAGNOSIS — J43.9 PULMONARY EMPHYSEMA, UNSPECIFIED EMPHYSEMA TYPE (HCC): ICD-10-CM

## 2023-07-28 RX ORDER — ALBUTEROL SULFATE 90 UG/1
2 AEROSOL, METERED RESPIRATORY (INHALATION) EVERY 6 HOURS PRN
Qty: 18 G | Refills: 1 | Status: SHIPPED | OUTPATIENT
Start: 2023-07-28

## 2023-09-29 DIAGNOSIS — J43.9 PULMONARY EMPHYSEMA, UNSPECIFIED EMPHYSEMA TYPE (HCC): Primary | ICD-10-CM

## 2023-09-29 RX ORDER — ALBUTEROL SULFATE 90 UG/1
2 AEROSOL, METERED RESPIRATORY (INHALATION) EVERY 6 HOURS PRN
Qty: 54 G | Refills: 3 | Status: SHIPPED | OUTPATIENT
Start: 2023-09-29

## 2023-10-04 ENCOUNTER — OFFICE VISIT (OUTPATIENT)
Dept: FAMILY MEDICINE CLINIC | Facility: CLINIC | Age: 78
End: 2023-10-04
Payer: COMMERCIAL

## 2023-10-04 VITALS
SYSTOLIC BLOOD PRESSURE: 122 MMHG | DIASTOLIC BLOOD PRESSURE: 80 MMHG | OXYGEN SATURATION: 92 % | RESPIRATION RATE: 12 BRPM | TEMPERATURE: 97 F | HEART RATE: 107 BPM

## 2023-10-04 DIAGNOSIS — Z23 NEED FOR INFLUENZA VACCINATION: ICD-10-CM

## 2023-10-04 DIAGNOSIS — H61.23 BILATERAL IMPACTED CERUMEN: ICD-10-CM

## 2023-10-04 DIAGNOSIS — C34.92 NON-SMALL CELL CARCINOMA OF LEFT LUNG, STAGE 3 (HCC): ICD-10-CM

## 2023-10-04 DIAGNOSIS — N30.01 ACUTE CYSTITIS WITH HEMATURIA: ICD-10-CM

## 2023-10-04 DIAGNOSIS — J43.9 PULMONARY EMPHYSEMA, UNSPECIFIED EMPHYSEMA TYPE (HCC): ICD-10-CM

## 2023-10-04 DIAGNOSIS — R35.0 URINE FREQUENCY: Primary | ICD-10-CM

## 2023-10-04 LAB
BACTERIA UR QL AUTO: ABNORMAL /HPF
BILIRUB UR QL STRIP: NEGATIVE
CAOX CRY URNS QL MICRO: ABNORMAL /HPF
CLARITY UR: ABNORMAL
COLOR UR: YELLOW
GLUCOSE UR STRIP-MCNC: NEGATIVE MG/DL
HGB UR QL STRIP.AUTO: ABNORMAL
KETONES UR STRIP-MCNC: NEGATIVE MG/DL
LEUKOCYTE ESTERASE UR QL STRIP: ABNORMAL
NITRITE UR QL STRIP: POSITIVE
NON-SQ EPI CELLS URNS QL MICRO: ABNORMAL /HPF
PH UR STRIP.AUTO: 6 [PH]
PROT UR STRIP-MCNC: ABNORMAL MG/DL
RBC #/AREA URNS AUTO: ABNORMAL /HPF
SL AMB  POCT GLUCOSE, UA: ABNORMAL
SL AMB LEUKOCYTE ESTERASE,UA: ABNORMAL
SL AMB POCT BILIRUBIN,UA: ABNORMAL
SL AMB POCT BLOOD,UA: ABNORMAL
SL AMB POCT CLARITY,UA: ABNORMAL
SL AMB POCT COLOR,UA: YELLOW
SL AMB POCT KETONES,UA: ABNORMAL
SL AMB POCT NITRITE,UA: ABNORMAL
SL AMB POCT PH,UA: 5
SL AMB POCT SPECIFIC GRAVITY,UA: 1.03
SL AMB POCT URINE PROTEIN: 10
SL AMB POCT UROBILINOGEN: 12.2
SP GR UR STRIP.AUTO: 1.02 (ref 1–1.03)
UROBILINOGEN UR STRIP-ACNC: <2 MG/DL
WBC #/AREA URNS AUTO: ABNORMAL /HPF
WBC CLUMPS # UR AUTO: PRESENT /UL

## 2023-10-04 PROCEDURE — 87077 CULTURE AEROBIC IDENTIFY: CPT | Performed by: INTERNAL MEDICINE

## 2023-10-04 PROCEDURE — 90662 IIV NO PRSV INCREASED AG IM: CPT

## 2023-10-04 PROCEDURE — 99214 OFFICE O/P EST MOD 30 MIN: CPT | Performed by: INTERNAL MEDICINE

## 2023-10-04 PROCEDURE — 81002 URINALYSIS NONAUTO W/O SCOPE: CPT | Performed by: INTERNAL MEDICINE

## 2023-10-04 PROCEDURE — G0008 ADMIN INFLUENZA VIRUS VAC: HCPCS

## 2023-10-04 PROCEDURE — 87186 SC STD MICRODIL/AGAR DIL: CPT | Performed by: INTERNAL MEDICINE

## 2023-10-04 PROCEDURE — 87086 URINE CULTURE/COLONY COUNT: CPT | Performed by: INTERNAL MEDICINE

## 2023-10-04 PROCEDURE — 69210 REMOVE IMPACTED EAR WAX UNI: CPT | Performed by: INTERNAL MEDICINE

## 2023-10-04 PROCEDURE — 81001 URINALYSIS AUTO W/SCOPE: CPT | Performed by: INTERNAL MEDICINE

## 2023-10-04 RX ORDER — CEPHALEXIN 500 MG/1
500 CAPSULE ORAL EVERY 12 HOURS SCHEDULED
Qty: 14 CAPSULE | Refills: 0 | Status: SHIPPED | OUTPATIENT
Start: 2023-10-04 | End: 2023-10-11

## 2023-10-05 ENCOUNTER — TELEPHONE (OUTPATIENT)
Dept: FAMILY MEDICINE CLINIC | Facility: CLINIC | Age: 78
End: 2023-10-05

## 2023-10-05 PROBLEM — N30.01 ACUTE CYSTITIS WITH HEMATURIA: Status: ACTIVE | Noted: 2023-10-05

## 2023-10-05 NOTE — ASSESSMENT & PLAN NOTE
Clinical presentation consistent with acute cystitis, will send UA and urine cultures. We will start empiric therapy with Keflex 500 mg twice daily. Side effects discussed. We will adjust therapy if needed according to cultures.

## 2023-10-05 NOTE — PROGRESS NOTES
Assessment/Plan:    Acute cystitis with hematuria  Clinical presentation consistent with acute cystitis, will send UA and urine cultures. We will start empiric therapy with Keflex 500 mg twice daily. Side effects discussed. We will adjust therapy if needed according to cultures. Diagnoses and all orders for this visit:    Urine frequency  -     POCT urine dip  -     UA (URINE) with reflex to Scope  -     Urine culture; Future  -     Urine culture  -     Urine Microscopic    Need for influenza vaccination  -     Cancel: influenza vaccine, quadrivalent, 0.5 mL, preservative-free, for adult and pediatric patients 6 mos+ (AFLURIA, FLUARIX, FLULAVAL, FLUZONE)  -     influenza vaccine, high-dose, PF 0.7 mL (FLUZONE HIGH-DOSE)    Acute cystitis with hematuria  -     cephalexin (KEFLEX) 500 mg capsule; Take 1 capsule (500 mg total) by mouth every 12 (twelve) hours for 7 days    Non-small cell carcinoma of left lung, stage 3 (HCC)    Pulmonary emphysema, unspecified emphysema type ([address])    Other orders  -     Ear cerumen removal            Ear cerumen removal    Date/Time: 10/4/2023 12:40 PM    Performed by: Mimi Ramirez MD  Authorized by: Mimi Ramirez MD  Universal Protocol:  Consent: Verbal consent obtained. Consent given by: patient  Patient understanding: patient states understanding of the procedure being performed  Patient identity confirmed: verbally with patient      Patient location:  Clinic  Procedure details:     Location:  L ear and R ear    Procedure type: irrigation with instrumentation      Instrumentation: curette    Post-procedure details:     Complication:  None    Hearing quality:  Improved    Patient tolerance of procedure: Tolerated well, no immediate complications      Subjective:      Patient ID: Brittany Jain is a 78 y.o. female. Patient came today with complaints of frequency of urination, she also reported that her hearing got worse recently.       The following portions of the patient's history were reviewed and updated as appropriate: allergies, current medications, past family history, past medical history, past social history, past surgical history, and problem list.    Review of Systems   Constitutional: Negative for chills and fever. HENT: Positive for hearing loss. Negative for congestion. Gastrointestinal: Negative for abdominal pain. Genitourinary: Positive for frequency. Negative for dysuria, flank pain, hematuria and pelvic pain. Musculoskeletal: Negative for back pain. Psychiatric/Behavioral: Negative for confusion.          Objective:      /80 (BP Location: Left arm, Patient Position: Sitting, Cuff Size: Standard)   Pulse (!) 107   Temp (!) 97 °F (36.1 °C)   Resp 12   SpO2 92%     Allergies   Allergen Reactions   • Erythromycin Hives   • Penicillins Hives   • Sulfa Antibiotics Hives   • Tetracyclines & Related Hives          Current Outpatient Medications:   •  cephalexin (KEFLEX) 500 mg capsule, Take 1 capsule (500 mg total) by mouth every 12 (twelve) hours for 7 days, Disp: 14 capsule, Rfl: 0  •  albuterol (Ventolin HFA) 90 mcg/act inhaler, Inhale 2 puffs every 6 (six) hours as needed for wheezing, Disp: 54 g, Rfl: 3  •  Ascorbic Acid (VITAMIN C) 100 MG tablet, Take 100 mg by mouth daily, Disp: , Rfl:   •  aspirin 325 mg tablet, Take 0.5 tablets by mouth daily , Disp: , Rfl:   •  Calcium 600 MG tablet, Take 1 tablet by mouth 2 (two) times a day, Disp: , Rfl:   •  Chromium 200 MCG TABS, Take 1 tablet by mouth daily, Disp: , Rfl:   •  fluorouracil (EFUDEX) 5 % cream, Use as directed, Disp: , Rfl:   •  fluticasone (FLONASE) 50 mcg/act nasal spray, 2 sprays into each nostril daily, Disp: 1 g, Rfl: 0  •  levothyroxine 125 mcg tablet, TAKE 1 TABLET BY MOUTH EVERY DAY, Disp: 90 tablet, Rfl: 1  •  loratadine-pseudoephedrine (CLARITIN-D 12-HOUR) 5-120 mg per tablet, Take 1 tablet by mouth 2 (two) times a day, Disp: 60 tablet, Rfl: 0  • magnesium 30 MG tablet, Take 30 mg by mouth 2 (two) times a day, Disp: , Rfl:   •  melatonin 1 mg, Take 10 mg by mouth daily at bedtime , Disp: , Rfl:   •  Multiple Vitamin (MULTIVITAMINS PO), Take 1 tablet by mouth daily, Disp: , Rfl:   •  Omega-3 Fatty Acids (FISH OIL) 1200 MG CAPS, Take by mouth, Disp: , Rfl:   •  OXYGEN-HELIUM IN, Inhale as needed Only uses rarely, Disp: , Rfl:   •  Spiriva HandiHaler 18 MCG inhalation capsule, INHALE 1 CAPSULE VIA HANDIHALER ONCE DAILY AT THE SAME TIME EVERY DAY, Disp: 90 capsule, Rfl: 1     There are no Patient Instructions on file for this visit. Physical Exam  HENT:      Right Ear: Tympanic membrane and external ear normal. There is impacted cerumen. Left Ear: Tympanic membrane and external ear normal. There is impacted cerumen. Abdominal:      General: Abdomen is flat. There is no distension. Tenderness: There is no abdominal tenderness. There is no right CVA tenderness, left CVA tenderness or guarding.    Psychiatric:         Mood and Affect: Mood normal.

## 2023-10-05 NOTE — TELEPHONE ENCOUNTER
Contacted Pt to ask which ear was cleaned by PCP so he can finish his notes. Pt stated he cleaned both of her ears.

## 2023-10-06 LAB — BACTERIA UR CULT: ABNORMAL

## 2023-11-22 DIAGNOSIS — J43.9 PULMONARY EMPHYSEMA, UNSPECIFIED EMPHYSEMA TYPE (HCC): ICD-10-CM

## 2023-11-22 RX ORDER — ALBUTEROL SULFATE 90 UG/1
2 AEROSOL, METERED RESPIRATORY (INHALATION) EVERY 6 HOURS PRN
Qty: 54 G | Refills: 3 | Status: SHIPPED | OUTPATIENT
Start: 2023-11-22

## 2023-11-22 RX ORDER — IPRATROPIUM BROMIDE AND ALBUTEROL SULFATE 2.5; .5 MG/3ML; MG/3ML
3 SOLUTION RESPIRATORY (INHALATION) 3 TIMES DAILY PRN
Qty: 180 ML | Refills: 2 | Status: SHIPPED | OUTPATIENT
Start: 2023-11-22

## 2023-12-04 PROBLEM — N30.01 ACUTE CYSTITIS WITH HEMATURIA: Status: RESOLVED | Noted: 2023-10-05 | Resolved: 2023-12-04

## 2023-12-07 ENCOUNTER — RA CDI HCC (OUTPATIENT)
Dept: OTHER | Facility: HOSPITAL | Age: 78
End: 2023-12-07

## 2023-12-07 NOTE — PROGRESS NOTES
[address] [facility] coding opportunities          Chart Reviewed number of suggestions sent to Provider: 1   E66.01  Patients Insurance     Medicare Insurance: [employer]

## 2023-12-13 ENCOUNTER — OFFICE VISIT (OUTPATIENT)
Dept: FAMILY MEDICINE CLINIC | Facility: CLINIC | Age: 78
End: 2023-12-13
Payer: COMMERCIAL

## 2023-12-13 ENCOUNTER — APPOINTMENT (OUTPATIENT)
Dept: LAB | Facility: MEDICAL CENTER | Age: 78
End: 2023-12-13
Payer: COMMERCIAL

## 2023-12-13 VITALS — HEART RATE: 100 BPM | DIASTOLIC BLOOD PRESSURE: 70 MMHG | OXYGEN SATURATION: 92 % | SYSTOLIC BLOOD PRESSURE: 100 MMHG

## 2023-12-13 DIAGNOSIS — J43.9 PULMONARY EMPHYSEMA, UNSPECIFIED EMPHYSEMA TYPE (HCC): ICD-10-CM

## 2023-12-13 DIAGNOSIS — E66.9 OBESITY, CLASS II, BMI 35-39.9: ICD-10-CM

## 2023-12-13 DIAGNOSIS — Z13.0 SCREENING FOR DEFICIENCY ANEMIA: ICD-10-CM

## 2023-12-13 DIAGNOSIS — R06.02 SOB (SHORTNESS OF BREATH): ICD-10-CM

## 2023-12-13 DIAGNOSIS — E03.9 HYPOTHYROIDISM, UNSPECIFIED TYPE: ICD-10-CM

## 2023-12-13 DIAGNOSIS — E78.00 HYPERCHOLESTEROLEMIA: ICD-10-CM

## 2023-12-13 DIAGNOSIS — Z00.00 MEDICARE ANNUAL WELLNESS VISIT, SUBSEQUENT: Primary | ICD-10-CM

## 2023-12-13 LAB
ALBUMIN SERPL BCP-MCNC: 4.1 G/DL (ref 3.5–5)
ALP SERPL-CCNC: 70 U/L (ref 34–104)
ALT SERPL W P-5'-P-CCNC: 48 U/L (ref 7–52)
ANION GAP SERPL CALCULATED.3IONS-SCNC: 8 MMOL/L
AST SERPL W P-5'-P-CCNC: 49 U/L (ref 13–39)
BASOPHILS # BLD AUTO: 0.02 THOUSANDS/ÂΜL (ref 0–0.1)
BASOPHILS NFR BLD AUTO: 0 % (ref 0–1)
BILIRUB SERPL-MCNC: 0.46 MG/DL (ref 0.2–1)
BNP SERPL-MCNC: 1472 PG/ML (ref 0–100)
BUN SERPL-MCNC: 15 MG/DL (ref 5–25)
CALCIUM SERPL-MCNC: 9.7 MG/DL (ref 8.4–10.2)
CHLORIDE SERPL-SCNC: 104 MMOL/L (ref 96–108)
CO2 SERPL-SCNC: 29 MMOL/L (ref 21–32)
CREAT SERPL-MCNC: 0.67 MG/DL (ref 0.6–1.3)
EOSINOPHIL # BLD AUTO: 0.09 THOUSAND/ÂΜL (ref 0–0.61)
EOSINOPHIL NFR BLD AUTO: 1 % (ref 0–6)
ERYTHROCYTE [DISTWIDTH] IN BLOOD BY AUTOMATED COUNT: 12.7 % (ref 11.6–15.1)
GFR SERPL CREATININE-BSD FRML MDRD: 83 ML/MIN/1.73SQ M
GLUCOSE P FAST SERPL-MCNC: 100 MG/DL (ref 65–99)
HCT VFR BLD AUTO: 36.9 % (ref 34.8–46.1)
HGB BLD-MCNC: 11.7 G/DL (ref 11.5–15.4)
IMM GRANULOCYTES # BLD AUTO: 0.03 THOUSAND/UL (ref 0–0.2)
IMM GRANULOCYTES NFR BLD AUTO: 0 % (ref 0–2)
LYMPHOCYTES # BLD AUTO: 0.88 THOUSANDS/ÂΜL (ref 0.6–4.47)
LYMPHOCYTES NFR BLD AUTO: 12 % (ref 14–44)
MCH RBC QN AUTO: 32.9 PG (ref 26.8–34.3)
MCHC RBC AUTO-ENTMCNC: 31.7 G/DL (ref 31.4–37.4)
MCV RBC AUTO: 104 FL (ref 82–98)
MONOCYTES # BLD AUTO: 0.31 THOUSAND/ÂΜL (ref 0.17–1.22)
MONOCYTES NFR BLD AUTO: 4 % (ref 4–12)
NEUTROPHILS # BLD AUTO: 5.88 THOUSANDS/ÂΜL (ref 1.85–7.62)
NEUTS SEG NFR BLD AUTO: 83 % (ref 43–75)
NRBC BLD AUTO-RTO: 0 /100 WBCS
PLATELET # BLD AUTO: 223 THOUSANDS/UL (ref 149–390)
PMV BLD AUTO: 13.4 FL (ref 8.9–12.7)
POTASSIUM SERPL-SCNC: 4.5 MMOL/L (ref 3.5–5.3)
PROT SERPL-MCNC: 6.8 G/DL (ref 6.4–8.4)
RBC # BLD AUTO: 3.56 MILLION/UL (ref 3.81–5.12)
SODIUM SERPL-SCNC: 141 MMOL/L (ref 135–147)
T4 FREE SERPL-MCNC: 0.75 NG/DL (ref 0.61–1.12)
TSH SERPL DL<=0.05 MIU/L-ACNC: 4.63 UIU/ML (ref 0.45–4.5)
WBC # BLD AUTO: 7.21 THOUSAND/UL (ref 4.31–10.16)

## 2023-12-13 PROCEDURE — 80053 COMPREHEN METABOLIC PANEL: CPT

## 2023-12-13 PROCEDURE — 85025 COMPLETE CBC W/AUTO DIFF WBC: CPT

## 2023-12-13 PROCEDURE — G0439 PPPS, SUBSEQ VISIT: HCPCS | Performed by: INTERNAL MEDICINE

## 2023-12-13 PROCEDURE — 84439 ASSAY OF FREE THYROXINE: CPT

## 2023-12-13 PROCEDURE — 99214 OFFICE O/P EST MOD 30 MIN: CPT | Performed by: INTERNAL MEDICINE

## 2023-12-13 PROCEDURE — 83880 ASSAY OF NATRIURETIC PEPTIDE: CPT

## 2023-12-13 PROCEDURE — 36415 COLL VENOUS BLD VENIPUNCTURE: CPT

## 2023-12-13 PROCEDURE — 84443 ASSAY THYROID STIM HORMONE: CPT

## 2023-12-13 RX ORDER — FLUTICASONE FUROATE AND VILANTEROL 100; 25 UG/1; UG/1
1 POWDER RESPIRATORY (INHALATION) DAILY
Qty: 360 BLISTER | Refills: 0 | Status: SHIPPED | OUTPATIENT
Start: 2023-12-13 | End: 2024-06-10

## 2023-12-13 NOTE — ASSESSMENT & PLAN NOTE
She reported that recently she became to have more exacerbations. She will continue with Spiriva will add ICS/LABA.

## 2023-12-13 NOTE — PATIENT INSTRUCTIONS
Medicare Preventive Visit Patient Instructions  Thank you for completing your Welcome to Medicare Visit or Medicare Annual Wellness Visit today. Your next wellness visit will be due in one year (12/13/2024). The screening/preventive services that you may require over the next 5-10 years are detailed below. Some tests may not apply to you based off risk factors and/or age. Screening tests ordered at today's visit but not completed yet may show as past due. Also, please note that scanned in results may not display below. Preventive Screenings:  Service Recommendations Previous Testing/Comments   Colorectal Cancer Screening  * Colonoscopy    * Fecal Occult Blood Test (FOBT)/Fecal Immunochemical Test (FIT)  * Fecal DNA/Cologuard Test  * Flexible Sigmoidoscopy Age: 43-73 years old   Colonoscopy: every 10 years (may be performed more frequently if at higher risk)  OR  FOBT/FIT: every 1 year  OR  Cologuard: every 3 years  OR  Sigmoidoscopy: every 5 years  Screening may be recommended earlier than age 39 if at higher risk for colorectal cancer. Also, an individualized decision between you and your healthcare provider will decide whether screening between the ages of 77-80 would be appropriate. Colonoscopy: 03/25/2014  FOBT/FIT: Not on file  Cologuard: Not on file  Sigmoidoscopy: Not on file          Breast Cancer Screening Age: 36 years old  Frequency: every 1-2 years  Not required if history of left and right mastectomy Mammogram: 09/30/2021        Cervical Cancer Screening Between the ages of 21-29, pap smear recommended once every 3 years. Between the ages of 32-69, can perform pap smear with HPV co-testing every 5 years.    Recommendations may differ for women with a history of total hysterectomy, cervical cancer, or abnormal pap smears in past. Pap Smear: Not on file        Hepatitis C Screening Once for adults born between [address] [place] [address]  More frequently in patients at high risk for Hepatitis C Hep C Antibody: Not on file        Diabetes Screening 1-2 times per year if you're at risk for diabetes or have pre-diabetes Fasting glucose: 92 mg/dL (12/15/2022)  A1C: 4.9 % (12/15/2022)      Cholesterol Screening Once every 5 years if you don't have a lipid disorder. May order more often based on risk factors. Lipid panel: 12/15/2022          Other Preventive Screenings Covered by Medicare:  Abdominal Aortic Aneurysm (AAA) Screening: covered once if your at risk. You're considered to be at risk if you have a family history of AAA. Lung Cancer Screening: covers low dose CT scan once per year if you meet all of the following conditions: (1) Age 48-67; (2) No signs or symptoms of lung cancer; (3) Current smoker or have quit smoking within the last 15 years; (4) You have a tobacco smoking history of at least 20 pack years (packs per day multiplied by number of years you smoked); (5) You get a written order from a healthcare provider. Glaucoma Screening: covered annually if you're considered high risk: (1) You have diabetes OR (2) Family history of glaucoma OR (3)  aged 48 and older OR (3)  American aged 72 and older  Osteoporosis Screening: covered every 2 years if you meet one of the following conditions: (1) You're estrogen deficient and at risk for osteoporosis based off medical history and other findings; (2) Have a vertebral abnormality; (3) On glucocorticoid therapy for more than 3 months; (4) Have primary hyperparathyroidism; (5) On osteoporosis medications and need to assess response to drug therapy. Last bone density test (DXA Scan): 09/30/2021. HIV Screening: covered annually if you're between the age of 14-79. Also covered annually if you are younger than 13 and older than 72 with risk factors for HIV infection. For pregnant patients, it is covered up to 3 times per pregnancy.     Immunizations:  Immunization Recommendations   Influenza Vaccine Annual influenza vaccination during flu season is recommended for all persons aged >= 6 months who do not have contraindications   Pneumococcal Vaccine   * Pneumococcal conjugate vaccine = PCV13 (Prevnar 13), PCV15 (Vaxneuvance), PCV20 (Prevnar 20)  * Pneumococcal polysaccharide vaccine = PPSV23 (Pneumovax) Adults 80-55 yo with certain risk factors or if 69+ yo  If never received any pneumonia vaccine: recommend Prevnar 20 (PCV20)  Give PCV20 if previously received 1 dose of PCV13 or PPSV23   Hepatitis B Vaccine 3 dose series if at intermediate or high risk (ex: diabetes, end stage renal disease, liver disease)   Respiratory syncytial virus (RSV) Vaccine - COVERED BY MEDICARE PART D  * RSVPreF3 (Arexvy) CDC recommends that adults 61years of age and older may receive a single dose of RSV vaccine using shared clinical decision-making (SCDM)   Tetanus (Td) Vaccine - COST NOT COVERED BY MEDICARE PART B Following completion of primary series, a booster dose should be given every 10 years to maintain immunity against tetanus. Td may also be given as tetanus wound prophylaxis. Tdap Vaccine - COST NOT COVERED BY MEDICARE PART B Recommended at least once for all adults. For pregnant patients, recommended with each pregnancy. Shingles Vaccine (Shingrix) - COST NOT COVERED BY MEDICARE PART B  2 shot series recommended in those 19 years and older who have or will have weakened immune systems or those 50 years and older     Health Maintenance Due:      Topic Date Due   • Colorectal Cancer Screening  03/25/2019   • Breast Cancer Screening: Mammogram  09/30/2022     Immunizations Due:  There are no preventive care reminders to display for this patient. Advance Directives   What are advance directives? Advance directives are legal documents that state your wishes and plans for medical care. These plans are made ahead of time in case you lose your ability to make decisions for yourself.  Advance directives can apply to any medical decision, such as the treatments you want, and if you want to donate organs. What are the types of advance directives? There are many types of advance directives, and each state has rules about how to use them. You may choose a combination of any of the following:  Living will: This is a written record of the treatment you want. You can also choose which treatments you do not want, which to limit, and which to stop at a certain time. This includes surgery, medicine, IV fluid, and tube feedings. Durable power of  for healthcare [facility]): This is a written record that states who you want to make healthcare choices for you when you are unable to make them for yourself. This person, called a proxy, is usually a family member or a friend. You may choose more than 1 proxy. Do not resuscitate (DNR) order:  A DNR order is used in case your heart stops beating or you stop breathing. It is a request not to have certain forms of treatment, such as CPR. A DNR order may be included in other types of advance directives. Medical directive: This covers the care that you want if you are in a coma, near death, or unable to make decisions for yourself. You can list the treatments you want for each condition. Treatment may include pain medicine, surgery, blood transfusions, dialysis, IV or tube feedings, and a ventilator (breathing machine). Values history: This document has questions about your views, beliefs, and how you feel and think about life. This information can help others choose the care that you would choose. Why are advance directives important? An advance directive helps you control your care. Although spoken wishes may be used, it is better to have your wishes written down. Spoken wishes can be misunderstood, or not followed. Treatments may be given even if you do not want them. An advance directive may make it easier for your family to make difficult choices about your care.    Urinary Incontinence   Urinary incontinence (UI)  is when you lose control of your bladder. UI develops because your bladder cannot store or empty urine properly. The 3 most common types of UI are stress incontinence, urge incontinence, or both. Medicines:   May be given to help strengthen your bladder control. Report any side effects of medication to your healthcare provider. Do pelvic muscle exercises often:  Your pelvic muscles help you stop urinating. Squeeze these muscles tight for 5 seconds, then relax for 5 seconds. Gradually work up to squeezing for 10 seconds. Do 3 sets of 15 repetitions a day, or as directed. This will help strengthen your pelvic muscles and improve bladder control. Train your bladder:  Go to the bathroom at set times, such as every 2 hours, even if you do not feel the urge to go. You can also try to hold your urine when you feel the urge to go. For example, hold your urine for 5 minutes when you feel the urge to go. As that becomes easier, hold your urine for 10 minutes. Self-care:   Keep a UI record. Write down how often you leak urine and how much you leak. Make a note of what you were doing when you leaked urine. Drink liquids as directed. You may need to limit the amount of liquid you drink to help control your urine leakage. Do not drink any liquid right before you go to bed. Limit or do not have drinks that contain caffeine or alcohol. Prevent constipation. Eat a variety of high-fiber foods. Good examples are high-fiber cereals, beans, vegetables, and whole-grain breads. Walking is the best way to trigger your intestines to have a bowel movement. Exercise regularly and maintain a healthy weight. Weight loss and exercise will decrease pressure on your bladder and help you control your leakage. Use a catheter as directed  to help empty your bladder. A catheter is a tiny, plastic tube that is put into your bladder to drain your urine. Go to behavior therapy as directed.   Behavior therapy may be used to help you learn to control your urge to urinate. © Copyright [employer] 2018 Information is for End User's use only and may not be sold, redistributed or otherwise used for commercial purposes.  All illustrations and images included in CareNotes® are the copyrighted property of [employer] Inc. or [address] [place] [address]

## 2023-12-13 NOTE — PROGRESS NOTES
Assessment and Plan:     Problem List Items Addressed This Visit       Chronic obstructive pulmonary disease ([address])     She reported that recently she became to have more exacerbations. She will continue with Spiriva will add ICS/LABA. Relevant Medications    Fluticasone Furoate-Vilanterol 100-25 mcg/actuation inhaler    Hypercholesterolemia     Her cholesterol levels overall acceptable on the last blood work. Hypothyroidism     Recheck TSH, continue current dose of levothyroxine. Relevant Orders    TSH w/Reflex    Medicare annual wellness visit, subsequent - Primary    Body mass index (BMI) 45.0-49.9, adult ([address])     Other Visit Diagnoses       SOB (shortness of breath)        Relevant Orders    B-Type Natriuretic Peptide(BNP)    Screening for deficiency anemia        Relevant Orders    CBC and differential    Obesity, Class II, BMI 35-39.9        Relevant Orders    Comprehensive metabolic panel              Depression Screening and Follow-up Plan: Patient was screened for depression during today's encounter. They screened negative with a PHQ-2 score of 0. Preventive health issues were discussed with patient, and age appropriate screening tests were ordered as noted in patient's After Visit Summary. Personalized health advice and appropriate referrals for health education or preventive services given if needed, as noted in patient's After Visit Summary. History of Present Illness:     Patient presents for a Medicare Wellness Visit    Patient came today for her Medicare annual wellness visit and to follow-up on her chronic problems. Her vital signs are okay. Date on pneumonia shot. She had mammogram done not too long ago and was negative, probably we should stop the screening due to her age. She is not pursuing colon cancer screening anymore. She lives with her son.              Patient Care Team:  Trisha Valencia MD as PCP - General (Internal Medicine)  Alonzo Yates MD Neha Eason MD Reymundo Kennedy., MD Pedro Robles MD     Review of Systems:     Review of Systems   Constitutional:  Negative for chills and fever. HENT:  Negative for congestion. Respiratory:  Positive for shortness of breath (Baseline). Negative for wheezing. Cardiovascular:  Positive for leg swelling (Baseline). Negative for chest pain and palpitations. Gastrointestinal:  Negative for abdominal pain. Genitourinary:  Negative for dysuria, flank pain, frequency, hematuria and pelvic pain. Musculoskeletal:  Negative for back pain. Psychiatric/Behavioral:  Negative for confusion.          Problem List:     Patient Active Problem List   Diagnosis    Adenocarcinoma of bladder (HCC)    Chronic obstructive pulmonary disease (HCC)    Elevated C-reactive protein    Hypercholesterolemia    Hypothyroidism    Non-small cell carcinoma of left lung, stage 3 (HCC)    Obesity    Squamous cell carcinoma of leg    Medicare annual wellness visit, subsequent    CIS (carcinoma in situ of bladder)    Body mass index (BMI) 45.0-49.9, adult [facility])      Past Medical and Surgical History:     Past Medical History:   Diagnosis Date    Anesthesia     "constipation after surgery"    Bladder cancer ([address])     Cataract     left    Colon polyp     COPD (chronic obstructive pulmonary disease) ([address])     Dry mouth     Full dentures     GERD (gastroesophageal reflux disease)     occas    History of pneumonia     Hyperlipidemia     Hypothyroidism     Lung cancer ([address])     stage 3    Pulmonary emphysema ([address])     Skin cancer     squamous cell of the leg    Sleep difficulties     "gets up frequently to void"    UTI (urinary tract infection) 12/2019    recent e coli treated and resolved    Wears glasses      Past Surgical History:   Procedure Laterality Date    CHOLECYSTECTOMY      COLONOSCOPY      CYSTOSCOPY      4/10 AND 8/10, 3/12/2015 4/18/2016  DIAGNOSTIC     CYSTOSCOPY  05/01/2020 CYSTOSCOPY  2021    CYSTOSCOPY  10/29/2021    DENTAL SURGERY      EYE SURGERY      HYSTERECTOMY      OTHER SURGICAL HISTORY      TRANSURETHERAL RESECTION     ME CYSTO W/REMOVAL OF LESIONS SMALL N/A 2019    Procedure: BLADDER BIOPSY; Tho Blanchard with mitomycin;  Surgeon: David Dejesus MD;  Location: [address] [place] [address] MAIN OR;  Service: Urology    ME CYSTOURETHROSCOPY WITH BIOPSY N/A 2020    Procedure: CYSTOSCOPY WITH BIOPSIES, FULGURATION;  Surgeon: David Dejesus MD;  Location: [facility];  Service: Urology    ME CYSTOURETHROSCOPY WITH BIOPSY N/A 2020    Procedure: CYSTO W/BIOPSIES & FULGURATION;  Surgeon: David Dejesus MD;  Location: [facility];  Service: Urology    WISDOM TOOTH EXTRACTION        Family History:     Family History   Problem Relation Age of Onset    Cancer Mother         Mouth     Mental illness Sister     Colon cancer Paternal Grandmother     No Known Problems Daughter       Social History:     Social History     Socioeconomic History    Marital status: Single     Spouse name: None    Number of children: None    Years of education: None    Highest education level: None   Occupational History    None   Tobacco Use    Smoking status: Former     Current packs/day: 0.00     Types: Cigarettes     Quit date: 2019     Years since quittin.9    Smokeless tobacco: Never   Vaping Use    Vaping status: Former   Substance and Sexual Activity    Alcohol use: No    Drug use: No    Sexual activity: None   Other Topics Concern    None   Social History Narrative    None     Social Determinants of Health     Financial Resource Strain: Low Risk  (2023)    Overall Financial Resource Strain (CARDIA)     Difficulty of Paying Living Expenses: Not hard at all   Food Insecurity: Not on file   Transportation Needs: No Transportation Needs (2023)    PRAPARE - Transportation     Lack of Transportation (Medical): No     Lack of Transportation (Non-Medical):  No   Physical Activity: Not on file   Stress: Not on file   Social Connections: Not on file   Intimate Partner Violence: Not on file   Housing Stability: Not on file      Medications and Allergies:     Current Outpatient Medications   Medication Sig Dispense Refill    Fluticasone Furoate-Vilanterol 100-25 mcg/actuation inhaler Inhale 1 puff daily Rinse mouth after use. 360 blister 0    albuterol (Ventolin HFA) 90 mcg/act inhaler Inhale 2 puffs every 6 (six) hours as needed for wheezing 54 g 3    Ascorbic Acid (VITAMIN C) 100 MG tablet Take 100 mg by mouth daily      aspirin 325 mg tablet Take 0.5 tablets by mouth daily       Calcium 600 MG tablet Take 1 tablet by mouth 2 (two) times a day      Chromium 200 MCG TABS Take 1 tablet by mouth daily      fluorouracil (EFUDEX) 5 % cream Use as directed      fluticasone (FLONASE) 50 mcg/act nasal spray 2 sprays into each nostril daily 1 g 0    ipratropium-albuterol (DUO-NEB) 0.5-2.5 mg/3 mL nebulizer solution Take 3 mL by nebulization 3 (three) times a day as needed for wheezing or shortness of breath 180 mL 2    levothyroxine 125 mcg tablet TAKE 1 TABLET BY MOUTH EVERY DAY 90 tablet 1    loratadine-pseudoephedrine (CLARITIN-D 12-HOUR) 5-120 mg per tablet Take 1 tablet by mouth 2 (two) times a day 60 tablet 0    magnesium 30 MG tablet Take 30 mg by mouth 2 (two) times a day      melatonin 1 mg Take 10 mg by mouth daily at bedtime       Multiple Vitamin (MULTIVITAMINS PO) Take 1 tablet by mouth daily      Omega-3 Fatty Acids (FISH OIL) 1200 MG CAPS Take by mouth      OXYGEN-HELIUM IN Inhale as needed Only uses rarely      Spiriva HandiHaler 18 MCG inhalation capsule INHALE 1 CAPSULE VIA HANDIHALER ONCE DAILY AT THE SAME TIME EVERY DAY 90 capsule 1     No current facility-administered medications for this visit.      Allergies   Allergen Reactions    Erythromycin Hives    Penicillins Hives    Sulfa Antibiotics Hives    Tetracyclines & Related Hives      Immunizations:     Immunization History Administered Date(s) Administered    COVID-19 MODERNA VACC 0.5 ML IM 03/24/2021, 04/21/2021, 02/11/2022    INFLUENZA 12/12/2022    Influenza Split High Dose Preservative Free IM 11/30/2012, 04/01/2016, 10/25/2017    Influenza, high dose seasonal 0.7 mL 10/01/2018, 10/21/2019, 10/14/2020, 10/29/2021, 12/12/2022, 10/04/2023    Influenza, seasonal, injectable 01/11/2010, 09/20/2010, 10/27/2011    Pneumococcal Conjugate 13-Valent 04/01/2016    Pneumococcal Conjugate Vaccine 20-valent (Pcv20), Polysace 12/12/2022    Pneumococcal Polysaccharide PPV23 08/18/2005, 08/19/2005, 12/14/2012    Zoster 01/13/2014      Health Maintenance:         Topic Date Due    Colorectal Cancer Screening  03/25/2019    Breast Cancer Screening: Mammogram  09/30/2022     There are no preventive care reminders to display for this patient. Medicare Screening Tests and Risk Assessments:     Stephanie Riley is here for her Subsequent Wellness visit. Health Risk Assessment:   Patient rates overall health as fair. Patient feels that their physical health rating is slightly worse. Patient is satisfied with their life. Eyesight was rated as same. Hearing was rated as slightly worse. Patient feels that their emotional and mental health rating is same. Patients states they are never, rarely angry. Patient states they are never, rarely unusually tired/fatigued. Pain experienced in the last 7 days has been none. Patient states that she has experienced no weight loss or gain in last 6 months. Depression Screening:   PHQ-2 Score: 0      Fall Risk Screening: In the past year, patient has experienced: no history of falling in past year      Urinary Incontinence Screening:   Patient has leaked urine accidently in the last six months. Uses pads twice daily    Home Safety:  Patient has trouble with stairs inside or outside of their home. Patient has working smoke alarms and has no working carbon monoxide detector. Home safety hazards include: none. Nutrition:   Current diet is Regular. Medications:   Patient is currently taking over-the-counter supplements. OTC medications include: see medication list. Patient is able to manage medications. Activities of Daily Living (ADLs)/Instrumental Activities of Daily Living (IADLs):   Walk and transfer into and out of bed and chair?: Yes  Dress and groom yourself?: Yes    Bathe or shower yourself?: Yes    Feed yourself? Yes  Do your laundry/housekeeping?: No  Manage your money, pay your bills and track your expenses?: Yes  Make your own meals?: Yes    Do your own shopping?: No    ADL comments: Son assists with ADLs when needed    Previous Hospitalizations:   Any hospitalizations or ED visits within the last 12 months?: No      Advance Care Planning:   Living will: No    Durable POA for healthcare: No    Advanced directive: No      PREVENTIVE SCREENINGS      Cardiovascular Screening:    General: Screening Not Indicated and History Lipid Disorder      Diabetes Screening:     General: Screening Current      Cervical Cancer Screening:    General: Screening Not Indicated      Lung Cancer Screening:     General: Screening Not Indicated and History Lung Cancer    Screening, Brief Intervention, and Referral to Treatment (SBIRT)    Screening  Typical number of drinks in a day: 0  Typical number of drinks in a week: 0  Interpretation: Low risk drinking behavior. Single Item Drug Screening:  How often have you used an illegal drug (including marijuana) or a prescription medication for non-medical reasons in the past year? never    Single Item Drug Screen Score: 0  Interpretation: Negative screen for possible drug use disorder    No results found. Physical Exam:     /70 (BP Location: Left arm, Patient Position: Sitting, Cuff Size: Adult)   Pulse 100   SpO2 92%     Physical Exam  Constitutional:       General: She is not in acute distress. Appearance: Normal appearance. She is not toxic-appearing. Cardiovascular:      Rate and Rhythm: Normal rate and regular rhythm. Heart sounds: Murmur heard. No friction rub. No gallop. Pulmonary:      Effort: No respiratory distress. Breath sounds: No wheezing or rales. Abdominal:      General: There is no distension. Palpations: There is no mass. Tenderness: There is no abdominal tenderness. There is no rebound. Hernia: No hernia is present. Musculoskeletal:         General: Tenderness present. No swelling or deformity. Neurological:      General: No focal deficit present. Mental Status: She is alert and oriented to person, place, and time.    Psychiatric:         Mood and Affect: Mood normal.         Behavior: Behavior normal.          Krystian Bonilla MD

## 2023-12-14 DIAGNOSIS — R06.02 SOB (SHORTNESS OF BREATH): Primary | ICD-10-CM

## 2023-12-14 RX ORDER — FUROSEMIDE 20 MG/1
20 TABLET ORAL DAILY
Qty: 90 TABLET | Refills: 0 | Status: SHIPPED | OUTPATIENT
Start: 2023-12-14

## 2023-12-15 DIAGNOSIS — E03.9 HYPOTHYROIDISM, UNSPECIFIED TYPE: ICD-10-CM

## 2023-12-15 RX ORDER — LEVOTHYROXINE SODIUM 0.12 MG/1
TABLET ORAL
Qty: 90 TABLET | Refills: 1 | Status: SHIPPED | OUTPATIENT
Start: 2023-12-15

## 2023-12-28 ENCOUNTER — APPOINTMENT (OUTPATIENT)
Dept: LAB | Facility: MEDICAL CENTER | Age: 78
End: 2023-12-28
Payer: COMMERCIAL

## 2023-12-28 DIAGNOSIS — R06.02 SOB (SHORTNESS OF BREATH): ICD-10-CM

## 2023-12-28 LAB
ANION GAP SERPL CALCULATED.3IONS-SCNC: 8 MMOL/L
BASOPHILS # BLD AUTO: 0.03 THOUSANDS/ÂΜL (ref 0–0.1)
BASOPHILS NFR BLD AUTO: 0 % (ref 0–1)
BUN SERPL-MCNC: 18 MG/DL (ref 5–25)
CALCIUM SERPL-MCNC: 10 MG/DL (ref 8.4–10.2)
CHLORIDE SERPL-SCNC: 106 MMOL/L (ref 96–108)
CO2 SERPL-SCNC: 29 MMOL/L (ref 21–32)
CREAT SERPL-MCNC: 0.63 MG/DL (ref 0.6–1.3)
EOSINOPHIL # BLD AUTO: 0.15 THOUSAND/ÂΜL (ref 0–0.61)
EOSINOPHIL NFR BLD AUTO: 2 % (ref 0–6)
ERYTHROCYTE [DISTWIDTH] IN BLOOD BY AUTOMATED COUNT: 12.9 % (ref 11.6–15.1)
GFR SERPL CREATININE-BSD FRML MDRD: 84 ML/MIN/1.73SQ M
GLUCOSE P FAST SERPL-MCNC: 112 MG/DL (ref 65–99)
HCT VFR BLD AUTO: 38.3 % (ref 34.8–46.1)
HGB BLD-MCNC: 12.1 G/DL (ref 11.5–15.4)
IMM GRANULOCYTES # BLD AUTO: 0.03 THOUSAND/UL (ref 0–0.2)
IMM GRANULOCYTES NFR BLD AUTO: 0 % (ref 0–2)
LYMPHOCYTES # BLD AUTO: 0.88 THOUSANDS/ÂΜL (ref 0.6–4.47)
LYMPHOCYTES NFR BLD AUTO: 12 % (ref 14–44)
MCH RBC QN AUTO: 32.9 PG (ref 26.8–34.3)
MCHC RBC AUTO-ENTMCNC: 31.6 G/DL (ref 31.4–37.4)
MCV RBC AUTO: 104 FL (ref 82–98)
MONOCYTES # BLD AUTO: 0.33 THOUSAND/ÂΜL (ref 0.17–1.22)
MONOCYTES NFR BLD AUTO: 5 % (ref 4–12)
NEUTROPHILS # BLD AUTO: 5.68 THOUSANDS/ÂΜL (ref 1.85–7.62)
NEUTS SEG NFR BLD AUTO: 81 % (ref 43–75)
NRBC BLD AUTO-RTO: 0 /100 WBCS
PLATELET # BLD AUTO: 287 THOUSANDS/UL (ref 149–390)
PMV BLD AUTO: 11.7 FL (ref 8.9–12.7)
POTASSIUM SERPL-SCNC: 4.7 MMOL/L (ref 3.5–5.3)
RBC # BLD AUTO: 3.68 MILLION/UL (ref 3.81–5.12)
SODIUM SERPL-SCNC: 143 MMOL/L (ref 135–147)
WBC # BLD AUTO: 7.1 THOUSAND/UL (ref 4.31–10.16)

## 2023-12-28 PROCEDURE — 80048 BASIC METABOLIC PNL TOTAL CA: CPT

## 2023-12-28 PROCEDURE — 85025 COMPLETE CBC W/AUTO DIFF WBC: CPT

## 2023-12-28 PROCEDURE — 36415 COLL VENOUS BLD VENIPUNCTURE: CPT

## 2024-01-02 ENCOUNTER — OFFICE VISIT (OUTPATIENT)
Dept: FAMILY MEDICINE CLINIC | Facility: CLINIC | Age: 79
End: 2024-01-02
Payer: COMMERCIAL

## 2024-01-02 VITALS
TEMPERATURE: 97.8 F | DIASTOLIC BLOOD PRESSURE: 62 MMHG | OXYGEN SATURATION: 89 % | HEART RATE: 88 BPM | SYSTOLIC BLOOD PRESSURE: 132 MMHG

## 2024-01-02 DIAGNOSIS — Z12.31 ENCOUNTER FOR SCREENING MAMMOGRAM FOR BREAST CANCER: ICD-10-CM

## 2024-01-02 DIAGNOSIS — J43.9 PULMONARY EMPHYSEMA, UNSPECIFIED EMPHYSEMA TYPE (HCC): ICD-10-CM

## 2024-01-02 DIAGNOSIS — R06.02 SOB (SHORTNESS OF BREATH): ICD-10-CM

## 2024-01-02 DIAGNOSIS — C34.92 NON-SMALL CELL CARCINOMA OF LEFT LUNG, STAGE 3 (HCC): ICD-10-CM

## 2024-01-02 PROCEDURE — 99214 OFFICE O/P EST MOD 30 MIN: CPT | Performed by: INTERNAL MEDICINE

## 2024-01-02 RX ORDER — FUROSEMIDE 20 MG/1
20 TABLET ORAL DAILY
Qty: 90 TABLET | Refills: 0 | Status: SHIPPED | OUTPATIENT
Start: 2024-01-02

## 2024-01-02 NOTE — PROGRESS NOTES
Assessment/Plan:    Chronic obstructive pulmonary disease (HCC)  Seems like doing better after we added ICS/lab, continue Spiriva.    SOB (shortness of breath)  Her BNP was highly elevated, she is currently on Lasix which gives her some benefit.  We waiting on the results of the echo of the heart, we will adjust dose of her Lasix after results of her study.  Continue with low-salt diet.       Diagnoses and all orders for this visit:    SOB (shortness of breath)  -     furosemide (LASIX) 20 mg tablet; Take 1 tablet (20 mg total) by mouth daily    Non-small cell carcinoma of left lung, stage 3 (HCC)    Pulmonary emphysema, unspecified emphysema type (HCC)    Body mass index (BMI) 45.0-49.9, adult ([facility])    Encounter for screening mammogram for breast cancer          Subjective:      Patient ID: Cortney Harding is a 78 y.o. female.    Patient came today to follow-up on her multiple medical problems.        The following portions of the patient's history were reviewed and updated as appropriate: allergies, current medications, past family history, past medical history, past social history, past surgical history, and problem list.    Review of Systems   Constitutional:  Negative for chills and fever.   HENT:  Negative for congestion.    Respiratory:  Positive for shortness of breath (Improved). Negative for wheezing.    Cardiovascular:  Positive for leg swelling (Baseline). Negative for chest pain and palpitations.   Gastrointestinal:  Negative for abdominal pain.   Genitourinary:  Negative for dysuria, flank pain, frequency, hematuria and pelvic pain.   Musculoskeletal:  Negative for back pain.   Psychiatric/Behavioral:  Negative for confusion.          Objective:      /62 (BP Location: Left arm, Patient Position: Sitting, Cuff Size: Large) Comment (BP Location): Lower left arm, per patient request  Pulse 88   Temp 97.8 °F (36.6 °C) (Tympanic)   SpO2 (!) 89% Comment: Patient denies SOB    Allergies   Allergen  Reactions    Erythromycin Hives    Penicillins Hives    Sulfa Antibiotics Hives    Tetracyclines & Related Hives          Current Outpatient Medications:     albuterol (Ventolin HFA) 90 mcg/act inhaler, Inhale 2 puffs every 6 (six) hours as needed for wheezing, Disp: 54 g, Rfl: 3    Ascorbic Acid (VITAMIN C) 100 MG tablet, Take 100 mg by mouth daily, Disp: , Rfl:     aspirin 325 mg tablet, Take 0.5 tablets by mouth daily , Disp: , Rfl:     Calcium 600 MG tablet, Take 1 tablet by mouth 2 (two) times a day, Disp: , Rfl:     Chromium 200 MCG TABS, Take 1 tablet by mouth daily, Disp: , Rfl:     fluorouracil (EFUDEX) 5 % cream, Use as directed, Disp: , Rfl:     fluticasone (FLONASE) 50 mcg/act nasal spray, 2 sprays into each nostril daily, Disp: 1 g, Rfl: 0    Fluticasone Furoate-Vilanterol 100-25 mcg/actuation inhaler, Inhale 1 puff daily Rinse mouth after use., Disp: 360 blister, Rfl: 0    furosemide (LASIX) 20 mg tablet, Take 1 tablet (20 mg total) by mouth daily, Disp: 90 tablet, Rfl: 0    ipratropium-albuterol (DUO-NEB) 0.5-2.5 mg/3 mL nebulizer solution, Take 3 mL by nebulization 3 (three) times a day as needed for wheezing or shortness of breath, Disp: 180 mL, Rfl: 2    levothyroxine 125 mcg tablet, TAKE 1 TABLET BY MOUTH EVERY DAY, Disp: 90 tablet, Rfl: 1    loratadine-pseudoephedrine (CLARITIN-D 12-HOUR) 5-120 mg per tablet, Take 1 tablet by mouth 2 (two) times a day, Disp: 60 tablet, Rfl: 0    magnesium 30 MG tablet, Take 30 mg by mouth 2 (two) times a day, Disp: , Rfl:     melatonin 1 mg, Take 10 mg by mouth daily at bedtime , Disp: , Rfl:     Multiple Vitamin (MULTIVITAMINS PO), Take 1 tablet by mouth daily, Disp: , Rfl:     Omega-3 Fatty Acids (FISH OIL) 1200 MG CAPS, Take by mouth, Disp: , Rfl:     OXYGEN-HELIUM IN, Inhale as needed Only uses rarely, Disp: , Rfl:     Spiriva HandiHaler 18 MCG inhalation capsule, INHALE 1 CAPSULE VIA HANDIHALER ONCE DAILY AT THE SAME TIME EVERY DAY, Disp: 90 capsule, Rfl: 1      There are no Patient Instructions on file for this visit.        Physical Exam  Constitutional:       General: She is not in acute distress.     Appearance: She is not ill-appearing or toxic-appearing.   Cardiovascular:      Heart sounds: Murmur heard.      No gallop.   Pulmonary:      Effort: No respiratory distress.      Breath sounds: Wheezing (Seems like baseline) present. No rales.

## 2024-01-02 NOTE — ASSESSMENT & PLAN NOTE
Her BNP was highly elevated, she is currently on Lasix which gives her some benefit.  We waiting on the results of the echo of the heart, we will adjust dose of her Lasix after results of her study.  Continue with low-salt diet.

## 2024-02-09 ENCOUNTER — HOSPITAL ENCOUNTER (OUTPATIENT)
Dept: NON INVASIVE DIAGNOSTICS | Facility: HOSPITAL | Age: 79
Discharge: HOME | DRG: 314 | End: 2024-02-09
Attending: INTERNAL MEDICINE
Payer: COMMERCIAL

## 2024-02-09 ENCOUNTER — APPOINTMENT (EMERGENCY)
Dept: RADIOLOGY | Facility: HOSPITAL | Age: 79
DRG: 292 | End: 2024-02-09
Payer: COMMERCIAL

## 2024-02-09 ENCOUNTER — HOSPITAL ENCOUNTER (INPATIENT)
Facility: HOSPITAL | Age: 79
LOS: 5 days | Discharge: HOME HEALTH | DRG: 292 | End: 2024-02-14
Attending: EMERGENCY MEDICINE | Admitting: STUDENT IN AN ORGANIZED HEALTH CARE EDUCATION/TRAINING PROGRAM
Payer: COMMERCIAL

## 2024-02-09 VITALS
DIASTOLIC BLOOD PRESSURE: 62 MMHG | HEART RATE: 110 BPM | HEIGHT: 62 IN | WEIGHT: 192 LBS | BODY MASS INDEX: 35.33 KG/M2 | SYSTOLIC BLOOD PRESSURE: 132 MMHG

## 2024-02-09 DIAGNOSIS — J96.11 CHRONIC RESPIRATORY FAILURE WITH HYPOXIA (HCC): ICD-10-CM

## 2024-02-09 DIAGNOSIS — I50.21 ACUTE SYSTOLIC HEART FAILURE (HCC): ICD-10-CM

## 2024-02-09 DIAGNOSIS — I50.9 CHF (CONGESTIVE HEART FAILURE) (HCC): Primary | ICD-10-CM

## 2024-02-09 DIAGNOSIS — R06.02 SOB (SHORTNESS OF BREATH): ICD-10-CM

## 2024-02-09 DIAGNOSIS — E03.9 HYPOTHYROIDISM, UNSPECIFIED TYPE: ICD-10-CM

## 2024-02-09 LAB
2HR DELTA HS TROPONIN: 5 NG/L
4HR DELTA HS TROPONIN: 3 NG/L
ALBUMIN SERPL BCP-MCNC: 4 G/DL (ref 3.5–5)
ALP SERPL-CCNC: 64 U/L (ref 34–104)
ALT SERPL W P-5'-P-CCNC: 18 U/L (ref 7–52)
ANION GAP SERPL CALCULATED.3IONS-SCNC: 6 MMOL/L
AORTIC ROOT: 2.4 CM
AORTIC VALVE MEAN VELOCITY: 9.6 M/S
APICAL FOUR CHAMBER EJECTION FRACTION: 16 %
AST SERPL W P-5'-P-CCNC: 21 U/L (ref 13–39)
ATRIAL RATE: 104 BPM
AV AREA BY CONTINUOUS VTI: 1.5 CM2
AV AREA PEAK VELOCITY: 1.5 CM2
AV LVOT MEAN GRADIENT: 1 MMHG
AV LVOT PEAK GRADIENT: 2 MMHG
AV MEAN GRADIENT: 4 MMHG
AV PEAK GRADIENT: 8 MMHG
AV VALVE AREA: 1.47 CM2
AV VELOCITY RATIO: 0.49
BASOPHILS # BLD AUTO: 0.05 THOUSANDS/ÂΜL (ref 0–0.1)
BASOPHILS NFR BLD AUTO: 1 % (ref 0–1)
BILIRUB SERPL-MCNC: 0.36 MG/DL (ref 0.2–1)
BNP SERPL-MCNC: 1140 PG/ML (ref 0–100)
BSA FOR ECHO PROCEDURE: 1.88 M2
BUN SERPL-MCNC: 11 MG/DL (ref 5–25)
CALCIUM SERPL-MCNC: 9.1 MG/DL (ref 8.4–10.2)
CARDIAC TROPONIN I PNL SERPL HS: 34 NG/L
CARDIAC TROPONIN I PNL SERPL HS: 37 NG/L
CARDIAC TROPONIN I PNL SERPL HS: 39 NG/L
CHLORIDE SERPL-SCNC: 103 MMOL/L (ref 96–108)
CO2 SERPL-SCNC: 30 MMOL/L (ref 21–32)
CREAT SERPL-MCNC: 0.62 MG/DL (ref 0.6–1.3)
DOP CALC AO PEAK VEL: 1.46 M/S
DOP CALC AO VTI: 22.29 CM
DOP CALC LVOT AREA: 3.14 CM2
DOP CALC LVOT CARDIAC INDEX: 1.57 L/MIN/M2
DOP CALC LVOT CARDIAC OUTPUT: 2.96 L/MIN
DOP CALC LVOT DIAMETER: 2 CM
DOP CALC LVOT PEAK VEL VTI: 10.44 CM
DOP CALC LVOT PEAK VEL: 0.72 M/S
DOP CALC LVOT STROKE INDEX: 16.5 ML/M2
DOP CALC LVOT STROKE VOLUME: 32.78
EOSINOPHIL # BLD AUTO: 0.25 THOUSAND/ÂΜL (ref 0–0.61)
EOSINOPHIL NFR BLD AUTO: 3 % (ref 0–6)
ERYTHROCYTE [DISTWIDTH] IN BLOOD BY AUTOMATED COUNT: 12.8 % (ref 11.6–15.1)
FRACTIONAL SHORTENING: 14 (ref 28–44)
GFR SERPL CREATININE-BSD FRML MDRD: 85 ML/MIN/1.73SQ M
GLUCOSE SERPL-MCNC: 95 MG/DL (ref 65–140)
HCT VFR BLD AUTO: 36.6 % (ref 34.8–46.1)
HGB BLD-MCNC: 12.1 G/DL (ref 11.5–15.4)
IMM GRANULOCYTES # BLD AUTO: 0.05 THOUSAND/UL (ref 0–0.2)
IMM GRANULOCYTES NFR BLD AUTO: 1 % (ref 0–2)
INTERVENTRICULAR SEPTUM IN DIASTOLE (PARASTERNAL SHORT AXIS VIEW): 1.2 CM
INTERVENTRICULAR SEPTUM: 1.2 CM (ref 0.6–1.1)
LAAS-AP4: 19.8 CM2
LEFT ATRIUM AREA SYSTOLE SINGLE PLANE A4C: 21.2 CM2
LEFT ATRIUM SIZE: 3.9 CM
LEFT INTERNAL DIMENSION IN SYSTOLE: 5.1 CM (ref 2.1–4)
LEFT VENTRICULAR INTERNAL DIMENSION IN DIASTOLE: 5.9 CM (ref 3.5–6)
LEFT VENTRICULAR POSTERIOR WALL IN END DIASTOLE: 1.3 CM
LEFT VENTRICULAR STROKE VOLUME: 52 ML
LVSV (TEICH): 52 ML
LYMPHOCYTES # BLD AUTO: 1.6 THOUSANDS/ÂΜL (ref 0.6–4.47)
LYMPHOCYTES NFR BLD AUTO: 19 % (ref 14–44)
MCH RBC QN AUTO: 33.2 PG (ref 26.8–34.3)
MCHC RBC AUTO-ENTMCNC: 33.1 G/DL (ref 31.4–37.4)
MCV RBC AUTO: 100 FL (ref 82–98)
MONOCYTES # BLD AUTO: 0.42 THOUSAND/ÂΜL (ref 0.17–1.22)
MONOCYTES NFR BLD AUTO: 5 % (ref 4–12)
MV EROA: 0 CM2
NEUTROPHILS # BLD AUTO: 5.91 THOUSANDS/ÂΜL (ref 1.85–7.62)
NEUTS SEG NFR BLD AUTO: 71 % (ref 43–75)
NRBC BLD AUTO-RTO: 0 /100 WBCS
P AXIS: 77 DEGREES
PISA MRMAX VEL: 0.3 M/S
PISA RADIUS: 0.3 CM
PLATELET # BLD AUTO: 288 THOUSANDS/UL (ref 149–390)
PMV BLD AUTO: 10.6 FL (ref 8.9–12.7)
POTASSIUM SERPL-SCNC: 3.7 MMOL/L (ref 3.5–5.3)
PR INTERVAL: 154 MS
PROT SERPL-MCNC: 7.2 G/DL (ref 6.4–8.4)
QRS AXIS: -24 DEGREES
QRSD INTERVAL: 112 MS
QT INTERVAL: 372 MS
QTC INTERVAL: 489 MS
RBC # BLD AUTO: 3.65 MILLION/UL (ref 3.81–5.12)
RIGHT ATRIUM AREA SYSTOLE A4C: 18 CM2
RIGHT VENTRICLE ID DIMENSION: 4.7 CM
SL CV PED ECHO LEFT VENTRICLE DIASTOLIC VOLUME (MOD BIPLANE) 2D: 176 ML
SL CV PED ECHO LEFT VENTRICLE SYSTOLIC VOLUME (MOD BIPLANE) 2D: 125 ML
SODIUM SERPL-SCNC: 139 MMOL/L (ref 135–147)
T WAVE AXIS: 78 DEGREES
TR MAX PG: 57 MMHG
TR PEAK VELOCITY: 3.8 M/S
TRICUSPID ANNULAR PLANE SYSTOLIC EXCURSION: 1.5 CM
TRICUSPID VALVE PEAK REGURGITATION VELOCITY: 3.77 M/S
VENTRICULAR RATE: 104 BPM
WBC # BLD AUTO: 8.28 THOUSAND/UL (ref 4.31–10.16)

## 2024-02-09 PROCEDURE — 93005 ELECTROCARDIOGRAM TRACING: CPT

## 2024-02-09 PROCEDURE — 99285 EMERGENCY DEPT VISIT HI MDM: CPT

## 2024-02-09 PROCEDURE — 93306 TTE W/DOPPLER COMPLETE: CPT | Performed by: INTERNAL MEDICINE

## 2024-02-09 PROCEDURE — 71045 X-RAY EXAM CHEST 1 VIEW: CPT

## 2024-02-09 PROCEDURE — 80053 COMPREHEN METABOLIC PANEL: CPT

## 2024-02-09 PROCEDURE — 83880 ASSAY OF NATRIURETIC PEPTIDE: CPT

## 2024-02-09 PROCEDURE — 94760 N-INVAS EAR/PLS OXIMETRY 1: CPT

## 2024-02-09 PROCEDURE — 99223 1ST HOSP IP/OBS HIGH 75: CPT | Performed by: STUDENT IN AN ORGANIZED HEALTH CARE EDUCATION/TRAINING PROGRAM

## 2024-02-09 PROCEDURE — 94640 AIRWAY INHALATION TREATMENT: CPT

## 2024-02-09 PROCEDURE — 93010 ELECTROCARDIOGRAM REPORT: CPT | Performed by: INTERNAL MEDICINE

## 2024-02-09 PROCEDURE — 93306 TTE W/DOPPLER COMPLETE: CPT

## 2024-02-09 PROCEDURE — 36415 COLL VENOUS BLD VENIPUNCTURE: CPT

## 2024-02-09 PROCEDURE — 85025 COMPLETE CBC W/AUTO DIFF WBC: CPT

## 2024-02-09 PROCEDURE — 84484 ASSAY OF TROPONIN QUANT: CPT

## 2024-02-09 RX ORDER — LEVOTHYROXINE SODIUM 0.12 MG/1
125 TABLET ORAL
Status: DISCONTINUED | OUTPATIENT
Start: 2024-02-10 | End: 2024-02-11

## 2024-02-09 RX ORDER — IPRATROPIUM BROMIDE AND ALBUTEROL SULFATE 2.5; .5 MG/3ML; MG/3ML
3 SOLUTION RESPIRATORY (INHALATION) 3 TIMES DAILY PRN
Status: DISCONTINUED | OUTPATIENT
Start: 2024-02-09 | End: 2024-02-14 | Stop reason: HOSPADM

## 2024-02-09 RX ORDER — FUROSEMIDE 10 MG/ML
40 INJECTION INTRAMUSCULAR; INTRAVENOUS 2 TIMES DAILY
Status: DISCONTINUED | OUTPATIENT
Start: 2024-02-09 | End: 2024-02-09

## 2024-02-09 RX ORDER — ONDANSETRON 2 MG/ML
4 INJECTION INTRAMUSCULAR; INTRAVENOUS EVERY 6 HOURS PRN
Status: DISCONTINUED | OUTPATIENT
Start: 2024-02-09 | End: 2024-02-14 | Stop reason: HOSPADM

## 2024-02-09 RX ORDER — ENOXAPARIN SODIUM 100 MG/ML
40 INJECTION SUBCUTANEOUS DAILY
Status: DISCONTINUED | OUTPATIENT
Start: 2024-02-10 | End: 2024-02-14 | Stop reason: HOSPADM

## 2024-02-09 RX ORDER — ALBUTEROL SULFATE 90 UG/1
2 AEROSOL, METERED RESPIRATORY (INHALATION) EVERY 6 HOURS PRN
Status: DISCONTINUED | OUTPATIENT
Start: 2024-02-09 | End: 2024-02-09

## 2024-02-09 RX ORDER — FLUTICASONE FUROATE AND VILANTEROL 100; 25 UG/1; UG/1
1 POWDER RESPIRATORY (INHALATION) DAILY
Status: DISCONTINUED | OUTPATIENT
Start: 2024-02-10 | End: 2024-02-14 | Stop reason: HOSPADM

## 2024-02-09 RX ORDER — FLUTICASONE PROPIONATE 50 MCG
2 SPRAY, SUSPENSION (ML) NASAL DAILY
Status: DISCONTINUED | OUTPATIENT
Start: 2024-02-10 | End: 2024-02-14 | Stop reason: HOSPADM

## 2024-02-09 RX ORDER — ALPRAZOLAM 0.25 MG/1
0.25 TABLET ORAL ONCE
Status: COMPLETED | OUTPATIENT
Start: 2024-02-09 | End: 2024-02-09

## 2024-02-09 RX ORDER — ALBUTEROL SULFATE 90 UG/1
2 AEROSOL, METERED RESPIRATORY (INHALATION) EVERY 4 HOURS PRN
Status: DISCONTINUED | OUTPATIENT
Start: 2024-02-09 | End: 2024-02-14 | Stop reason: HOSPADM

## 2024-02-09 RX ORDER — FUROSEMIDE 10 MG/ML
40 INJECTION INTRAMUSCULAR; INTRAVENOUS 2 TIMES DAILY
Status: DISCONTINUED | OUTPATIENT
Start: 2024-02-09 | End: 2024-02-10

## 2024-02-09 RX ADMIN — IPRATROPIUM BROMIDE AND ALBUTEROL SULFATE 3 ML: 2.5; .5 SOLUTION RESPIRATORY (INHALATION) at 19:45

## 2024-02-09 RX ADMIN — ALPRAZOLAM 0.25 MG: 0.25 TABLET ORAL at 21:53

## 2024-02-09 RX ADMIN — FUROSEMIDE 40 MG: 10 INJECTION, SOLUTION INTRAVENOUS at 18:23

## 2024-02-09 NOTE — Clinical Note
Case was discussed with EVON and the patient's admission status was agreed to be Admission Status: inpatient status to the service of Dr. Qiu

## 2024-02-09 NOTE — ASSESSMENT & PLAN NOTE
History of non-small cell lung cancer with adenocarcinoma histology  Had follow-up with oncology outpatient, Dr. Hansen in 2021 with no evidence of recurrence at that time

## 2024-02-09 NOTE — H&P
[facility]  H&P  Name: Cortney Harding 78 y.o. female I MRN: [id number]  Unit/Bed#: ED-06 I Date of Admission: 2/9/2024   Date of Service: 2/9/2024 I Hospital Day: 0      Assessment/Plan   Class 2 severe obesity due to excess calories with serious comorbidity and body mass index (BMI) of 35.0 to 35.9 in adult   Assessment & Plan  BMI noted to be 35, would benefit from dietary changes, lifestyle modifications    Non-small cell carcinoma of left lung, stage 3 (HCC)  Assessment & Plan  History of non-small cell lung cancer with adenocarcinoma histology  Had follow-up with oncology outpatient, Dr. Hansen in 2021 with no evidence of recurrence at that time    Chronic obstructive pulmonary disease (HCC)  Assessment & Plan  Not in exacerbation, on room air  Continue home nebs and bronchodilators    Adenocarcinoma of bladder (HCC)  Assessment & Plan  Patient previously followed with Dr. De Oliveira  Has consider Keytruda but has been lost to follow-up    * Acute systolic heart failure (HCC)  Assessment & Plan  Wt Readings from Last 3 Encounters:   02/09/24 87.1 kg (192 lb)   12/12/22 87.3 kg (192 lb 6.4 oz)   12/17/21 80.7 kg (178 lb)     78-year-old female with past medical history of bladder cancer, small cell lung cancer in remission, COPD and obesity presented with shortness of breath  Has been following with her outpatient PCP, workup for heart failure ongoing.  Had schedule echo today, notified to have low EF pending formal read and sent to the ED  BNP elevated, CXR with evidence of vascular congestion  Had been on lasix 20mg daily, will place on IV lasix 40mg BID  Consult cardiology, fluid restrict, diabetic diet and monitor I/os           VTE Prophylaxis: Enoxaparin (Lovenox)  / sequential compression device   Code Status: FC  POLST: There is no POLST form on file for this patient (pre-hospital)    Anticipated Length of Stay:  Patient will be admitted on an Inpatient basis with an anticipated  "length of stay of 2 midnights.   Justification for Hospital Stay: IV diuresis, cards evaluation    Chief Complaint:   SOB    History of Present Illness:    Cortney Harding is a 78 y.o. female who presents with shortness of breath.  Past medical history of bladder cancer, small cell lung cancer in remission, obesity and COPD.  Patient has been following with her PCP outpatient, had been short of breath over the last month.  On her last visit with her PCP on January 2, with complaints of shortness of breath that she was being worked up had negative troponin, elevated BNP and some leg swelling.  Her PCP started her on p.o. Lasix 20 mg once daily and had an echo ordered for her.  Echo was scheduled and completed today, patient echo preliminarily reviewed showing new systolic heart failure, global hypokinesis with cardiology recommending patient come to the ED for further evaluation.  She denies any chest pain, fevers, chills, nausea or vomiting.  Does appear tachypneic, and dyspneic while conversing.  Currently on room air.     Review of Systems:    Review of Systems   Respiratory:  Positive for chest tightness and shortness of breath.    Cardiovascular:  Positive for leg swelling.   All other systems reviewed and are negative.      Past Medical and Surgical History:     Past Medical History:   Diagnosis Date    Anesthesia     \"constipation after surgery\"    Bladder cancer (HCC)     Cataract     left    Colon polyp     COPD (chronic obstructive pulmonary disease) (HCC)     Dry mouth     Full dentures     GERD (gastroesophageal reflux disease)     occas    History of pneumonia     Hyperlipidemia     Hypothyroidism     Lung cancer (HCC)     stage 3    Pulmonary emphysema (HCC)     Skin cancer     squamous cell of the leg    Sleep difficulties     \"gets up frequently to void\"    UTI (urinary tract infection) 12/2019    recent e coli treated and resolved    Wears glasses        Past Surgical History:   Procedure Laterality " Date    CHOLECYSTECTOMY      COLONOSCOPY      CYSTOSCOPY      4/10 AND 8/10, 3/12/2015 4/18/2016  DIAGNOSTIC     CYSTOSCOPY  05/01/2020    CYSTOSCOPY  04/02/2021    CYSTOSCOPY  10/29/2021    DENTAL SURGERY      EYE SURGERY      HYSTERECTOMY      OTHER SURGICAL HISTORY      TRANSURETHERAL RESECTION     MT CYSTO W/REMOVAL OF LESIONS SMALL N/A 12/23/2019    Procedure: BLADDER BIOPSY; BLADDER FULGERATION with mitomycin;  Surgeon: Daniel De Oliveira MD;  Location:  MAIN OR;  Service: Urology    MT CYSTOURETHROSCOPY WITH BIOPSY N/A 6/1/2020    Procedure: CYSTOSCOPY WITH BIOPSIES, FULGURATION;  Surgeon: Daniel De Oliveira MD;  Location: [facility];  Service: Urology    MT CYSTOURETHROSCOPY WITH BIOPSY N/A 11/2/2020    Procedure: CYSTO W/BIOPSIES & FULGURATION;  Surgeon: Daniel De Oliveira MD;  Location: [facility];  Service: Urology    WISDOM TOOTH EXTRACTION         Meds/Allergies:    Prior to Admission medications    Medication Sig Start Date End Date Taking? Authorizing Provider   albuterol (Ventolin HFA) 90 mcg/act inhaler Inhale 2 puffs every 6 (six) hours as needed for wheezing 11/22/23   Mehul Thorpe MD   Ascorbic Acid (VITAMIN C) 100 MG tablet Take 100 mg by mouth daily    Historical Provider, MD   aspirin 325 mg tablet Take 0.5 tablets by mouth daily     Historical Provider, MD   Calcium 600 MG tablet Take 1 tablet by mouth 2 (two) times a day    Historical Provider, MD   Chromium 200 MCG TABS Take 1 tablet by mouth daily    Historical Provider, MD   fluorouracil (EFUDEX) 5 % cream Use as directed 10/13/20   Historical Provider, MD   fluticasone (FLONASE) 50 mcg/act nasal spray 2 sprays into each nostril daily 12/17/21   Cece Cotto PA-C   Fluticasone Furoate-Vilanterol 100-25 mcg/actuation inhaler Inhale 1 puff daily Rinse mouth after use. 12/13/23 6/10/24  Mehul Thorpe MD   furosemide (LASIX) 20 mg tablet Take 1 tablet (20 mg total) by mouth daily 1/2/24   Mehul Thorpe MD    ipratropium-albuterol (DUO-NEB) 0.5-2.5 mg/3 mL nebulizer solution Take 3 mL by nebulization 3 (three) times a day as needed for wheezing or shortness of breath 23   Mehul Thorpe MD   levothyroxine 125 mcg tablet TAKE 1 TABLET BY MOUTH EVERY DAY 12/15/23   Mehul Thorpe MD   loratadine-pseudoephedrine (CLARITIN-D 12-HOUR) 5-120 mg per tablet Take 1 tablet by mouth 2 (two) times a day 21   Cece Cotto PA-C   magnesium 30 MG tablet Take 30 mg by mouth 2 (two) times a day    Historical Provider, MD   melatonin 1 mg Take 10 mg by mouth daily at bedtime     Historical Provider, MD   Multiple Vitamin (MULTIVITAMINS PO) Take 1 tablet by mouth daily    Historical Provider, MD   Omega-3 Fatty Acids (FISH OIL) 1200 MG CAPS Take by mouth    Historical Provider, MD   OXYGEN-HELIUM IN Inhale as needed Only uses rarely    Historical Provider, MD   Spiriva HandiHaler 18 MCG inhalation capsule INHALE 1 CAPSULE VIA HANDIHALER ONCE DAILY AT THE SAME TIME EVERY DAY 7/15/23   Mehul Thorpe MD     I have reviewed home medications with patient personally.    Allergies:   Allergies   Allergen Reactions    Erythromycin Hives    Penicillins Hives    Sulfa Antibiotics Hives    Tetracyclines & Related Hives       Social History:     Marital Status: Single   Occupation: retired  Patient Pre-hospital Living Situation: home  Patient Pre-hospital Level of Mobility: amb  Patient Pre-hospital Diet Restrictions: none  Substance Use History:   Social History     Substance and Sexual Activity   Alcohol Use No     Social History     Tobacco Use   Smoking Status Former    Current packs/day: 0.00    Types: Cigarettes    Quit date: 2019    Years since quittin.0   Smokeless Tobacco Never     Social History     Substance and Sexual Activity   Drug Use No       Family History:    Family History   Problem Relation Age of Onset    Cancer Mother         Mouth     Mental illness Sister     Colon  cancer Paternal Grandmother     No Known Problems Daughter        Physical Exam:     Vitals:   Blood Pressure: 139/67 (02/09/24 1534)  Pulse: 98 (02/09/24 1534)  Temperature: 97.8 °F (36.6 °C) (02/09/24 1430)  Temp Source: Oral (02/09/24 1430)  Respirations: 20 (02/09/24 1534)  SpO2: 100 % (02/09/24 1534)    Physical Exam  Vitals and nursing note reviewed.   Constitutional:       Appearance: She is obese.   HENT:      Head: Normocephalic.   Eyes:      Conjunctiva/sclera: Conjunctivae normal.      Pupils: Pupils are equal, round, and reactive to light.   Cardiovascular:      Rate and Rhythm: Tachycardia present.   Pulmonary:      Effort: Pulmonary effort is normal.      Breath sounds: Rales present. No wheezing.      Comments: Decreased breath sounds  Abdominal:      General: Bowel sounds are normal. There is no distension.      Palpations: Abdomen is soft.   Musculoskeletal:         General: No swelling. Normal range of motion.      Right lower leg: Edema present.      Left lower leg: Edema present.   Skin:     General: Skin is warm and dry.   Neurological:      General: No focal deficit present.      Mental Status: She is alert. Mental status is at baseline.       Additional Data:     Lab Results: I have personally reviewed pertinent reports.      Results from last 7 days   Lab Units 02/09/24  1435   WBC Thousand/uL 8.28   HEMOGLOBIN g/dL 12.1   HEMATOCRIT % 36.6   PLATELETS Thousands/uL 288   NEUTROS PCT % 71   LYMPHS PCT % 19   MONOS PCT % 5   EOS PCT % 3     Results from last 7 days   Lab Units 02/09/24  1435   SODIUM mmol/L 139   POTASSIUM mmol/L 3.7   CHLORIDE mmol/L 103   CO2 mmol/L 30   BUN mg/dL 11   CREATININE mg/dL 0.62   ANION GAP mmol/L 6   CALCIUM mg/dL 9.1   ALBUMIN g/dL 4.0   TOTAL BILIRUBIN mg/dL 0.36   ALK PHOS U/L 64   ALT U/L 18   AST U/L 21   GLUCOSE RANDOM mg/dL 95                       Imaging: I have personally reviewed pertinent reports.      XR chest 1 view portable    (Results Pending)        EKG, Pathology, and Other Studies Reviewed on Admission:   EKG: Sinus Tachycardia    Allscripts / Epic Records Reviewed: Yes     ** Please Note: This note has been constructed using a voice recognition system. **

## 2024-02-09 NOTE — NURSING NOTE
Patient refusing 2 RN skin assessment upon admission. RN educated and explained importance of skin assessment. Patient still refusing.

## 2024-02-09 NOTE — ASSESSMENT & PLAN NOTE
Patient previously followed with Dr. De Oliveira  Has consider Keytruda but has been lost to follow-up

## 2024-02-09 NOTE — ED PROVIDER NOTES
"History  Chief Complaint   Patient presents with    Shortness of Breath     Pt coming from cardiology presents with SOB.  Pt was sent down to ED for diuresis. Pt cardiologist told her she's has \"been in heart failure for a while\"     Cortney is an 78 year old female w/ shortness of breath who was sent in from cardiology office after evaluation today. She was being evaluated for worsening shortness of breath over the past few weeks. She is unsure of how long it has been worsening but is becoming unable to tolerate any sort of exertion. She was started on Lasix in December after having a BNP of almost 1500. She takes 20 mg daily. She says the office told her today she needed to come to ER because she \"has heart failure\". She is on 2.5 liters of oxygen at baseline for her COPD. She denies any chest pain, lightheadedness, headache, dizziness. She reports worsening swelling in bother of her legs and pain with ambulation. She denies any abdominal pain, nausea, vomiting, diarrhea. Patient had an echo performed in office today and on call cardiologist reports severely reduced ejection fraction w/ global hypokinesis and severe pulmonary hypertension.       History provided by:  Patient   used: No    Shortness of Breath  Associated symptoms: no abdominal pain, no chest pain, no fever, no headaches, no sore throat and no vomiting        Prior to Admission Medications   Prescriptions Last Dose Informant Patient Reported? Taking?   Ascorbic Acid (VITAMIN C) 100 MG tablet  Self Yes No   Sig: Take 100 mg by mouth daily   Calcium 600 MG tablet  Self Yes No   Sig: Take 1 tablet by mouth 2 (two) times a day   Chromium 200 MCG TABS  Self Yes No   Sig: Take 1 tablet by mouth daily   Fluticasone Furoate-Vilanterol 100-25 mcg/actuation inhaler   No No   Sig: Inhale 1 puff daily Rinse mouth after use.   Multiple Vitamin (MULTIVITAMINS PO)  Self Yes No   Sig: Take 1 tablet by mouth daily   OXYGEN-HELIUM IN  Self Yes No " "  Sig: Inhale as needed Only uses rarely   Omega-3 Fatty Acids (FISH OIL) 1200 MG CAPS  Self Yes No   Sig: Take by mouth   Spiriva HandiHaler 18 MCG inhalation capsule   No No   Sig: INHALE 1 CAPSULE VIA HANDIHALER ONCE DAILY AT THE SAME TIME EVERY DAY   albuterol (Ventolin HFA) 90 mcg/act inhaler   No No   Sig: Inhale 2 puffs every 6 (six) hours as needed for wheezing   aspirin 325 mg tablet  Self Yes No   Sig: Take 0.5 tablets by mouth daily    fluorouracil (EFUDEX) 5 % cream  Self Yes No   Sig: Use as directed   fluticasone (FLONASE) 50 mcg/act nasal spray   No No   Si sprays into each nostril daily   furosemide (LASIX) 20 mg tablet   No No   Sig: Take 1 tablet (20 mg total) by mouth daily   ipratropium-albuterol (DUO-NEB) 0.5-2.5 mg/3 mL nebulizer solution   No No   Sig: Take 3 mL by nebulization 3 (three) times a day as needed for wheezing or shortness of breath   levothyroxine 125 mcg tablet   No No   Sig: TAKE 1 TABLET BY MOUTH EVERY DAY   loratadine-pseudoephedrine (CLARITIN-D 12-HOUR) 5-120 mg per tablet   No No   Sig: Take 1 tablet by mouth 2 (two) times a day   magnesium 30 MG tablet  Self Yes No   Sig: Take 30 mg by mouth 2 (two) times a day   melatonin 1 mg  Self Yes No   Sig: Take 10 mg by mouth daily at bedtime       Facility-Administered Medications: None       Past Medical History:   Diagnosis Date    Anesthesia     \"constipation after surgery\"    Bladder cancer (HCC)     Cataract     left    Colon polyp     COPD (chronic obstructive pulmonary disease) (HCC)     Dry mouth     Full dentures     GERD (gastroesophageal reflux disease)     occas    History of pneumonia     Hyperlipidemia     Hypothyroidism     Lung cancer (HCC)     stage 3    Pulmonary emphysema (HCC)     Skin cancer     squamous cell of the leg    Sleep difficulties     \"gets up frequently to void\"    UTI (urinary tract infection) 2019    recent e coli treated and resolved    Wears glasses        Past Surgical History: "   Procedure Laterality Date    CHOLECYSTECTOMY      COLONOSCOPY      CYSTOSCOPY      4/10 AND 8/10, 3/12/2015 2016  DIAGNOSTIC     CYSTOSCOPY  2020    CYSTOSCOPY  2021    CYSTOSCOPY  10/29/2021    DENTAL SURGERY      EYE SURGERY      HYSTERECTOMY      OTHER SURGICAL HISTORY      TRANSURETHERAL RESECTION     IN CYSTO W/REMOVAL OF LESIONS SMALL N/A 2019    Procedure: BLADDER BIOPSY; BLADDER FULGERATION with mitomycin;  Surgeon: Daniel De Oliveira MD;  Location:  MAIN OR;  Service: Urology    IN CYSTOURETHROSCOPY WITH BIOPSY N/A 2020    Procedure: CYSTOSCOPY WITH BIOPSIES, FULGURATION;  Surgeon: Daniel De Oliveira MD;  Location: [facility];  Service: Urology    IN CYSTOURETHROSCOPY WITH BIOPSY N/A 2020    Procedure: CYSTO W/BIOPSIES & FULGURATION;  Surgeon: Daniel De Oliveira MD;  Location: [facility];  Service: Urology    WISDOM TOOTH EXTRACTION         Family History   Problem Relation Age of Onset    Cancer Mother         Mouth     Mental illness Sister     Colon cancer Paternal Grandmother     No Known Problems Daughter      I have reviewed and agree with the history as documented.    E-Cigarette/Vaping    E-Cigarette Use Former User      E-Cigarette/Vaping Substances    Nicotine No     THC No     CBD No     Flavoring No     Other No     Unknown No      Social History     Tobacco Use    Smoking status: Former     Current packs/day: 0.00     Types: Cigarettes     Quit date: 2019     Years since quittin.0    Smokeless tobacco: Never   Vaping Use    Vaping status: Former   Substance Use Topics    Alcohol use: Not Currently    Drug use: No       Review of Systems   Constitutional:  Negative for chills and fever.   HENT:  Negative for congestion and sore throat.    Respiratory:  Positive for shortness of breath. Negative for chest tightness.    Cardiovascular:  Positive for leg swelling. Negative for chest pain.   Gastrointestinal:  Negative for abdominal pain, diarrhea, nausea and  vomiting.   Genitourinary:  Negative for dysuria.   Skin:  Negative for color change and pallor.   Neurological:  Negative for dizziness, light-headedness and headaches.   All other systems reviewed and are negative.      Physical Exam  Physical Exam  Vitals and nursing note reviewed.   Constitutional:       Appearance: She is well-developed.   HENT:      Head: Normocephalic and atraumatic.      Mouth/Throat:      Mouth: Mucous membranes are moist.      Pharynx: Oropharynx is clear.   Eyes:      Extraocular Movements: Extraocular movements intact.      Pupils: Pupils are equal, round, and reactive to light.   Cardiovascular:      Rate and Rhythm: Regular rhythm. Tachycardia present.      Pulses: Normal pulses.   Pulmonary:      Effort: Tachypnea present.      Breath sounds: Examination of the right-lower field reveals rales. Examination of the left-lower field reveals rales. Wheezing and rales present.   Chest:      Chest wall: No tenderness.   Abdominal:      Palpations: Abdomen is soft.      Tenderness: There is no abdominal tenderness. There is no guarding or rebound.   Musculoskeletal:      Cervical back: Normal range of motion.      Right lower leg: Edema present.      Left lower leg: Edema present.   Skin:     General: Skin is warm and dry.   Neurological:      General: No focal deficit present.      Mental Status: She is alert and oriented to person, place, and time.   Psychiatric:         Mood and Affect: Mood normal.         Behavior: Behavior normal.         Vital Signs  ED Triage Vitals   Temperature Pulse Respirations Blood Pressure SpO2   02/09/24 1430 02/09/24 1408 02/09/24 1408 02/09/24 1408 02/09/24 1408   97.8 °F (36.6 °C) (!) 112 (!) 24 151/92 98 %      Temp Source Heart Rate Source Patient Position - Orthostatic VS BP Location FiO2 (%)   02/09/24 1430 02/09/24 1408 02/09/24 1408 02/09/24 1408 --   Oral Monitor Lying Right arm       Pain Score       02/09/24 2100       No Pain           Vitals:     02/09/24 1534 02/09/24 1802 02/09/24 2204 02/09/24 2205   BP: 139/67 132/81  117/73   Pulse: 98  105 105   Patient Position - Orthostatic VS: Sitting            Visual Acuity      ED Medications  Medications   fluticasone (FLONASE) 50 mcg/act nasal spray 2 spray (has no administration in time range)   Fluticasone Furoate-Vilanterol 100-25 mcg/actuation 1 puff (has no administration in time range)   ipratropium-albuterol (DUO-NEB) 0.5-2.5 mg/3 mL inhalation solution 3 mL (3 mL Nebulization Given 2/9/24 1945)   levothyroxine tablet 125 mcg (has no administration in time range)   umeclidinium 62.5 mcg/actuation inhaler AEPB 1 puff (has no administration in time range)   ondansetron (ZOFRAN) injection 4 mg (has no administration in time range)   enoxaparin (LOVENOX) subcutaneous injection 40 mg (has no administration in time range)   furosemide (LASIX) injection 40 mg (40 mg Intravenous Given 2/9/24 1823)   aspirin (ECOTRIN LOW STRENGTH) EC tablet 162 mg (has no administration in time range)   albuterol (PROVENTIL HFA,VENTOLIN HFA) inhaler 2 puff (has no administration in time range)   ALPRAZolam (XANAX) tablet 0.25 mg (0.25 mg Oral Given 2/9/24 2153)       Diagnostic Studies  Results Reviewed       Procedure Component Value Units Date/Time    HS Troponin I 4hr [[id number]]  (Normal) Collected: 02/09/24 1828    Lab Status: Final result Specimen: Blood from Arm, Right Updated: 02/09/24 1902     hs TnI 4hr 37 ng/L      Delta 4hr hsTnI 3 ng/L     HS Troponin I 2hr [[id number]]  (Normal) Collected: 02/09/24 1532    Lab Status: Final result Specimen: Blood from Arm, Left Updated: 02/09/24 1611     hs TnI 2hr 39 ng/L      Delta 2hr hsTnI 5 ng/L     HS Troponin 0hr (reflex protocol) [[id number]]  (Normal) Collected: 02/09/24 1435    Lab Status: Final result Specimen: Blood from Arm, Left Updated: 02/09/24 1511     hs TnI 0hr 34 ng/L     B-Type Natriuretic Peptide(BNP) [[id number]]  (Abnormal) Collected: 02/09/24 1435    Lab  Status: Final result Specimen: Blood from Arm, Left Updated: 02/09/24 1510     BNP 1,140 pg/mL     Comprehensive metabolic panel [[id number]] Collected: 02/09/24 1435    Lab Status: Final result Specimen: Blood from Arm, Left Updated: 02/09/24 1504     Sodium 139 mmol/L      Potassium 3.7 mmol/L      Chloride 103 mmol/L      CO2 30 mmol/L      ANION GAP 6 mmol/L      BUN 11 mg/dL      Creatinine 0.62 mg/dL      Glucose 95 mg/dL      Calcium 9.1 mg/dL      AST 21 U/L      ALT 18 U/L      Alkaline Phosphatase 64 U/L      Total Protein 7.2 g/dL      Albumin 4.0 g/dL      Total Bilirubin 0.36 mg/dL      eGFR 85 ml/min/1.73sq m     Narrative:      [employer] guidelines for Chronic Kidney Disease (CKD):     Stage 1 with normal or high GFR (GFR > 90 mL/min/1.73 square meters)    Stage 2 Mild CKD (GFR = 60-89 mL/min/1.73 square meters)    Stage 3A Moderate CKD (GFR = 45-59 mL/min/1.73 square meters)    Stage 3B Moderate CKD (GFR = 30-44 mL/min/1.73 square meters)    Stage 4 Severe CKD (GFR = 15-29 mL/min/1.73 square meters)    Stage 5 End Stage CKD (GFR <15 mL/min/1.73 square meters)  Note: GFR calculation is accurate only with a steady state creatinine    CBC and differential [[id number]]  (Abnormal) Collected: 02/09/24 1435    Lab Status: Final result Specimen: Blood from Arm, Left Updated: 02/09/24 1450     WBC 8.28 Thousand/uL      RBC 3.65 Million/uL      Hemoglobin 12.1 g/dL      Hematocrit 36.6 %       fL      MCH 33.2 pg      MCHC 33.1 g/dL      RDW 12.8 %      MPV 10.6 fL      Platelets 288 Thousands/uL      nRBC 0 /100 WBCs      Neutrophils Relative 71 %      Immat GRANS % 1 %      Lymphocytes Relative 19 %      Monocytes Relative 5 %      Eosinophils Relative 3 %      Basophils Relative 1 %      Neutrophils Absolute 5.91 Thousands/µL      Immature Grans Absolute 0.05 Thousand/uL      Lymphocytes Absolute 1.60 Thousands/µL      Monocytes Absolute 0.42 Thousand/µL      Eosinophils  Absolute 0.25 Thousand/µL      Basophils Absolute 0.05 Thousands/µL                    XR chest 1 view portable    (Results Pending)              Procedures  Procedures         ED Course  ED Course as of 02/10/24 0016   Fri Feb 09, 2024   1450 CBC and differential(!)   1504 Comprehensive metabolic panel   1514 HS Troponin 0hr (reflex protocol)  34   1514 B-Type Natriuretic Peptide(BNP)(!)  1,140                               SBIRT 22yo+      Flowsheet Row Most Recent Value   Initial Alcohol Screen: US AUDIT-C     1. How often do you have a drink containing alcohol? 0 Filed at: 02/09/2024 1410   2. How many drinks containing alcohol do you have on a typical day you are drinking?  0 Filed at: 02/09/2024 1410   3a. Male UNDER 65: How often do you have five or more drinks on one occasion? 0 Filed at: 02/09/2024 1410   3b. FEMALE Any Age, or MALE 65+: How often do you have 4 or more drinks on one occassion? 0 Filed at: 02/09/2024 1410   Audit-C Score 0 Filed at: 02/09/2024 1410   RACHEL: How many times in the past year have you...    Used an illegal drug or used a prescription medication for non-medical reasons? Never Filed at: 02/09/2024 1410                      Medical Decision Making  78 year old female who was sent in from cardiology office after having an abnormal echo. I reached out to cardiology who reported the echo showed severely reduced EF, global hypokinesis, and pulmonary hypertension. She has been having worsening shortness of breath over the past few weeks. She denies any chest pain, abdominal pain, headaches, dizziness. She notes swelling in her legs. On physical exam she is tachypneic and tachycardic. Lungs w/ rales at bases. Heart tachycardic w/ regular rhythm.     Plan: CBC, CMP, BNP, trop, CXR, ECG  BNP 1100, trop 34, CBC and CMP unremarkable    Patient was admitted for acute heart failure. Case discussed with Dr. Colvin and patient admitted to [facility] service.     Amount and/or Complexity of Data  Reviewed  Labs: ordered. Decision-making details documented in ED Course.  Radiology: ordered.    Risk  Decision regarding hospitalization.             Disposition  Final diagnoses:   CHF (congestive heart failure) ([facility])     Time reflects when diagnosis was documented in both MDM as applicable and the Disposition within this note       Time User Action Codes Description Comment    2/9/2024  2:47 PM Jorge Morales Add [I50.9] CHF (congestive heart failure) (HCC)     2/9/2024  4:33 PM Luis Colvin [I50.21] Acute systolic heart failure (HCC)           ED Disposition       ED Disposition   Admit    Condition   Stable    Date/Time   Fri Feb 9, 2024 1611    Comment   Case was discussed with Dr. Colvin and the patient's admission status was agreed to be Admission Status: inpatient status to the service of Dr. Colvin.               Follow-up Information    None         Current Discharge Medication List        CONTINUE these medications which have NOT CHANGED    Details   albuterol (Ventolin HFA) 90 mcg/act inhaler Inhale 2 puffs every 6 (six) hours as needed for wheezing  Qty: 54 g, Refills: 3    Comments: Please provide patient with a Ventolin brand name  Associated Diagnoses: Pulmonary emphysema, unspecified emphysema type (HCC)      Ascorbic Acid (VITAMIN C) 100 MG tablet Take 100 mg by mouth daily      aspirin 325 mg tablet Take 0.5 tablets by mouth daily       Calcium 600 MG tablet Take 1 tablet by mouth 2 (two) times a day      Chromium 200 MCG TABS Take 1 tablet by mouth daily      fluorouracil (EFUDEX) 5 % cream Use as directed      fluticasone (FLONASE) 50 mcg/act nasal spray 2 sprays into each nostril daily  Qty: 1 g, Refills: 0    Associated Diagnoses: Disorder of right eustachian tube      Fluticasone Furoate-Vilanterol 100-25 mcg/actuation inhaler Inhale 1 puff daily Rinse mouth after use.  Qty: 360 blister, Refills: 0    Associated Diagnoses: Pulmonary emphysema, unspecified emphysema type (HCC)      furosemide  (LASIX) 20 mg tablet Take 1 tablet (20 mg total) by mouth daily  Qty: 90 tablet, Refills: 0    Associated Diagnoses: SOB (shortness of breath)      ipratropium-albuterol (DUO-NEB) 0.5-2.5 mg/3 mL nebulizer solution Take 3 mL by nebulization 3 (three) times a day as needed for wheezing or shortness of breath  Qty: 180 mL, Refills: 2    Associated Diagnoses: Pulmonary emphysema, unspecified emphysema type (HCC)      levothyroxine 125 mcg tablet TAKE 1 TABLET BY MOUTH EVERY DAY  Qty: 90 tablet, Refills: 1    Comments: DX Code Needed  .  Associated Diagnoses: Hypothyroidism, unspecified type      loratadine-pseudoephedrine (CLARITIN-D 12-HOUR) 5-120 mg per tablet Take 1 tablet by mouth 2 (two) times a day  Qty: 60 tablet, Refills: 0    Associated Diagnoses: Disorder of right eustachian tube      magnesium 30 MG tablet Take 30 mg by mouth 2 (two) times a day      melatonin 1 mg Take 10 mg by mouth daily at bedtime       Multiple Vitamin (MULTIVITAMINS PO) Take 1 tablet by mouth daily      Omega-3 Fatty Acids (FISH OIL) 1200 MG CAPS Take by mouth      OXYGEN-HELIUM IN Inhale as needed Only uses rarely      Spiriva HandiHaler 18 MCG inhalation capsule INHALE 1 CAPSULE VIA HANDIHALER ONCE DAILY AT THE SAME TIME EVERY DAY  Qty: 90 capsule, Refills: 1    Associated Diagnoses: Pulmonary emphysema, unspecified emphysema type (HCC)             No discharge procedures on file.    PDMP Review       None            ED Provider  Electronically Signed by             Dayana Cano PA-C  02/10/24 0016

## 2024-02-09 NOTE — ED ATTENDING ATTESTATION
2/9/2024  I, Jorge Morales MD, saw and evaluated the patient. I have discussed the patient with the resident/non-physician practitioner and agree with the resident's/non-physician practitioner's findings, Plan of Care, and MDM as documented in the resident's/non-physician practitioner's note, except where noted. All available labs and Radiology studies were reviewed.  I was present for key portions of any procedure(s) performed by the resident/non-physician practitioner and I was immediately available to provide assistance.       At this point I agree with the current assessment done in the Emergency Department.  I have conducted an independent evaluation of this patient a history and physical is as follows:    ED Course     78-year-old female presents to ED for evaluation of shortness of breath and exertional dyspnea after being seen in her cardiologist office today.  She had an outpatient echocardiogram which was reportedly abnormal.  Official report not available for review at this time but on-call cardiology reports severely decreased EF with global hypokinesis.  Patient reports fairly severe dyspnea with exertion for at least a year perhaps worsening over the past few weeks.  She was started on Lasix 2 to 3 months ago.  She notes that this may have improved her lower extremity edema some but did not really improve her functional symptoms.  She is not currently having any chest pain.  Past medical history also includes COPD.  She has been wearing 2 L oxygen by nasal cannula for some time now.  Complains of difficulty sleeping.  Denies fevers or recent illnesses.    On exam she is chronically ill-appearing but nontoxic-appearing, GCS 15 nonfocal, sclera nonicteric conjunctive a normal, mucous membranes are mildly dry, regular rate and rhythm, lungs with rales noted at bilateral bases, abdomen soft nondistended nontender, 3+ pitting edema bilateral lower extremities to the proximal calf, normal distal  sensation and cap refill    Impression and plan: 78-year-old female with chronic hypoxic respiratory failure on home oxygen thought to be multifactorial between COPD and CHF.  She was sent in by cardiology today due to worsening dyspnea and abnormal echocardiogram.  Will require admission.  We will check cardiac workup in ED including BNP with chest x-ray.  Patient is currently stable on her home oxygen at rest.    Critical Care Time  Procedures

## 2024-02-09 NOTE — ASSESSMENT & PLAN NOTE
Wt Readings from Last 3 Encounters:   02/09/24 87.1 kg (192 lb)   12/12/22 87.3 kg (192 lb 6.4 oz)   12/17/21 80.7 kg (178 lb)     78-year-old female with past medical history of bladder cancer, small cell lung cancer in remission, COPD and obesity presented with shortness of breath  Has been following with her outpatient PCP, workup for heart failure ongoing.  Had schedule echo today, notified to have low EF pending formal read and sent to the ED  BNP elevated, CXR with evidence of vascular congestion  Had been on lasix 20mg daily, will place on IV lasix 40mg BID  Consult cardiology, fluid restrict, diabetic diet and monitor I/os

## 2024-02-10 ENCOUNTER — APPOINTMENT (INPATIENT)
Dept: CT IMAGING | Facility: HOSPITAL | Age: 79
DRG: 292 | End: 2024-02-10
Payer: COMMERCIAL

## 2024-02-10 PROBLEM — J96.10 CHRONIC RESPIRATORY FAILURE (HCC): Status: ACTIVE | Noted: 2024-02-10

## 2024-02-10 LAB
ANION GAP SERPL CALCULATED.3IONS-SCNC: 5 MMOL/L
ATRIAL RATE: 97 BPM
ATRIAL RATE: 98 BPM
BUN SERPL-MCNC: 10 MG/DL (ref 5–25)
CALCIUM SERPL-MCNC: 9.3 MG/DL (ref 8.4–10.2)
CHLORIDE SERPL-SCNC: 103 MMOL/L (ref 96–108)
CO2 SERPL-SCNC: 32 MMOL/L (ref 21–32)
CREAT SERPL-MCNC: 0.57 MG/DL (ref 0.6–1.3)
ERYTHROCYTE [DISTWIDTH] IN BLOOD BY AUTOMATED COUNT: 12.6 % (ref 11.6–15.1)
GFR SERPL CREATININE-BSD FRML MDRD: 87 ML/MIN/1.73SQ M
GLUCOSE SERPL-MCNC: 86 MG/DL (ref 65–140)
HCT VFR BLD AUTO: 37.1 % (ref 34.8–46.1)
HGB BLD-MCNC: 12.1 G/DL (ref 11.5–15.4)
MAGNESIUM SERPL-MCNC: 1.9 MG/DL (ref 1.9–2.7)
MCH RBC QN AUTO: 32.4 PG (ref 26.8–34.3)
MCHC RBC AUTO-ENTMCNC: 32.6 G/DL (ref 31.4–37.4)
MCV RBC AUTO: 100 FL (ref 82–98)
P AXIS: 71 DEGREES
P AXIS: 76 DEGREES
PLATELET # BLD AUTO: 255 THOUSANDS/UL (ref 149–390)
PMV BLD AUTO: 11 FL (ref 8.9–12.7)
POTASSIUM SERPL-SCNC: 3.7 MMOL/L (ref 3.5–5.3)
PR INTERVAL: 158 MS
PR INTERVAL: 166 MS
QRS AXIS: 232 DEGREES
QRS AXIS: 252 DEGREES
QRSD INTERVAL: 106 MS
QRSD INTERVAL: 112 MS
QT INTERVAL: 380 MS
QT INTERVAL: 384 MS
QTC INTERVAL: 482 MS
QTC INTERVAL: 490 MS
RBC # BLD AUTO: 3.73 MILLION/UL (ref 3.81–5.12)
SODIUM SERPL-SCNC: 140 MMOL/L (ref 135–147)
T WAVE AXIS: 74 DEGREES
T WAVE AXIS: 75 DEGREES
T4 FREE SERPL-MCNC: 0.57 NG/DL (ref 0.61–1.12)
TSH SERPL DL<=0.05 MIU/L-ACNC: 10.68 UIU/ML (ref 0.45–4.5)
VENTRICULAR RATE: 97 BPM
VENTRICULAR RATE: 98 BPM
WBC # BLD AUTO: 7.02 THOUSAND/UL (ref 4.31–10.16)

## 2024-02-10 PROCEDURE — 99232 SBSQ HOSP IP/OBS MODERATE 35: CPT | Performed by: PHYSICIAN ASSISTANT

## 2024-02-10 PROCEDURE — 99223 1ST HOSP IP/OBS HIGH 75: CPT | Performed by: INTERNAL MEDICINE

## 2024-02-10 PROCEDURE — 85027 COMPLETE CBC AUTOMATED: CPT | Performed by: STUDENT IN AN ORGANIZED HEALTH CARE EDUCATION/TRAINING PROGRAM

## 2024-02-10 PROCEDURE — 84439 ASSAY OF FREE THYROXINE: CPT | Performed by: STUDENT IN AN ORGANIZED HEALTH CARE EDUCATION/TRAINING PROGRAM

## 2024-02-10 PROCEDURE — 83735 ASSAY OF MAGNESIUM: CPT | Performed by: STUDENT IN AN ORGANIZED HEALTH CARE EDUCATION/TRAINING PROGRAM

## 2024-02-10 PROCEDURE — 71250 CT THORAX DX C-: CPT

## 2024-02-10 PROCEDURE — G1004 CDSM NDSC: HCPCS

## 2024-02-10 PROCEDURE — 80048 BASIC METABOLIC PNL TOTAL CA: CPT | Performed by: STUDENT IN AN ORGANIZED HEALTH CARE EDUCATION/TRAINING PROGRAM

## 2024-02-10 PROCEDURE — 93010 ELECTROCARDIOGRAM REPORT: CPT | Performed by: STUDENT IN AN ORGANIZED HEALTH CARE EDUCATION/TRAINING PROGRAM

## 2024-02-10 PROCEDURE — 84443 ASSAY THYROID STIM HORMONE: CPT | Performed by: STUDENT IN AN ORGANIZED HEALTH CARE EDUCATION/TRAINING PROGRAM

## 2024-02-10 RX ORDER — FUROSEMIDE 10 MG/ML
40 INJECTION INTRAMUSCULAR; INTRAVENOUS
Status: DISCONTINUED | OUTPATIENT
Start: 2024-02-10 | End: 2024-02-10

## 2024-02-10 RX ORDER — ALPRAZOLAM 0.25 MG/1
0.25 TABLET ORAL
Status: DISCONTINUED | OUTPATIENT
Start: 2024-02-10 | End: 2024-02-14 | Stop reason: HOSPADM

## 2024-02-10 RX ORDER — UREA 10 %
500 LOTION (ML) TOPICAL ONCE
Status: COMPLETED | OUTPATIENT
Start: 2024-02-10 | End: 2024-02-10

## 2024-02-10 RX ADMIN — FUROSEMIDE 40 MG: 10 INJECTION, SOLUTION INTRAVENOUS at 18:23

## 2024-02-10 RX ADMIN — UMECLIDINIUM 1 PUFF: 62.5 AEROSOL, POWDER ORAL at 08:50

## 2024-02-10 RX ADMIN — ALPRAZOLAM 0.25 MG: 0.25 TABLET ORAL at 23:32

## 2024-02-10 RX ADMIN — FUROSEMIDE 40 MG: 10 INJECTION, SOLUTION INTRAVENOUS at 08:09

## 2024-02-10 RX ADMIN — ASPIRIN 162 MG: 81 TABLET, COATED ORAL at 08:09

## 2024-02-10 RX ADMIN — LEVOTHYROXINE SODIUM 125 MCG: 125 TABLET ORAL at 05:59

## 2024-02-10 RX ADMIN — Medication 500 MG: at 02:11

## 2024-02-10 RX ADMIN — FUROSEMIDE 40 MG: 10 INJECTION, SOLUTION INTRAVENOUS at 13:38

## 2024-02-10 RX ADMIN — ENOXAPARIN SODIUM 40 MG: 40 INJECTION SUBCUTANEOUS at 08:09

## 2024-02-10 RX ADMIN — FLUTICASONE FUROATE AND VILANTEROL TRIFENATATE 1 PUFF: 100; 25 POWDER RESPIRATORY (INHALATION) at 08:50

## 2024-02-10 NOTE — ASSESSMENT & PLAN NOTE
Maintained outpatient on 2.5 L nasal cannula  Has been stable on home oxygen while inpatient, continue

## 2024-02-10 NOTE — PLAN OF CARE
Problem: Potential for Falls  Goal: Patient will remain free of falls  Description: INTERVENTIONS:  - Educate patient/family on patient safety including physical limitations  - Instruct patient to call for assistance with activity   - Consult OT/PT to assist with strengthening/mobility   - Keep Call bell within reach  - Keep bed low and locked with side rails adjusted as appropriate  - Keep care items and personal belongings within reach  - Initiate and maintain comfort rounds  - Make Fall Risk Sign visible to staff  - Offer Toileting every  Hours, in advance of need  - Initiate/Maintain alarm  - Obtain necessary fall risk management equipment:   - Apply yellow socks and bracelet for high fall risk patients  - Consider moving patient to room near nurses station  Outcome: Progressing     Problem: PAIN - ADULT  Goal: Verbalizes/displays adequate comfort level or baseline comfort level  Description: Interventions:  - Encourage patient to monitor pain and request assistance  - Assess pain using appropriate pain scale  - Administer analgesics based on type and severity of pain and evaluate response  - Implement non-pharmacological measures as appropriate and evaluate response  - Consider cultural and social influences on pain and pain management  - Notify physician/advanced practitioner if interventions unsuccessful or patient reports new pain  Outcome: Progressing     Problem: INFECTION - ADULT  Goal: Absence or prevention of progression during hospitalization  Description: INTERVENTIONS:  - Assess and monitor for signs and symptoms of infection  - Monitor lab/diagnostic results  - Monitor all insertion sites, i.e. indwelling lines, tubes, and drains  - Monitor endotracheal if appropriate and nasal secretions for changes in amount and color  - [place] appropriate cooling/warming therapies per order  - Administer medications as ordered  - Instruct and encourage patient and family to use good hand hygiene technique  -  Identify and instruct in appropriate isolation precautions for identified infection/condition  Outcome: Progressing     Problem: SAFETY ADULT  Goal: Patient will remain free of falls  Description: INTERVENTIONS:  - Educate patient/family on patient safety including physical limitations  - Instruct patient to call for assistance with activity   - Consult OT/PT to assist with strengthening/mobility   - Keep Call bell within reach  - Keep bed low and locked with side rails adjusted as appropriate  - Keep care items and personal belongings within reach  - Initiate and maintain comfort rounds  - Make Fall Risk Sign visible to staff  - Offer Toileting every  Hours, in advance of need  - Initiate/Maintain alarm  - Obtain necessary fall risk management equipment:   - Apply yellow socks and bracelet for high fall risk patients  - Consider moving patient to room near nurses station  Outcome: Progressing  Goal: Maintain or return to baseline ADL function  Description: INTERVENTIONS:  -  Assess patient's ability to carry out ADLs; assess patient's baseline for ADL function and identify physical deficits which impact ability to perform ADLs (bathing, care of mouth/teeth, toileting, grooming, dressing, etc.)  - Assess/evaluate cause of self-care deficits   - Assess range of motion  - Assess patient's mobility; develop plan if impaired  - Assess patient's need for assistive devices and provide as appropriate  - Encourage maximum independence but intervene and supervise when necessary  - Involve family in performance of ADLs  - Assess for home care needs following discharge   - Consider OT consult to assist with ADL evaluation and planning for discharge  - Provide patient education as appropriate  Outcome: Progressing  Goal: Maintains/Returns to pre admission functional level  Description: INTERVENTIONS:  - Perform AM-PAC 6 Click Basic Mobility/ Daily Activity assessment daily.  - Set and communicate daily mobility goal to care  team and patient/family/caregiver.   - Collaborate with rehabilitation services on mobility goals if consulted  - Perform Range of Motion  times a day.  - Reposition patient every hours.  - Dangle patient  times a day  - Stand patient  times a day  - Ambulate patient  times a day  - Out of bed to chair  times a day   - Out of bed for meals  times a day  - Out of bed for toileting  - Record patient progress and toleration of activity level   Outcome: Progressing     Problem: DISCHARGE PLANNING  Goal: Discharge to home or other facility with appropriate resources  Description: INTERVENTIONS:  - Identify barriers to discharge w/patient and caregiver  - Arrange for needed discharge resources and transportation as appropriate  - Identify discharge learning needs (meds, wound care, etc.)  - Arrange for interpretive services to assist at discharge as needed  - Refer to Case Management Department for coordinating discharge planning if the patient needs post-hospital services based on physician/advanced practitioner order or complex needs related to functional status, cognitive ability, or social support system  Outcome: Progressing     Problem: Knowledge Deficit  Goal: Patient/family/caregiver demonstrates understanding of disease process, treatment plan, medications, and discharge instructions  Description: Complete learning assessment and assess knowledge base.  Interventions:  - Provide teaching at level of understanding  - Provide teaching via preferred learning methods  Outcome: Progressing     Problem: CARDIOVASCULAR - ADULT  Goal: Maintains optimal cardiac output and hemodynamic stability  Description: INTERVENTIONS:  - Monitor I/O, vital signs and rhythm  - Monitor for S/S and trends of decreased cardiac output  - Administer and titrate ordered vasoactive medications to optimize hemodynamic stability  - Assess quality of pulses, skin color and temperature  - Assess for signs of decreased coronary artery perfusion  -  Instruct patient to report change in severity of symptoms  Outcome: Progressing  Goal: Absence of cardiac dysrhythmias or at baseline rhythm  Description: INTERVENTIONS:  - Continuous cardiac monitoring, vital signs, obtain 12 lead EKG if ordered  - Administer antiarrhythmic and heart rate control medications as ordered  - Monitor electrolytes and administer replacement therapy as ordered  Outcome: Progressing     Problem: Prexisting or High Potential for Compromised Skin Integrity  Goal: Skin integrity is maintained or improved  Description: INTERVENTIONS:  - Identify patients at risk for skin breakdown  - Assess and monitor skin integrity  - Assess and monitor nutrition and hydration status  - Monitor labs   - Assess for incontinence   - Turn and reposition patient  - Assist with mobility/ambulation  - Relieve pressure over bony prominences  - Avoid friction and shearing  - Provide appropriate hygiene as needed including keeping skin clean and dry  - Evaluate need for skin moisturizer/barrier cream  - Collaborate with interdisciplinary team   - Patient/family teaching  - Consider wound care consult   Outcome: Progressing

## 2024-02-10 NOTE — UTILIZATION REVIEW
"Initial Clinical Review    Admission: Date/Time/Statement:   Admission Orders (From admission, onward)       Ordered        02/09/24 1612  INPATIENT ADMISSION  Once                          Orders Placed This Encounter   Procedures    INPATIENT ADMISSION     Standing Status:   Standing     Number of Occurrences:   1     Order Specific Question:   Level of Care     Answer:   Med Surg [16]     Order Specific Question:   Estimated length of stay     Answer:   More than 2 Midnights     Order Specific Question:   Certification     Answer:   I certify that inpatient services are medically necessary for this patient for a duration of greater than two midnights. See H&P and MD Progress Notes for additional information about the patient's course of treatment.     ED Arrival Information       Expected   -    Arrival   2/9/2024 14:04    Acuity   Emergent              Means of arrival   Wheelchair    Escorted by   Family Member    Service   Hospitalist    Admission type   Emergency              Arrival complaint   sob             Chief Complaint   Patient presents with    Shortness of Breath     Pt coming from cardiology presents with SOB.  Pt was sent down to ED for diuresis. Pt cardiologist told her she's has \"been in heart failure for a while\"       Initial Presentation: 78 y.o. female w/ PMH of bladder cancer, small cell lung cancer,  obesity and COPD on 2.5 L baseline O2 presented to the ED from OP Cardiology office w/ SOB.   Pt been ff her PCP d/t SOB for the last month. Has ongoing w/u for heart failure. She completed her sched echo today w/c showed new systolic heart failure, global hypokinesis with cardiology recommending patient come to the ED for further evaluation.   In the ED, appears tachypneic, and dyspneic while conversing. Placed on 3L NC. BNP elevated, CXR with evidence of vascular congestion.  On exam, tachycardia, rales, decreased BS, b/l LE edema present.    Admit as observation level of care for acute " systolic heart failure.  Plan:  IV lasix 40mg BID. Consult cardiology, fluid restrict, diabetic diet and monitor I/os    Date: 02/10   Day 2: Pt reports feeling slightly better today. On 3L NC. Breathing improved w/ IV Lasix. Continue IV Lasix 40 mg twice daily. Continue monitoring intake and output, fluid restriction, daily weights. Will defer need for additional echocardiogram to cardiology, may need contrast to rule out thrombus    Cardiology Consult: Acute on chronic combined systolic and diastolic heart failure:     PE: +elevated JVP, b/l wheezes, +1 pitting b/l LE edema.  Plan: Increase Lasix to 40 mg IV 3 times daily with low threshold to increase. Strict I's/O's and daily weights. Keep potassium greater than 4 and magnesium greater than 2. Hopeful transition to oral diuretic in the next 48 to 72 hours. Will discuss the possibility of LifeVest within the next 24 hours given cardiomyopathy.    Date: 02/11    Day 3: Has surpassed a 2nd midnight with active treatments and services, which include: cont to mon labs. Cont to mon resp status. Cont IV diuretics. Mon I/O, daily wts. additional echocardiogram with contrast to rule out thrombus. Increase levothyroxine from 125 mcg to 150 mcg-  T4 low at 0.57. Cardiology ff.    ED Triage Vitals   Temperature Pulse Respirations Blood Pressure SpO2   02/09/24 1430 02/09/24 1408 02/09/24 1408 02/09/24 1408 02/09/24 1408   97.8 °F (36.6 °C) (!) 112 (!) 24 151/92 98 %      Temp Source Heart Rate Source Patient Position - Orthostatic VS BP Location FiO2 (%)   02/09/24 1430 02/09/24 1408 02/09/24 1408 02/09/24 1408 --   Oral Monitor Lying Right arm       Pain Score       02/09/24 2100       No Pain          Wt Readings from Last 1 Encounters:   02/10/24 86.1 kg (189 lb 14.4 oz)     Additional Vital Signs:     Date/Time Temp Pulse Resp BP MAP (mmHg) SpO2 Calculated FIO2 (%) - Nasal Cannula Nasal Cannula O2 Flow Rate (L/min) O2 Device Patient Position - Orthostatic VS   02/10/24  15:38:55 98.6 °F (37 °C) 100 18 126/73 91 97 % 32 3 L/min Nasal cannula --   02/10/24 13:37:52 97.6 °F (36.4 °C) 100 22 119/71 87 98 % 32 3 L/min Nasal cannula --   02/10/24 11:57:09 -- 94 -- 111/63 79 97 % -- -- -- --   02/10/24 11:27:36 97.6 °F (36.4 °C) 97 20 94/57 69 95 % -- -- -- --   02/10/24 07:30:58 97.8 °F (36.6 °C) 101 23 Abnormal  125/73 90 96 % 32 3 L/min Nasal cannula --   02/10/24 05:05:57 -- 91 -- 114/67 83 97 % -- -- -- --   02/09/24 22:05:02 97.9 °F (36.6 °C) 105 -- 117/73 88 98 % -- -- -- --   02/09/24 22:04:03 97.9 °F (36.6 °C) 105 20 -- -- 97 % -- -- -- --   02/09/24 2100 -- -- -- -- -- -- 32 3 L/min Nasal cannula --   02/09/24 1945 -- -- -- -- -- 96 % -- -- -- --   02/09/24 18:02:07 -- -- 20 132/81 98 96 % -- -- -- --   02/09/24 1800 -- -- -- -- -- -- 36 4 L/min Nasal cannula --   02/09/24 1755 98.5 °F (36.9 °C) -- -- -- -- -- -- -- -- --   02/09/24 1534 -- 98 20 139/67 -- 100 % -- -- None (Room air) Sitting   02/09/24 1430 97.8 °F (36.6 °C) -- -- -- -- -- -- -- -- --   02/09/24 1417 -- 105 -- -- -- -- -- -- -- --     Pertinent Labs/Diagnostic Test Results:   XR chest 1 view portable   Final Result by Fern Dey MD (02/10 0918)      New left suprahilar opacity measuring up to 3.6 cm. Dedicated PA and lateral views or chest CT would be helpful for further characterization.      Moderate pulmonary edema with small right pleural effusion.      The study was marked in EPIC for immediate notification.      Resident: FERN HERNANDEZ I, the attending radiologist, have reviewed the images and agree with the final report above.      Workstation performed: [id number]         CT chest wo contrast    (Results Pending)     02/09   ECHO:  well visualized due to poor echo penetration.     Moderately dilated left ventricle cavity, severely reduced left ventricular systolic function with global hypokinesis with regions of akinesis in the apex.  Cannot definitively exclude layering thrombus in the apical  region.  Ejection fraction estimated around 17%.  Restrictive left ventricular filling pattern suggesting grade 3 diastolic dysfunction.  Decreased global longitudinal strain, -3.2%.  Dilated right ventricle, normal right ventricular systolic function.  Normal left and right atrial cavity size.  Aortic valve sclerosis, no aortic valve stenosis or regurgitation.  Thickened mitral valve leaflets with sclerosis, moderate mitral valve regurgitation.  Mild to moderate tricuspid valve and mild pulmonic valve regurgitation.  Moderate pulmonary hypertension.  Estimated RVSP/PASP is 52 mmHg.  Trace, hemodynamically insignificant pericardial effusion.     No previous echocardiogram is available for comparison.  Patient admitted to hospital for further evaluation.  Repeat limited echocardiogram with Definity contrast can be considered to further evaluate possible apical thrombus.     EKG result: Sinus tachycardia  Poor R wave progression  Cannot rule out Lateral infarct , age undetermined    EKG 2: Normal sinus rhythm  Right superior axis deviation  Prolonged QT        Results from last 7 days   Lab Units 02/10/24  0501 02/09/24  1435   WBC Thousand/uL 7.02 8.28   HEMOGLOBIN g/dL 12.1 12.1   HEMATOCRIT % 37.1 36.6   PLATELETS Thousands/uL 255 288   NEUTROS ABS Thousands/µL  --  5.91         Results from last 7 days   Lab Units 02/10/24  0501 02/09/24  1435   SODIUM mmol/L 140 139   POTASSIUM mmol/L 3.7 3.7   CHLORIDE mmol/L 103 103   CO2 mmol/L 32 30   ANION GAP mmol/L 5 6   BUN mg/dL 10 11   CREATININE mg/dL 0.57* 0.62   EGFR ml/min/1.73sq m 87 85   CALCIUM mg/dL 9.3 9.1   MAGNESIUM mg/dL 1.9  --      Results from last 7 days   Lab Units 02/09/24  1435   AST U/L 21   ALT U/L 18   ALK PHOS U/L 64   TOTAL PROTEIN g/dL 7.2   ALBUMIN g/dL 4.0   TOTAL BILIRUBIN mg/dL 0.36         Results from last 7 days   Lab Units 02/10/24  0501 02/09/24  1435   GLUCOSE RANDOM mg/dL 86 95       Results from last 7 days   Lab Units 02/09/24  1828  "02/09/24  1532 02/09/24  1435   HS TNI 0HR ng/L  --   --  34   HS TNI 2HR ng/L  --  39  --    HSTNI D2 ng/L  --  5  --    HS TNI 4HR ng/L 37  --   --    HSTNI D4 ng/L 3  --   --              Results from last 7 days   Lab Units 02/10/24  0501   TSH 3RD GENERATON uIU/mL 10.682*                     Results from last 7 days   Lab Units 02/09/24  1435   BNP pg/mL 1,140*               ED Treatment:   Medication Administration from 02/09/2024 1404 to 02/09/2024 1750       None          Past Medical History:   Diagnosis Date    Anesthesia     \"constipation after surgery\"    Bladder cancer (HCC)     Cataract     left    Colon polyp     COPD (chronic obstructive pulmonary disease) (HCC)     Dry mouth     Full dentures     GERD (gastroesophageal reflux disease)     occas    History of pneumonia     Hyperlipidemia     Hypothyroidism     Lung cancer (HCC)     stage 3    Pulmonary emphysema (HCC)     Skin cancer     squamous cell of the leg    Sleep difficulties     \"gets up frequently to void\"    UTI (urinary tract infection) 12/2019    recent e coli treated and resolved    Wears glasses      Present on Admission:   Chronic obstructive pulmonary disease (HCC)   Non-small cell carcinoma of left lung, stage 3 (HCC)   Adenocarcinoma of bladder (HCC)   Hypothyroidism      Admitting Diagnosis: Acute systolic heart failure (HCC) [I50.21]  CHF (congestive heart failure) (HCC) [I50.9]  SOB (shortness of breath) [R06.02]  Age/Sex: 78 y.o. female  Admission Orders:  SCD  PT/OT  Daily wts  I/O  Tele      Scheduled Medications:  aspirin, 162 mg, Oral, Daily  enoxaparin, 40 mg, Subcutaneous, Daily  fluticasone, 2 spray, Nasal, Daily  Fluticasone Furoate-Vilanterol, 1 puff, Inhalation, Daily  furosemide, 40 mg, Intravenous, TID (diuretic)  levothyroxine, 125 mcg, Oral, Early Morning  umeclidinium, 1 puff, Inhalation, Daily      Continuous IV Infusions: none     PRN Meds:  albuterol, 2 puff, Inhalation, Q4H PRN  ipratropium-albuterol, 3 mL, " Nebulization, TID PRN 02/09 x 1  ondansetron, 4 mg, Intravenous, Q6H PRN        IP CONSULT TO NUTRITION SERVICES  IP CONSULT TO CARDIOLOGY    Network Utilization Review Department  ATTENTION: Please call with any questions or concerns to [phone] and carefully listen to the prompts so that you are directed to the right person. All voicemails are confidential.   For Discharge needs, contact Care Management DC Support Team at [phone] opt. [phone]  Send all requests for admission clinical reviews, approved or denied determinations and any other requests to dedicated fax number below belonging to the campus where the patient is receiving treatment. List of dedicated fax numbers for the Facilities:  FACILITY NAME UR FAX NUMBER   ADMISSION DENIALS (Administrative/Medical Necessity) [phone]   DISCHARGE SUPPORT TEAM (NETWORK) [phone]   PARENT CHILD HEALTH (Maternity/NICU/Pediatrics) [phone]   [facility] [phone]   [facility] [phone]   [facility] [phone]   [facility] [phone]   [facility] [phone]   [facility] [phone]   [facility] [phone]   [facility] [phone]   [facility] [phone]   [facility] [phone]   [facility] [phone]   [facility] [phone]

## 2024-02-10 NOTE — PROGRESS NOTES
[facility]  Progress Note  Name: Cortney Harding I  MRN: [id number]  Unit/Bed#: [place] [address] -01 I Date of Admission: 2/9/2024   Date of Service: 2/10/2024 I Hospital Day: 1    Assessment/Plan   * Acute systolic heart failure (HCC)  Assessment & Plan  Wt Readings from Last 3 Encounters:   02/10/24 86.1 kg (189 lb 14.4 oz)   02/09/24 87.1 kg (192 lb)   12/12/22 87.3 kg (192 lb 6.4 oz)     78-year-old female with past medical history of bladder cancer, small cell lung cancer in remission, COPD and obesity presented with shortness of breath  Has been following with her outpatient PCP, workup for heart failure ongoing.  Underwent outpatient echocardiogram and was sent to the ER for low EF  Echocardiogram with moderately dilated left ventricular cavity, severely reduced left ventricular systolic function with global hypokinesis and regions of akinesis of the apex, cannot definitively exclude layering thrombus in the apical region, EF 17%, grade 3 diastolic dysfunction  Cardiology consulted  Continue IV Lasix 40 mg twice daily  Continue monitoring intake and output, fluid restriction, daily weights  Will defer need for additional echocardiogram to cardiology, may need contrast to rule out thrombus    Chronic respiratory failure (HCC)  Assessment & Plan  Maintained outpatient on 2.5 L nasal cannula  Has been stable on home oxygen while inpatient, continue    Class 2 severe obesity due to excess calories with serious comorbidity and body mass index (BMI) of 35.0 to 35.9 in adult   Assessment & Plan  BMI noted to be 35, would benefit from dietary changes, lifestyle modifications    Non-small cell carcinoma of left lung, stage 3 (HCC)  Assessment & Plan  History of non-small cell lung cancer with adenocarcinoma histology  Had follow-up with oncology outpatient, Dr. Hansen in 2021 with no evidence of recurrence at that time    Hypothyroidism  Assessment & Plan  TSH elevated at 10, follow-up T4  For  now continue levothyroxine 125 mcg daily    Chronic obstructive pulmonary disease (HCC)  Assessment & Plan  Does not appear in acute exacerbation  Continue nebulizers and inhalers    Adenocarcinoma of bladder (HCC)  Assessment & Plan  Patient previously followed with Dr. De Oliveira  Has consider Keytruda but has been lost to follow-up             VTE Pharmacologic Prophylaxis:   High Risk (Score >/= 5) - Pharmacological DVT Prophylaxis Ordered: enoxaparin (Lovenox). Sequential Compression Devices Ordered.    Mobility:   Basic Mobility Inpatient Raw Score: 19  JH-HLM Goal: 6: Walk 10 steps or more  HLM Goal NOT achieved. Continue with multidisciplinary rounding and encourage appropriate mobility to improve upon HLM goals.    Patient Centered Rounds: I performed bedside rounds with nursing staff today.   Discussions with Specialists or Other Care Team Provider: none    Education and Discussions with Family / Patient: Patient declined call to .     Total Time Spent on Date of Encounter in care of patient: This time was spent on one or more of the following: performing physical exam; counseling and coordination of care; obtaining or reviewing history; documenting in the medical record; reviewing/ordering tests, medications or procedures; communicating with other healthcare professionals and discussing with patient's family/caregivers.    Current Length of Stay: 1 day(s)  Current Patient Status: Inpatient   Certification Statement: The patient will continue to require additional inpatient hospital stay due to acute heart failure pending cardiology eval  Discharge Plan: Anticipate discharge in >72 hrs to rehab facility.    Code Status: Level 1 - Full Code    Subjective:   Patient reports she is feeling slightly better today.  Unhappy to hear that she has new heart failure.  Does report some improvement in her breathing with Lasix given yesterday.  Reports she has been very stressed and has not been sleeping  but slept well after 1 dose of Xanax last night.    Objective:     Vitals:   Temp (24hrs), Av °F (36.7 °C), Min:97.8 °F (36.6 °C), Max:98.5 °F (36.9 °C)    Temp:  [97.8 °F (36.6 °C)-98.5 °F (36.9 °C)] 97.8 °F (36.6 °C)  HR:  [] 101  Resp:  [20-24] 23  BP: (114-151)/(62-92) 125/73  SpO2:  [96 %-100 %] 96 %  Body mass index is 34.73 kg/m².     Input and Output Summary (last 24 hours):     Intake/Output Summary (Last 24 hours) at 2/10/2024 0809  Last data filed at 2/10/2024 0730  Gross per 24 hour   Intake 480 ml   Output 1450 ml   Net -970 ml       Physical Exam:   Physical Exam  Vitals reviewed.   Constitutional:       General: She is not in acute distress.     Comments: 2.5L NC   HENT:      Head: Normocephalic and atraumatic.   Eyes:      General: No scleral icterus.     Conjunctiva/sclera: Conjunctivae normal.   Cardiovascular:      Rate and Rhythm: Normal rate and regular rhythm.      Heart sounds: No murmur heard.  Pulmonary:      Effort: Pulmonary effort is normal. No respiratory distress.      Breath sounds: No wheezing or rhonchi.      Comments: Decreased at bases  Abdominal:      General: Bowel sounds are normal. There is no distension.      Palpations: Abdomen is soft.      Tenderness: There is no abdominal tenderness.   Musculoskeletal:      Cervical back: Neck supple.      Right lower leg: Edema present.      Left lower leg: Edema present.   Skin:     General: Skin is warm and dry.   Neurological:      Mental Status: She is alert and oriented to person, place, and time.   Psychiatric:         Mood and Affect: Mood normal.         Behavior: Behavior normal.          Additional Data:     Labs:  Results from last 7 days   Lab Units 02/10/24  0501 24  1435   WBC Thousand/uL 7.02 8.28   HEMOGLOBIN g/dL 12.1 12.1   HEMATOCRIT % 37.1 36.6   PLATELETS Thousands/uL 255 288   NEUTROS PCT %  --  71   LYMPHS PCT %  --  19   MONOS PCT %  --  5   EOS PCT %  --  3     Results from last 7 days   Lab Units  02/10/24  0501 02/09/24  1435   SODIUM mmol/L 140 139   POTASSIUM mmol/L 3.7 3.7   CHLORIDE mmol/L 103 103   CO2 mmol/L 32 30   BUN mg/dL 10 11   CREATININE mg/dL 0.57* 0.62   ANION GAP mmol/L 5 6   CALCIUM mg/dL 9.3 9.1   ALBUMIN g/dL  --  4.0   TOTAL BILIRUBIN mg/dL  --  0.36   ALK PHOS U/L  --  64   ALT U/L  --  18   AST U/L  --  21   GLUCOSE RANDOM mg/dL 86 95                       Lines/Drains:  Invasive Devices       Peripheral Intravenous Line  Duration             Peripheral IV 02/09/24 Left;Proximal;Ventral (anterior) Forearm <1 day                      Telemetry:  Telemetry Orders (From admission, onward)               24 Hour Telemetry Monitoring  Continuous x 24 Hours (Telem)        Question:  Reason for 24 Hour Telemetry  Answer:  Decompensated CHF- and any one of the following: continuous diuretic infusion or total diuretic dose >200 mg daily, associated electrolyte derangement (I.e. K < 3.0), ionotropic drip (continuous infusion), hx of ventricular arrhythmia, or new EF < 35%                     Telemetry Reviewed: Normal Sinus Rhythm  Indication for Continued Telemetry Use: Acute CHF on >200 mg lasix/day or equivalent dose or with new reduced EF.              Imaging: Reviewed radiology reports from this admission including: ECHO    Recent Cultures (last 7 days):         Last 24 Hours Medication List:   Current Facility-Administered Medications   Medication Dose Route Frequency Provider Last Rate    albuterol  2 puff Inhalation Q4H PRN Leodan Gifford PA-C      aspirin  162 mg Oral Daily Luis Colvin MD      enoxaparin  40 mg Subcutaneous Daily Luis Colvin MD      fluticasone  2 spray Nasal Daily Luis Colvin MD      Fluticasone Furoate-Vilanterol  1 puff Inhalation Daily Luis Colvin MD      furosemide  40 mg Intravenous BID Luis Colvin MD      ipratropium-albuterol  3 mL Nebulization TID PRN Luis Colvin MD      levothyroxine  125 mcg Oral Early Morning Luis Colvin MD       ondansetron  4 mg Intravenous Q6H PRN Luis Colvin MD      umeclidinium  1 puff Inhalation Daily Luis Colvin MD          Today, Patient Was Seen By: Daniella Zuñiga PA-C    **Please Note: This note may have been constructed using a voice recognition system.**

## 2024-02-10 NOTE — PLAN OF CARE
Problem: Potential for Falls  Goal: Patient will remain free of falls  Description: INTERVENTIONS:  - Educate patient/family on patient safety including physical limitations  - Instruct patient to call for assistance with activity   - Consult OT/PT to assist with strengthening/mobility   - Keep Call bell within reach  - Keep bed low and locked with side rails adjusted as appropriate  - Keep care items and personal belongings within reach  - Initiate and maintain comfort rounds  - Make Fall Risk Sign visible to staff  - Offer Toileting every 2 Hours, in advance of need  - Initiate/Maintain bed alarm  - Obtain necessary fall risk management equipment: alarm   - Apply yellow socks and bracelet for high fall risk patients  - Consider moving patient to room near nurses station  2/9/2024 2043 by Patricia Cerna RN  Outcome: Progressing  2/9/2024 2043 by Patricia Cerna RN  Outcome: Progressing  2/9/2024 2042 by Patricia Cerna RN  Outcome: Progressing     Problem: PAIN - ADULT  Goal: Verbalizes/displays adequate comfort level or baseline comfort level  Description: Interventions:  - Encourage patient to monitor pain and request assistance  - Assess pain using appropriate pain scale  - Administer analgesics based on type and severity of pain and evaluate response  - Implement non-pharmacological measures as appropriate and evaluate response  - Consider cultural and social influences on pain and pain management  - Notify physician/advanced practitioner if interventions unsuccessful or patient reports new pain  2/9/2024 2043 by Patricia Cerna RN  Outcome: Progressing  2/9/2024 2043 by Patricia Cerna RN  Outcome: Progressing     Problem: INFECTION - ADULT  Goal: Absence or prevention of progression during hospitalization  Description: INTERVENTIONS:  - Assess and monitor for signs and symptoms of infection  - Monitor lab/diagnostic results  - Monitor all insertion sites, i.e. indwelling lines, tubes, and drains  -  Monitor endotracheal if appropriate and nasal secretions for changes in amount and color  - [place] appropriate cooling/warming therapies per order  - Administer medications as ordered  - Instruct and encourage patient and family to use good hand hygiene technique  - Identify and instruct in appropriate isolation precautions for identified infection/condition  2/9/2024 2043 by Patricia Cerna RN  Outcome: Progressing  2/9/2024 2043 by Patricia Cerna RN  Outcome: Progressing     Problem: SAFETY ADULT  Goal: Patient will remain free of falls  Description: INTERVENTIONS:  - Educate patient/family on patient safety including physical limitations  - Instruct patient to call for assistance with activity   - Consult OT/PT to assist with strengthening/mobility   - Keep Call bell within reach  - Keep bed low and locked with side rails adjusted as appropriate  - Keep care items and personal belongings within reach  - Initiate and maintain comfort rounds  - Make Fall Risk Sign visible to staff  - Offer Toileting every 2 Hours, in advance of need  - Initiate/Maintain bed alarm  - Obtain necessary fall risk management equipment: alarm  - Apply yellow socks and bracelet for high fall risk patients  - Consider moving patient to room near nurses station  2/9/2024 2043 by Patricia Cerna RN  Outcome: Progressing  2/9/2024 2043 by Patricia Cerna RN  Outcome: Progressing  2/9/2024 2042 by Patricia Cerna RN  Outcome: Progressing  Goal: Maintain or return to baseline ADL function  Description: INTERVENTIONS:  -  Assess patient's ability to carry out ADLs; assess patient's baseline for ADL function and identify physical deficits which impact ability to perform ADLs (bathing, care of mouth/teeth, toileting, grooming, dressing, etc.)  - Assess/evaluate cause of self-care deficits   - Assess range of motion  - Assess patient's mobility; develop plan if impaired  - Assess patient's need for assistive devices and provide as  appropriate  - Encourage maximum independence but intervene and supervise when necessary  - Involve family in performance of ADLs  - Assess for home care needs following discharge   - Consider OT consult to assist with ADL evaluation and planning for discharge  - Provide patient education as appropriate  2/9/2024 2043 by Patricia Cerna RN  Outcome: Progressing  2/9/2024 2043 by Patricia Cerna RN  Outcome: Progressing  Goal: Maintains/Returns to pre admission functional level  Description: INTERVENTIONS:  - Perform AM-PAC 6 Click Basic Mobility/ Daily Activity assessment daily.  - Set and communicate daily mobility goal to care team and patient/family/caregiver.   - Collaborate with rehabilitation services on mobility goals if consulted  - Perform Range of Motion 4 times a day.  - Reposition patient every 2 hours.  - Dangle patient 3 times a day  - Stand patient 3 times a day  - Ambulate patient 3 times a day  - Out of bed to chair 3 times a day   - Out of bed for meals 3 times a day  - Out of bed for toileting  - Record patient progress and toleration of activity level   2/9/2024 2043 by Patricia Cerna RN  Outcome: Progressing  2/9/2024 2043 by Patricia Cerna RN  Outcome: Progressing     Problem: DISCHARGE PLANNING  Goal: Discharge to home or other facility with appropriate resources  Description: INTERVENTIONS:  - Identify barriers to discharge w/patient and caregiver  - Arrange for needed discharge resources and transportation as appropriate  - Identify discharge learning needs (meds, wound care, etc.)  - Arrange for interpretive services to assist at discharge as needed  - Refer to Case Management Department for coordinating discharge planning if the patient needs post-hospital services based on physician/advanced practitioner order or complex needs related to functional status, cognitive ability, or social support system  2/9/2024 2043 by Patricia Cerna RN  Outcome: Progressing  2/9/2024 2043 by Patricia  BENEDICTO Cerna RN  Outcome: Progressing     Problem: Knowledge Deficit  Goal: Patient/family/caregiver demonstrates understanding of disease process, treatment plan, medications, and discharge instructions  Description: Complete learning assessment and assess knowledge base.  Interventions:  - Provide teaching at level of understanding  - Provide teaching via preferred learning methods  2/9/2024 2043 by Patricia Cerna RN  Outcome: Progressing  2/9/2024 2043 by Patricia Cerna RN  Outcome: Progressing     Problem: CARDIOVASCULAR - ADULT  Goal: Maintains optimal cardiac output and hemodynamic stability  Description: INTERVENTIONS:  - Monitor I/O, vital signs and rhythm  - Monitor for S/S and trends of decreased cardiac output  - Administer and titrate ordered vasoactive medications to optimize hemodynamic stability  - Assess quality of pulses, skin color and temperature  - Assess for signs of decreased coronary artery perfusion  - Instruct patient to report change in severity of symptoms  2/9/2024 2043 by Patricia Cerna RN  Outcome: Progressing  2/9/2024 2043 by Patricia Cerna RN  Outcome: Progressing  Goal: Absence of cardiac dysrhythmias or at baseline rhythm  Description: INTERVENTIONS:  - Continuous cardiac monitoring, vital signs, obtain 12 lead EKG if ordered  - Administer antiarrhythmic and heart rate control medications as ordered  - Monitor electrolytes and administer replacement therapy as ordered  2/9/2024 2043 by Patricia Cerna RN  Outcome: Progressing  2/9/2024 2043 by Patricia Cerna RN  Outcome: Progressing

## 2024-02-10 NOTE — CONSULTS
Cardiology Consultation  MD Erick Mart MD, FACC  Abhishek Vanessa DO, [facility]  MD Rafal Smith DO, [facility]  Jones Hawley DO, [facility]  ----------------------------------------------------------------  [address] [address]    Cortney Harding 78 y.o. female MRN: [id number]  Unit/Bed#: [address] 227-01 Encounter: [id number]      Reason for Consultation: Heart failure      ASSESSMENT:   Acute on chronic combined systolic and diastolic heart failure  LVEF 17%, moderate LV dilatation, grade 3 diastolic dysfunction with apical akinesis, RV dilatation with normal function, AV sclerosis, moderate MR, mild to moderate TR with PASP 52 mmHg, trace pericardial effusion, February 2024  Dyslipidemia  Severe COPD with chronic hypoxic respiratory failure  History of bladder carcinoma  History of stage III lung cancer  Hypothyroid    PLAN:  Patient was found to have significant shortness of breath with pursed lip and new cardiomyopathy  Physical examination demonstrated significant peripheral edema and she was recommended for inpatient IV diuretic  Increase Lasix to 40 mg IV 3 times daily with low threshold to increase  Since initiation of IV diuretic, patient admits to improvement in her breathing and lower extremity edema  Strict I's/O's and daily weights  Keep potassium greater than 4 and magnesium greater than 2  Hopeful transition to oral diuretic in the next 48 to 72 hours  Will discuss the possibility of LifeVest within the next 24 hours given cardiomyopathy  Supportive care    Signed: Abhishek Vanessa DO, CHARISMA ZAMORA FACP      History of Present Illness:  Cortney Harding is a 78 y.o. female with severe COPD, dyslipidemia, bladder cancer and lung cancer presented for outpatient echocardiogram admitting to significant progressive lower extremity edema with severe dyspnea on exertion.  The patient was found to be pursed lip breathing and echo demonstrated  "severe cardiomyopathy.  Ejection fraction was found to be at 17% with at least moderate mitral regurgitation and moderate to severe pulmonary hypertension.  Due to her presentation, she was recommended for acute treatment of her heart failure with intravenous diuretic as she reported her weights were not substantially improving on outpatient oral diuretic.  She denies any chest pain, pressure, tightness or squeezing.  Denies lightheadedness, dizziness or palpitations.      Review of Systems:  Review of Systems   Constitutional: Negative for decreased appetite, fever, weight gain and weight loss.   HENT:  Negative for congestion and sore throat.    Eyes:  Negative for visual disturbance.   Cardiovascular:  Positive for dyspnea on exertion and leg swelling. Negative for chest pain, near-syncope and palpitations.   Respiratory:  Positive for shortness of breath. Negative for cough.    Hematologic/Lymphatic: Negative for bleeding problem.   Skin:  Negative for rash.   Musculoskeletal:  Negative for myalgias and neck pain.   Gastrointestinal:  Negative for abdominal pain and nausea.   Neurological:  Negative for light-headedness and weakness.   Psychiatric/Behavioral:  Negative for depression.          Past Medical History:   Diagnosis Date    Anesthesia     \"constipation after surgery\"    Bladder cancer (HCC)     Cataract     left    Colon polyp     COPD (chronic obstructive pulmonary disease) (HCC)     Dry mouth     Full dentures     GERD (gastroesophageal reflux disease)     occas    History of pneumonia     Hyperlipidemia     Hypothyroidism     Lung cancer (HCC)     stage 3    Pulmonary emphysema (HCC)     Skin cancer     squamous cell of the leg    Sleep difficulties     \"gets up frequently to void\"    UTI (urinary tract infection) 12/2019    recent e coli treated and resolved    Wears glasses        Past Surgical History:   Procedure Laterality Date    CHOLECYSTECTOMY      COLONOSCOPY      CYSTOSCOPY      4/10 AND " 8/10, 3/12/2015 2016  DIAGNOSTIC     CYSTOSCOPY  2020    CYSTOSCOPY  2021    CYSTOSCOPY  10/29/2021    DENTAL SURGERY      EYE SURGERY      HYSTERECTOMY      OTHER SURGICAL HISTORY      TRANSURETHERAL RESECTION     MD CYSTO W/REMOVAL OF LESIONS SMALL N/A 2019    Procedure: BLADDER BIOPSY; BLADDER FULGERATION with mitomycin;  Surgeon: Daniel De Oliveira MD;  Location:  MAIN OR;  Service: Urology    MD CYSTOURETHROSCOPY WITH BIOPSY N/A 2020    Procedure: CYSTOSCOPY WITH BIOPSIES, FULGURATION;  Surgeon: Daniel De Oliveira MD;  Location: [facility];  Service: Urology    MD CYSTOURETHROSCOPY WITH BIOPSY N/A 2020    Procedure: CYSTO W/BIOPSIES & FULGURATION;  Surgeon: Daniel De Oliveira MD;  Location: [facility];  Service: Urology    WISDOM TOOTH EXTRACTION         Social History     Socioeconomic History    Marital status: Single     Spouse name: None    Number of children: None    Years of education: None    Highest education level: None   Occupational History    None   Tobacco Use    Smoking status: Former     Current packs/day: 0.00     Types: Cigarettes     Quit date: 2019     Years since quittin.0    Smokeless tobacco: Never   Vaping Use    Vaping status: Former   Substance and Sexual Activity    Alcohol use: Not Currently    Drug use: No    Sexual activity: None   Other Topics Concern    None   Social History Narrative    None     Social Determinants of Health     Financial Resource Strain: Low Risk  (2023)    Overall Financial Resource Strain (CARDIA)     Difficulty of Paying Living Expenses: Not hard at all   Food Insecurity: Not on file   Transportation Needs: No Transportation Needs (2023)    PRAPARE - Transportation     Lack of Transportation (Medical): No     Lack of Transportation (Non-Medical): No   Physical Activity: Not on file   Stress: Not on file   Social Connections: Not on file   Intimate Partner Violence: Not on file   Housing Stability: Not on file        Family History   Problem Relation Age of Onset    Cancer Mother         Mouth     Mental illness Sister     Colon cancer Paternal Grandmother     No Known Problems Daughter        Allergies   Allergen Reactions    Erythromycin Hives    Penicillins Hives    Sulfa Antibiotics Hives    Tetracyclines & Related Hives       No current facility-administered medications on file prior to encounter.     Current Outpatient Medications on File Prior to Encounter   Medication Sig    albuterol (Ventolin HFA) 90 mcg/act inhaler Inhale 2 puffs every 6 (six) hours as needed for wheezing    Ascorbic Acid (VITAMIN C) 100 MG tablet Take 100 mg by mouth daily    aspirin 325 mg tablet Take 0.5 tablets by mouth daily     Calcium 600 MG tablet Take 1 tablet by mouth 2 (two) times a day    Chromium 200 MCG TABS Take 1 tablet by mouth daily    fluorouracil (EFUDEX) 5 % cream Use as directed    fluticasone (FLONASE) 50 mcg/act nasal spray 2 sprays into each nostril daily    Fluticasone Furoate-Vilanterol 100-25 mcg/actuation inhaler Inhale 1 puff daily Rinse mouth after use.    furosemide (LASIX) 20 mg tablet Take 1 tablet (20 mg total) by mouth daily    ipratropium-albuterol (DUO-NEB) 0.5-2.5 mg/3 mL nebulizer solution Take 3 mL by nebulization 3 (three) times a day as needed for wheezing or shortness of breath    levothyroxine 125 mcg tablet TAKE 1 TABLET BY MOUTH EVERY DAY    loratadine-pseudoephedrine (CLARITIN-D 12-HOUR) 5-120 mg per tablet Take 1 tablet by mouth 2 (two) times a day    magnesium 30 MG tablet Take 30 mg by mouth 2 (two) times a day    melatonin 1 mg Take 10 mg by mouth daily at bedtime     Multiple Vitamin (MULTIVITAMINS PO) Take 1 tablet by mouth daily    Omega-3 Fatty Acids (FISH OIL) 1200 MG CAPS Take by mouth    OXYGEN-HELIUM IN Inhale as needed Only uses rarely    Spiriva HandiHaler 18 MCG inhalation capsule INHALE 1 CAPSULE VIA HANDIHALER ONCE DAILY AT THE SAME TIME EVERY DAY        Current  Facility-Administered Medications   Medication Dose Route Frequency Provider Last Rate    albuterol  2 puff Inhalation Q4H PRN Leodan Gifford PA-C      aspirin  162 mg Oral Daily Luis Covlin MD      enoxaparin  40 mg Subcutaneous Daily Luis Colvin MD      fluticasone  2 spray Nasal Daily Luis Colvin MD      Fluticasone Furoate-Vilanterol  1 puff Inhalation Daily Luis Colvin MD      furosemide  40 mg Intravenous TID (diuretic) Abhishek Vanessa DO      ipratropium-albuterol  3 mL Nebulization TID PRN Luis Colvin MD      levothyroxine  125 mcg Oral Early Morning Luis Colvin MD      ondansetron  4 mg Intravenous Q6H PRN Luis Colvin MD      umeclidinium  1 puff Inhalation Daily Luis Colvin MD              Vitals:    02/10/24 0507 02/10/24 0600 02/10/24 0730 02/10/24 1127   BP:   125/73 94/57   BP Location:       Pulse:   101 97   Resp:   (!) 23 20   Temp:   97.8 °F (36.6 °C) 97.6 °F (36.4 °C)   TempSrc:   Oral    SpO2:   96% 95%   Weight: (!) 190 kg (418 lb 10.5 oz) 86.1 kg (189 lb 14.4 oz)     Height:           I/O last 3 completed shifts:  In: -   Out: 1450 [Urine:1450]      Intake/Output Summary (Last 24 hours) at 2/10/2024 1157  Last data filed at 2/10/2024 1101  Gross per 24 hour   Intake 840 ml   Output 2050 ml   Net -1210 ml       Weight change:     PHYSICAL EXAMINATION:  Gen: Awake, Alert, NAD  Head/eyes: AT/NC, pupils equal and round, Anicteric  ENT: mmm  Neck: Supple, +elevated JVP, trachea midline  Resp: Bilateral wheezes  CV: RRR +S1, S2, No m/r/g  Abd: Soft, NT/ND + BS  Ext: +1 pitting LE edema bilaterally  Neuro: Follows commands, moves all extermities  Psych: Appropriate affect, normal mood, pleasant attitude, non-combative  Skin: warm; no rash, erythema or venous stasis changes on exposed skin    Lab Results:  Results from last 7 days   Lab Units 02/10/24  0501   WBC Thousand/uL 7.02   HEMOGLOBIN g/dL 12.1   HEMATOCRIT % 37.1   PLATELETS Thousands/uL 255     Results  "from last 7 days   Lab Units 02/10/24  0501 24  1435   POTASSIUM mmol/L 3.7 3.7   CHLORIDE mmol/L 103 103   CO2 mmol/L 32 30   BUN mg/dL 10 11   CREATININE mg/dL 0.57* 0.62   CALCIUM mg/dL 9.3 9.1   ALK PHOS U/L  --  64   ALT U/L  --  18   AST U/L  --  21     No results found for: \"TROPONINT\"      Results from last 7 days   Lab Units 24  1828 24  1532 24  1435   HS TNI 0HR ng/L  --   --  34   HS TNI 2HR ng/L  --  39  --    HS TNI 4HR ng/L 37  --   --                    No results found for this or any previous visit.    No results found for this or any previous visit.    No results found for this or any previous visit.    Results for orders placed during the hospital encounter of 16    NM myocardial perfusion spect (rx stress and/or rest)    [place] [address]  [phone]    Rest/Stress Gated SPECT Myocardial Perfusion Imaging After Regadenoson    Patient: VADIM LEI  MR number: [id number]  Account number: [id number]  : 1945  Age: 71 years  Gender: Female  Status: Outpatient  Location: [facility] [place]  Height: 61 in  Weight: 209 lb  BP: 151/ 78 mmHg    Allergies: ERYTHROMYCIN, PENICILLINS, SULFA ANTIBIOTICS, TETRACYCLINES RELATED    Diagnosis: J44.9 - Chronic obstructive pulmonary disease, unspecified, R07.9 -  Chest pain, unspecified    Primary Physician:  Petar Mcdermott MD  Technician:  Braydon Cordova  RN:  Renetta Simmons RN BSN  Referring Physician:  Petar Mcdermott MD  Group:  [facility]  Report Prepared By::  Renetta Simmons RN BSN  Interpreting Physician:  Rafal Oakley DO    INDICATIONS: Detection of coronary artery disease.    HISTORY: CANCER:LUNG/BLADDER/SKIN  ADAIR/COPD CURRNT LONG TIME SMOKER  BL HIP PAIN/GAIT DISTURBANCE The patient is a 71 year old  female.  Chest pain status: chest pain. Other symptoms: dyspnea and decreased " effort  tolerance. Coronary artery disease risk factors: dyslipidemia, smoking, and  post-menopausal state. Cardiovascular history: none significant. Co-morbidity:  history of lung disease and obesity. Medications: no cardiac drugs.    PHYSICAL EXAM: Baseline physical exam screening: no wheezes audible.    REST ECG: Normal sinus rhythm. Normal baseline ECG.    PROCEDURE: The study was performed at the [facility] [place]. A  regadenoson infusion pharmacologic stress test was performed. Gated SPECT  myocardial perfusion imaging was performed after stress and at rest. Systolic  blood pressure was 151 mmHg, at the start of the study. Diastolic blood  pressure was 78 mmHg, at the start of the study. The heart rate was 81 bpm, at  the start of the study. IV double checked.  Regadenoson protocol:  HR bpm SBP mmHg DBP mmHg Symptoms  Baseline 81 151 78 none  Immediate -- -- -- mild dyspnea  1 min -- -- -- moderate dyspnea  2 min 91 130 70 same as above  3 min -- -- -- subsiding  4 min -- -- -- subsiding  5 min 90 135 74 none  The patient also performed low level exercise with leg lifts.    STRESS SUMMARY: Duration of pharmacologic stress was Maximal heart rate during  stress was 104 bpm. There was normal resting blood pressure with an appropriate  response to stress. The rate-pressure product for the peak heart rate and blood  pressure was 20343. There was no chest pain during stress. The stress test was  terminated due to protocol completion. Pre oxygen saturation: 95 %. Peak oxygen  saturation: 97 %. The stress ECG was negative for ischemia. There were no  stress arrhythmias or conduction abnormalities.    ISOTOPE ADMINISTRATION:  Resting isotope administration Stress isotope administration  Agent Tetrofosmin Tetrofosmin  Dose 10.2 mCi 32.3 mCi  Date 05/05/2016 05/05/2016  Injection time 08:55 10:35  Imaging time 09:25 11:35  Injection-image interval 30 min 45 min    The radiopharmaceutical was injected at the  peak effect of pharmacologic  stress.    MYOCARDIAL PERFUSION IMAGING:  The image quality was good. Rotating projection images reveal mild breast  attenuation. Left ventricular size was normal. The TID ratio was 1.15.    PERFUSION DEFECTS:  -  There were no perfusion defects.    GATED SPECT:  The calculated left ventricular ejection fraction was 50 %. Left ventricular  ejection fraction was within normal limits by visual estimate. There was no  left ventricular regional abnormality.    SUMMARY:  -  Stress results: There was no chest pain during stress.  -  ECG conclusions: The stress ECG was negative for ischemia.  -  Perfusion imaging: There were no perfusion defects.  -  Gated SPECT: The calculated left ventricular ejection fraction was 50 %.  Left ventricular ejection fraction was within normal limits by visual estimate.  There was no left ventricular regional abnormality.    IMPRESSIONS: Normal study after pharmacologic vasodilation. Myocardial  perfusion imaging was normal at rest and with stress. Left ventricular systolic  function was normal.    Prepared and signed by    Rafal Oakley DO  Signed 05/05/2016 14:23:20      CXR: No results found for this or any previous visit.    Results for orders placed during the hospital encounter of 02/09/24    XR chest 1 view portable    Narrative  XR CHEST PORTABLE    INDICATION: Shortness of breath.    COMPARISON: Chest radiograph 2/12/2013    FINDINGS:    Mild eventration of the right diaphragm. Increased interstitial lung markings with Kerley-B lines bilaterally. Small right pleural effusion.    New left suprahilar opacity measuring up to 3.6 cm.    No pneumothorax..    Enlarged cardiac silhouette, unchanged.    Bilateral degenerative changes of the acromioclavicular and glenohumeral joints.    Normal upper abdomen.    Impression  New left suprahilar opacity measuring up to 3.6 cm. Dedicated PA and lateral views or chest CT would be helpful for further  characterization.    Moderate pulmonary edema with small right pleural effusion.    The study was marked in EPIC for immediate notification.    Resident: FERN HERNANDEZ I, the attending radiologist, have reviewed the images and agree with the final report above.    Workstation performed: [id number]      ECG: Sinus tachycardia, poor R wave progression, cannot rule out lateral infarct age indeterminant    This note was completed in part utilizing M-Modal Fluency Direct Software.  Grammatical errors, random word insertions, spelling mistakes, and incomplete sentences may be an occasional consequence of this system secondary to software limitations, ambient noise, and hardware issues.  If you have any questions or concerns about the content, text, or information contained within the body of this dictation, please contact the provider for clarification.

## 2024-02-10 NOTE — NURSING NOTE
"Patient refused skin assessment, Nurse educated the importance. Per patient \"I have no wounds\" patient continues to refused.    Patient appears to be anxious, dyspnea noted during exertion.Nasal cannula remains intact, 3 L.   "

## 2024-02-10 NOTE — ASSESSMENT & PLAN NOTE
Wt Readings from Last 3 Encounters:   02/10/24 86.1 kg (189 lb 14.4 oz)   02/09/24 87.1 kg (192 lb)   12/12/22 87.3 kg (192 lb 6.4 oz)     78-year-old female with past medical history of bladder cancer, small cell lung cancer in remission, COPD and obesity presented with shortness of breath  Has been following with her outpatient PCP, workup for heart failure ongoing.  Underwent outpatient echocardiogram and was sent to the ER for low EF  Echocardiogram with moderately dilated left ventricular cavity, severely reduced left ventricular systolic function with global hypokinesis and regions of akinesis of the apex, cannot definitively exclude layering thrombus in the apical region, EF 17%, grade 3 diastolic dysfunction  Cardiology consulted  Continue IV Lasix 40 mg twice daily  Continue monitoring intake and output, fluid restriction, daily weights  Will defer need for additional echocardiogram to cardiology, may need contrast to rule out thrombus

## 2024-02-11 ENCOUNTER — APPOINTMENT (INPATIENT)
Dept: NON INVASIVE DIAGNOSTICS | Facility: HOSPITAL | Age: 79
DRG: 292 | End: 2024-02-11
Payer: COMMERCIAL

## 2024-02-11 LAB
ANION GAP SERPL CALCULATED.3IONS-SCNC: 6 MMOL/L
BUN SERPL-MCNC: 20 MG/DL (ref 5–25)
CALCIUM SERPL-MCNC: 9.3 MG/DL (ref 8.4–10.2)
CHLORIDE SERPL-SCNC: 100 MMOL/L (ref 96–108)
CO2 SERPL-SCNC: 33 MMOL/L (ref 21–32)
CREAT SERPL-MCNC: 0.74 MG/DL (ref 0.6–1.3)
GFR SERPL CREATININE-BSD FRML MDRD: 76 ML/MIN/1.73SQ M
GLUCOSE SERPL-MCNC: 97 MG/DL (ref 65–140)
POTASSIUM SERPL-SCNC: 3.8 MMOL/L (ref 3.5–5.3)
SODIUM SERPL-SCNC: 139 MMOL/L (ref 135–147)

## 2024-02-11 PROCEDURE — 97166 OT EVAL MOD COMPLEX 45 MIN: CPT

## 2024-02-11 PROCEDURE — 93308 TTE F-UP OR LMTD: CPT | Performed by: INTERNAL MEDICINE

## 2024-02-11 PROCEDURE — 93321 DOPPLER ECHO F-UP/LMTD STD: CPT | Performed by: INTERNAL MEDICINE

## 2024-02-11 PROCEDURE — 93325 DOPPLER ECHO COLOR FLOW MAPG: CPT | Performed by: INTERNAL MEDICINE

## 2024-02-11 PROCEDURE — 99232 SBSQ HOSP IP/OBS MODERATE 35: CPT | Performed by: PHYSICIAN ASSISTANT

## 2024-02-11 PROCEDURE — 99232 SBSQ HOSP IP/OBS MODERATE 35: CPT | Performed by: INTERNAL MEDICINE

## 2024-02-11 PROCEDURE — 80048 BASIC METABOLIC PNL TOTAL CA: CPT | Performed by: PHYSICIAN ASSISTANT

## 2024-02-11 PROCEDURE — 93321 DOPPLER ECHO F-UP/LMTD STD: CPT

## 2024-02-11 PROCEDURE — 93308 TTE F-UP OR LMTD: CPT

## 2024-02-11 PROCEDURE — 93325 DOPPLER ECHO COLOR FLOW MAPG: CPT

## 2024-02-11 PROCEDURE — 97530 THERAPEUTIC ACTIVITIES: CPT

## 2024-02-11 RX ORDER — LEVOTHYROXINE SODIUM 0.07 MG/1
150 TABLET ORAL
Status: DISCONTINUED | OUTPATIENT
Start: 2024-02-12 | End: 2024-02-14 | Stop reason: HOSPADM

## 2024-02-11 RX ORDER — TORSEMIDE 20 MG/1
20 TABLET ORAL DAILY
Status: DISCONTINUED | OUTPATIENT
Start: 2024-02-11 | End: 2024-02-14 | Stop reason: HOSPADM

## 2024-02-11 RX ORDER — LOPERAMIDE HYDROCHLORIDE 2 MG/1
2 CAPSULE ORAL 3 TIMES DAILY PRN
Status: DISCONTINUED | OUTPATIENT
Start: 2024-02-11 | End: 2024-02-14 | Stop reason: HOSPADM

## 2024-02-11 RX ORDER — TORSEMIDE 20 MG/1
20 TABLET ORAL DAILY
Status: DISCONTINUED | OUTPATIENT
Start: 2024-02-12 | End: 2024-02-11

## 2024-02-11 RX ADMIN — LEVOTHYROXINE SODIUM 125 MCG: 125 TABLET ORAL at 07:02

## 2024-02-11 RX ADMIN — UMECLIDINIUM 1 PUFF: 62.5 AEROSOL, POWDER ORAL at 09:09

## 2024-02-11 RX ADMIN — ASPIRIN 162 MG: 81 TABLET, COATED ORAL at 09:07

## 2024-02-11 RX ADMIN — PERFLUTREN 0.6 ML/MIN: 6.52 INJECTION, SUSPENSION INTRAVENOUS at 13:36

## 2024-02-11 RX ADMIN — ALPRAZOLAM 0.25 MG: 0.25 TABLET ORAL at 22:33

## 2024-02-11 RX ADMIN — ENOXAPARIN SODIUM 40 MG: 40 INJECTION SUBCUTANEOUS at 09:11

## 2024-02-11 RX ADMIN — TORSEMIDE 20 MG: 20 TABLET ORAL at 15:33

## 2024-02-11 RX ADMIN — FLUTICASONE FUROATE AND VILANTEROL TRIFENATATE 1 PUFF: 100; 25 POWDER RESPIRATORY (INHALATION) at 09:03

## 2024-02-11 RX ADMIN — FLUTICASONE PROPIONATE 2 SPRAY: 50 SPRAY, METERED NASAL at 09:08

## 2024-02-11 NOTE — INCIDENTAL FINDINGS
The following findings require follow up:  Radiographic finding   Finding: Left suprahilar opacity due to benign radiation fibrosis, cluster of nodules in lateral left upper lobe and left lower lobe, likely inflammatory    Follow up required: Repeat CT chest without contrast   Follow up should be done within 3 month(s)    Please notify the following clinician to assist with the follow up:   Dr. Thorpe

## 2024-02-11 NOTE — ASSESSMENT & PLAN NOTE
History of non-small cell lung cancer with adenocarcinoma histology  Had follow-up with oncology outpatient, Dr. Hansen in 2021 with no evidence of recurrence at that time  CT chest showing left suprahilar opacity due to benign radiation fibrosis, cluster of nodules in lateral upper left lobe, likely inflammatory, recommend CT chest in 3 months

## 2024-02-11 NOTE — PLAN OF CARE
Problem: Potential for Falls  Goal: Patient will remain free of falls  Description: INTERVENTIONS:  - Educate patient/family on patient safety including physical limitations  - Instruct patient to call for assistance with activity   - Consult OT/PT to assist with strengthening/mobility   - Keep Call bell within reach  - Keep bed low and locked with side rails adjusted as appropriate  - Keep care items and personal belongings within reach  - Initiate and maintain comfort rounds  - Make Fall Risk Sign visible to staff  - Offer Toileting every 2 Hours, in advance of need  - Initiate/Maintain bed alarm  - Obtain necessary fall risk management equipment:   - Apply yellow socks and bracelet for high fall risk patients  - Consider moving patient to room near nurses station  Outcome: Progressing     Problem: PAIN - ADULT  Goal: Verbalizes/displays adequate comfort level or baseline comfort level  Description: Interventions:  - Encourage patient to monitor pain and request assistance  - Assess pain using appropriate pain scale  - Administer analgesics based on type and severity of pain and evaluate response  - Implement non-pharmacological measures as appropriate and evaluate response  - Consider cultural and social influences on pain and pain management  - Notify physician/advanced practitioner if interventions unsuccessful or patient reports new pain  Outcome: Progressing     Problem: INFECTION - ADULT  Goal: Absence or prevention of progression during hospitalization  Description: INTERVENTIONS:  - Assess and monitor for signs and symptoms of infection  - Monitor lab/diagnostic results  - Monitor all insertion sites, i.e. indwelling lines, tubes, and drains  - Monitor endotracheal if appropriate and nasal secretions for changes in amount and color  - Sargeant appropriate cooling/warming therapies per order  - Administer medications as ordered  - Instruct and encourage patient and family to use good hand hygiene  technique  - Identify and instruct in appropriate isolation precautions for identified infection/condition  Outcome: Progressing     Problem: SAFETY ADULT  Goal: Patient will remain free of falls  Description: INTERVENTIONS:  - Educate patient/family on patient safety including physical limitations  - Instruct patient to call for assistance with activity   - Consult OT/PT to assist with strengthening/mobility   - Keep Call bell within reach  - Keep bed low and locked with side rails adjusted as appropriate  - Keep care items and personal belongings within reach  - Initiate and maintain comfort rounds  - Make Fall Risk Sign visible to staff  - Offer Toileting every 2 Hours, in advance of need  - Initiate/Maintain bed alarm  - Obtain necessary fall risk management equipment:   - Apply yellow socks and bracelet for high fall risk patients  - Consider moving patient to room near nurses station  Outcome: Progressing  Goal: Maintain or return to baseline ADL function  Description: INTERVENTIONS:  -  Assess patient's ability to carry out ADLs; assess patient's baseline for ADL function and identify physical deficits which impact ability to perform ADLs (bathing, care of mouth/teeth, toileting, grooming, dressing, etc.)  - Assess/evaluate cause of self-care deficits   - Assess range of motion  - Assess patient's mobility; develop plan if impaired  - Assess patient's need for assistive devices and provide as appropriate  - Encourage maximum independence but intervene and supervise when necessary  - Involve family in performance of ADLs  - Assess for home care needs following discharge   - Consider OT consult to assist with ADL evaluation and planning for discharge  - Provide patient education as appropriate  Outcome: Progressing  Goal: Maintains/Returns to pre admission functional level  Description: INTERVENTIONS:  - Perform AM-PAC 6 Click Basic Mobility/ Daily Activity assessment daily.  - Set and communicate daily  mobility goal to care team and patient/family/caregiver.   - Collaborate with rehabilitation services on mobility goals if consulted  - Perform Range of Motion 3 times a day.  - Reposition patient every 2 hours.  - Dangle patient 3 times a day  - Stand patient 3 times a day  - Ambulate patient 3 times a day  - Out of bed to chair 3 times a day   - Out of bed for meals 3 times a day  - Out of bed for toileting  - Record patient progress and toleration of activity level   Outcome: Progressing     Problem: DISCHARGE PLANNING  Goal: Discharge to home or other facility with appropriate resources  Description: INTERVENTIONS:  - Identify barriers to discharge w/patient and caregiver  - Arrange for needed discharge resources and transportation as appropriate  - Identify discharge learning needs (meds, wound care, etc.)  - Arrange for interpretive services to assist at discharge as needed  - Refer to Case Management Department for coordinating discharge planning if the patient needs post-hospital services based on physician/advanced practitioner order or complex needs related to functional status, cognitive ability, or social support system  Outcome: Progressing     Problem: Knowledge Deficit  Goal: Patient/family/caregiver demonstrates understanding of disease process, treatment plan, medications, and discharge instructions  Description: Complete learning assessment and assess knowledge base.  Interventions:  - Provide teaching at level of understanding  - Provide teaching via preferred learning methods  Outcome: Progressing     Problem: CARDIOVASCULAR - ADULT  Goal: Maintains optimal cardiac output and hemodynamic stability  Description: INTERVENTIONS:  - Monitor I/O, vital signs and rhythm  - Monitor for S/S and trends of decreased cardiac output  - Administer and titrate ordered vasoactive medications to optimize hemodynamic stability  - Assess quality of pulses, skin color and temperature  - Assess for signs of  decreased coronary artery perfusion  - Instruct patient to report change in severity of symptoms  Outcome: Progressing  Goal: Absence of cardiac dysrhythmias or at baseline rhythm  Description: INTERVENTIONS:  - Continuous cardiac monitoring, vital signs, obtain 12 lead EKG if ordered  - Administer antiarrhythmic and heart rate control medications as ordered  - Monitor electrolytes and administer replacement therapy as ordered  Outcome: Progressing     Problem: Prexisting or High Potential for Compromised Skin Integrity  Goal: Skin integrity is maintained or improved  Description: INTERVENTIONS:  - Identify patients at risk for skin breakdown  - Assess and monitor skin integrity  - Assess and monitor nutrition and hydration status  - Monitor labs   - Assess for incontinence   - Turn and reposition patient  - Assist with mobility/ambulation  - Relieve pressure over bony prominences  - Avoid friction and shearing  - Provide appropriate hygiene as needed including keeping skin clean and dry  - Evaluate need for skin moisturizer/barrier cream  - Collaborate with interdisciplinary team   - Patient/family teaching  - Consider wound care consult   Outcome: Progressing

## 2024-02-11 NOTE — OCCUPATIONAL THERAPY NOTE
"    Occupational Therapy Evaluation     Patient Name: Cortney Harding  Today's Date: 2/11/2024  Problem List  Principal Problem:    Acute systolic heart failure (HCC)  Active Problems:    Adenocarcinoma of bladder (HCC)    Chronic obstructive pulmonary disease (HCC)    Hypothyroidism    Non-small cell carcinoma of left lung, stage 3 (HCC)    Class 2 severe obesity due to excess calories with serious comorbidity and body mass index (BMI) of 35.0 to 35.9 in adult     Chronic respiratory failure (HCC)    Past Medical History  Past Medical History:   Diagnosis Date    Anesthesia     \"constipation after surgery\"    Bladder cancer (HCC)     Cataract     left    Colon polyp     COPD (chronic obstructive pulmonary disease) (HCC)     Dry mouth     Full dentures     GERD (gastroesophageal reflux disease)     occas    History of pneumonia     Hyperlipidemia     Hypothyroidism     Lung cancer (HCC)     stage 3    Pulmonary emphysema (HCC)     Skin cancer     squamous cell of the leg    Sleep difficulties     \"gets up frequently to void\"    UTI (urinary tract infection) 12/2019    recent e coli treated and resolved    Wears glasses      Past Surgical History  Past Surgical History:   Procedure Laterality Date    CHOLECYSTECTOMY      COLONOSCOPY      CYSTOSCOPY      4/10 AND 8/10, 3/12/2015 4/18/2016  DIAGNOSTIC     CYSTOSCOPY  05/01/2020    CYSTOSCOPY  04/02/2021    CYSTOSCOPY  10/29/2021    DENTAL SURGERY      EYE SURGERY      HYSTERECTOMY      OTHER SURGICAL HISTORY      TRANSURETHERAL RESECTION     N[place] CYSTO W/REMOVAL OF LESIONS SMALL N/A 12/23/2019    Procedure: BLADDER BIOPSY; BLADDER FULGERATION with mitomycin;  Surgeon: Daniel De Oliveira MD;  Location:  MAIN OR;  Service: Urology    NJ CYSTOURETHROSCOPY WITH BIOPSY N/A 6/1/2020    Procedure: CYSTOSCOPY WITH BIOPSIES, FULGURATION;  Surgeon: Daniel De Oliveira MD;  Location: [facility];  Service: Urology    NJ CYSTOURETHROSCOPY WITH BIOPSY N/A 11/2/2020    Procedure: CYSTO " W/BIOPSIES & FULGURATION;  Surgeon: Daniel De Oliveira MD;  Location: [facility];  Service: Urology    WISDOM TOOTH EXTRACTION           02/11/24 0931   OT Last Visit   OT Visit Date 02/11/24   Note Type   Note type Evaluation   Pain Assessment   Pain Assessment Tool 0-10   Pain Score No Pain   Restrictions/Precautions   Weight Bearing Precautions Per Order No   Other Precautions O2;Fall Risk  (3 L O2)   Home Living   Type of Home House   Home Layout Multi-level;Bed/bath upstairs;Stairs to enter with rails   Bathroom Shower/Tub Tub/shower unit   Bathroom Toilet Standard   Bathroom Equipment Shower chair;Commode   Home Equipment Walker;Cane   Additional Comments All DME belonged to pt's mother before she passed. No DME obtained via pt's insurance, per pt report.   Prior Function   Level of [place] Independent with ADLs;Independent with functional mobility;Needs assistance with ADLs   Lives With Son  (Works.)   Receives Help From Family   IADLs Family/Friend/Other provides transportation;Independent with meal prep;Independent with medication management;Family/Friend/Other provides meals   Falls in the last 6 months 0   Vocational Retired   Lifestyle   Autonomy PTA, was (I) with ADLs and required (A) with IADLs. Patient lives in a 2 SH, bedroom/bathroom located on second floor. Has BSC on first floor. Lives w/ son who works. (-) . Denies falls. Has DME however does not use. Furniture cruises within her home. Always has (A) if she leaves home.   Reciprocal Relationships Son   Service to Others caretaker   Intrinsic Gratification watching TV   General   Additional Pertinent History Comorbidities affecting pt’s functional performance include a significant PMH of: adenocarcinoma of bladder, COPD, non-small cell carcinoma of L lung (stage 3), chronic resp failure on 2 L continuously, CIS.  Patient with active OT orders and activity orders for Up as tolerated.   Family/Caregiver Present No   Subjective   Subjective  "\"If they would let me move around here by myself I wouldn't be so stiff.\"   ADL   Where Assessed Edge of bed   Eating Assistance 7  Independent   Grooming Assistance 6  Modified Independent   UB Bathing Assistance 5  Supervision/Setup   LB Bathing Assistance 5  Supervision/Setup   UB Dressing Assistance 5  Supervision/Setup   LB Dressing Assistance 5  Supervision/Setup   Toileting Assistance  5  Supervision/Setup   Transfers   Sit to Stand 5  Supervision   Additional items Bedrails;Increased time required   Stand to Sit 5  Supervision   Additional items Bedrails;Increased time required   Functional Mobility   Additional Comments CGA w/o AD, holds onto the wall. Declines use of DME at this time despite education on benefits.   Balance   Static Sitting Normal   Dynamic Sitting Good   Static Standing Fair -   Dynamic Standing Fair -   Ambulatory Poor +   Activity Tolerance   Activity Tolerance Patient tolerated treatment well;Other (Comment)  (insight into deficits.)   Medical Staff Made Aware RN SB, PT   Nurse Made Aware Yes   RUE Assessment   RUE Assessment WFL   LUE Assessment   LUE Assessment WFL   Hand Function   Gross Motor Coordination Functional   Fine Motor Coordination Functional   Sensation   Light Touch No apparent deficits   Proprioception   Proprioception No apparent deficits   Vision-Basic Assessment   Current Vision Wears glasses all the time   Vision - Complex Assessment   Ocular Range of Motion Intact   Acuity Able to read clock/calendar on wall without difficulty;Able to read employee name badge without difficulty   Perception   Inattention/Neglect Appears intact   Cognition   Overall Cognitive Status WFL   Arousal/Participation Alert;Responsive;Cooperative   Attention Attends with cues to redirect   Orientation Level Oriented X4   Memory Decreased recall of precautions;Decreased short term memory   Following Commands Follows one step commands without difficulty   Comments Can be tangential but " redirectable. Decreased insight into deficits.   Assessment   Limitation Decreased ADL status;Decreased Safe judgement during ADL;Decreased endurance;Decreased self-care trans;Decreased high-level ADLs   Prognosis Fair   Assessment Patient is a 78 y.o. year old female seen for OT eval s/p admit to [facility] on 2/9/2024 with c/o SOB, acute systolic heart failure.  OT consulted to assess ADLs/IADLs/functional mobility and assist w/ D/C planning. Patient demonstrates the following deficits impacting occupational performance: weakness, decreased strength , decreased balance, decreased activity tolerance, decreased safety awareness, SOB, increased body habitus , impaired coordination, and decreased cardiovascular endurance. These impairments, as well at pt’s ACE home environment, steps within home environment, limited home support, difficulty performing ADLs, difficulty performing IADLs, difficulty performing transfers/mobility, limited insight into deficits, compliance, fall risk , functional decline , increase in O2 requirements , and advanced age, limit pt’s ability to safely engage in all baseline areas of occupation. Pt's CLOF as follows: eating/grooming: Kimberly, UB ADLs: supervision , LB ADLs: supervision , toileting: supervision , bed mobility: DNT, functional transfers: supervision , functional mobility: CGA, sitting/standing tolerance: ~2 min. Pt would benefit from continued skilled OT while in acute setting to address deficits as defined above and to maximize (I) w/ ADLs/functional mobility. Occupational performance areas to address include: grooming, bathing/shower, toilet hygiene, dressing, medication management, health maintenance, functional mobility, cleaning, meal prep, and household maintenance. Based on the aforementioned evaluation, functional performance deficits, and assessments, pt has been identified as a moderate complexity evaluation. At this time, recommendation for pt to receive post-acute  rehabilitation services at a Level III (minimum resource intensity) due to above deficits and CLOF. OT will continue to follow pt 1-3x/wk to address the goals listed below to  w/in 10-14 days.   Goals   Patient Goals to return home   LTG Time Frame 10-14   Plan   Treatment Interventions ADL retraining;Functional transfer training;UE strengthening/ROM;Endurance training;Patient/family training;Equipment evaluation/education;Compensatory technique education;Continued evaluation;Energy conservation;Activityengagement;Cardiac education   Goal Expiration Date 24   OT Frequency 1-2x/wk;2-3x/wk   Discharge Recommendation   Rehab Resource Intensity Level, OT III (Minimum Resource Intensity)   AM-PAC Daily Activity Inpatient   Lower Body Dressing 3   Bathing 3   Toileting 3   Upper Body Dressing 4   Grooming 4   Eating 4   Daily Activity Raw Score 21   Daily Activity Standardized Score (Calc for Raw Score >=11) 44.27   AM-PAC Applied Cognition Inpatient   Following a Speech/Presentation 4   Understanding Ordinary Conversation 4   Taking Medications 3   Remembering Where Things Are Placed or Put Away 3   Remembering List of 4-5 Errands 3   Taking Care of Complicated Tasks 3   Applied Cognition Raw Score 20   Applied Cognition Standardized Score 41.76   Additional Treatment Session   Start Time 949   End Time 959   Treatment Assessment Pt seen for additional tx session this date. Completes all transfers w/ SBA. CGA for mobility as pt declines use of RW - she prefers to hold onto walls/furniture. Educated pt on purpose of AD (assist w/ balance, assist w/ conserving energy). After much discussion, pt amendable to possibly obtaining a rollator. Described benefit of having a seat to sit on when fatigued, as pt can only ambulate short distances w/ current resp status. Did contact  to provide info on insurance coverage. COmmunicated this w/ PT to trial, will trial during next session. Pt left seated EOB, needs met  and call bell within reach.   Additional Treatment Day 1   End of Consult   Education Provided Yes   Patient Position at End of Consult Seated edge of bed;Bedside chair   Nurse Communication Nurse aware of consult     Occupational Therapy goals: In 7-14 days:     1- Patient will verbalize and demonstrate use of energy conservation/deep breathing technique and work simplification skills during functional activity with no verbal cues.   2- Patient will verbalize and demonstrate good body mechanics and joint protection techniques during ADLs/IADLs with no verbal cues   3- Pt will complete bed mobility at a Mod I level w/ G balance/safety demonstrated to decrease caregiver assistance required   4- Patient will increase OOB/ sitting tolerance to 2-4 hours per day for increased participation in self care and leisure tasks with no s/s of exertion.   5-Patient will increase standing tolerance time to 5 minutes with unilateral UE support to complete sink level ADLs@ mod I level    6- Pt will improve functional transfers to Mod I on/off all surfaces using DME as needed w/ G balance/safety   7- Patient will complete UB ADLs with [place] utilizing appropriate DME/AE PRN   8- Patient will complete LB ADLs with [place] utilizing appropriate DME/AE PRN   9- Patient will complete toileting tasks with [place] with G hygiene/thoroughness utilizing appropriate DME/AE PRN   10- Pt will improve functional mobility during ADL/IADL/leisure tasks to Mod I using DME as needed w/ G balance/safety    11- Pt will be attentive 100% of the time during ongoing cognitive assessment w/ G participation to assist w/ safe d/c planning/recommendations   12- Pt will participate in simulated IADL management task to increase independence to Mod I w/ G safety and endurance   13- Pt will increase BUE strength by 1MM grade via AROM/AAROM/PROM exercises to increase independence in ADLs and transfers         Shelbi Dutton OTR/L

## 2024-02-11 NOTE — PLAN OF CARE
Problem: OCCUPATIONAL THERAPY ADULT  Goal: Performs self-care activities at highest level of function for planned discharge setting.  See evaluation for individualized goals.  Description: Treatment Interventions: ADL retraining, Functional transfer training, UE strengthening/ROM, Endurance training, Patient/family training, Equipment evaluation/education, Compensatory technique education, Continued evaluation, Energy conservation, Activityengagement, Cardiac education          See flowsheet documentation for full assessment, interventions and recommendations.   Note: Limitation: Decreased ADL status, Decreased Safe judgement during ADL, Decreased endurance, Decreased self-care trans, Decreased high-level ADLs  Prognosis: Fair  Assessment: Patient is a 78 y.o. year old female seen for OT eval s/p admit to [facility] on 2/9/2024 with c/o SOB, acute systolic heart failure.  OT consulted to assess ADLs/IADLs/functional mobility and assist w/ D/C planning. Patient demonstrates the following deficits impacting occupational performance: weakness, decreased strength , decreased balance, decreased activity tolerance, decreased safety awareness, SOB, increased body habitus , impaired coordination, and decreased cardiovascular endurance. These impairments, as well at pt’s ACE home environment, steps within home environment, limited home support, difficulty performing ADLs, difficulty performing IADLs, difficulty performing transfers/mobility, limited insight into deficits, compliance, fall risk , functional decline , increase in O2 requirements , and advanced age, limit pt’s ability to safely engage in all baseline areas of occupation. Pt's CLOF as follows: eating/grooming: Kimberly, UB ADLs: supervision , LB ADLs: supervision , toileting: supervision , bed mobility: DNT, functional transfers: supervision , functional mobility: CGA, sitting/standing tolerance: ~2 min. Pt would benefit from continued skilled OT while in acute setting  to address deficits as defined above and to maximize (I) w/ ADLs/functional mobility. Occupational performance areas to address include: grooming, bathing/shower, toilet hygiene, dressing, medication management, health maintenance, functional mobility, cleaning, meal prep, and household maintenance. Based on the aforementioned evaluation, functional performance deficits, and assessments, pt has been identified as a moderate complexity evaluation. At this time, recommendation for pt to receive post-acute rehabilitation services at a Level III (minimum resource intensity) due to above deficits and CLOF. OT will continue to follow pt 1-3x/wk to address the goals listed below to  w/in 10-14 days.     Rehab Resource Intensity Level, OT: III (Minimum Resource Intensity)

## 2024-02-11 NOTE — ASSESSMENT & PLAN NOTE
TSH elevated at 10, T4 low at 0.57  Increase levothyroxine from 125 mcg to 150 mcg  Outpatient follow-up for repeat labs in 6 weeks

## 2024-02-11 NOTE — ASSESSMENT & PLAN NOTE
Wt Readings from Last 3 Encounters:   02/11/24 84.8 kg (186 lb 15.2 oz)   02/09/24 87.1 kg (192 lb)   12/12/22 87.3 kg (192 lb 6.4 oz)     78-year-old female with past medical history of bladder cancer, small cell lung cancer in remission, COPD and obesity presented with shortness of breath  Has been following with her outpatient PCP, workup for heart failure ongoing.  Underwent outpatient echocardiogram and was sent to the ER for low EF  Echocardiogram with moderately dilated left ventricular cavity, severely reduced left ventricular systolic function with global hypokinesis and regions of akinesis of the apex, cannot definitively exclude layering thrombus in the apical region, EF 17%, grade 3 diastolic dysfunction  Cardiology following  Continue IV diuretics at the discretion of cardiology  Continue monitoring intake and output, fluid restriction, daily weights-down 3 pounds since yesterday  Plan for additional echocardiogram with contrast to rule out thrombus

## 2024-02-11 NOTE — PROGRESS NOTES
Cardiology Progress Note   MD Erick Mart MD, FACC  Abhishek Vanessa DO, [facility]  MD Rafal Smith DO, [facility]  Jones Hawley DO [facility]  ----------------------------------------------------------------  [facility]  [address] [place] [id number]    Cortney Harding 78 y.o. female MRN: [id number]  Unit/Bed#: [address] 227-01 Encounter: [id number]      ASSESSMENT:   Acute on chronic combined systolic and diastolic heart failure  LVEF 17%, moderate LV dilatation, grade 3 diastolic dysfunction with apical akinesis, RV dilatation with normal function, AV sclerosis, moderate MR, mild to moderate TR with PASP 52 mmHg, trace pericardial effusion, February 2024  Dyslipidemia  Severe COPD with chronic hypoxic respiratory failure  History of bladder carcinoma  History of stage III lung cancer  Hypothyroid    PLAN:  Patient's respiratory status significantly improved with IV diuretic  Peripheral edema has also markedly improved  Transition to oral diuretic today with torsemide 20 mg daily  Strict I's/O's and daily weights  Keep potassium greater than 4 magnesium greater than 2  Hopeful transition to oral diuretic in the next 48 to 72 hours  N.p.o. after midnight for cardiac catheterization with severe cardiomyopathy  Risks, benefits and alternatives to cardiac catheterization have been discussed at length including the risk of bleeding, infection, renal failure, CVA, myocardial infarction and death; patient understands these risks and wishes to proceed.  Will discuss the possibility of LifeVest within the next 24 hours given cardiomyopathy  Supportive care    Signed: Abhishek Vanessa DO, CHARISMA ZAMORA FACP      History of Present Illness:  Patient seen and examined.  Denies chest pain, pressure, tightness or squeezing.  Denies lightheadedness, dizziness or palpitations.  Denies lower extremity swelling orthopnea or paroxysmal nocturnal dyspnea.  Feels back to her usual state of  health.      Review of Systems:  Review of Systems   Constitutional: Negative for decreased appetite, fever, weight gain and weight loss.   HENT:  Negative for congestion and sore throat.    Eyes:  Negative for visual disturbance.   Cardiovascular:  Negative for chest pain, dyspnea on exertion, leg swelling, near-syncope and palpitations.   Respiratory:  Negative for cough and shortness of breath.    Hematologic/Lymphatic: Negative for bleeding problem.   Skin:  Negative for rash.   Musculoskeletal:  Negative for myalgias and neck pain.   Gastrointestinal:  Negative for abdominal pain and nausea.   Neurological:  Negative for light-headedness and weakness.   Psychiatric/Behavioral:  Negative for depression.        Allergies   Allergen Reactions    Erythromycin Hives    Penicillins Hives    Sulfa Antibiotics Hives    Tetracyclines & Related Hives       No current facility-administered medications on file prior to encounter.     Current Outpatient Medications on File Prior to Encounter   Medication Sig    albuterol (Ventolin HFA) 90 mcg/act inhaler Inhale 2 puffs every 6 (six) hours as needed for wheezing    Ascorbic Acid (VITAMIN C) 100 MG tablet Take 100 mg by mouth daily    aspirin 325 mg tablet Take 0.5 tablets by mouth daily     Calcium 600 MG tablet Take 1 tablet by mouth 2 (two) times a day    Chromium 200 MCG TABS Take 1 tablet by mouth daily    fluorouracil (EFUDEX) 5 % cream Use as directed    fluticasone (FLONASE) 50 mcg/act nasal spray 2 sprays into each nostril daily    Fluticasone Furoate-Vilanterol 100-25 mcg/actuation inhaler Inhale 1 puff daily Rinse mouth after use.    furosemide (LASIX) 20 mg tablet Take 1 tablet (20 mg total) by mouth daily    ipratropium-albuterol (DUO-NEB) 0.5-2.5 mg/3 mL nebulizer solution Take 3 mL by nebulization 3 (three) times a day as needed for wheezing or shortness of breath    levothyroxine 125 mcg tablet TAKE 1 TABLET BY MOUTH EVERY DAY    loratadine-pseudoephedrine  (CLARITIN-D 12-HOUR) 5-120 mg per tablet Take 1 tablet by mouth 2 (two) times a day    magnesium 30 MG tablet Take 30 mg by mouth 2 (two) times a day    melatonin 1 mg Take 10 mg by mouth daily at bedtime     Multiple Vitamin (MULTIVITAMINS PO) Take 1 tablet by mouth daily    Omega-3 Fatty Acids (FISH OIL) 1200 MG CAPS Take by mouth    OXYGEN-HELIUM IN Inhale as needed Only uses rarely    Spiriva HandiHaler 18 MCG inhalation capsule INHALE 1 CAPSULE VIA HANDIHALER ONCE DAILY AT THE SAME TIME EVERY DAY        Current Facility-Administered Medications   Medication Dose Route Frequency Provider Last Rate    albuterol  2 puff Inhalation Q4H PRN Leodan Gifford PA-C      ALPRAZolam  0.25 mg Oral HS PRN Lesley Sifuentes PA-C      aspirin  162 mg Oral Daily Luis Colvin MD      enoxaparin  40 mg Subcutaneous Daily Luis Colvin MD      fluticasone  2 spray Nasal Daily Luis Colvin MD      Fluticasone Furoate-Vilanterol  1 puff Inhalation Daily Luis Colvin MD      ipratropium-albuterol  3 mL Nebulization TID PRN Luis Colvin MD      [START ON 2/12/2024] levothyroxine  150 mcg Oral Early Morning Daniella Zuñiga PA-C      loperamide  2 mg Oral TID PRN Daniella Zuñiga PA-C      ondansetron  4 mg Intravenous Q6H PRN Luis Colvin MD      umeclidinium  1 puff Inhalation Daily Luis Colvin MD              Vitals:    02/10/24 1912 02/11/24 0600 02/11/24 0646 02/11/24 0733   BP: 126/67  121/75 120/75   BP Location: Right arm      Pulse: 100  89 85   Resp: 20  20 17   Temp: 97.8 °F (36.6 °C)  98.4 °F (36.9 °C) 97.9 °F (36.6 °C)   TempSrc: Oral      SpO2: 95%  95% 97%   Weight:  84.8 kg (186 lb 15.2 oz)     Height:         Body mass index is 34.19 kg/m².      Intake/Output Summary (Last 24 hours) at 2/11/2024 1031  Last data filed at 2/11/2024 0609  Gross per 24 hour   Intake 480 ml   Output 2200 ml   Net -1720 ml       Weight change: -2.291 kg (-5 lb 0.8 oz)    PHYSICAL EXAMINATION:  Gen: Awake, Alert,  "NAD  Head/eyes: AT/NC, pupils equal and round, Anicteric  ENT: mmm  Neck: Supple, No elevated JVP, trachea midline  Resp: Decreased breath sounds bilaterally  CV: RRR +S1, S2, No m/r/g  Abd: Soft, NT/ND + BS  Ext: no LE edema bilaterally  Neuro: Follows commands, moves all extermities  Psych: Appropriate affect, normal mood, pleasant attitude, non-combative  Skin: warm; no rash, erythema or venous stasis changes on exposed skin    Lab Results:  Results from last 7 days   Lab Units 02/10/24  0501   WBC Thousand/uL 7.02   HEMOGLOBIN g/dL 12.1   HEMATOCRIT % 37.1   PLATELETS Thousands/uL 255     Results from last 7 days   Lab Units 02/11/24  0608 02/10/24  0501 02/09/24  1435   POTASSIUM mmol/L 3.8   < > 3.7   CHLORIDE mmol/L 100   < > 103   CO2 mmol/L 33*   < > 30   BUN mg/dL 20   < > 11   CREATININE mg/dL 0.74   < > 0.62   CALCIUM mg/dL 9.3   < > 9.1   ALK PHOS U/L  --   --  64   ALT U/L  --   --  18   AST U/L  --   --  21    < > = values in this interval not displayed.     No results found for: \"TROPONINT\"      Results from last 7 days   Lab Units 02/09/24  1828 02/09/24  1532 02/09/24  1435   HS TNI 0HR ng/L  --   --  34   HS TNI 2HR ng/L  --  39  --    HS TNI 4HR ng/L 37  --   --            Tele: SR    This note was completed in part utilizing M-Modal Fluency Direct Software.  Grammatical errors, random word insertions, spelling mistakes, and incomplete sentences may be an occasional consequence of this system secondary to software limitations, ambient noise, and hardware issues.  If you have any questions or concerns about the content, text, or information contained within the body of this dictation, please contact the provider for clarification.      "

## 2024-02-11 NOTE — CASE MANAGEMENT
Case Management Discharge Planning Note    Patient name Cortney Harding  Location [place] [address] /[place] 2 M* MRN [id number]  : 1945 Date 2024       Current Admission Date: 2024  Current Admission Diagnosis:Acute systolic heart failure (HCC)   Patient Active Problem List    Diagnosis Date Noted    Chronic respiratory failure (HCC) 02/10/2024    Acute systolic heart failure (HCC) 2024    SOB (shortness of breath) 2024    Body mass index (BMI) 45.0-49.9, adult (HCC) 2022    CIS (carcinoma in situ of bladder) 06/15/2020    Medicare annual wellness visit, subsequent 2020    Squamous cell carcinoma of leg 2014    Non-small cell carcinoma of left lung, stage 3 (HCC) 2013    Class 2 severe obesity due to excess calories with serious comorbidity and body mass index (BMI) of 35.0 to 35.9 in adult  2013    Adenocarcinoma of bladder (HCC) 2012    Chronic obstructive pulmonary disease (HCC) 2012    Elevated C-reactive protein 2012    Hypercholesterolemia 2012    Hypothyroidism 2012      LOS (days): 2  Geometric Mean LOS (GMLOS) (days):   Days to GMLOS:     OBJECTIVE:  Risk of Unplanned Readmission Score: 12.09         Current admission status: Inpatient   [facility]erred Pharmacy:   [id number] [place] [address] [facility]  [address] [place] [address]  Phone: [phone] Fax: [phone]    Primary Care Provider: Mehul Thorpe MD    Primary Insurance: [facility]  Secondary Insurance:     DISCHARGE DETAILS:        CM notified by RN that patient is asking about a rollator. CM met with patient to discuss. She would like to see if she can obtain a rollator and how much it costs. She reports that she also has [employer] and hopes they assist with some of the cost if any. YOLANDA informed her that rollator are typically OOP, around $70 but CM can price check it through her insurance for her. She said  that she uses [employer] [facility] for her oxygen so she is agreeable with a referral being placed to them. She knows that she also has the option to order a rollator online or buy from a convenience store. CM placed dme referral for the rollator to [facility] via [place].

## 2024-02-11 NOTE — PLAN OF CARE
Problem: Potential for Falls  Goal: Patient will remain free of falls  Description: INTERVENTIONS:  - Educate patient/family on patient safety including physical limitations  - Instruct patient to call for assistance with activity   - Consult OT/PT to assist with strengthening/mobility   - Keep Call bell within reach  - Keep bed low and locked with side rails adjusted as appropriate  - Keep care items and personal belongings within reach  - Initiate and maintain comfort rounds  - Make Fall Risk Sign visible to staff  - Offer Toileting every 2 Hours, in advance of need  - Initiate/Maintain bed alarm  - Apply yellow socks and bracelet for high fall risk patients  - Consider moving patient to room near nurses station  Outcome: Progressing     Problem: PAIN - ADULT  Goal: Verbalizes/displays adequate comfort level or baseline comfort level  Description: Interventions:  - Encourage patient to monitor pain and request assistance  - Assess pain using appropriate pain scale  - Administer analgesics based on type and severity of pain and evaluate response  - Implement non-pharmacological measures as appropriate and evaluate response  - Consider cultural and social influences on pain and pain management  - Notify physician/advanced practitioner if interventions unsuccessful or patient reports new pain  Outcome: Progressing     Problem: INFECTION - ADULT  Goal: Absence or prevention of progression during hospitalization  Description: INTERVENTIONS:  - Assess and monitor for signs and symptoms of infection  - Monitor lab/diagnostic results  - Monitor all insertion sites, i.e. indwelling lines, tubes, and drains  - Monitor endotracheal if appropriate and nasal secretions for changes in amount and color  - [place] appropriate cooling/warming therapies per order  - Administer medications as ordered  - Instruct and encourage patient and family to use good hand hygiene technique  - Identify and instruct in appropriate isolation  precautions for identified infection/condition  Outcome: Progressing     Problem: SAFETY ADULT  Goal: Patient will remain free of falls  Description: INTERVENTIONS:  - Educate patient/family on patient safety including physical limitations  - Instruct patient to call for assistance with activity   - Consult OT/PT to assist with strengthening/mobility   - Keep Call bell within reach  - Keep bed low and locked with side rails adjusted as appropriate  - Keep care items and personal belongings within reach  - Initiate and maintain comfort rounds  - Make Fall Risk Sign visible to staff  - Apply yellow socks and bracelet for high fall risk patients  - Consider moving patient to room near nurses station  Outcome: Progressing  Goal: Maintain or return to baseline ADL function  Description: INTERVENTIONS:  -  Assess patient's ability to carry out ADLs; assess patient's baseline for ADL function and identify physical deficits which impact ability to perform ADLs (bathing, care of mouth/teeth, toileting, grooming, dressing, etc.)  - Assess/evaluate cause of self-care deficits   - Assess range of motion  - Assess patient's mobility; develop plan if impaired  - Assess patient's need for assistive devices and provide as appropriate  - Encourage maximum independence but intervene and supervise when necessary  - Involve family in performance of ADLs  - Assess for home care needs following discharge   - Consider OT consult to assist with ADL evaluation and planning for discharge  - Provide patient education as appropriate  Outcome: Progressing  Goal: Maintains/Returns to pre admission functional level  Description: INTERVENTIONS:  - Perform AM-PAC 6 Click Basic Mobility/ Daily Activity assessment daily.  - Set and communicate daily mobility goal to care team and patient/family/caregiver.   - Collaborate with rehabilitation services on mobility goals if consulted  - Out of bed for toileting  - Record patient progress and toleration  of activity level   Outcome: Progressing     Problem: DISCHARGE PLANNING  Goal: Discharge to home or other facility with appropriate resources  Description: INTERVENTIONS:  - Identify barriers to discharge w/patient and caregiver  - Arrange for needed discharge resources and transportation as appropriate  - Identify discharge learning needs (meds, wound care, etc.)  - Arrange for interpretive services to assist at discharge as needed  - Refer to Case Management Department for coordinating discharge planning if the patient needs post-hospital services based on physician/advanced practitioner order or complex needs related to functional status, cognitive ability, or social support system  Outcome: Progressing     Problem: Knowledge Deficit  Goal: Patient/family/caregiver demonstrates understanding of disease process, treatment plan, medications, and discharge instructions  Description: Complete learning assessment and assess knowledge base.  Interventions:  - Provide teaching at level of understanding  - Provide teaching via preferred learning methods  Outcome: Progressing     Problem: CARDIOVASCULAR - ADULT  Goal: Maintains optimal cardiac output and hemodynamic stability  Description: INTERVENTIONS:  - Monitor I/O, vital signs and rhythm  - Monitor for S/S and trends of decreased cardiac output  - Administer and titrate ordered vasoactive medications to optimize hemodynamic stability  - Assess quality of pulses, skin color and temperature  - Assess for signs of decreased coronary artery perfusion  - Instruct patient to report change in severity of symptoms  Outcome: Progressing  Goal: Absence of cardiac dysrhythmias or at baseline rhythm  Description: INTERVENTIONS:  - Continuous cardiac monitoring, vital signs, obtain 12 lead EKG if ordered  - Administer antiarrhythmic and heart rate control medications as ordered  - Monitor electrolytes and administer replacement therapy as ordered  Outcome: Progressing      Problem: Prexisting or High Potential for Compromised Skin Integrity  Goal: Skin integrity is maintained or improved  Description: INTERVENTIONS:  - Identify patients at risk for skin breakdown  - Assess and monitor skin integrity  - Assess and monitor nutrition and hydration status  - Monitor labs   - Assess for incontinence   - Turn and reposition patient  - Assist with mobility/ambulation  - Relieve pressure over bony prominences  - Avoid friction and shearing  - Provide appropriate hygiene as needed including keeping skin clean and dry  - Evaluate need for skin moisturizer/barrier cream  - Collaborate with interdisciplinary team   - Patient/family teaching  - Consider wound care consult   Outcome: Progressing

## 2024-02-11 NOTE — PROGRESS NOTES
[facility]  Name: Cortney Harding I  MRN: [id number]  Unit/Bed#: [place] [address] -01 I Date of Admission: 2/9/2024   Date of Service: 2/11/2024 I Hospital Day: 2    Assessment/Plan   * Acute systolic heart failure (HCC)  Assessment & Plan  Wt Readings from Last 3 Encounters:   02/11/24 84.8 kg (186 lb 15.2 oz)   02/09/24 87.1 kg (192 lb)   12/12/22 87.3 kg (192 lb 6.4 oz)     78-year-old female with past medical history of bladder cancer, small cell lung cancer in remission, COPD and obesity presented with shortness of breath  Has been following with her outpatient PCP, workup for heart failure ongoing.  Underwent outpatient echocardiogram and was sent to the ER for low EF  Echocardiogram with moderately dilated left ventricular cavity, severely reduced left ventricular systolic function with global hypokinesis and regions of akinesis of the apex, cannot definitively exclude layering thrombus in the apical region, EF 17%, grade 3 diastolic dysfunction  Cardiology following  Continue IV diuretics at the discretion of cardiology  Continue monitoring intake and output, fluid restriction, daily weights-down 3 pounds since yesterday  Plan for additional echocardiogram with contrast to rule out thrombus    Chronic respiratory failure (HCC)  Assessment & Plan  Maintained outpatient on 2.5 L nasal cannula  Has been stable on home oxygen while inpatient, continue    Class 2 severe obesity due to excess calories with serious comorbidity and body mass index (BMI) of 35.0 to 35.9 in adult   Assessment & Plan  BMI noted to be 35, would benefit from dietary changes, lifestyle modifications    Non-small cell carcinoma of left lung, stage 3 (HCC)  Assessment & Plan  History of non-small cell lung cancer with adenocarcinoma histology  Had follow-up with oncology outpatient, Dr. Hansen in 2021 with no evidence of recurrence at that time  CT chest showing left suprahilar opacity due to benign  radiation fibrosis, cluster of nodules in lateral upper left lobe, likely inflammatory, recommend CT chest in 3 months    Hypothyroidism  Assessment & Plan  TSH elevated at 10, T4 low at 0.57  Increase levothyroxine from 125 mcg to 150 mcg  Outpatient follow-up for repeat labs in 6 weeks    Chronic obstructive pulmonary disease (HCC)  Assessment & Plan  Does not appear in acute exacerbation  Continue nebulizers and inhalers    Adenocarcinoma of bladder (HCC)  Assessment & Plan  Patient previously followed with Dr. De Oliveira  Has consider Keytruda but has been lost to follow-up             VTE Pharmacologic Prophylaxis:   High Risk (Score >/= 5) - Pharmacological DVT Prophylaxis Ordered: enoxaparin (Lovenox). Sequential Compression Devices Ordered.    Mobility:   Basic Mobility Inpatient Raw Score: 19  JH-HLM Goal: 6: Walk 10 steps or more  JH-HLM Achieved: 6: Walk 10 steps or more  HLM Goal achieved. Continue to encourage appropriate mobility.    Patient Centered Rounds: I performed bedside rounds with nursing staff today.   Discussions with Specialists or Other Care Team Provider: none    Education and Discussions with Family / Patient: Patient declined call to .     Total Time Spent on Date of Encounter in care of patient:  This time was spent on one or more of the following: performing physical exam; counseling and coordination of care; obtaining or reviewing history; documenting in the medical record; reviewing/ordering tests, medications or procedures; communicating with other healthcare professionals and discussing with patient's family/caregivers.    Current Length of Stay: 2 day(s)  Current Patient Status: Inpatient   Certification Statement: The patient will continue to require additional inpatient hospital stay due to acute systolic heart failure with newly reduced EF  Discharge Plan: Anticipate discharge in 48-72 hrs to home.    Code Status: Level 1 - Full Code    Subjective:   Patient  reports she is doing okay today.  Notes improvement in her breathing.  Eating and drinking well.  Denies any shortness of breath.    Objective:     Vitals:   Temp (24hrs), Av °F (36.7 °C), Min:97.6 °F (36.4 °C), Max:98.6 °F (37 °C)    Temp:  [97.6 °F (36.4 °C)-98.6 °F (37 °C)] 97.9 °F (36.6 °C)  HR:  [] 85  Resp:  [17-22] 17  BP: ()/(57-75) 120/75  SpO2:  [95 %-98 %] 97 %  Body mass index is 34.19 kg/m².     Input and Output Summary (last 24 hours):     Intake/Output Summary (Last 24 hours) at 2024 0831  Last data filed at 2024 0609  Gross per 24 hour   Intake 720 ml   Output 2600 ml   Net -1880 ml       Physical Exam:   Physical Exam  Vitals reviewed.   Constitutional:       General: She is not in acute distress.     Comments: NC   HENT:      Head: Normocephalic and atraumatic.   Eyes:      General: No scleral icterus.     Conjunctiva/sclera: Conjunctivae normal.   Cardiovascular:      Rate and Rhythm: Normal rate and regular rhythm.      Heart sounds: No murmur heard.  Pulmonary:      Effort: Pulmonary effort is normal. No respiratory distress.      Breath sounds: Normal breath sounds. No wheezing.   Abdominal:      General: Bowel sounds are normal. There is no distension.      Palpations: Abdomen is soft.      Tenderness: There is no abdominal tenderness.   Musculoskeletal:      Cervical back: Neck supple.      Right lower leg: Edema (ankles) present.      Left lower leg: Edema present.   Skin:     General: Skin is warm and dry.   Neurological:      Mental Status: She is alert and oriented to person, place, and time.   Psychiatric:         Mood and Affect: Mood normal.         Behavior: Behavior normal.          Additional Data:     Labs:  Results from last 7 days   Lab Units 02/10/24  0501 24  1435   WBC Thousand/uL 7.02 8.28   HEMOGLOBIN g/dL 12.1 12.1   HEMATOCRIT % 37.1 36.6   PLATELETS Thousands/uL 255 288   NEUTROS PCT %  --  71   LYMPHS PCT %  --  19   MONOS PCT %  --  5    EOS PCT %  --  3     Results from last 7 days   Lab Units 24  0608 02/10/24  0501 24  1435   SODIUM mmol/L 139   < > 139   POTASSIUM mmol/L 3.8   < > 3.7   CHLORIDE mmol/L 100   < > 103   CO2 mmol/L 33*   < > 30   BUN mg/dL 20   < > 11   CREATININE mg/dL 0.74   < > 0.62   ANION GAP mmol/L 6   < > 6   CALCIUM mg/dL 9.3   < > 9.1   ALBUMIN g/dL  --   --  4.0   TOTAL BILIRUBIN mg/dL  --   --  0.36   ALK PHOS U/L  --   --  64   ALT U/L  --   --  18   AST U/L  --   --  21   GLUCOSE RANDOM mg/dL 97   < > 95    < > = values in this interval not displayed.                       Lines/Drains:  Invasive Devices       Peripheral Intravenous Line  Duration             Peripheral IV 24 Left;Proximal;Ventral (anterior) Forearm 1 day                      Telemetry:  Telemetry Orders (From admission, onward)               24 Hour Telemetry Monitoring  Continuous x 24 Hours (Telem)           Question:  Reason for 24 Hour Telemetry  Answer:  Decompensated CHF- and any one of the following: continuous diuretic infusion or total diuretic dose >200 mg daily, associated electrolyte derangement (I.e. K < 3.0), ionotropic drip (continuous infusion), hx of ventricular arrhythmia, or new EF < 35%                     Telemetry Reviewed: Normal Sinus Rhythm  Indication for Continued Telemetry Use: Acute CHF on >200 mg lasix/day or equivalent dose or with new reduced EF.              Imaging: Reviewed radiology reports from this admission including: chest CT scan    Recent Cultures (last 7 days):         Last 24 Hours Medication List:   Current Facility-Administered Medications   Medication Dose Route Frequency Provider Last Rate    albuterol  2 puff Inhalation Q4H PRN Leodan Gifford PA-C      ALPRAZolam  0.25 mg Oral HS PRN Lesley Sifuentes PA-C      aspirin  162 mg Oral Daily Luis Colvin MD      enoxaparin  40 mg Subcutaneous Daily Luis Colvin MD      fluticasone  2 spray Nasal Daily Luis Colvin MD       Fluticasone Furoate-Vilanterol  1 puff Inhalation Daily Luis Colvin MD      ipratropium-albuterol  3 mL Nebulization TID PRN Luis Colvin MD      [START ON 2/12/2024] levothyroxine  150 mcg Oral Early Morning Daniella Zuñiga PA-C      loperamide  2 mg Oral TID PRN Daniella Zuñiga PA-C      ondansetron  4 mg Intravenous Q6H PRN Luis Colvin MD      umeclidinium  1 puff Inhalation Daily Luis Colvin MD          Today, Patient Was Seen By: Daniella Zuñiga PA-C    **Please Note: This note may have been constructed using a voice recognition system.**

## 2024-02-12 LAB
ANION GAP SERPL CALCULATED.3IONS-SCNC: 7 MMOL/L
BSA FOR ECHO PROCEDURE: 1.85 M2
BUN SERPL-MCNC: 22 MG/DL (ref 5–25)
CALCIUM SERPL-MCNC: 9.6 MG/DL (ref 8.4–10.2)
CHLORIDE SERPL-SCNC: 100 MMOL/L (ref 96–108)
CO2 SERPL-SCNC: 33 MMOL/L (ref 21–32)
CREAT SERPL-MCNC: 0.75 MG/DL (ref 0.6–1.3)
GFR SERPL CREATININE-BSD FRML MDRD: 75 ML/MIN/1.73SQ M
GLUCOSE SERPL-MCNC: 98 MG/DL (ref 65–140)
POTASSIUM SERPL-SCNC: 4.1 MMOL/L (ref 3.5–5.3)
SL CV LV EF: 15
SODIUM SERPL-SCNC: 140 MMOL/L (ref 135–147)

## 2024-02-12 PROCEDURE — 97163 PT EVAL HIGH COMPLEX 45 MIN: CPT

## 2024-02-12 PROCEDURE — 80048 BASIC METABOLIC PNL TOTAL CA: CPT | Performed by: PHYSICIAN ASSISTANT

## 2024-02-12 PROCEDURE — 97530 THERAPEUTIC ACTIVITIES: CPT

## 2024-02-12 PROCEDURE — 99232 SBSQ HOSP IP/OBS MODERATE 35: CPT | Performed by: PHYSICIAN ASSISTANT

## 2024-02-12 PROCEDURE — 99232 SBSQ HOSP IP/OBS MODERATE 35: CPT | Performed by: INTERNAL MEDICINE

## 2024-02-12 RX ORDER — LOSARTAN POTASSIUM 25 MG/1
25 TABLET ORAL DAILY
Status: DISCONTINUED | OUTPATIENT
Start: 2024-02-12 | End: 2024-02-14 | Stop reason: HOSPADM

## 2024-02-12 RX ORDER — METOPROLOL SUCCINATE 25 MG/1
25 TABLET, EXTENDED RELEASE ORAL DAILY
Status: DISCONTINUED | OUTPATIENT
Start: 2024-02-12 | End: 2024-02-14 | Stop reason: HOSPADM

## 2024-02-12 RX ADMIN — FLUTICASONE PROPIONATE 2 SPRAY: 50 SPRAY, METERED NASAL at 08:54

## 2024-02-12 RX ADMIN — UMECLIDINIUM 1 PUFF: 62.5 AEROSOL, POWDER ORAL at 08:56

## 2024-02-12 RX ADMIN — METOPROLOL SUCCINATE 25 MG: 25 TABLET, EXTENDED RELEASE ORAL at 12:27

## 2024-02-12 RX ADMIN — LEVOTHYROXINE SODIUM 150 MCG: 75 TABLET ORAL at 06:09

## 2024-02-12 RX ADMIN — LOSARTAN POTASSIUM 25 MG: 25 TABLET, FILM COATED ORAL at 12:27

## 2024-02-12 RX ADMIN — ENOXAPARIN SODIUM 40 MG: 40 INJECTION SUBCUTANEOUS at 08:53

## 2024-02-12 RX ADMIN — FLUTICASONE FUROATE AND VILANTEROL TRIFENATATE 1 PUFF: 100; 25 POWDER RESPIRATORY (INHALATION) at 08:55

## 2024-02-12 RX ADMIN — TORSEMIDE 20 MG: 20 TABLET ORAL at 08:53

## 2024-02-12 RX ADMIN — ALPRAZOLAM 0.25 MG: 0.25 TABLET ORAL at 22:55

## 2024-02-12 RX ADMIN — ASPIRIN 162 MG: 81 TABLET, COATED ORAL at 08:53

## 2024-02-12 NOTE — PROGRESS NOTES
[facility]  Progress Note  Name: Cortney Harding I  MRN: [id number]  Unit/Bed#: [place] [address] -01 I Date of Admission: 2/9/2024   Date of Service: 2/12/2024 I Hospital Day: 3    Assessment/Plan   * Acute systolic heart failure (HCC)  Assessment & Plan  Wt Readings from Last 3 Encounters:   02/12/24 84.8 kg (186 lb 15.2 oz)   02/09/24 87.1 kg (192 lb)   12/12/22 87.3 kg (192 lb 6.4 oz)     78-year-old female with past medical history of bladder cancer, small cell lung cancer in remission, COPD and obesity presented with shortness of breath  Has been following with her outpatient PCP, workup for heart failure ongoing.  Underwent outpatient echocardiogram and was sent to the ER for low EF  Echocardiogram with moderately dilated left ventricular cavity, severely reduced left ventricular systolic function with global hypokinesis and regions of akinesis of the apex, cannot definitively exclude layering thrombus in the apical region, EF 17%, grade 3 diastolic dysfunction  Will need life vest on DC  Cardiology following  IV diuretics switched to oral today  Continue monitoring intake and output, fluid restriction, daily weights-down 3 pounds since yesterday  Additional echo ordered to further evaluate possibility of thrombus  Cardiac Catheterization scheduled for today at 10:30am    Chronic respiratory failure (HCC)  Assessment & Plan  Maintained outpatient on 2.5 L nasal cannula  Has been stable on home oxygen while inpatient, continue    Class 2 severe obesity due to excess calories with serious comorbidity and body mass index (BMI) of 35.0 to 35.9 in adult   Assessment & Plan  BMI noted to be 35, would benefit from dietary changes, lifestyle modifications    Non-small cell carcinoma of left lung, stage 3 (HCC)  Assessment & Plan  History of non-small cell lung cancer with adenocarcinoma histology  Had follow-up with oncology outpatient, Dr. Hansen in 2021 with no evidence of recurrence at that  time  CT chest showing left suprahilar opacity due to benign radiation fibrosis, cluster of nodules in lateral upper left lobe, likely inflammatory  Recommend CT chest in 3 months    Hypothyroidism  Assessment & Plan  TSH elevated at 10, T4 low at 0.57  Increase levothyroxine from 125 mcg to 150 mcg  Outpatient follow-up for repeat labs in 6 weeks    Chronic obstructive pulmonary disease (HCC)  Assessment & Plan  Does not appear in acute exacerbation  Continue nebulizers and inhalers    Adenocarcinoma of bladder (HCC)  Assessment & Plan  Patient previously followed with Dr. De Oliveira  Has consider Keytruda but has been lost to follow-up           VTE Pharmacologic Prophylaxis:   Moderate Risk (Score 3-4) - Pharmacological DVT Prophylaxis Ordered: enoxaparin (Lovenox).    Mobility:   Basic Mobility Inpatient Raw Score: 20  JH-HLM Goal: 6: Walk 10 steps or more  JH-HLM Achieved: 6: Walk 10 steps or more  HLM Goal achieved. Continue to encourage appropriate mobility.    Patient Centered Rounds: I performed bedside rounds with nursing staff today.   Discussions with Specialists or Other Care Team Provider: None    Education and Discussions with Family / Patient: Patient declined call to .     Total Time Spent on Date of Encounter in care of patient: 35 mins. This time was spent on one or more of the following: performing physical exam; counseling and coordination of care; obtaining or reviewing history; documenting in the medical record; reviewing/ordering tests, medications or procedures; communicating with other healthcare professionals and discussing with patient's family/caregivers.    Current Length of Stay: 3 day(s)  Current Patient Status: Inpatient   Certification Statement: The patient will continue to require additional inpatient hospital stay due to Cardiac catheterization  Discharge Plan: Anticipate discharge in 24-48 hrs to home.    Code Status: Level 1 - Full Code    Subjective:   The patient  is seen resting in bed. She reports no current shortness of breath, chest pain, lightheadedness, dizziness, palpitations. Her biggest complaint at this time is being hungry. She will go for cath this AM.    Objective:     Vitals:   Temp (24hrs), Av.1 °F (36.7 °C), Min:97.9 °F (36.6 °C), Max:98.3 °F (36.8 °C)    Temp:  [97.9 °F (36.6 °C)-98.3 °F (36.8 °C)] 97.9 °F (36.6 °C)  HR:  [] 97  Resp:  [15-20] 16  BP: (104-132)/(64-76) 121/73  SpO2:  [88 %-97 %] 88 %  Body mass index is 34.19 kg/m².     Input and Output Summary (last 24 hours):     Intake/Output Summary (Last 24 hours) at 2024 0859  Last data filed at 2024 2201  Gross per 24 hour   Intake --   Output 1150 ml   Net -1150 ml       Physical Exam:   Physical Exam  Vitals and nursing note reviewed.   Constitutional:       General: She is not in acute distress.     Appearance: Normal appearance. She is not ill-appearing, toxic-appearing or diaphoretic.   HENT:      Head: Normocephalic and atraumatic.   Cardiovascular:      Rate and Rhythm: Normal rate and regular rhythm.      Heart sounds: No murmur heard.     No friction rub. No gallop.   Pulmonary:      Effort: Pulmonary effort is normal. No respiratory distress.      Breath sounds: Normal breath sounds. No wheezing, rhonchi or rales.   Abdominal:      General: Abdomen is flat. Bowel sounds are normal. There is no distension.      Palpations: Abdomen is soft.      Tenderness: There is no abdominal tenderness.   Musculoskeletal:      Right lower leg: No edema.      Left lower leg: No edema.   Skin:     General: Skin is warm and dry.      Coloration: Skin is not jaundiced or pale.   Neurological:      General: No focal deficit present.      Mental Status: She is alert. Mental status is at baseline.          Additional Data:     Labs:  Results from last 7 days   Lab Units 02/10/24  0501 24  1435   WBC Thousand/uL 7.02 8.28   HEMOGLOBIN g/dL 12.1 12.1   HEMATOCRIT % 37.1 36.6   PLATELETS  Thousands/uL 255 288   NEUTROS PCT %  --  71   LYMPHS PCT %  --  19   MONOS PCT %  --  5   EOS PCT %  --  3     Results from last 7 days   Lab Units 24  0435 02/10/24  0501 24  1435   SODIUM mmol/L 140   < > 139   POTASSIUM mmol/L 4.1   < > 3.7   CHLORIDE mmol/L 100   < > 103   CO2 mmol/L 33*   < > 30   BUN mg/dL 22   < > 11   CREATININE mg/dL 0.75   < > 0.62   ANION GAP mmol/L 7   < > 6   CALCIUM mg/dL 9.6   < > 9.1   ALBUMIN g/dL  --   --  4.0   TOTAL BILIRUBIN mg/dL  --   --  0.36   ALK PHOS U/L  --   --  64   ALT U/L  --   --  18   AST U/L  --   --  21   GLUCOSE RANDOM mg/dL 98   < > 95    < > = values in this interval not displayed.                       Lines/Drains:  Invasive Devices       Peripheral Intravenous Line  Duration             Peripheral IV 24 Left;Proximal;Ventral (anterior) Forearm 2 days                      Telemetry:  Telemetry Orders (From admission, onward)               24 Hour Telemetry Monitoring  Continuous x 24 Hours (Telem)           Question:  Reason for 24 Hour Telemetry  Answer:  Decompensated CHF- and any one of the following: continuous diuretic infusion or total diuretic dose >200 mg daily, associated electrolyte derangement (I.e. K < 3.0), ionotropic drip (continuous infusion), hx of ventricular arrhythmia, or new EF < 35%                     Telemetry Reviewed: Normal Sinus Rhythm  Indication for Continued Telemetry Use: Acute CHF on >200 mg lasix/day or equivalent dose or with new reduced EF.              Imaging: Reviewed radiology reports from this admission including: ECHO    Recent Cultures (last 7 days):         Last 24 Hours Medication List:   Current Facility-Administered Medications   Medication Dose Route Frequency Provider Last Rate    albuterol  2 puff Inhalation Q4H PRN Leodan Gifford PA-C      ALPRAZolam  0.25 mg Oral HS PRN Lesley Sifuentes PA-C      aspirin  162 mg Oral Daily Luis Colvin MD      enoxaparin  40 mg Subcutaneous  Daily Luis Colvin MD      fluticasone  2 spray Nasal Daily Luis Colvin MD      Fluticasone Furoate-Vilanterol  1 puff Inhalation Daily Luis Colvin MD      ipratropium-albuterol  3 mL Nebulization TID PRN Luis Colvin MD      levothyroxine  150 mcg Oral Early Morning Daniella Zuñiga PA-C      loperamide  2 mg Oral TID PRN Daniella Zuñiga PA-C      ondansetron  4 mg Intravenous Q6H PRN Luis Colvin MD      torsemide  20 mg Oral Daily Abhishek Vanessa DO      umeclidinium  1 puff Inhalation Daily Luis Colvin MD          Today, Patient Was Seen By: Apolinar Smiley PA-C    **Please Note: This note may have been constructed using a voice recognition system.**

## 2024-02-12 NOTE — PROGRESS NOTES
Cardiology Progress Note   MD Erick Mart MD, FACC  Abhishek Vanessa DO, [facility]  MD Rafal Smith DO, [facility]  Jones Hawley DO, [facility]  ----------------------------------------------------------------  [address] [address]    Cortney Harding 78 y.o. female MRN: [id number]  Unit/Bed#: [address] 227-01 Encounter: [id number]      ASSESSMENT:   Acute on chronic combined systolic and diastolic heart failure  LVEF 17%, moderate LV dilatation, grade 3 diastolic dysfunction with apical akinesis, RV dilatation with normal function, AV sclerosis, moderate MR, mild to moderate TR with PASP 52 mmHg, trace pericardial effusion, February 2024  Dyslipidemia  Severe COPD with chronic hypoxic respiratory failure  History of bladder carcinoma  History of stage III lung cancer  Hypothyroid    PLAN:  Weights remain stable on oral torsemide  Patient has declined LifeVest for now  Discussing case with interventional cardiology, they recommend against invasive coronary angiography due to potential high risk of the procedure with patient's comorbidities  Start Toprol-XL 25 mg daily and losartan 25 mg daily with cardiomyopathy  Would initiate Jardiance 10 mg daily based on insurance; will ask case management/social work to look into pricing for medication  If patient feels back to her baseline with ambulation, reasonable for discharge later today from CV perspective  Patient understands that should she gain greater than 3 pounds in a day or 5 pounds in 1 to 2 weeks, recommend calling the office for further titration of diuretic medication    Signed: Abhishek Vanessa DO, [facility]IS[facility] FACP      History of Present Illness:  Patient seen and examined.  Denies chest pain, pressure, tightness or squeezing.  Denies lightheadedness, dizziness or palpitations.  Denies lower extremity swelling orthopnea or paroxysmal nocturnal dyspnea.      Review of Systems:  Review of  Systems   Constitutional: Negative for decreased appetite, fever, weight gain and weight loss.   HENT:  Negative for congestion and sore throat.    Eyes:  Negative for visual disturbance.   Cardiovascular:  Negative for chest pain, dyspnea on exertion, leg swelling, near-syncope and palpitations.   Respiratory:  Negative for cough and shortness of breath.    Hematologic/Lymphatic: Negative for bleeding problem.   Skin:  Negative for rash.   Musculoskeletal:  Negative for myalgias and neck pain.   Gastrointestinal:  Negative for abdominal pain and nausea.   Neurological:  Negative for light-headedness and weakness.   Psychiatric/Behavioral:  Negative for depression.        Allergies   Allergen Reactions    Erythromycin Hives    Penicillins Hives    Sulfa Antibiotics Hives    Tetracyclines & Related Hives       No current facility-administered medications on file prior to encounter.     Current Outpatient Medications on File Prior to Encounter   Medication Sig    albuterol (Ventolin HFA) 90 mcg/act inhaler Inhale 2 puffs every 6 (six) hours as needed for wheezing    Ascorbic Acid (VITAMIN C) 100 MG tablet Take 100 mg by mouth daily    aspirin 325 mg tablet Take 0.5 tablets by mouth daily     Calcium 600 MG tablet Take 1 tablet by mouth 2 (two) times a day    Chromium 200 MCG TABS Take 1 tablet by mouth daily    fluorouracil (EFUDEX) 5 % cream Use as directed    fluticasone (FLONASE) 50 mcg/act nasal spray 2 sprays into each nostril daily    Fluticasone Furoate-Vilanterol 100-25 mcg/actuation inhaler Inhale 1 puff daily Rinse mouth after use.    furosemide (LASIX) 20 mg tablet Take 1 tablet (20 mg total) by mouth daily    ipratropium-albuterol (DUO-NEB) 0.5-2.5 mg/3 mL nebulizer solution Take 3 mL by nebulization 3 (three) times a day as needed for wheezing or shortness of breath    levothyroxine 125 mcg tablet TAKE 1 TABLET BY MOUTH EVERY DAY    loratadine-pseudoephedrine (CLARITIN-D 12-HOUR) 5-120 mg per tablet Take  1 tablet by mouth 2 (two) times a day    magnesium 30 MG tablet Take 30 mg by mouth 2 (two) times a day    melatonin 1 mg Take 10 mg by mouth daily at bedtime     Multiple Vitamin (MULTIVITAMINS PO) Take 1 tablet by mouth daily    Omega-3 Fatty Acids (FISH OIL) 1200 MG CAPS Take by mouth    OXYGEN-HELIUM IN Inhale as needed Only uses rarely    Spiriva HandiHaler 18 MCG inhalation capsule INHALE 1 CAPSULE VIA HANDIHALER ONCE DAILY AT THE SAME TIME EVERY DAY        Current Facility-Administered Medications   Medication Dose Route Frequency Provider Last Rate    albuterol  2 puff Inhalation Q4H PRN Leodan Gifford PA-C      ALPRAZolam  0.25 mg Oral HS PRN Lesley Sifuentes PA-C      aspirin  162 mg Oral Daily Luis Colvin MD      enoxaparin  40 mg Subcutaneous Daily Luis Colvin MD      fluticasone  2 spray Nasal Daily Luis Colvin MD      Fluticasone Furoate-Vilanterol  1 puff Inhalation Daily Luis Colvin MD      ipratropium-albuterol  3 mL Nebulization TID PRN Luis Colvin MD      levothyroxine  150 mcg Oral Early Morning Daniella Zuñiga PA-C      loperamide  2 mg Oral TID PRN Daniella Zuñiga PA-C      ondansetron  4 mg Intravenous Q6H PRN Luis Colvin MD      torsemide  20 mg Oral Daily Abhishek Vanessa DO      umeclidinium  1 puff Inhalation Daily Luis Colvin MD              Vitals:    02/11/24 1900 02/11/24 2113 02/12/24 0553 02/12/24 0803   BP:  132/76  121/73   Pulse:  99  97   Resp: 20 20  16   Temp:  98.3 °F (36.8 °C)  97.9 °F (36.6 °C)   TempSrc:       SpO2:  95%  (!) 88%   Weight:   84.8 kg (186 lb 15.2 oz)    Height:         Body mass index is 34.19 kg/m².      Intake/Output Summary (Last 24 hours) at 2/12/2024 1000  Last data filed at 2/11/2024 2201  Gross per 24 hour   Intake --   Output 1150 ml   Net -1150 ml       Weight change: -0.431 kg (-15.2 oz)    PHYSICAL EXAMINATION:  Gen: Awake, Alert, NAD  Head/eyes: AT/NC, pupils equal and round, Anicteric  ENT: mmm  Neck:  "Supple, No elevated JVP, trachea midline  Resp: Decreased breath sounds bilaterally  CV: RRR +S1, S2, No m/r/g  Abd: Soft, NT/ND + BS  Ext: trivial pedal edema bilaterally  Neuro: Follows commands, moves all extermities  Psych: Appropriate affect, normal mood, pleasant attitude, non-combative  Skin: warm; no rash, erythema or venous stasis changes on exposed skin    Lab Results:  Results from last 7 days   Lab Units 02/10/24  0501   WBC Thousand/uL 7.02   HEMOGLOBIN g/dL 12.1   HEMATOCRIT % 37.1   PLATELETS Thousands/uL 255     Results from last 7 days   Lab Units 02/12/24  0435 02/10/24  0501 02/09/24  1435   POTASSIUM mmol/L 4.1   < > 3.7   CHLORIDE mmol/L 100   < > 103   CO2 mmol/L 33*   < > 30   BUN mg/dL 22   < > 11   CREATININE mg/dL 0.75   < > 0.62   CALCIUM mg/dL 9.6   < > 9.1   ALK PHOS U/L  --   --  64   ALT U/L  --   --  18   AST U/L  --   --  21    < > = values in this interval not displayed.     No results found for: \"TROPONINT\"      Results from last 7 days   Lab Units 02/09/24  1828 02/09/24  1532 02/09/24  1435   HS TNI 0HR ng/L  --   --  34   HS TNI 2HR ng/L  --  39  --    HS TNI 4HR ng/L 37  --   --            Tele: SR to ST    This note was completed in part utilizing M-Modal Fluency Direct Software.  Grammatical errors, random word insertions, spelling mistakes, and incomplete sentences may be an occasional consequence of this system secondary to software limitations, ambient noise, and hardware issues.  If you have any questions or concerns about the content, text, or information contained within the body of this dictation, please contact the provider for clarification.      "

## 2024-02-12 NOTE — PLAN OF CARE
Problem: PHYSICAL THERAPY ADULT  Goal: Performs mobility at highest level of function for planned discharge setting.  See evaluation for individualized goals.  Description: Treatment/Interventions: Functional transfer training, LE strengthening/ROM, Elevations, Therapeutic exercise, Endurance training, Gait training, Patient/family training, Equipment eval/education, Compensatory technique education, Spoke to nursing, Spoke to case management  Equipment Recommended: Walker, Other (Comment) (RW/Rollator; pt likely to decline)       See flowsheet documentation for full assessment, interventions and recommendations.  Note: Prognosis: Fair  Problem List: Decreased strength, Decreased endurance, Impaired balance, Decreased mobility, Impaired judgement, Decreased safety awareness, Obesity  Assessment: Pt is a 78 y.o. female admitted [facility] on [facility]24 for Acute systolic heart failure (HCC). Pt significant pmhx: adenocarcinoma of bladder, COPD, non-small cell carcinoma of L lung (stage 3), chronic resp failure on 2 L continuously, CIS, per chart EF 17%. Active PT orders and activity orders for up as tolerated. PT was consulted and pt was seen on 2/12/2024 to assess strength/mobility, activity tolerance, and d/c planning needs. PTA, pt reports indep with short distance amb with use of furniture for stability/indep with ADLs/Assist with IADLs. Pt presents with deficits in BLE strength, balance, endurance, gait deviations that limits functional mobility and activity tolerance relative to baseline. Pt is currently functioning at supervision for functional transfers with RW and amb with RW. Pt requiring skilled cues for safe technique throughout session with limited insight into deficits/safety awareness noted. Pt resistant to education on fall risk and benefits of use of AD. Pt demonstrated semi-steady, step through patterning without gross LOB. Currently at inc risk for falls.  Pt tachycardic at rest with HR up to 117 with  activity, SpO2 >91% on 2.5LNC. Please refer to flowsheet for additional objective findings. Recommend daily OOB to chair and amb with non-PT staff as medically appropriate to prevent functional decline while in hospital. Pt will benefit from continued skilled IP PT to address the above mentioned impairments in order to maximize recovery and increase functional independence when completing mobility and ADLs. At this time PT recommendations for d/c are anticipate d/c to home with support of son and level III (min) rehab intensity resources when medically ready. Additional treatment session performed following IE. Refer to assessment below.  Barriers to Discharge: Inaccessible home environment, Decreased caregiver support  Barriers to Discharge Comments: (+) time home alone, (+) ACE and within home  Rehab Resource Intensity Level, PT: III (Minimum Resource Intensity)    See flowsheet documentation for full assessment.

## 2024-02-12 NOTE — PLAN OF CARE
Problem: Potential for Falls  Goal: Patient will remain free of falls  Description: INTERVENTIONS:  - Educate patient/family on patient safety including physical limitations  - Instruct patient to call for assistance with activity   - Consult OT/PT to assist with strengthening/mobility   - Keep Call bell within reach  - Keep bed low and locked with side rails adjusted as appropriate  - Keep care items and personal belongings within reach  - Initiate and maintain comfort rounds  - Make Fall Risk Sign visible to staff  - Offer Toileting every 2 Hours, in advance of need  - Initiate/Maintain bed alarm  - Obtain necessary fall risk management equipment:   - Apply yellow socks and bracelet for high fall risk patients  - Consider moving patient to room near nurses station  Outcome: Progressing     Problem: PAIN - ADULT  Goal: Verbalizes/displays adequate comfort level or baseline comfort level  Description: Interventions:  - Encourage patient to monitor pain and request assistance  - Assess pain using appropriate pain scale  - Administer analgesics based on type and severity of pain and evaluate response  - Implement non-pharmacological measures as appropriate and evaluate response  - Consider cultural and social influences on pain and pain management  - Notify physician/advanced practitioner if interventions unsuccessful or patient reports new pain  Outcome: Progressing     Problem: INFECTION - ADULT  Goal: Absence or prevention of progression during hospitalization  Description: INTERVENTIONS:  - Assess and monitor for signs and symptoms of infection  - Monitor lab/diagnostic results  - Monitor all insertion sites, i.e. indwelling lines, tubes, and drains  - Monitor endotracheal if appropriate and nasal secretions for changes in amount and color  - [place] appropriate cooling/warming therapies per order  - Administer medications as ordered  - Instruct and encourage patient and family to use good hand hygiene  technique  - Identify and instruct in appropriate isolation precautions for identified infection/condition  Outcome: Progressing     Problem: SAFETY ADULT  Goal: Patient will remain free of falls  Description: INTERVENTIONS:  - Educate patient/family on patient safety including physical limitations  - Instruct patient to call for assistance with activity   - Consult OT/PT to assist with strengthening/mobility   - Keep Call bell within reach  - Keep bed low and locked with side rails adjusted as appropriate  - Keep care items and personal belongings within reach  - Initiate and maintain comfort rounds  - Make Fall Risk Sign visible to staff  - Offer Toileting every 2 Hours, in advance of need  - Initiate/Maintain bed alarm  - Obtain necessary fall risk management equipment:   - Apply yellow socks and bracelet for high fall risk patients  - Consider moving patient to room near nurses station  Outcome: Progressing  Goal: Maintain or return to baseline ADL function  Description: INTERVENTIONS:  -  Assess patient's ability to carry out ADLs; assess patient's baseline for ADL function and identify physical deficits which impact ability to perform ADLs (bathing, care of mouth/teeth, toileting, grooming, dressing, etc.)  - Assess/evaluate cause of self-care deficits   - Assess range of motion  - Assess patient's mobility; develop plan if impaired  - Assess patient's need for assistive devices and provide as appropriate  - Encourage maximum independence but intervene and supervise when necessary  - Involve family in performance of ADLs  - Assess for home care needs following discharge   - Consider OT consult to assist with ADL evaluation and planning for discharge  - Provide patient education as appropriate  Outcome: Progressing  Goal: Maintains/Returns to pre admission functional level  Description: INTERVENTIONS:  - Perform AM-PAC 6 Click Basic Mobility/ Daily Activity assessment daily.  - Set and communicate daily  mobility goal to care team and patient/family/caregiver.   - Collaborate with rehabilitation services on mobility goals if consulted  - Perform Range of Motion 3 times a day.  - Reposition patient every 2 hours.  - Dangle patient 3 times a day  - Stand patient 3 times a day  - Ambulate patient 3 times a day  - Out of bed to chair 3 times a day   - Out of bed for meals 3 times a day  - Out of bed for toileting  - Record patient progress and toleration of activity level   Outcome: Progressing     Problem: DISCHARGE PLANNING  Goal: Discharge to home or other facility with appropriate resources  Description: INTERVENTIONS:  - Identify barriers to discharge w/patient and caregiver  - Arrange for needed discharge resources and transportation as appropriate  - Identify discharge learning needs (meds, wound care, etc.)  - Arrange for interpretive services to assist at discharge as needed  - Refer to Case Management Department for coordinating discharge planning if the patient needs post-hospital services based on physician/advanced practitioner order or complex needs related to functional status, cognitive ability, or social support system  Outcome: Progressing     Problem: Knowledge Deficit  Goal: Patient/family/caregiver demonstrates understanding of disease process, treatment plan, medications, and discharge instructions  Description: Complete learning assessment and assess knowledge base.  Interventions:  - Provide teaching at level of understanding  - Provide teaching via preferred learning methods  Outcome: Progressing     Problem: CARDIOVASCULAR - ADULT  Goal: Maintains optimal cardiac output and hemodynamic stability  Description: INTERVENTIONS:  - Monitor I/O, vital signs and rhythm  - Monitor for S/S and trends of decreased cardiac output  - Administer and titrate ordered vasoactive medications to optimize hemodynamic stability  - Assess quality of pulses, skin color and temperature  - Assess for signs of  decreased coronary artery perfusion  - Instruct patient to report change in severity of symptoms  Outcome: Progressing  Goal: Absence of cardiac dysrhythmias or at baseline rhythm  Description: INTERVENTIONS:  - Continuous cardiac monitoring, vital signs, obtain 12 lead EKG if ordered  - Administer antiarrhythmic and heart rate control medications as ordered  - Monitor electrolytes and administer replacement therapy as ordered  Outcome: Progressing     Problem: Prexisting or High Potential for Compromised Skin Integrity  Goal: Skin integrity is maintained or improved  Description: INTERVENTIONS:  - Identify patients at risk for skin breakdown  - Assess and monitor skin integrity  - Assess and monitor nutrition and hydration status  - Monitor labs   - Assess for incontinence   - Turn and reposition patient  - Assist with mobility/ambulation  - Relieve pressure over bony prominences  - Avoid friction and shearing  - Provide appropriate hygiene as needed including keeping skin clean and dry  - Evaluate need for skin moisturizer/barrier cream  - Collaborate with interdisciplinary team   - Patient/family teaching  - Consider wound care consult   Outcome: Progressing

## 2024-02-12 NOTE — PROGRESS NOTES
Chart was reviewed.    High risk for complications given severe co morbidities, ( lung cancer, bladder cancer, severe COPD, EF 17 %, normal troponins ) Cardiac cath would very likely not change treatment plan. Risk outweigh benefits.  I did not think she needs cath at this time.

## 2024-02-12 NOTE — ASSESSMENT & PLAN NOTE
History of non-small cell lung cancer with adenocarcinoma histology  Had follow-up with oncology outpatient, Dr. Hansen in 2021 with no evidence of recurrence at that time  CT chest showing left suprahilar opacity due to benign radiation fibrosis, cluster of nodules in lateral upper left lobe, likely inflammatory  Recommend CT chest in 3 months

## 2024-02-12 NOTE — PHYSICAL THERAPY NOTE
"PHYSICAL THERAPY EVALUATION    NAME:  Cortney Harding  DATE: 02/12/24    AGE:   78 y.o.  Mrn:   [id number]  ADMIT DX:  Acute systolic heart failure (HCC) [I50.21]  CHF (congestive heart failure) (HCC) [I50.9]  SOB (shortness of breath) [R06.02]    Patient Active Problem List   Diagnosis    Adenocarcinoma of bladder (HCC)    Chronic obstructive pulmonary disease (HCC)    Elevated C-reactive protein    Hypercholesterolemia    Hypothyroidism    Non-small cell carcinoma of left lung, stage 3 (HCC)    Class 2 severe obesity due to excess calories with serious comorbidity and body mass index (BMI) of 35.0 to 35.9 in adult     Squamous cell carcinoma of leg    Medicare annual wellness visit, subsequent    CIS (carcinoma in situ of bladder)    Body mass index (BMI) 45.0-49.9, adult (HCC)    SOB (shortness of breath)    Acute systolic heart failure (HCC)    Chronic respiratory failure (HCC)       Past Medical History:   Diagnosis Date    Anesthesia     \"constipation after surgery\"    Bladder cancer (HCC)     Cataract     left    Colon polyp     COPD (chronic obstructive pulmonary disease) (HCC)     Dry mouth     Full dentures     GERD (gastroesophageal reflux disease)     occas    History of pneumonia     Hyperlipidemia     Hypothyroidism     Lung cancer (HCC)     stage 3    Pulmonary emphysema (HCC)     Skin cancer     squamous cell of the leg    Sleep difficulties     \"gets up frequently to void\"    UTI (urinary tract infection) 12/2019    recent e coli treated and resolved    Wears glasses        Past Surgical History:   Procedure Laterality Date    CHOLECYSTECTOMY      COLONOSCOPY      CYSTOSCOPY      4/10 AND 8/10, 3/12/2015 4/18/2016  DIAGNOSTIC     CYSTOSCOPY  05/01/2020    CYSTOSCOPY  04/02/2021    CYSTOSCOPY  10/29/2021    DENTAL SURGERY      EYE SURGERY      HYSTERECTOMY      OTHER SURGICAL HISTORY      TRANSURETHERAL RESECTION     SD CYSTO W/REMOVAL OF LESIONS SMALL N/A 12/23/2019    Procedure: BLADDER BIOPSY; " "BLADDER FULGERATION with mitomycin;  Surgeon: Daniel De Oliveira MD;  Location: [facility];  Service: Urology    GA CYSTOURETHROSCOPY WITH BIOPSY N/A 6/1/2020    Procedure: CYSTOSCOPY WITH BIOPSIES, FULGURATION;  Surgeon: Daniel De Oliveira MD;  Location: [facility];  Service: Urology    GA CYSTOURETHROSCOPY WITH BIOPSY N/A 11/2/2020    Procedure: CYSTO W/BIOPSIES & FULGURATION;  Surgeon: Daniel De Oliveira MD;  Location: [facility];  Service: Urology    WISDOM TOOTH EXTRACTION         Imaging Studies:  CT chest wo contrast   Final Result by Nova Pires MD (02/10 1749)      Left suprahilar opacity described on chest radiograph is due to benign radiation fibrosis, stable when compared with the chest CT from April 2021.      Cluster of nodules in the lateral left upper lobe and 5 mm nodule in the left lower lobe, new since April 2021; while likely inflammatory, recommend follow-up with a chest CT with no contrast in 3 months.      This study was marked in Epic for significant notification and follow up.         Workstation performed: [id number]         XR chest 1 view portable   Final Result by Abhilash Dey MD (02/10 0918)      New left suprahilar opacity measuring up to 3.6 cm. Dedicated PA and lateral views or chest CT would be helpful for further characterization.      Moderate pulmonary edema with small right pleural effusion.      The study was marked in EPIC for immediate notification.      Resident: ABHILASH HERNANDEZ I, the attending radiologist, have reviewed the images and agree with the final report above.      Workstation performed: [id number]             Past Medical History:   Diagnosis Date    Anesthesia     \"constipation after surgery\"    Bladder cancer (HCC)     Cataract     left    Colon polyp     COPD (chronic obstructive pulmonary disease) (HCC)     Dry mouth     Full dentures     GERD (gastroesophageal reflux disease)     occas    History of pneumonia     Hyperlipidemia     Hypothyroidism     " "Lung cancer (HCC)     stage 3    Pulmonary emphysema (HCC)     Skin cancer     squamous cell of the leg    Sleep difficulties     \"gets up frequently to void\"    UTI (urinary tract infection) 12/2019    recent e coli treated and resolved    Wears glasses      Length Of Stay: 3  Performed at least 2 patient identifiers during session: Name and Birthday  PHYSICAL THERAPY EVALUATION :        02/12/24 1143   PT Last Visit   PT Visit Date 02/12/24   Note Type   Note type Evaluation   Pain Assessment   Pain Assessment Tool 0-10   Pain Score No Pain   Restrictions/Precautions   Weight Bearing Precautions Per Order No   Other Precautions Fall Risk;O2;Telemetry  (2.5LNC)   Home Living   Type of Home House   Home Layout Multi-level;Bed/bath upstairs;Stairs to enter with rails   Bathroom Shower/Tub Tub/shower unit   Bathroom Toilet Standard   Bathroom Equipment Shower chair;Commode   Home Equipment Walker;Cane   Additional Comments Per pt, All DME belonged to pt's mother before she passed.   Prior Function   Level of [place] Independent with ADLs;Independent with functional mobility   Lives With Son  (Per pt, son works \"couple days a week for a couple of hours\")   Receives Help From Family   IADLs Family/Friend/Other provides transportation;Independent with meal prep;Independent with medication management;Family/Friend/Other provides meals   Falls in the last 6 months 0   Vocational Retired   Comments PTA, pt reports indep with short distance amb with use of furniture for stability, assistance from son to negotiate ACE/ indep with ADLs/Assist with IADLs. Patient lives in a 2 SH, bedroom/bathroom located on second floor with unilateral rail. Reports crawling method to ascend with rest breaks.\" Has BSC on first floor. Lives w/ son who works. (-) . Denies falls. Has DME however does not use.  Son always assists when leaving home.   General   Family/Caregiver Present No   Cognition   Overall Cognitive Status WFL " "  Attention Attends with cues to redirect   Orientation Level Oriented X4   Memory Decreased recall of precautions;Decreased short term memory   Following Commands Follows one step commands without difficulty   Comments Limited insight into deficits. Cues to redirect to complete tasks. Max cues for safety 2/2 impulsiveness   Subjective   Subjective \"I'm not a fall risk.\"   RLE Assessment   RLE Assessment WFL  (3+/5 major muscle groups, except knee ext 4-/5)   LLE Assessment   LLE Assessment WFL  (3+/5 major muscle groups, except knee ext 4-/5)   Coordination   Sensation WFL   Bed Mobility   Additional Comments Pt greeted seated at EOB upon PT arrival. Returned seated at EOB at end of session.   Transfers   Sit to Stand 5  Supervision   Additional items Bedrails;Increased time required;Verbal cues   Stand to Sit 5  Supervision   Additional items Bedrails;Increased time required;Verbal cues   Additional Comments RW for stability. Instructed on safe technique/hand placement. pt attempting to place RW arms length away and pull on AD to ascend despite cues.   Ambulation/Elevation   Gait pattern Poor UE support;Improper Weight shift;Decreased foot clearance;Foward flexed;Short stride;Excessively slow;Step through pattern;Decreased heel strike;Decreased toe off   Gait Assistance 5  Supervision   Additional items Assist x 1;Verbal cues   Assistive Device Rolling walker   Distance 40'x1 with RW and 1 standing rest break   Ambulation/Elevation Additional Comments Semi-steady, step through patterning without gross LOB. Instructed on proper use of RW, hand placement, and educated on benefits of AD on fall risk. (+) limited safety awareness with attempts to amb with forearms on RW handles despite PT instruction, eventually amenable, however resistant to edu. (+) Fatigue, SpO2 >91% on 2.5LNC.   Balance   Static Sitting Normal   Dynamic Sitting Good   Static Standing Fair -   Dynamic Standing Fair -   Ambulatory Fair -  (With RW) "   Endurance Deficit   Endurance Deficit Yes   Endurance Deficit Description fatigue, min TAYLOR with activity   Activity Tolerance   Activity Tolerance Patient tolerated treatment well;Other (Comment);Patient limited by fatigue  ((+) limited insight into deficits/safety awareness.)   Medical Staff Made Aware RN XIANG, YOLANDA   Nurse Made Aware RN cleared/updated   Assessment   Prognosis Fair   Problem List Decreased strength;Decreased endurance;Impaired balance;Decreased mobility;Impaired judgement;Decreased safety awareness;Obesity   Assessment Pt is a 78 y.o. female admitted to [facility] on 2/9/2024 for Acute systolic heart failure (HCC). Pt significant pmhx: adenocarcinoma of bladder, COPD, non-small cell carcinoma of L lung (stage 3), chronic resp failure on 2 L continuously, CIS, per chart EF 17%. Active PT orders and activity orders for up as tolerated. PT was consulted and pt was seen on 2/12/2024 to assess strength/mobility, activity tolerance, and d/c planning needs. PTA, pt reports indep with short distance amb with use of furniture for stability/indep with ADLs/Assist with IADLs. Pt presents with deficits in BLE strength, balance, endurance, gait deviations that limits functional mobility and activity tolerance relative to baseline. Pt is currently functioning at supervision for functional transfers with RW and amb with RW. Pt requiring skilled cues for safe technique throughout session with limited insight into deficits/safety awareness noted. Pt resistant to education on fall risk and benefits of use of AD. Pt demonstrated semi-steady, step through patterning without gross LOB. Currently at inc risk for falls.  Pt tachycardic at rest with HR up to 117 with activity, SpO2 >91% on 2.5LNC. Please refer to flowsheet for additional objective findings. Recommend daily OOB to chair and amb with non-PT staff as medically appropriate to prevent functional decline while in hospital. Pt will benefit from continued skilled IP PT  to address the above mentioned impairments in order to maximize recovery and increase functional independence when completing mobility and ADLs. At this time PT recommendations for d/c are anticipate d/c to home with support of son and level III (min) rehab intensity resources when medically ready. Additional treatment session performed following IE. Refer to assessment below.   Barriers to Discharge Inaccessible home environment;Decreased caregiver support   Barriers to Discharge Comments (+) time home alone, (+) ACE and within home   Goals   Patient Goals to go home   STG Expiration Date 02/26/24   Short Term Goal #1 1).  Perform all transfers with [place] demonstrating safe and appropriate technique 100% of the time in order to improve ability to negotiate safely in home environment. 2) Amb with least restrictive AD > 100'x1 with mod I in order to demonstrate ability to negotiate in home environment. 3)  Improve overall strength and balance 1/2 grade in order to optimize ability to perform functional tasks and reduce fall risk. 4) Increase activity tolerance to 45 minutes in order to improve endurance to functional tasks. 5)  Negotiate >/= 14 stairs using most appropriate technique and S in order to be able to negotiate safely in home environment. 6) PT for ongoing patient and family/caregiver education, DME needs and d/c planning in order to promote highest level of function in least restrictive environment.   PT Treatment Day 0   Plan   Treatment/Interventions Functional transfer training;LE strengthening/ROM;Elevations;Therapeutic exercise;Endurance training;Gait training;Patient/family training;Equipment eval/education;Compensatory technique education;Spoke to nursing;Spoke to case management   PT Frequency 1-2x/wk   Discharge Recommendation   Rehab Resource Intensity Level, PT III (Minimum Resource Intensity)   Equipment Recommended Walker;Other (Comment)  (RW/Rollator; pt likely to decline)   Additional  "Comments The patient's AM-PAC Basic Mobility Inpatient Short Form Raw Score is 18. A Raw score of greater than 16 suggests the patient may benefit from discharge to home. Please also refer to the recommendation of the Physical Therapist for safe discharge planning.   AM-PAC Basic Mobility Inpatient   Turning in Flat Bed Without Bedrails 3   Lying on Back to Sitting on Edge of Flat Bed Without Bedrails 3   Moving Bed to Chair 3   Standing Up From Chair Using Arms 3   Walk in Room 3   Climb 3-5 Stairs With Railing 3   Basic Mobility Inpatient Raw Score 18   Basic Mobility Standardized Score 41.05   Highest Level Of Mobility   JH-HLM Goal 6: Walk 10 steps or more   JH-HLM Achieved 7: Walk 25 feet or more   Additional Treatment Session   Start Time 1131   End Time 1143   Treatment Assessment Pt seen for additional treatment session following IE. Intervention with focus on gait training, stair negotiation, and education on benefits of AD on fall risk, activity pacing/energy conservation, talk test for relative activity intensity, use of RW vs Rollator. Pt demonstrating progress towards functional goals with ability to ascend flight of stairs using crawling technique to promote ability to access bedroom and full bathroom and descend stairs using unilateral rail and min Ax1 with hand held assistance. Pt declined amb with RW during additional session. Amb 40'x1 with CGA without AD demonstrating attempts to reach for furniture for support with extensive trunk flx, unsteady step through gait patterning, however no gross LOB. Fall risk education reviewed. (+) TAYLOR and requiring seated rest break, SpO2 >91% on 2.5LNC througout. Pt reporting she has no need for AD to walk short distances within the home and the furniture is \"safe enough.\" Cont to recommend use of RW while in hospital. Pt would benefit from use of rollator 2/2 dec activity tolerance. At inc risk of falls 2/2 limited insight into deficits and safety awareness with " dec eccentric control during transfers. Pt demonstrated ability complete functional mobility with assistance required for d/c to home with support of son. Pt communicated she was upset that noone has updated her regarding the status of her cardiac catheterization procedure and NPO status. Pt would cont to benefit from skilled IPPT while in hospital to improve safety, minimize fall risk, and inc activity tolerance 2/2 (+) time home alone. Anticipate d/c to home with level III (min) rehab intensity resources when medically ready. CM and RN aware.   Equipment Use RW, stairs,   Additional Treatment Day 1   End of Consult   Patient Position at End of Consult Seated edge of bed;All needs within reach  (Pt)     (Please find full objective findings from PT assessment regarding body systems outlined above).     Hx/personal factors: co-morbidities, inaccessible home, dec caregiver support, advanced age, telemetry, fall risk, obesity, and O2, (+) ACE, requiring assistance for mobility to leave the home   Examination: dec mobility, dec balance, dec endurance, dec amb, risk for falls, impairements in locomotion, musculoskeletal, balance, endurance, posture, coordination, impairments in systems including multiple body structures involved; musculoskeletal (strength, posture, BMI), neuromuscular (balance, gait, transfers), cardiopulmonary (vitals), assessed cognition; activity limitations (difficulties executing an action); participation restrictions (problems associated w involvement in life situations), am-pac score suggesting inc supervision/assistance   Clinical: unpredictable (ongoing medical status, abnormal lab values, risk for falls, and telemetry, impulsivity )  Complexity: high     Bairon Joy, PT,DPT   02/12/24

## 2024-02-12 NOTE — PLAN OF CARE
Problem: Potential for Falls  Goal: Patient will remain free of falls  Description: INTERVENTIONS:  - Educate patient/family on patient safety including physical limitations  - Instruct patient to call for assistance with activity   - Consult OT/PT to assist with strengthening/mobility   - Keep Call bell within reach  - Keep bed low and locked with side rails adjusted as appropriate  - Keep care items and personal belongings within reach  - Initiate and maintain comfort rounds  - Make Fall Risk Sign visible to staff  - Apply yellow socks and bracelet for high fall risk patients  - Consider moving patient to room near nurses station  Outcome: Progressing     Problem: PAIN - ADULT  Goal: Verbalizes/displays adequate comfort level or baseline comfort level  Description: Interventions:  - Encourage patient to monitor pain and request assistance  - Assess pain using appropriate pain scale  - Administer analgesics based on type and severity of pain and evaluate response  - Implement non-pharmacological measures as appropriate and evaluate response  - Consider cultural and social influences on pain and pain management  - Notify physician/advanced practitioner if interventions unsuccessful or patient reports new pain  Outcome: Progressing     Problem: INFECTION - ADULT  Goal: Absence or prevention of progression during hospitalization  Description: INTERVENTIONS:  - Assess and monitor for signs and symptoms of infection  - Monitor lab/diagnostic results  - Monitor all insertion sites, i.e. indwelling lines, tubes, and drains  - Monitor endotracheal if appropriate and nasal secretions for changes in amount and color  - [place] appropriate cooling/warming therapies per order  - Administer medications as ordered  - Instruct and encourage patient and family to use good hand hygiene technique  - Identify and instruct in appropriate isolation precautions for identified infection/condition  Outcome: Progressing     Problem:  SAFETY ADULT  Goal: Patient will remain free of falls  Description: INTERVENTIONS:  - Educate patient/family on patient safety including physical limitations  - Instruct patient to call for assistance with activity   - Consult OT/PT to assist with strengthening/mobility   - Keep Call bell within reach  - Keep bed low and locked with side rails adjusted as appropriate  - Keep care items and personal belongings within reach  - Initiate and maintain comfort rounds  - Make Fall Risk Sign visible to staff  - Apply yellow socks and bracelet for high fall risk patients  - Consider moving patient to room near nurses station  Outcome: Progressing  Goal: Maintain or return to baseline ADL function  Description: INTERVENTIONS:  -  Assess patient's ability to carry out ADLs; assess patient's baseline for ADL function and identify physical deficits which impact ability to perform ADLs (bathing, care of mouth/teeth, toileting, grooming, dressing, etc.)  - Assess/evaluate cause of self-care deficits   - Assess range of motion  - Assess patient's mobility; develop plan if impaired  - Assess patient's need for assistive devices and provide as appropriate  - Encourage maximum independence but intervene and supervise when necessary  - Involve family in performance of ADLs  - Assess for home care needs following discharge   - Consider OT consult to assist with ADL evaluation and planning for discharge  - Provide patient education as appropriate  Outcome: Progressing  Goal: Maintains/Returns to pre admission functional level  Description: INTERVENTIONS:  - Perform AM-PAC 6 Click Basic Mobility/ Daily Activity assessment daily.  - Set and communicate daily mobility goal to care team and patient/family/caregiver.   - Collaborate with rehabilitation services on mobility goals if consulted  - Out of bed for toileting  - Record patient progress and toleration of activity level   Outcome: Progressing     Problem: DISCHARGE PLANNING  Goal:  Discharge to home or other facility with appropriate resources  Description: INTERVENTIONS:  - Identify barriers to discharge w/patient and caregiver  - Arrange for needed discharge resources and transportation as appropriate  - Identify discharge learning needs (meds, wound care, etc.)  - Arrange for interpretive services to assist at discharge as needed  - Refer to Case Management Department for coordinating discharge planning if the patient needs post-hospital services based on physician/advanced practitioner order or complex needs related to functional status, cognitive ability, or social support system  Outcome: Progressing     Problem: Knowledge Deficit  Goal: Patient/family/caregiver demonstrates understanding of disease process, treatment plan, medications, and discharge instructions  Description: Complete learning assessment and assess knowledge base.  Interventions:  - Provide teaching at level of understanding  - Provide teaching via preferred learning methods  Outcome: Progressing     Problem: CARDIOVASCULAR - ADULT  Goal: Maintains optimal cardiac output and hemodynamic stability  Description: INTERVENTIONS:  - Monitor I/O, vital signs and rhythm  - Monitor for S/S and trends of decreased cardiac output  - Administer and titrate ordered vasoactive medications to optimize hemodynamic stability  - Assess quality of pulses, skin color and temperature  - Assess for signs of decreased coronary artery perfusion  - Instruct patient to report change in severity of symptoms  Outcome: Progressing  Goal: Absence of cardiac dysrhythmias or at baseline rhythm  Description: INTERVENTIONS:  - Continuous cardiac monitoring, vital signs, obtain 12 lead EKG if ordered  - Administer antiarrhythmic and heart rate control medications as ordered  - Monitor electrolytes and administer replacement therapy as ordered  Outcome: Progressing     Problem: Prexisting or High Potential for Compromised Skin Integrity  Goal: Skin  integrity is maintained or improved  Description: INTERVENTIONS:  - Identify patients at risk for skin breakdown  - Assess and monitor skin integrity  - Assess and monitor nutrition and hydration status  - Monitor labs   - Assess for incontinence   - Turn and reposition patient  - Assist with mobility/ambulation  - Relieve pressure over bony prominences  - Avoid friction and shearing  - Provide appropriate hygiene as needed including keeping skin clean and dry  - Evaluate need for skin moisturizer/barrier cream  - Collaborate with interdisciplinary team   - Patient/family teaching  - Consider wound care consult   Outcome: Progressing

## 2024-02-13 ENCOUNTER — HOME HEALTH ADMISSION (OUTPATIENT)
Dept: HOME HEALTH SERVICES | Facility: HOME HEALTHCARE | Age: 79
End: 2024-02-13
Payer: COMMERCIAL

## 2024-02-13 LAB
ANION GAP SERPL CALCULATED.3IONS-SCNC: 7 MMOL/L
BUN SERPL-MCNC: 25 MG/DL (ref 5–25)
CALCIUM SERPL-MCNC: 9 MG/DL (ref 8.4–10.2)
CHLORIDE SERPL-SCNC: 99 MMOL/L (ref 96–108)
CO2 SERPL-SCNC: 33 MMOL/L (ref 21–32)
CREAT SERPL-MCNC: 0.71 MG/DL (ref 0.6–1.3)
ERYTHROCYTE [DISTWIDTH] IN BLOOD BY AUTOMATED COUNT: 12.7 % (ref 11.6–15.1)
GFR SERPL CREATININE-BSD FRML MDRD: 80 ML/MIN/1.73SQ M
GLUCOSE SERPL-MCNC: 87 MG/DL (ref 65–140)
HCT VFR BLD AUTO: 38.8 % (ref 34.8–46.1)
HGB BLD-MCNC: 12.5 G/DL (ref 11.5–15.4)
MCH RBC QN AUTO: 32.6 PG (ref 26.8–34.3)
MCHC RBC AUTO-ENTMCNC: 32.2 G/DL (ref 31.4–37.4)
MCV RBC AUTO: 101 FL (ref 82–98)
PLATELET # BLD AUTO: 279 THOUSANDS/UL (ref 149–390)
PMV BLD AUTO: 11.3 FL (ref 8.9–12.7)
POTASSIUM SERPL-SCNC: 3.5 MMOL/L (ref 3.5–5.3)
RBC # BLD AUTO: 3.83 MILLION/UL (ref 3.81–5.12)
SODIUM SERPL-SCNC: 139 MMOL/L (ref 135–147)
WBC # BLD AUTO: 5.96 THOUSAND/UL (ref 4.31–10.16)

## 2024-02-13 PROCEDURE — 99232 SBSQ HOSP IP/OBS MODERATE 35: CPT | Performed by: INTERNAL MEDICINE

## 2024-02-13 PROCEDURE — 85027 COMPLETE CBC AUTOMATED: CPT | Performed by: PHYSICIAN ASSISTANT

## 2024-02-13 PROCEDURE — 80048 BASIC METABOLIC PNL TOTAL CA: CPT | Performed by: PHYSICIAN ASSISTANT

## 2024-02-13 PROCEDURE — 99232 SBSQ HOSP IP/OBS MODERATE 35: CPT | Performed by: PHYSICIAN ASSISTANT

## 2024-02-13 RX ADMIN — TORSEMIDE 20 MG: 20 TABLET ORAL at 08:10

## 2024-02-13 RX ADMIN — METOPROLOL SUCCINATE 25 MG: 25 TABLET, EXTENDED RELEASE ORAL at 08:10

## 2024-02-13 RX ADMIN — ENOXAPARIN SODIUM 40 MG: 40 INJECTION SUBCUTANEOUS at 08:10

## 2024-02-13 RX ADMIN — ASPIRIN 162 MG: 81 TABLET, COATED ORAL at 08:10

## 2024-02-13 RX ADMIN — LOSARTAN POTASSIUM 25 MG: 25 TABLET, FILM COATED ORAL at 08:10

## 2024-02-13 RX ADMIN — LEVOTHYROXINE SODIUM 150 MCG: 75 TABLET ORAL at 05:08

## 2024-02-13 RX ADMIN — FLUTICASONE PROPIONATE 2 SPRAY: 50 SPRAY, METERED NASAL at 08:08

## 2024-02-13 RX ADMIN — FLUTICASONE FUROATE AND VILANTEROL TRIFENATATE 1 PUFF: 100; 25 POWDER RESPIRATORY (INHALATION) at 08:08

## 2024-02-13 RX ADMIN — LOPERAMIDE HYDROCHLORIDE 2 MG: 2 CAPSULE ORAL at 09:14

## 2024-02-13 RX ADMIN — ALPRAZOLAM 0.25 MG: 0.25 TABLET ORAL at 22:23

## 2024-02-13 RX ADMIN — UMECLIDINIUM 1 PUFF: 62.5 AEROSOL, POWDER ORAL at 08:08

## 2024-02-13 NOTE — PROGRESS NOTES
Cardiology Progress Note   MD Erick Mart MD, FACC  Abhishek Vanessa DO, [facility] HospitalMD Rafal Zambrano DO, [facility]  Jones Hawley DO, [facility]  ----------------------------------------------------------------  [address] [address]    Cortney Harding 78 y.o. female MRN: [id number]  Unit/Bed#: [address] 227-01 Encounter: [id number]      ASSESSMENT:   Acute on chronic combined systolic and diastolic heart failure  LVEF 17%, moderate LV dilatation, grade 3 diastolic dysfunction with apical akinesis, RV dilatation with normal function, AV sclerosis, moderate MR, mild to moderate TR with PASP 52 mmHg, trace pericardial effusion, February 2024  Dilated cardiomyopathy  Dyslipidemia  Severe COPD with chronic hypoxic respiratory failure  History of bladder carcinoma  History of stage III lung cancer  Hypothyroid    PLAN:  Patient had dilated cardiomyopathy with severely reduced left ventricular systolic function and now requesting [employer]  Order has been placed and will contact [employer] company  Weights remain stable  Due to high risk of cardiac catheterization procedure, interventional cardiology recommending against cardiac catheterization  Continue Toprol-XL and losartan as BP tolerates; renal function remained stable  If able to have Jardiance added to her regimen with 10 mg daily dosing based on her insurance, would institute by primary team  No further inpatient CV testing  Outpatient follow-up upon discharge for additional CV testing as clinically indicated including with advanced heart failure AP and primary cardiology    Signed: Abhishek Vanessa DO, CHARISMA ZAMORA FACP      History of Present Illness:  Patient seen and examined.  Denies chest pain, pressure, tightness or squeezing.  Denies lightheadedness, dizziness or palpitations.  Denies lower extremity swelling orthopnea or paroxysmal nocturnal dyspnea.  Resting comfortably in bed and feels back to  baseline.      Review of Systems:  Review of Systems   Constitutional: Negative for decreased appetite, fever, weight gain and weight loss.   HENT:  Negative for congestion and sore throat.    Eyes:  Negative for visual disturbance.   Cardiovascular:  Negative for chest pain, dyspnea on exertion, leg swelling, near-syncope and palpitations.   Respiratory:  Negative for cough and shortness of breath.    Hematologic/Lymphatic: Negative for bleeding problem.   Skin:  Negative for rash.   Musculoskeletal:  Negative for myalgias and neck pain.   Gastrointestinal:  Negative for abdominal pain and nausea.   Neurological:  Negative for light-headedness and weakness.   Psychiatric/Behavioral:  Negative for depression.        Allergies   Allergen Reactions    Erythromycin Hives    Penicillins Hives    Sulfa Antibiotics Hives    Tetracyclines & Related Hives       No current facility-administered medications on file prior to encounter.     Current Outpatient Medications on File Prior to Encounter   Medication Sig    albuterol (Ventolin HFA) 90 mcg/act inhaler Inhale 2 puffs every 6 (six) hours as needed for wheezing    Ascorbic Acid (VITAMIN C) 100 MG tablet Take 100 mg by mouth daily    aspirin 325 mg tablet Take 0.5 tablets by mouth daily     Calcium 600 MG tablet Take 1 tablet by mouth 2 (two) times a day    Chromium 200 MCG TABS Take 1 tablet by mouth daily    fluorouracil (EFUDEX) 5 % cream Use as directed    fluticasone (FLONASE) 50 mcg/act nasal spray 2 sprays into each nostril daily    Fluticasone Furoate-Vilanterol 100-25 mcg/actuation inhaler Inhale 1 puff daily Rinse mouth after use.    furosemide (LASIX) 20 mg tablet Take 1 tablet (20 mg total) by mouth daily    ipratropium-albuterol (DUO-NEB) 0.5-2.5 mg/3 mL nebulizer solution Take 3 mL by nebulization 3 (three) times a day as needed for wheezing or shortness of breath    levothyroxine 125 mcg tablet TAKE 1 TABLET BY MOUTH EVERY DAY    loratadine-pseudoephedrine  (CLARITIN-D 12-HOUR) 5-120 mg per tablet Take 1 tablet by mouth 2 (two) times a day    magnesium 30 MG tablet Take 30 mg by mouth 2 (two) times a day    melatonin 1 mg Take 10 mg by mouth daily at bedtime     Multiple Vitamin (MULTIVITAMINS PO) Take 1 tablet by mouth daily    Omega-3 Fatty Acids (FISH OIL) 1200 MG CAPS Take by mouth    OXYGEN-HELIUM IN Inhale as needed Only uses rarely    Spiriva HandiHaler 18 MCG inhalation capsule INHALE 1 CAPSULE VIA HANDIHALER ONCE DAILY AT THE SAME TIME EVERY DAY        Current Facility-Administered Medications   Medication Dose Route Frequency Provider Last Rate    albuterol  2 puff Inhalation Q4H PRN Leodan Gifford PA-C      ALPRAZolam  0.25 mg Oral HS PRN Lesley Sifuentes PA-C      aspirin  162 mg Oral Daily Luis Colvin MD      enoxaparin  40 mg Subcutaneous Daily Luis Colvin MD      fluticasone  2 spray Nasal Daily Luis Colvin MD      Fluticasone Furoate-Vilanterol  1 puff Inhalation Daily Luis Colvin MD      ipratropium-albuterol  3 mL Nebulization TID PRN Luis Colvin MD      levothyroxine  150 mcg Oral Early Morning Daniella Zuñiga PA-C      loperamide  2 mg Oral TID PRN Daniella Zuñiga PA-C      losartan  25 mg Oral Daily Abhishek Vanessa, DO      metoprolol succinate  25 mg Oral Daily Abhishek Vanessa, DO      ondansetron  4 mg Intravenous Q6H PRN Luis Colvin MD      torsemide  20 mg Oral Daily Abhishek Vanessa, DO      umeclidinium  1 puff Inhalation Daily Luis Colvin MD              Vitals:    02/12/24 2113 02/13/24 0533 02/13/24 0537 02/13/24 0747   BP: 112/62 129/82  112/68   BP Location: Left arm   Right arm   Pulse:  91  76   Resp:   20 18   Temp:   97.7 °F (36.5 °C) 98.1 °F (36.7 °C)   TempSrc:   Oral Oral   SpO2:  93%  96%   Weight:   84.5 kg (186 lb 4.6 oz)    Height:         Body mass index is 34.07 kg/m².      Intake/Output Summary (Last 24 hours) at 2/13/2024 1007  Last data filed at 2/13/2024 0526  Gross per 24  "hour   Intake 470 ml   Output 451 ml   Net 19 ml       Weight change: 0.131 kg (4.6 oz)    PHYSICAL EXAMINATION:  Gen: Awake, Alert, NAD  Head/eyes: AT/NC, pupils equal and round, Anicteric  ENT: mmm  Neck: Supple, No elevated JVP, trachea midline  Resp: Decreased breath sounds bilaterally  CV: RRR +S1, S2, No m/r/g  Abd: Soft, NT/ND + BS  Ext: No pitting  edema bilaterally  Neuro: Follows commands, moves all extermities  Psych: Appropriate affect, normal mood, pleasant attitude, non-combative  Skin: warm; no rash, erythema or venous stasis changes on exposed skin    Lab Results:  Results from last 7 days   Lab Units 02/13/24  0509   WBC Thousand/uL 5.96   HEMOGLOBIN g/dL 12.5   HEMATOCRIT % 38.8   PLATELETS Thousands/uL 279     Results from last 7 days   Lab Units 02/13/24  0509 02/10/24  0501 02/09/24  1435   POTASSIUM mmol/L 3.5   < > 3.7   CHLORIDE mmol/L 99   < > 103   CO2 mmol/L 33*   < > 30   BUN mg/dL 25   < > 11   CREATININE mg/dL 0.71   < > 0.62   CALCIUM mg/dL 9.0   < > 9.1   ALK PHOS U/L  --   --  64   ALT U/L  --   --  18   AST U/L  --   --  21    < > = values in this interval not displayed.     No results found for: \"TROPONINT\"      Results from last 7 days   Lab Units 02/09/24  1828 02/09/24  1532 02/09/24  1435   HS TNI 0HR ng/L  --   --  34   HS TNI 2HR ng/L  --  39  --    HS TNI 4HR ng/L 37  --   --            Tele: SR to ST    This note was completed in part utilizing M-Modal Fluency Direct Software.  Grammatical errors, random word insertions, spelling mistakes, and incomplete sentences may be an occasional consequence of this system secondary to software limitations, ambient noise, and hardware issues.  If you have any questions or concerns about the content, text, or information contained within the body of this dictation, please contact the provider for clarification.      "

## 2024-02-13 NOTE — CASE MANAGEMENT
Case Management Discharge Planning Note    Patient name Cortney Harding  [facility]* MRN [id number]  : 1945 Date 2024         OBJECTIVE:  Risk of Unplanned Readmission Score: 12.54          DISCHARGE DETAILS:    Discharge planning discussed with:: Pt's son Sam Shane of Choice: Yes  Comments - Freedom of Choice: Discussed recommendation of [facility] for SN/PT in leiu of CHF and life vest along with therapy need- pt's son agreeable to same and would like [facility] if possible- referral made  CM contacted family/caregiver?: Yes  Were Treatment Team discharge recommendations reviewed with patient/caregiver?: Yes  Did patient/caregiver verbalize understanding of patient care needs?: Yes       Contacts  Patient Contacts: Sam Harding-son  Relationship to Patient:: Family  Contact Method: Phone  Phone Number: [phone]  Reason/Outcome: Referral, Discharge Planning    Requested Home Health Care         Is the patient interested in HHC at discharge?: Yes  Home Health Discipline requested:: Nursing, Physical Therapy  Home Health Agency Name:: [facility]  Home Health Follow-Up Provider:: PCP  Home Health Services Needed:: Evaluate Functional Status and Safety, Gait/ADL Training, Heart Failure Management, Strengthening/Theraputic Exercises to Improve Function, Other (comment) (has life vest)  Homebound Criteria Met:: Requires the Assistance of Another Person for Safe Ambulation or to Leave the Home, Uses an Assist Device (i.e. cane, walker, etc)  Supporting Clincal Findings:: Limited Endurance, Dyspnea with Exertion, Fatigues Easliy in Short Distances         Other Referral/Resources/Interventions Provided:  Interventions:  (Order sent to [employer] call to Dominic Wright (liaison) who confirmed order being completed and to have pt fitted this afternoon for anticipated dc Wednesday)         Treatment Team Recommendation: Home with Home Health Care  Discharge Destination Plan:: Home with  Home Health Care  Transport at Discharge : Auto with designated , Family        Transported by (Company and Unit #): Son                    IMM Given (Date):: 02/13/24  IMM Given to:: Family (via phone)     Additional Comments: Day Check on Jardiance through [employer] $9.00- attending informed of same

## 2024-02-13 NOTE — PLAN OF CARE
Problem: Potential for Falls  Goal: Patient will remain free of falls  Description: INTERVENTIONS:  - Educate patient/family on patient safety including physical limitations  - Instruct patient to call for assistance with activity   - Consult OT/PT to assist with strengthening/mobility   - Keep Call bell within reach  - Keep bed low and locked with side rails adjusted as appropriate  - Keep care items and personal belongings within reach  - Initiate and maintain comfort rounds  - Make Fall Risk Sign visible to staff  - Offer Toileting every  Hours, in advance of need  - Initiate/Maintain alarm  - Obtain necessary fall risk management equipment:   - Apply yellow socks and bracelet for high fall risk patients  - Consider moving patient to room near nurses station  2/13/2024 0256 by Amanda Jamison, RN  Outcome: Progressing  2/13/2024 0256 by Amanda Jamison, RN  Outcome: Progressing     Problem: PAIN - ADULT  Goal: Verbalizes/displays adequate comfort level or baseline comfort level  Description: Interventions:  - Encourage patient to monitor pain and request assistance  - Assess pain using appropriate pain scale  - Administer analgesics based on type and severity of pain and evaluate response  - Implement non-pharmacological measures as appropriate and evaluate response  - Consider cultural and social influences on pain and pain management  - Notify physician/advanced practitioner if interventions unsuccessful or patient reports new pain  2/13/2024 0256 by Amanda Jamison, RN  Outcome: Progressing  2/13/2024 0256 by Amanda Jamison, RN  Outcome: Progressing     Problem: INFECTION - ADULT  Goal: Absence or prevention of progression during hospitalization  Description: INTERVENTIONS:  - Assess and monitor for signs and symptoms of infection  - Monitor lab/diagnostic results  - Monitor all insertion sites, i.e. indwelling lines, tubes, and drains  - Monitor endotracheal if appropriate and nasal secretions for  changes in amount and color  - [place] appropriate cooling/warming therapies per order  - Administer medications as ordered  - Instruct and encourage patient and family to use good hand hygiene technique  - Identify and instruct in appropriate isolation precautions for identified infection/condition  2/13/2024 0256 by Amanda Jamison RN  Outcome: Progressing  2/13/2024 0256 by Amanda Jamison RN  Outcome: Progressing     Problem: SAFETY ADULT  Goal: Patient will remain free of falls  Description: INTERVENTIONS:  - Educate patient/family on patient safety including physical limitations  - Instruct patient to call for assistance with activity   - Consult OT/PT to assist with strengthening/mobility   - Keep Call bell within reach  - Keep bed low and locked with side rails adjusted as appropriate  - Keep care items and personal belongings within reach  - Initiate and maintain comfort rounds  - Make Fall Risk Sign visible to staff  - Offer Toileting every  Hours, in advance of need  - Initiate/Maintain alarm  - Obtain necessary fall risk management equipment:   - Apply yellow socks and bracelet for high fall risk patients  - Consider moving patient to room near nurses station  2/13/2024 0256 by Amanda Jamison RN  Outcome: Progressing  2/13/2024 0256 by Amanda Jamison RN  Outcome: Progressing  Goal: Maintain or return to baseline ADL function  Description: INTERVENTIONS:  -  Assess patient's ability to carry out ADLs; assess patient's baseline for ADL function and identify physical deficits which impact ability to perform ADLs (bathing, care of mouth/teeth, toileting, grooming, dressing, etc.)  - Assess/evaluate cause of self-care deficits   - Assess range of motion  - Assess patient's mobility; develop plan if impaired  - Assess patient's need for assistive devices and provide as appropriate  - Encourage maximum independence but intervene and supervise when necessary  - Involve family in performance of  ADLs  - Assess for home care needs following discharge   - Consider OT consult to assist with ADL evaluation and planning for discharge  - Provide patient education as appropriate  2/13/2024 0256 by Amanda Jamison RN  Outcome: Progressing  2/13/2024 0256 by Amanda Jamison RN  Outcome: Progressing  Goal: Maintains/Returns to pre admission functional level  Description: INTERVENTIONS:  - Perform AM-PAC 6 Click Basic Mobility/ Daily Activity assessment daily.  - Set and communicate daily mobility goal to care team and patient/family/caregiver.   - Collaborate with rehabilitation services on mobility goals if consulted  - Perform Range of Motion  times a day.  - Reposition patient every  hours.  - Dangle patient  times a day  - Stand patient  times a day  - Ambulate patient  times a day  - Out of bed to chair  times a day   - Out of bed for meals  times a day  - Out of bed for toileting  - Record patient progress and toleration of activity level   2/13/2024 0256 by Amanda Jamison RN  Outcome: Progressing  2/13/2024 0256 by Amanda Jamison RN  Outcome: Progressing     Problem: DISCHARGE PLANNING  Goal: Discharge to home or other facility with appropriate resources  Description: INTERVENTIONS:  - Identify barriers to discharge w/patient and caregiver  - Arrange for needed discharge resources and transportation as appropriate  - Identify discharge learning needs (meds, wound care, etc.)  - Arrange for interpretive services to assist at discharge as needed  - Refer to Case Management Department for coordinating discharge planning if the patient needs post-hospital services based on physician/advanced practitioner order or complex needs related to functional status, cognitive ability, or social support system  2/13/2024 0256 by Amanda Jamison RN  Outcome: Progressing  2/13/2024 0256 by Amanda Jamison RN  Outcome: Progressing     Problem: Knowledge Deficit  Goal: Patient/family/caregiver demonstrates  understanding of disease process, treatment plan, medications, and discharge instructions  Description: Complete learning assessment and assess knowledge base.  Interventions:  - Provide teaching at level of understanding  - Provide teaching via preferred learning methods  2/13/2024 0256 by Amanda Jamison RN  Outcome: Progressing  2/13/2024 0256 by Amanda Jamison RN  Outcome: Progressing     Problem: CARDIOVASCULAR - ADULT  Goal: Maintains optimal cardiac output and hemodynamic stability  Description: INTERVENTIONS:  - Monitor I/O, vital signs and rhythm  - Monitor for S/S and trends of decreased cardiac output  - Administer and titrate ordered vasoactive medications to optimize hemodynamic stability  - Assess quality of pulses, skin color and temperature  - Assess for signs of decreased coronary artery perfusion  - Instruct patient to report change in severity of symptoms  2/13/2024 0256 by Amanda Jamison RN  Outcome: Progressing  2/13/2024 0256 by Amanda Jamison RN  Outcome: Progressing  Goal: Absence of cardiac dysrhythmias or at baseline rhythm  Description: INTERVENTIONS:  - Continuous cardiac monitoring, vital signs, obtain 12 lead EKG if ordered  - Administer antiarrhythmic and heart rate control medications as ordered  - Monitor electrolytes and administer replacement therapy as ordered  2/13/2024 0256 by Amanda Jamison RN  Outcome: Progressing  2/13/2024 0256 by Amanda Jamison RN  Outcome: Progressing     Problem: Prexisting or High Potential for Compromised Skin Integrity  Goal: Skin integrity is maintained or improved  Description: INTERVENTIONS:  - Identify patients at risk for skin breakdown  - Assess and monitor skin integrity  - Assess and monitor nutrition and hydration status  - Monitor labs   - Assess for incontinence   - Turn and reposition patient  - Assist with mobility/ambulation  - Relieve pressure over bony prominences  - Avoid friction and shearing  - Provide appropriate  hygiene as needed including keeping skin clean and dry  - Evaluate need for skin moisturizer/barrier cream  - Collaborate with interdisciplinary team   - Patient/family teaching  - Consider wound care consult   2/13/2024 0256 by Amanda Jamison RN  Outcome: Progressing  2/13/2024 0256 by Amanda Jamison, RN  Outcome: Progressing     Problem: RESPIRATORY - ADULT  Goal: Achieves optimal ventilation and oxygenation  Description: INTERVENTIONS:  - Assess for changes in respiratory status  - Assess for changes in mentation and behavior  - Position to facilitate oxygenation and minimize respiratory effort  - Oxygen administered by appropriate delivery if ordered  - Initiate smoking cessation education as indicated  - Encourage broncho-pulmonary hygiene including cough, deep breathe, Incentive Spirometry  - Assess the need for suctioning and aspirate as needed  - Assess and instruct to report SOB or any respiratory difficulty  - Respiratory Therapy support as indicated  Outcome: Progressing

## 2024-02-13 NOTE — ASSESSMENT & PLAN NOTE
Wt Readings from Last 3 Encounters:   02/13/24 84.5 kg (186 lb 4.6 oz)   02/09/24 87.1 kg (192 lb)   12/12/22 87.3 kg (192 lb 6.4 oz)     78-year-old female with past medical history of bladder cancer, small cell lung cancer in remission, COPD and obesity presented with shortness of breath  Has been following with her outpatient PCP, workup for heart failure ongoing.  Underwent outpatient echocardiogram and was sent to the ER for low EF  Echocardiogram with moderately dilated left ventricular cavity, severely reduced left ventricular systolic function with global hypokinesis and regions of akinesis of the apex, cannot definitively exclude layering thrombus in the apical region, EF 17%, grade 3 diastolic dysfunction  Will need life vest on DC  Awaiting life vest  Cardiology following  IV diuretics switched to oral yesterday  Continue monitoring intake and output, fluid restriction, daily weights-down 3 pounds since yesterday  Additional echo ordered to further evaluate possibility of thrombus- negative for thrombus  Jardiance sent to pharmacy for price check  Continue BB, diuretic

## 2024-02-13 NOTE — PLAN OF CARE
Problem: Potential for Falls  Goal: Patient will remain free of falls  Description: INTERVENTIONS:  - Educate patient/family on patient safety including physical limitations  - Instruct patient to call for assistance with activity   - Consult OT/PT to assist with strengthening/mobility   - Keep Call bell within reach  - Keep bed low and locked with side rails adjusted as appropriate  - Keep care items and personal belongings within reach  - Initiate and maintain comfort rounds  - Make Fall Risk Sign visible to staff  - Offer Toileting every  Hours, in advance of need  - Initiate/Maintain alarm  - Obtain necessary fall risk management equipment:   - Apply yellow socks and bracelet for high fall risk patients  - Consider moving patient to room near nurses station  2/13/2024 0726 by Clifford Marley RN  Outcome: Progressing  2/13/2024 0726 by Clifford Marley RN  Outcome: Progressing     Problem: PAIN - ADULT  Goal: Verbalizes/displays adequate comfort level or baseline comfort level  Description: Interventions:  - Encourage patient to monitor pain and request assistance  - Assess pain using appropriate pain scale  - Administer analgesics based on type and severity of pain and evaluate response  - Implement non-pharmacological measures as appropriate and evaluate response  - Consider cultural and social influences on pain and pain management  - Notify physician/advanced practitioner if interventions unsuccessful or patient reports new pain  2/13/2024 0726 by Clifford Marley RN  Outcome: Progressing  2/13/2024 0726 by Clifford Marley RN  Outcome: Progressing     Problem: INFECTION - ADULT  Goal: Absence or prevention of progression during hospitalization  Description: INTERVENTIONS:  - Assess and monitor for signs and symptoms of infection  - Monitor lab/diagnostic results  - Monitor all insertion sites, i.e. indwelling lines, tubes, and drains  - Monitor endotracheal if appropriate and nasal secretions for changes  in amount and color  - [place] appropriate cooling/warming therapies per order  - Administer medications as ordered  - Instruct and encourage patient and family to use good hand hygiene technique  - Identify and instruct in appropriate isolation precautions for identified infection/condition  2/13/2024 0726 by Clifford Marley RN  Outcome: Progressing  2/13/2024 0726 by Clifford Marley RN  Outcome: Progressing     Problem: SAFETY ADULT  Goal: Patient will remain free of falls  Description: INTERVENTIONS:  - Educate patient/family on patient safety including physical limitations  - Instruct patient to call for assistance with activity   - Consult OT/PT to assist with strengthening/mobility   - Keep Call bell within reach  - Keep bed low and locked with side rails adjusted as appropriate  - Keep care items and personal belongings within reach  - Initiate and maintain comfort rounds  - Make Fall Risk Sign visible to staff  - Offer Toileting every  Hours, in advance of need  - Initiate/Maintain alarm  - Obtain necessary fall risk management equipment:   - Apply yellow socks and bracelet for high fall risk patients  - Consider moving patient to room near nurses station  2/13/2024 0726 by Clifford Marley RN  Outcome: Progressing  2/13/2024 0726 by Clifford Marley RN  Outcome: Progressing  Goal: Maintain or return to baseline ADL function  Description: INTERVENTIONS:  -  Assess patient's ability to carry out ADLs; assess patient's baseline for ADL function and identify physical deficits which impact ability to perform ADLs (bathing, care of mouth/teeth, toileting, grooming, dressing, etc.)  - Assess/evaluate cause of self-care deficits   - Assess range of motion  - Assess patient's mobility; develop plan if impaired  - Assess patient's need for assistive devices and provide as appropriate  - Encourage maximum independence but intervene and supervise when necessary  - Involve family in performance of ADLs  - Assess for  home care needs following discharge   - Consider OT consult to assist with ADL evaluation and planning for discharge  - Provide patient education as appropriate  2/13/2024 0726 by Clifford Marley RN  Outcome: Progressing  2/13/2024 0726 by Clifford Marley RN  Outcome: Progressing  Goal: Maintains/Returns to pre admission functional level  Description: INTERVENTIONS:  - Perform AM-PAC 6 Click Basic Mobility/ Daily Activity assessment daily.  - Set and communicate daily mobility goal to care team and patient/family/caregiver.   - Collaborate with rehabilitation services on mobility goals if consulted  - Perform Range of Motion  times a day.  - Reposition patient every  hours.  - Dangle patient  times a day  - Stand patient  times a day  - Ambulate patient  times a day  - Out of bed to chair  times a day   - Out of bed for ita times a day  - Out of bed for toileting  - Record patient progress and toleration of activity level   2/13/2024 0726 by Clifford Marley RN  Outcome: Progressing  2/13/2024 0726 by Clifford Marley RN  Outcome: Progressing     Problem: DISCHARGE PLANNING  Goal: Discharge to home or other facility with appropriate resources  Description: INTERVENTIONS:  - Identify barriers to discharge w/patient and caregiver  - Arrange for needed discharge resources and transportation as appropriate  - Identify discharge learning needs (meds, wound care, etc.)  - Arrange for interpretive services to assist at discharge as needed  - Refer to Case Management Department for coordinating discharge planning if the patient needs post-hospital services based on physician/advanced practitioner order or complex needs related to functional status, cognitive ability, or social support system  2/13/2024 0726 by Clifford Marley RN  Outcome: Progressing  2/13/2024 0726 by Clifford Marley RN  Outcome: Progressing     Problem: Knowledge Deficit  Goal: Patient/family/caregiver demonstrates understanding of disease process,  treatment plan, medications, and discharge instructions  Description: Complete learning assessment and assess knowledge base.  Interventions:  - Provide teaching at level of understanding  - Provide teaching via preferred learning methods  2/13/2024 0726 by Clifford Marley RN  Outcome: Progressing  2/13/2024 0726 by Clifford Marley RN  Outcome: Progressing     Problem: CARDIOVASCULAR - ADULT  Goal: Maintains optimal cardiac output and hemodynamic stability  Description: INTERVENTIONS:  - Monitor I/O, vital signs and rhythm  - Monitor for S/S and trends of decreased cardiac output  - Administer and titrate ordered vasoactive medications to optimize hemodynamic stability  - Assess quality of pulses, skin color and temperature  - Assess for signs of decreased coronary artery perfusion  - Instruct patient to report change in severity of symptoms  2/13/2024 0726 by Clifford Marley RN  Outcome: Progressing  2/13/2024 0726 by Clifford Marley RN  Outcome: Progressing  Goal: Absence of cardiac dysrhythmias or at baseline rhythm  Description: INTERVENTIONS:  - Continuous cardiac monitoring, vital signs, obtain 12 lead EKG if ordered  - Administer antiarrhythmic and heart rate control medications as ordered  - Monitor electrolytes and administer replacement therapy as ordered  2/13/2024 0726 by Clifford Marley RN  Outcome: Progressing  2/13/2024 0726 by Clifford Marley RN  Outcome: Progressing     Problem: Prexisting or High Potential for Compromised Skin Integrity  Goal: Skin integrity is maintained or improved  Description: INTERVENTIONS:  - Identify patients at risk for skin breakdown  - Assess and monitor skin integrity  - Assess and monitor nutrition and hydration status  - Monitor labs   - Assess for incontinence   - Turn and reposition patient  - Assist with mobility/ambulation  - Relieve pressure over bony prominences  - Avoid friction and shearing  - Provide appropriate hygiene as needed including keeping skin  clean and dry  - Evaluate need for skin moisturizer/barrier cream  - Collaborate with interdisciplinary team   - Patient/family teaching  - Consider wound care consult   2/13/2024 0726 by Clifford Marley RN  Outcome: Progressing  2/13/2024 0726 by Clifford Marley RN  Outcome: Progressing     Problem: RESPIRATORY - ADULT  Goal: Achieves optimal ventilation and oxygenation  Description: INTERVENTIONS:  - Assess for changes in respiratory status  - Assess for changes in mentation and behavior  - Position to facilitate oxygenation and minimize respiratory effort  - Oxygen administered by appropriate delivery if ordered  - Initiate smoking cessation education as indicated  - Encourage broncho-pulmonary hygiene including cough, deep breathe, Incentive Spirometry  - Assess the need for suctioning and aspirate as needed  - Assess and instruct to report SOB or any respiratory difficulty  - Respiratory Therapy support as indicated  2/13/2024 0726 by Clifford Marley RN  Outcome: Progressing  2/13/2024 0726 by Clifford Marley RN  Outcome: Progressing

## 2024-02-13 NOTE — PROGRESS NOTES
Patient:    MRN:  [id number]    Cooper Request ID:  [id number]    Level of care reserved:  Home Health Agency    Partner Reserved:  [facility], [place] [address] [phone]    Clinical needs requested:    Geography searched:  [phone]    Start of Service:    Request sent:  11:56am EST on 2/13/2024 by Mamta Hernandez    Partner reserved:  1:44pm EST on 2/13/2024 by Mamta Hernandez    Choice list shared:  1:44pm EST on 2/13/2024 by Mamta Hernandez

## 2024-02-13 NOTE — PROGRESS NOTES
Counts include 234 beds at the [facility]  Progress Note  Name: Cortney Harding I  MRN: [id number]  Unit/Bed#: [place] [address] -01 I Date of Admission: 2/9/2024   Date of Service: 2/13/2024 I Hospital Day: 4    Assessment/Plan   * Acute systolic heart failure (HCC)  Assessment & Plan  Wt Readings from Last 3 Encounters:   02/13/24 84.5 kg (186 lb 4.6 oz)   02/09/24 87.1 kg (192 lb)   12/12/22 87.3 kg (192 lb 6.4 oz)     78-year-old female with past medical history of bladder cancer, small cell lung cancer in remission, COPD and obesity presented with shortness of breath  Has been following with her outpatient PCP, workup for heart failure ongoing.  Underwent outpatient echocardiogram and was sent to the ER for low EF  Echocardiogram with moderately dilated left ventricular cavity, severely reduced left ventricular systolic function with global hypokinesis and regions of akinesis of the apex, cannot definitively exclude layering thrombus in the apical region, EF 17%, grade 3 diastolic dysfunction  Will need life vest on DC  Awaiting life vest  Cardiology following  IV diuretics switched to oral yesterday  Continue monitoring intake and output, fluid restriction, daily weights-down 3 pounds since yesterday  Additional echo ordered to further evaluate possibility of thrombus- negative for thrombus  Jardiance sent to pharmacy for price check  Continue BB, diuretic    Chronic respiratory failure (HCC)  Assessment & Plan  Maintained outpatient on 2.5 L nasal cannula  Has been stable on home oxygen while inpatient, continue    Class 2 severe obesity due to excess calories with serious comorbidity and body mass index (BMI) of 35.0 to 35.9 in adult   Assessment & Plan  BMI noted to be 35, would benefit from dietary changes, lifestyle modifications    Non-small cell carcinoma of left lung, stage 3 (HCC)  Assessment & Plan  History of non-small cell lung cancer with adenocarcinoma histology  Had follow-up with oncology outpatient,   Tyrone in 2021 with no evidence of recurrence at that time  CT chest showing left suprahilar opacity due to benign radiation fibrosis, cluster of nodules in lateral upper left lobe, likely inflammatory  Recommend CT chest in 3 months    Hypothyroidism  Assessment & Plan  TSH elevated at 10, T4 low at 0.57  Increase levothyroxine from 125 mcg to 150 mcg  Outpatient follow-up for repeat labs in 6 weeks    Chronic obstructive pulmonary disease (HCC)  Assessment & Plan  Does not appear in acute exacerbation  Continue nebulizers and inhalers    Adenocarcinoma of bladder (HCC)  Assessment & Plan  Patient previously followed with Dr. De Oliveira  Has consider Keytruda but has been lost to follow-up           VTE Pharmacologic Prophylaxis:   Moderate Risk (Score 3-4) - Pharmacological DVT Prophylaxis Ordered: enoxaparin (Lovenox).    Mobility:   Basic Mobility Inpatient Raw Score: 18  JH-HLM Goal: 6: Walk 10 steps or more  JH-HLM Achieved: 6: Walk 10 steps or more  HLM Goal achieved. Continue to encourage appropriate mobility.    Patient Centered Rounds: I performed bedside rounds with nursing staff today.   Discussions with Specialists or Other Care Team Provider: cardiology    Education and Discussions with Family / Patient: Patient declined call to .     Total Time Spent on Date of Encounter in care of patient: 25 mins. This time was spent on one or more of the following: performing physical exam; counseling and coordination of care; obtaining or reviewing history; documenting in the medical record; reviewing/ordering tests, medications or procedures; communicating with other healthcare professionals and discussing with patient's family/caregivers.    Current Length of Stay: 4 day(s)  Current Patient Status: Inpatient   Certification Statement: The patient will continue to require additional inpatient hospital stay due to life vest  Discharge Plan: Anticipate discharge tomorrow to home.    Code Status: Level  1 - Full Code    Subjective:   The patient is seen resting in bed.  After further discussion with the patient as well as explanation, patient agreed to LifeVest.  I informed her that this would likely not be delivered until tomorrow secondary to this now when she is in agreement with staying.  She denies worsening shortness of breath, chest pain, palpitations, nausea, vomiting, lightheadedness, dizziness.  She is feeling well overall.    Objective:     Vitals:   Temp (24hrs), Av °F (36.7 °C), Min:97.7 °F (36.5 °C), Max:98.2 °F (36.8 °C)    Temp:  [97.7 °F (36.5 °C)-98.2 °F (36.8 °C)] 98.1 °F (36.7 °C)  HR:  [] 76  Resp:  [16-20] 18  BP: ()/(52-82) 112/68  SpO2:  [93 %-97 %] 96 %  Body mass index is 34.07 kg/m².     Input and Output Summary (last 24 hours):     Intake/Output Summary (Last 24 hours) at 2024 0840  Last data filed at 2024 0526  Gross per 24 hour   Intake 470 ml   Output 451 ml   Net 19 ml       Physical Exam:   Physical Exam  Vitals and nursing note reviewed.   Constitutional:       General: She is not in acute distress.     Appearance: Normal appearance. She is not ill-appearing, toxic-appearing or diaphoretic.   HENT:      Head: Normocephalic and atraumatic.   Cardiovascular:      Rate and Rhythm: Normal rate and regular rhythm.      Heart sounds: No murmur heard.     No friction rub. No gallop.   Pulmonary:      Effort: Pulmonary effort is normal. No respiratory distress.      Breath sounds: Normal breath sounds. No wheezing, rhonchi or rales.   Abdominal:      General: Abdomen is flat. Bowel sounds are normal. There is no distension.      Palpations: Abdomen is soft.      Tenderness: There is no abdominal tenderness.   Musculoskeletal:      Right lower leg: No edema.      Left lower leg: No edema.   Skin:     General: Skin is warm and dry.      Coloration: Skin is not jaundiced or pale.   Neurological:      General: No focal deficit present.      Mental Status: She is alert  and oriented to person, place, and time. Mental status is at baseline.        Additional Data:     Labs:  Results from last 7 days   Lab Units 02/13/24  0509 02/10/24  0501 02/09/24  1435   WBC Thousand/uL 5.96   < > 8.28   HEMOGLOBIN g/dL 12.5   < > 12.1   HEMATOCRIT % 38.8   < > 36.6   PLATELETS Thousands/uL 279   < > 288   NEUTROS PCT %  --   --  71   LYMPHS PCT %  --   --  19   MONOS PCT %  --   --  5   EOS PCT %  --   --  3    < > = values in this interval not displayed.     Results from last 7 days   Lab Units 02/13/24  0509 02/10/24  0501 02/09/24  1435   SODIUM mmol/L 139   < > 139   POTASSIUM mmol/L 3.5   < > 3.7   CHLORIDE mmol/L 99   < > 103   CO2 mmol/L 33*   < > 30   BUN mg/dL 25   < > 11   CREATININE mg/dL 0.71   < > 0.62   ANION GAP mmol/L 7   < > 6   CALCIUM mg/dL 9.0   < > 9.1   ALBUMIN g/dL  --   --  4.0   TOTAL BILIRUBIN mg/dL  --   --  0.36   ALK PHOS U/L  --   --  64   ALT U/L  --   --  18   AST U/L  --   --  21   GLUCOSE RANDOM mg/dL 87   < > 95    < > = values in this interval not displayed.                       Lines/Drains:  Invasive Devices       Peripheral Intravenous Line  Duration             Peripheral IV 02/13/24 Right Forearm <1 day                      Telemetry:  Telemetry Orders (From admission, onward)               24 Hour Telemetry Monitoring  Continuous x 24 Hours (Telem)        Expiring   Question:  Reason for 24 Hour Telemetry  Answer:  Decompensated CHF- and any one of the following: continuous diuretic infusion or total diuretic dose >200 mg daily, associated electrolyte derangement (I.e. K < 3.0), ionotropic drip (continuous infusion), hx of ventricular arrhythmia, or new EF < 35%                     Telemetry Reviewed: Normal Sinus Rhythm  Indication for Continued Telemetry Use: Acute CHF on >200 mg lasix/day or equivalent dose or with new reduced EF.              Imaging: No pertinent imaging reviewed.    Recent Cultures (last 7 days):         Last 24 Hours Medication  List:   Current Facility-Administered Medications   Medication Dose Route Frequency Provider Last Rate    albuterol  2 puff Inhalation Q4H PRN Leodan Gifford PA-C      ALPRAZolam  0.25 mg Oral HS PRN Lesley Sifuentes PA-C      aspirin  162 mg Oral Daily Luis Colvin MD      enoxaparin  40 mg Subcutaneous Daily Luis Colvin MD      fluticasone  2 spray Nasal Daily Luis Colvin MD      Fluticasone Furoate-Vilanterol  1 puff Inhalation Daily Luis Colvin MD      ipratropium-albuterol  3 mL Nebulization TID PRN Luis Colvin MD      levothyroxine  150 mcg Oral Early Morning Daniella Zuñiga PA-C      loperamide  2 mg Oral TID PRN Daniella Zuñiga PA-C      losartan  25 mg Oral Daily Abhishek Vanessa, DO      metoprolol succinate  25 mg Oral Daily Abhishek Vanessa, DO      ondansetron  4 mg Intravenous Q6H PRN Luis Colvin MD      torsemide  20 mg Oral Daily Abhishek Vanessa, DO      umeclidinium  1 puff Inhalation Daily Luis Colvin MD          Today, Patient Was Seen By: Apolinar Smiley PA-C    **Please Note: This note may have been constructed using a voice recognition system.**

## 2024-02-14 ENCOUNTER — TRANSITIONAL CARE MANAGEMENT (OUTPATIENT)
Dept: FAMILY MEDICINE CLINIC | Facility: CLINIC | Age: 79
End: 2024-02-14

## 2024-02-14 ENCOUNTER — TELEPHONE (OUTPATIENT)
Dept: FAMILY MEDICINE CLINIC | Facility: CLINIC | Age: 79
End: 2024-02-14

## 2024-02-14 VITALS
HEART RATE: 98 BPM | OXYGEN SATURATION: 96 % | WEIGHT: 186.29 LBS | RESPIRATION RATE: 18 BRPM | DIASTOLIC BLOOD PRESSURE: 56 MMHG | BODY MASS INDEX: 34.28 KG/M2 | TEMPERATURE: 98.2 F | HEIGHT: 62 IN | SYSTOLIC BLOOD PRESSURE: 101 MMHG

## 2024-02-14 LAB
ANION GAP SERPL CALCULATED.3IONS-SCNC: 7 MMOL/L
BUN SERPL-MCNC: 31 MG/DL (ref 5–25)
CALCIUM SERPL-MCNC: 9.2 MG/DL (ref 8.4–10.2)
CHLORIDE SERPL-SCNC: 99 MMOL/L (ref 96–108)
CO2 SERPL-SCNC: 32 MMOL/L (ref 21–32)
CREAT SERPL-MCNC: 0.82 MG/DL (ref 0.6–1.3)
ERYTHROCYTE [DISTWIDTH] IN BLOOD BY AUTOMATED COUNT: 12.6 % (ref 11.6–15.1)
GFR SERPL CREATININE-BSD FRML MDRD: 67 ML/MIN/1.73SQ M
GLUCOSE SERPL-MCNC: 101 MG/DL (ref 65–140)
HCT VFR BLD AUTO: 36.7 % (ref 34.8–46.1)
HGB BLD-MCNC: 12.1 G/DL (ref 11.5–15.4)
MCH RBC QN AUTO: 33.2 PG (ref 26.8–34.3)
MCHC RBC AUTO-ENTMCNC: 33 G/DL (ref 31.4–37.4)
MCV RBC AUTO: 101 FL (ref 82–98)
PLATELET # BLD AUTO: 273 THOUSANDS/UL (ref 149–390)
PMV BLD AUTO: 11.4 FL (ref 8.9–12.7)
POTASSIUM SERPL-SCNC: 3.8 MMOL/L (ref 3.5–5.3)
RBC # BLD AUTO: 3.64 MILLION/UL (ref 3.81–5.12)
SODIUM SERPL-SCNC: 138 MMOL/L (ref 135–147)
WBC # BLD AUTO: 6.66 THOUSAND/UL (ref 4.31–10.16)

## 2024-02-14 PROCEDURE — 85027 COMPLETE CBC AUTOMATED: CPT | Performed by: PHYSICIAN ASSISTANT

## 2024-02-14 PROCEDURE — 99239 HOSP IP/OBS DSCHRG MGMT >30: CPT | Performed by: PHYSICIAN ASSISTANT

## 2024-02-14 PROCEDURE — 80048 BASIC METABOLIC PNL TOTAL CA: CPT | Performed by: PHYSICIAN ASSISTANT

## 2024-02-14 RX ORDER — ALPRAZOLAM 0.25 MG/1
0.25 TABLET ORAL
Qty: 5 TABLET | Refills: 0 | Status: SHIPPED | OUTPATIENT
Start: 2024-02-14 | End: 2024-02-20

## 2024-02-14 RX ORDER — METOPROLOL SUCCINATE 25 MG/1
25 TABLET, EXTENDED RELEASE ORAL DAILY
Qty: 30 TABLET | Refills: 0 | Status: SHIPPED | OUTPATIENT
Start: 2024-02-15 | End: 2024-02-19 | Stop reason: SDUPTHER

## 2024-02-14 RX ORDER — LEVOTHYROXINE SODIUM 0.15 MG/1
150 TABLET ORAL DAILY
Qty: 30 TABLET | Refills: 0 | Status: SHIPPED | OUTPATIENT
Start: 2024-02-14 | End: 2024-02-20 | Stop reason: SDUPTHER

## 2024-02-14 RX ORDER — TORSEMIDE 20 MG/1
20 TABLET ORAL DAILY
Qty: 30 TABLET | Refills: 0 | Status: SHIPPED | OUTPATIENT
Start: 2024-02-15 | End: 2024-02-19 | Stop reason: SDUPTHER

## 2024-02-14 RX ORDER — LOSARTAN POTASSIUM 25 MG/1
25 TABLET ORAL DAILY
Qty: 30 TABLET | Refills: 0 | Status: SHIPPED | OUTPATIENT
Start: 2024-02-15 | End: 2024-02-19 | Stop reason: SDUPTHER

## 2024-02-14 RX ADMIN — ASPIRIN 162 MG: 81 TABLET, COATED ORAL at 09:55

## 2024-02-14 RX ADMIN — LEVOTHYROXINE SODIUM 150 MCG: 75 TABLET ORAL at 05:13

## 2024-02-14 RX ADMIN — UMECLIDINIUM 1 PUFF: 62.5 AEROSOL, POWDER ORAL at 09:57

## 2024-02-14 RX ADMIN — TORSEMIDE 20 MG: 20 TABLET ORAL at 09:57

## 2024-02-14 RX ADMIN — FLUTICASONE PROPIONATE 2 SPRAY: 50 SPRAY, METERED NASAL at 09:56

## 2024-02-14 RX ADMIN — FLUTICASONE FUROATE AND VILANTEROL TRIFENATATE 1 PUFF: 100; 25 POWDER RESPIRATORY (INHALATION) at 09:56

## 2024-02-14 RX ADMIN — ENOXAPARIN SODIUM 40 MG: 40 INJECTION SUBCUTANEOUS at 09:55

## 2024-02-14 RX ADMIN — LOSARTAN POTASSIUM 25 MG: 25 TABLET, FILM COATED ORAL at 09:56

## 2024-02-14 RX ADMIN — METOPROLOL SUCCINATE 25 MG: 25 TABLET, EXTENDED RELEASE ORAL at 09:57

## 2024-02-14 NOTE — PLAN OF CARE
Problem: Potential for Falls  Goal: Patient will remain free of falls  Description: INTERVENTIONS:  - Educate patient/family on patient safety including physical limitations  - Instruct patient to call for assistance with activity   - Consult OT/PT to assist with strengthening/mobility   - Keep Call bell within reach  - Keep bed low and locked with side rails adjusted as appropriate  - Keep care items and personal belongings within reach  - Initiate and maintain comfort rounds  - Make Fall Risk Sign visible to staff  - Offer Toileting every  Hours, in advance of need  - Initiate/Maintain alarm  - Obtain necessary fall risk management equipment:   - Apply yellow socks and bracelet for high fall risk patients  - Consider moving patient to room near nurses station  Outcome: Progressing     Problem: PAIN - ADULT  Goal: Verbalizes/displays adequate comfort level or baseline comfort level  Description: Interventions:  - Encourage patient to monitor pain and request assistance  - Assess pain using appropriate pain scale  - Administer analgesics based on type and severity of pain and evaluate response  - Implement non-pharmacological measures as appropriate and evaluate response  - Consider cultural and social influences on pain and pain management  - Notify physician/advanced practitioner if interventions unsuccessful or patient reports new pain  Outcome: Progressing     Problem: INFECTION - ADULT  Goal: Absence or prevention of progression during hospitalization  Description: INTERVENTIONS:  - Assess and monitor for signs and symptoms of infection  - Monitor lab/diagnostic results  - Monitor all insertion sites, i.e. indwelling lines, tubes, and drains  - Monitor endotracheal if appropriate and nasal secretions for changes in amount and color  - [place] appropriate cooling/warming therapies per order  - Administer medications as ordered  - Instruct and encourage patient and family to use good hand hygiene technique  -  Identify and instruct in appropriate isolation precautions for identified infection/condition  Outcome: Progressing     Problem: SAFETY ADULT  Goal: Patient will remain free of falls  Description: INTERVENTIONS:  - Educate patient/family on patient safety including physical limitations  - Instruct patient to call for assistance with activity   - Consult OT/PT to assist with strengthening/mobility   - Keep Call bell within reach  - Keep bed low and locked with side rails adjusted as appropriate  - Keep care items and personal belongings within reach  - Initiate and maintain comfort rounds  - Make Fall Risk Sign visible to staff  - Offer Toileting every  Hours, in advance of need  - Initiate/Maintain alarm  - Obtain necessary fall risk management equipment:   - Apply yellow socks and bracelet for high fall risk patients  - Consider moving patient to room near nurses station  Outcome: Progressing  Goal: Maintain or return to baseline ADL function  Description: INTERVENTIONS:  -  Assess patient's ability to carry out ADLs; assess patient's baseline for ADL function and identify physical deficits which impact ability to perform ADLs (bathing, care of mouth/teeth, toileting, grooming, dressing, etc.)  - Assess/evaluate cause of self-care deficits   - Assess range of motion  - Assess patient's mobility; develop plan if impaired  - Assess patient's need for assistive devices and provide as appropriate  - Encourage maximum independence but intervene and supervise when necessary  - Involve family in performance of ADLs  - Assess for home care needs following discharge   - Consider OT consult to assist with ADL evaluation and planning for discharge  - Provide patient education as appropriate  Outcome: Progressing  Goal: Maintains/Returns to pre admission functional level  Description: INTERVENTIONS:  - Perform AM-PAC 6 Click Basic Mobility/ Daily Activity assessment daily.  - Set and communicate daily mobility goal to care  team and patient/family/caregiver.   - Collaborate with rehabilitation services on mobility goals if consulted  - Perform Range of Motion  times a day.  - Reposition patient every  hours.  - Dangle patient  times a day  - Stand patient  times a day  - Ambulate patient times a day  - Out of bed to chair  times a day   - Out of bed for meal times a day  - Out of bed for toileting  - Record patient progress and toleration of activity level   Outcome: Progressing     Problem: DISCHARGE PLANNING  Goal: Discharge to home or other facility with appropriate resources  Description: INTERVENTIONS:  - Identify barriers to discharge w/patient and caregiver  - Arrange for needed discharge resources and transportation as appropriate  - Identify discharge learning needs (meds, wound care, etc.)  - Arrange for interpretive services to assist at discharge as needed  - Refer to Case Management Department for coordinating discharge planning if the patient needs post-hospital services based on physician/advanced practitioner order or complex needs related to functional status, cognitive ability, or social support system  Outcome: Progressing     Problem: Knowledge Deficit  Goal: Patient/family/caregiver demonstrates understanding of disease process, treatment plan, medications, and discharge instructions  Description: Complete learning assessment and assess knowledge base.  Interventions:  - Provide teaching at level of understanding  - Provide teaching via preferred learning methods  Outcome: Progressing     Problem: CARDIOVASCULAR - ADULT  Goal: Maintains optimal cardiac output and hemodynamic stability  Description: INTERVENTIONS:  - Monitor I/O, vital signs and rhythm  - Monitor for S/S and trends of decreased cardiac output  - Administer and titrate ordered vasoactive medications to optimize hemodynamic stability  - Assess quality of pulses, skin color and temperature  - Assess for signs of decreased coronary artery perfusion  -  Instruct patient to report change in severity of symptoms  Outcome: Progressing  Goal: Absence of cardiac dysrhythmias or at baseline rhythm  Description: INTERVENTIONS:  - Continuous cardiac monitoring, vital signs, obtain 12 lead EKG if ordered  - Administer antiarrhythmic and heart rate control medications as ordered  - Monitor electrolytes and administer replacement therapy as ordered  Outcome: Progressing     Problem: Prexisting or High Potential for Compromised Skin Integrity  Goal: Skin integrity is maintained or improved  Description: INTERVENTIONS:  - Identify patients at risk for skin breakdown  - Assess and monitor skin integrity  - Assess and monitor nutrition and hydration status  - Monitor labs   - Assess for incontinence   - Turn and reposition patient  - Assist with mobility/ambulation  - Relieve pressure over bony prominences  - Avoid friction and shearing  - Provide appropriate hygiene as needed including keeping skin clean and dry  - Evaluate need for skin moisturizer/barrier cream  - Collaborate with interdisciplinary team   - Patient/family teaching  - Consider wound care consult   Outcome: Progressing     Problem: RESPIRATORY - ADULT  Goal: Achieves optimal ventilation and oxygenation  Description: INTERVENTIONS:  - Assess for changes in respiratory status  - Assess for changes in mentation and behavior  - Position to facilitate oxygenation and minimize respiratory effort  - Oxygen administered by appropriate delivery if ordered  - Initiate smoking cessation education as indicated  - Encourage broncho-pulmonary hygiene including cough, deep breathe, Incentive Spirometry  - Assess the need for suctioning and aspirate as needed  - Assess and instruct to report SOB or any respiratory difficulty  - Respiratory Therapy support as indicated  Outcome: Progressing

## 2024-02-14 NOTE — ASSESSMENT & PLAN NOTE
Wt Readings from Last 3 Encounters:   02/13/24 84.5 kg (186 lb 4.6 oz)   02/09/24 87.1 kg (192 lb)   12/12/22 87.3 kg (192 lb 6.4 oz)     78-year-old female with past medical history of bladder cancer, small cell lung cancer in remission, COPD and obesity presented with shortness of breath  Has been following with her outpatient PCP, workup for heart failure ongoing.  Underwent outpatient echocardiogram and was sent to the ER for low EF  Echocardiogram with moderately dilated left ventricular cavity, severely reduced left ventricular systolic function with global hypokinesis and regions of akinesis of the apex, cannot definitively exclude layering thrombus in the apical region, EF 17%, grade 3 diastolic dysfunction  Life vest ordered and delivered  Cardiology following  Torsemide 20 mg daily  losartan 25 mg daily  Metoprolol 25 mg daily  Jardiance 10 mg daily  Additional echo ordered to further evaluate possibility of thrombus- negative for thrombus

## 2024-02-14 NOTE — PROGRESS NOTES
Cardiology Office Note  MD Erick Mart MD, [facility]  Abhishek Vanessa DO, [facility]  MD Rafal Smith DO, [facility]  Jones Hawley DO, [facility]  [place] [address]    Cortney Harding 78 y.o. female MRN: [id number]  Unit/Bed#:  Encounter: [id number]      History of Present Illness:  It was a pleasure to see Cortney Harding in the office today for follow-up CV evaluation. She has a past medical history of chronic combined systolic and diastolic heart failure, dilated cardiomyopathy, dyslipidemia, severe COPD, hypothyroid, prior bladder cancer and lung cancer.  The patient was found to have progressive shortness of breath and peripheral edema.  She was seen by primary care and recommended for echocardiogram and was placed on Lasix.  Her weights had not substantially improved on the diuretic and came in for outpatient echocardiogram in February 2024.  At that time, she was found to be pursed lip breathing and significantly short of breath.  She had difficulty with activities of daily living.  She was recommended for ED evaluation and treated for acute heart failure with intravenous diuretic.  Once she was euvolemic, she was transition to oral diuretic and fitted with LifeVest.  Patient was seen by interventional cardiology and recommended for medical therapy and against invasive coronary angiography due to the risk for the procedure.  She is here today for routine follow-up.  Since discharge, her shortness of breath improved, but still very short of breath at baseline.  Denies chest pain, tightness or squeezing.  Denies lightheadedness, dizziness or palpitations.  Denies lower extremity swelling, orthopnea or paroxysmal nocturnal dyspnea.    Review of Systems:  Review of Systems   Constitutional: Negative for decreased appetite, fever, weight gain and weight loss.   HENT:  Negative for  "congestion and sore throat.    Eyes:  Negative for visual disturbance.   Cardiovascular:  Positive for dyspnea on exertion. Negative for chest pain, leg swelling, near-syncope and palpitations.   Respiratory:  Positive for shortness of breath. Negative for cough.    Hematologic/Lymphatic: Negative for bleeding problem.   Skin:  Negative for rash.   Musculoskeletal:  Negative for myalgias and neck pain.   Gastrointestinal:  Negative for abdominal pain and nausea.   Neurological:  Negative for light-headedness and weakness.   Psychiatric/Behavioral:  Negative for depression.      Past Medical History:   Diagnosis Date    Anesthesia     \"constipation after surgery\"    Bladder cancer (HCC)     Cataract     left    Colon polyp     COPD (chronic obstructive pulmonary disease) (HCC)     Dry mouth     Full dentures     GERD (gastroesophageal reflux disease)     occas    History of pneumonia     Hyperlipidemia     Hypothyroidism     Lung cancer (HCC)     stage 3    Pulmonary emphysema (HCC)     Skin cancer     squamous cell of the leg    Sleep difficulties     \"gets up frequently to void\"    UTI (urinary tract infection) 12/2019    recent e coli treated and resolved    Wears glasses        Past Surgical History:   Procedure Laterality Date    CHOLECYSTECTOMY      COLONOSCOPY      CYSTOSCOPY      4/10 AND 8/10, 3/12/2015 4/18/2016  DIAGNOSTIC     CYSTOSCOPY  05/01/2020    CYSTOSCOPY  04/02/2021    CYSTOSCOPY  10/29/2021    DENTAL SURGERY      EYE SURGERY      HYSTERECTOMY      OTHER SURGICAL HISTORY      TRANSURETHERAL RESECTION     OR CYSTO W/REMOVAL OF LESIONS SMALL N/A 12/23/2019    Procedure: BLADDER BIOPSY; BLADDER FULGERATION with mitomycin;  Surgeon: Daniel De Oliveira MD;  Location:  MAIN OR;  Service: Urology    OR CYSTOURETHROSCOPY WITH BIOPSY N/A 6/1/2020    Procedure: CYSTOSCOPY WITH BIOPSIES, FULGURATION;  Surgeon: Daniel De Oliveira MD;  Location: [facility];  Service: Urology    OR CYSTOURETHROSCOPY WITH BIOPSY N/A " 2020    Procedure: CYSTO W/BIOPSIES & FULGURATION;  Surgeon: Daniel De Oliveira MD;  Location[facility];  Service: Urology    WISDOM TOOTH EXTRACTION         Social History     Socioeconomic History    Marital status: Single     Spouse name: None    Number of children: None    Years of education: None    Highest education level: None   Occupational History    None   Tobacco Use    Smoking status: Former     Current packs/day: 0.00     Types: Cigarettes     Quit date: 2019     Years since quittin.1    Smokeless tobacco: Never   Vaping Use    Vaping status: Former   Substance and Sexual Activity    Alcohol use: Not Currently    Drug use: No    Sexual activity: None   Other Topics Concern    None   Social History Narrative    None     Social Determinants of Health     Financial Resource Strain: Low Risk  (2023)    Overall Financial Resource Strain (CARDIA)     Difficulty of Paying Living Expenses: Not hard at all   Food Insecurity: Not on file   Transportation Needs: No Transportation Needs (2023)    PRAPARE - Transportation     Lack of Transportation (Medical): No     Lack of Transportation (Non-Medical): No   Physical Activity: Not on file   Stress: Not on file   Social Connections: Not on file   Intimate Partner Violence: Not on file   Housing Stability: Not on file       Family History   Problem Relation Age of Onset    Cancer Mother         Mouth     Mental illness Sister     Colon cancer Paternal Grandmother     No Known Problems Daughter        Allergies   Allergen Reactions    Erythromycin Hives    Penicillins Hives    Sulfa Antibiotics Hives    Tetracyclines & Related Hives         Current Outpatient Medications:     albuterol (Ventolin HFA) 90 mcg/act inhaler, Inhale 2 puffs every 6 (six) hours as needed for wheezing, Disp: 54 g, Rfl: 3    ALPRAZolam (XANAX) 0.25 mg tablet, Take 1 tablet (0.25 mg total) by mouth daily at bedtime as needed for sleep for up to 10 days, Disp: 5 tablet,  Rfl: 0    Ascorbic Acid (VITAMIN C) 100 MG tablet, Take 100 mg by mouth daily, Disp: , Rfl:     aspirin 325 mg tablet, Take 0.5 tablets by mouth daily , Disp: , Rfl:     Calcium 600 MG tablet, Take 1 tablet by mouth 2 (two) times a day, Disp: , Rfl:     Chromium 200 MCG TABS, Take 1 tablet by mouth daily, Disp: , Rfl:     Empagliflozin (Jardiance) 10 MG TABS tablet, Take 1 tablet (10 mg total) by mouth every morning, Disp: 90 tablet, Rfl: 3    Fluticasone Furoate-Vilanterol 100-25 mcg/actuation inhaler, Inhale 1 puff daily Rinse mouth after use., Disp: 360 blister, Rfl: 0    levothyroxine 150 mcg tablet, Take 1 tablet (150 mcg total) by mouth daily, Disp: 30 tablet, Rfl: 0    losartan (COZAAR) 25 mg tablet, Take 1 tablet (25 mg total) by mouth daily, Disp: 90 tablet, Rfl: 3    magnesium 30 MG tablet, Take 30 mg by mouth 2 (two) times a day, Disp: , Rfl:     melatonin 1 mg, Take 10 mg by mouth daily at bedtime , Disp: , Rfl:     metoprolol succinate (TOPROL-XL) 25 mg 24 hr tablet, Take 1 tablet (25 mg total) by mouth daily, Disp: 90 tablet, Rfl: 3    Multiple Vitamin (MULTIVITAMINS PO), Take 1 tablet by mouth daily, Disp: , Rfl:     Omega-3 Fatty Acids (FISH OIL) 1200 MG CAPS, Take by mouth, Disp: , Rfl:     OXYGEN-HELIUM IN, Inhale as needed Only uses rarely, Disp: , Rfl:     torsemide (DEMADEX) 20 mg tablet, Take 1 tablet (20 mg total) by mouth daily, Disp: 90 tablet, Rfl: 3    fluorouracil (EFUDEX) 5 % cream, Use as directed (Patient not taking: Reported on 2/15/2024), Disp: , Rfl:     fluticasone (FLONASE) 50 mcg/act nasal spray, 2 sprays into each nostril daily (Patient not taking: Reported on 2/15/2024), Disp: 1 g, Rfl: 0    ipratropium-albuterol (DUO-NEB) 0.5-2.5 mg/3 mL nebulizer solution, Take 3 mL by nebulization 3 (three) times a day as needed for wheezing or shortness of breath (Patient not taking: Reported on 2/15/2024), Disp: 180 mL, Rfl: 2    loratadine-pseudoephedrine (CLARITIN-D 12-HOUR) 5-120 mg per  tablet, Take 1 tablet by mouth 2 (two) times a day (Patient not taking: Reported on 2/15/2024), Disp: 60 tablet, Rfl: 0    Spiriva HandiHaler 18 MCG inhalation capsule, INHALE 1 CAPSULE VIA HANDIHALER ONCE DAILY AT THE SAME TIME EVERY DAY (Patient not taking: Reported on 2/15/2024), Disp: 90 capsule, Rfl: 1    Vitals:    24 0800   BP: 103/60   BP Location: Right arm   Patient Position: Sitting   Cuff Size: Standard   Pulse: 88   Weight: 84.7 kg (186 lb 12.8 oz)     Body mass index is 34.17 kg/m².    PHYSICAL EXAMINATION:  Gen: Awake, Alert, NAD   Head/eyes: AT/NC, pupils equal and round, Anicteric  ENT: mmm  Neck: Supple, No elevated JVP, trachea midline  Resp: Decreased breath sounds bilaterally  CV: RRR +S1, S2, No m/r/g  Abd: Soft, NT/ND + BS  Ext: no LE edema bilaterally  Neuro: Follows commands, moves all extermities  Psych: Appropriate affect, normal mood, pleasant attitude, non-combative  Skin: warm; no rash, erythema or venous stasis changes on exposed skin    --------------------------------------------------------------------------------  TREADMILL STRESS  No results found for this or any previous visit.     --------------------------------------------------------------------------------  NUCLEAR STRESS TEST: No results found for this or any previous visit.    Results for orders placed during the hospital encounter of 16    NM myocardial perfusion spect (rx stress and/or rest)    [address] [address] [phone]    Rest/Stress Gated SPECT Myocardial Perfusion Imaging After Regadenoson    Patient: VADIM LEI  MR number: [id number]  Account number: [id number]  : 1945  Age: 71 years  Gender: Female  Status: Outpatient  Location: [facility] [place]  Height: 61 in  Weight: 209 lb  BP: 151/ 78 mmHg    Allergies: ERYTHROMYCIN, PENICILLINS, SULFA ANTIBIOTICS, TETRACYCLINES RELATED    Diagnosis: J44.9 -  Chronic obstructive pulmonary disease, unspecified, R07.9 -  Chest pain, unspecified    Primary Physician:  Petar Mcdermott MD  Technician:  Braydon Cordova  RN:  Renetta Simmons RN BSN  Referring Physician:  Petar Mcdermott MD  Group:  [facility]  Report Prepared By::  Renetta Simmons RN BSN  Interpreting Physician:  Rafal Oakley DO    INDICATIONS: Detection of coronary artery disease.    HISTORY: CANCER:LUNG/BLADDER/SKIN  ADAIR/COPD CURRNT LONG TIME SMOKER  BL HIP PAIN/GAIT DISTURBANCE The patient is a 71 year old  female.  Chest pain status: chest pain. Other symptoms: dyspnea and decreased effort  tolerance. Coronary artery disease risk factors: dyslipidemia, smoking, and  post-menopausal state. Cardiovascular history: none significant. Co-morbidity:  history of lung disease and obesity. Medications: no cardiac drugs.    PHYSICAL EXAM: Baseline physical exam screening: no wheezes audible.    REST ECG: Normal sinus rhythm. Normal baseline ECG.    PROCEDURE: The study was performed at the [facility] [place]. A  regadenoson infusion pharmacologic stress test was performed. Gated SPECT  myocardial perfusion imaging was performed after stress and at rest. Systolic  blood pressure was 151 mmHg, at the start of the study. Diastolic blood  pressure was 78 mmHg, at the start of the study. The heart rate was 81 bpm, at  the start of the study. IV double checked.  Regadenoson protocol:  HR bpm SBP mmHg DBP mmHg Symptoms  Baseline 81 151 78 none  Immediate -- -- -- mild dyspnea  1 min -- -- -- moderate dyspnea  2 min 91 130 70 same as above  3 min -- -- -- subsiding  4 min -- -- -- subsiding  5 min 90 135 74 none  The patient also performed low level exercise with leg lifts.    STRESS SUMMARY: Duration of pharmacologic stress was Maximal heart rate during  stress was 104 bpm. There was normal resting blood pressure with an appropriate  response to stress. The rate-pressure product  for the peak heart rate and blood  pressure was 66290. There was no chest pain during stress. The stress test was  terminated due to protocol completion. Pre oxygen saturation: 95 %. Peak oxygen  saturation: 97 %. The stress ECG was negative for ischemia. There were no  stress arrhythmias or conduction abnormalities.    ISOTOPE ADMINISTRATION:  Resting isotope administration Stress isotope administration  Agent Tetrofosmin Tetrofosmin  Dose 10.2 mCi 32.3 mCi  Date 05/05/2016 05/05/2016  Injection time 08:55 10:35  Imaging time 09:25 11:35  Injection-image interval 30 min 45 min    The radiopharmaceutical was injected at the peak effect of pharmacologic  stress.    MYOCARDIAL PERFUSION IMAGING:  The image quality was good. Rotating projection images reveal mild breast  attenuation. Left ventricular size was normal. The TID ratio was 1.15.    PERFUSION DEFECTS:  -  There were no perfusion defects.    GATED SPECT:  The calculated left ventricular ejection fraction was 50 %. Left ventricular  ejection fraction was within normal limits by visual estimate. There was no  left ventricular regional abnormality.    SUMMARY:  -  Stress results: There was no chest pain during stress.  -  ECG conclusions: The stress ECG was negative for ischemia.  -  Perfusion imaging: There were no perfusion defects.  -  Gated SPECT: The calculated left ventricular ejection fraction was 50 %.  Left ventricular ejection fraction was within normal limits by visual estimate.  There was no left ventricular regional abnormality.    IMPRESSIONS: Normal study after pharmacologic vasodilation. Myocardial  perfusion imaging was normal at rest and with stress. Left ventricular systolic  function was normal.    Prepared and signed by    Rafal Oakley DO  Signed 05/05/2016 14:23:20      --------------------------------------------------------------------------------  CATH:  No results found for this or any previous  "visit.    --------------------------------------------------------------------------------  ECHO:   No results found for this or any previous visit.    No results found for this or any previous visit.    --------------------------------------------------------------------------------  HOLTER  No results found for this or any previous visit.    No results found for this or any previous visit.    --------------------------------------------------------------------------------  CAROTIDS  No results found for this or any previous visit.     --------------------------------------------------------------------------------  ECGs:  No results found for this visit on 02/19/24.     Lab Results   Component Value Date    WBC 6.66 02/14/2024    HGB 12.1 02/14/2024    HCT 36.7 02/14/2024     (H) 02/14/2024     02/14/2024      Lab Results   Component Value Date    SODIUM 138 02/14/2024    K 3.8 02/14/2024    CL 99 02/14/2024    CO2 32 02/14/2024    BUN 31 (H) 02/14/2024    CREATININE 0.82 02/14/2024    GLUC 101 02/14/2024    CALCIUM 9.2 02/14/2024      Lab Results   Component Value Date    HGBA1C 4.9 12/15/2022      Lab Results   Component Value Date    CHOL 214 03/14/2014     Lab Results   Component Value Date    HDL 42 (L) 12/15/2022    HDL 35 (L) 10/20/2020    HDL 29 03/14/2014     Lab Results   Component Value Date    LDLCALC 124 (H) 12/15/2022    LDLCALC 129 (H) 10/20/2020    LDLCALC 133 (H) 03/14/2014     Lab Results   Component Value Date    TRIG 157 (H) 12/15/2022    TRIG 398 (H) 10/20/2020    TRIG 260 03/14/2014     No results found for: \"CHOLHDL\"   Lab Results   Component Value Date    INR 0.9 01/08/2019        1. Chronic combined systolic and diastolic heart failure (HCC)  -     Basic metabolic panel; Future  -     CTA cardiac; Future; Expected date: 02/19/2024  -     Echo follow up/limited w/ contrast if indicated; Future; Expected date: 05/13/2024    2. Dilated cardiomyopathy (HCC)  -     CTA cardiac; " Future; Expected date: 02/19/2024    3. Dyslipidemia    4. Pulmonary hypertension ([facility])    5. Nonrheumatic mitral valve regurgitation    6. CHF (congestive heart failure) ([facility])  -     Ambulatory referral to Cardiology  -     losartan (COZAAR) 25 mg tablet; Take 1 tablet (25 mg total) by mouth daily  -     metoprolol succinate (TOPROL-XL) 25 mg 24 hr tablet; Take 1 tablet (25 mg total) by mouth daily  -     Empagliflozin (Jardiance) 10 MG TABS tablet; Take 1 tablet (10 mg total) by mouth every morning  -     torsemide (DEMADEX) 20 mg tablet; Take 1 tablet (20 mg total) by mouth daily    7. Claudication ([facility])  -     VAS ARTERIAL DUPLEX- LOWER LIMB BILATERAL; Future; Expected date: 02/19/2024        IMPRESSION:    Chronic combined systolic and diastolic heart failure  LVEF 17%, moderate LV dilatation, grade 3 diastolic dysfunction with apical akinesis, RV dilatation with normal function, AV sclerosis, moderate MR, mild to moderate TR with PASP 52 mmHg, trace pericardial effusion, February 2024  Dilated cardiomyopathy  Dyslipidemia  Severe COPD with chronic hypoxic respiratory failure  History of bladder carcinoma  History of stage III lung cancer  Hypothyroid  History of tobacco abuse  Claudication    PLAN:  It was a pleasure to see Cortney in the office today for follow-up CV evaluation.  She is here today due to her history of cardiomyopathy with severely reduced left ventricular systolic dysfunction and dilated cardiomyopathy.  Patient has no chest pain concerning for angina and no signs or symptoms of decompensated heart failure.  Clinically she examines to be euvolemic.  Blood pressure and heart rate are currently stable.  She is tolerating her current medications without any reported adverse effects.  Patient has difficulty perform greater than 4 METS due to her baseline severe COPD.  Based on her clinical presentation, I have the following recommendations:    1.  We have discussed medical therapy versus additional  "evaluation of her coronary anatomy.  At this time, the patient feels strongly she would like to undergo further ischemic evaluation.  We have ordered CTA of the coronary arteries.  Would recommend taking additional dose of metoprolol to control her heart rate on the day of testing.  BMP has been ordered prior to test.  2.  Check lower extremity arterial Doppler to assess for any evidence of obstructive disease.  3.  Recommend heart healthy diet low in sodium and carbohydrate.  Recommend salt restriction of less than 1500 mg/day.  4.  Would encourage 30 minutes a day, 5 days a week of moderate intensity activity to build cardiovascular endurance as tolerated.  5.  Continue losartan, Toprol-XL and Jardiance.  No additional room to uptitrate these medications at this time.  6.  Check repeat echocardiogram in 3 months prior to her follow-up encounter.  If echocardiogram shows reduction in function is still severe, would recommend placement of ICD if EP feels reasonable risk.  7.  Should she gain greater than 3 pounds in a day or 5 pounds in 1 to 2 weeks, recommend calling the office for further titration of diuretic medication.  Continue torsemide.  8.  We will follow-up with her after testing to review the results.    As always, please do not hesitate to call with any questions.    Portions of the record may have been created with voice recognition software. Occasional wrong word or \"sound a like\" substitutions may have occurred due to the inherent limitations of voice recognition software. Read the chart carefully and recognize, using context, where substitutions have occurred.      Signed: Abhishek Vanessa DO, FACC, CHARISMA, FACP  "

## 2024-02-14 NOTE — CASE MANAGEMENT
Case Management Discharge Planning Note    Patient name Cortney Harding  Location [facility] 2 M* MRN [id number]  : 1945 Date 2024       Current Admission Date: 2024  Current Admission Diagnosis:Acute systolic heart failure (HCC)   Patient Active Problem List    Diagnosis Date Noted    Chronic respiratory failure (HCC) 02/10/2024    Acute systolic heart failure (HCC) 2024    SOB (shortness of breath) 2024    Body mass index (BMI) 45.0-49.9, adult (HCC) 2022    CIS (carcinoma in situ of bladder) 06/15/2020    Medicare annual wellness visit, subsequent 2020    Squamous cell carcinoma of leg 2014    Non-small cell carcinoma of left lung, stage 3 (HCC) 2013    Class 2 severe obesity due to excess calories with serious comorbidity and body mass index (BMI) of 35.0 to 35.9 in adult  2013    Adenocarcinoma of bladder (HCC) 2012    Chronic obstructive pulmonary disease (HCC) 2012    Elevated C-reactive protein 2012    Hypercholesterolemia 2012    Hypothyroidism 2012      LOS (days): 5  Geometric Mean LOS (GMLOS) (days): 3  Days to GMLOS:-1.8     OBJECTIVE:  Risk of Unplanned Readmission Score: 14.56         Current admission status: Inpatient   Preferred Pharmacy:   [id number] - [place] - [address]  [address] [address]  Phone: [phone] Fax: [phone]    [facility]  [place] - [address],  [address],  [address] [facility] [address]  Phone: [phone] Fax: [phone]    Primary Care Provider: Mehul Thorpe MD    Primary Insurance: [employer] [facility]  Secondary Insurance:     DISCHARGE DETAILS:                                                                                                 Additional Comments: Spoke with pt re: discharge and DME needed which she denies needing.  She is agreeable to  the VNA coming due to having the Life vest and low endurance.  All information placed on AVS- no further CM needs identified.

## 2024-02-14 NOTE — DISCHARGE INSTR - AVS FIRST PAGE
Dear Cortney Harding,     It was our pleasure to care for you here at [facility].  It is our hope that we were always able to exceed the expected standards for your care during your stay.  You were hospitalized due to *** .  You were cared for on the *** floor under the service of Apolinar Smiley PA-C with the [facility] who covers for your primary care physician (PCP), Mehul Thorpe MD, while you were hospitalized.  If you have any questions or concerns related to this hospitalization, you may contact us at .  For follow up as well as medication refills, we recommend that you follow up with your primary care physician.  A registered nurse will reach out to you by phone within a few days after your discharge to answer any additional questions that you may have after going home.  However, at this time we provide for you here, the most important instructions / recommendations at discharge:     Notable Medication Adjustments -   Stop taking furosemide (lasix) 20 mg daily.  Take empagliflozin (Jardiance) 10 mg daily  Take losartan (Cozaar) 25 mg daily.  Take metoprolol (Toprol- XL) 25 mg daily  Take torsemide (Demadex) 20 mg daily  Take alprazolam (Xanax) 0.25 mg as needed for sleep.  Take levothyroxine (Synthroid) 150 mcg daily.  Testing Required after Discharge -   None  Incidental findings that Require Follow Up   None  Important Follow Up Information -   Please continue to wear your life vest.  Please review this entire after visit summary as additional general instructions including medication list, appointments, activity, diet, any pertinent wound care, and other additional recommendations from your care team that may be provided for you.      Sincerely,     Apolinar Smiley PA-C

## 2024-02-14 NOTE — DISCHARGE SUMMARY
formerly [facility]  Discharge- Cortney Harding 1945, 78 y.o. female MRN: [id number]  Unit/Bed#: [place] [address] -01 Encounter: [id number]  Primary Care Provider: Mehul Thorpe MD   Date and time admitted to hospital: 2/9/2024  2:04 PM    * Acute systolic heart failure (HCC)  Assessment & Plan  Wt Readings from Last 3 Encounters:   02/13/24 84.5 kg (186 lb 4.6 oz)   02/09/24 87.1 kg (192 lb)   12/12/22 87.3 kg (192 lb 6.4 oz)     78-year-old female with past medical history of bladder cancer, small cell lung cancer in remission, COPD and obesity presented with shortness of breath  Has been following with her outpatient PCP, workup for heart failure ongoing.  Underwent outpatient echocardiogram and was sent to the ER for low EF  Echocardiogram with moderately dilated left ventricular cavity, severely reduced left ventricular systolic function with global hypokinesis and regions of akinesis of the apex, cannot definitively exclude layering thrombus in the apical region, EF 17%, grade 3 diastolic dysfunction  Life vest ordered and delivered  Cardiology following  Torsemide 20 mg daily  losartan 25 mg daily  Metoprolol 25 mg daily  Jardiance 10 mg daily  Additional echo ordered to further evaluate possibility of thrombus- negative for thrombus    Chronic respiratory failure (HCC)  Assessment & Plan  Maintained outpatient on 2.5 L nasal cannula  Has been stable on home oxygen while inpatient, continue    Class 2 severe obesity due to excess calories with serious comorbidity and body mass index (BMI) of 35.0 to 35.9 in adult   Assessment & Plan  BMI noted to be 35, would benefit from dietary changes, lifestyle modifications    Non-small cell carcinoma of left lung, stage 3 (HCC)  Assessment & Plan  History of non-small cell lung cancer with adenocarcinoma histology  Had follow-up with oncology outpatient, Dr. Hansen in 2021 with no evidence of recurrence at that time  CT chest showing  left suprahilar opacity due to benign radiation fibrosis, cluster of nodules in lateral upper left lobe, likely inflammatory  Recommend CT chest in 3 months    Hypothyroidism  Assessment & Plan  TSH elevated at 10, T4 low at 0.57  Increase levothyroxine from 125 mcg to 150 mcg  Outpatient follow-up for repeat labs in 6 weeks    Chronic obstructive pulmonary disease (HCC)  Assessment & Plan  Does not appear in acute exacerbation  Continue nebulizers and inhalers    Adenocarcinoma of bladder (HCC)  Assessment & Plan  Patient previously followed with Dr. De Oliveira  Has consider Keytruda but has been lost to follow-up      Medical Problems       Resolved Problems  Date Reviewed: 2/14/2024   None       Discharging Physician / Practitioner: Apolinar Smiley PA-C  PCP: Mehul Thorpe MD  Admission Date:   Admission Orders (From admission, onward)       Ordered        02/09/24 1612  INPATIENT ADMISSION  Once                          Discharge Date: 02/14/24    Consultations During Hospital Stay:  cardiology    Procedures Performed:   None    Significant Findings / Test Results:   Echo follow up/limited w/ contrast if indicated  Result Date: 2/12/2024    Narrative:   Left Ventricle: Left ventricular cavity size is mildly dilated. Wall thickness is normal. The left ventricular ejection fraction is 15% by visual estimation. Systolic function is severely reduced. There is severe global hypokinesis with regional variation. Diastolic function is abnormal. There are increased left ventricular apical trabeculations without clear echocardiographic indications of thrombus.   Right Ventricle: Right ventricular cavity size is top-normal. Systolic function is normal.   Left Atrium: The atrium is dilated.   Aortic Valve: There is mild regurgitation. There is aortic valve sclerosis.   Mitral Valve: There is mild annular calcification. There is trace regurgitation.   Tricuspid Valve: Pulmonary artery systolic pressures cannot be  estimated due to lack of tricuspid regurgitation jet.   Compared to report from February 9, 2024, contrast echo study was performed without clear echocardiographic indications of thrombus.  Increased left ventricular apical trabeculations noted.     CT chest wo contrast  Result Date: 2/10/2024    Impression: Left suprahilar opacity described on chest radiograph is due to benign radiation fibrosis, stable when compared with the chest CT from April 2021. Cluster of nodules in the lateral left upper lobe and 5 mm nodule in the left lower lobe, new since April 2021; while likely inflammatory, recommend follow-up with a chest CT with no contrast in 3 months. This study was marked in Epic for significant notification and follow up. Workstation performed: [id number]     XR chest 1 view portable  Result Date: 2/10/2024    Impression: New left suprahilar opacity measuring up to 3.6 cm. Dedicated PA and lateral views or chest CT would be helpful for further characterization. Moderate pulmonary edema with small right pleural effusion. The study was marked in EPIC for immediate notification. Resident: FERN HERNANDEZ I, the attending radiologist, have reviewed the images and agree with the final report above. Workstation performed: [id number]     Echo complete w/ contrast if indicated  Result Date: 2/9/2024    Narrative: Technically fair quality transthoracic echocardiogram study.  Certain regions of the LV will not well visualized due to poor echo penetration. Moderately dilated left ventricle cavity, severely reduced left ventricular systolic function with global hypokinesis with regions of akinesis in the apex.  Cannot definitively exclude layering thrombus in the apical region.  Ejection fraction estimated around 17%.  Restrictive left ventricular filling pattern suggesting grade 3 diastolic dysfunction.  Decreased global longitudinal strain, -3.2%. Dilated right ventricle, normal right ventricular systolic function.  Normal left and right atrial cavity size. Aortic valve sclerosis, no aortic valve stenosis or regurgitation. Thickened mitral valve leaflets with sclerosis, moderate mitral valve regurgitation. Mild to moderate tricuspid valve and mild pulmonic valve regurgitation. Moderate pulmonary hypertension.  Estimated RVSP/PASP is 52 mmHg. Trace, hemodynamically insignificant pericardial effusion. No previous echocardiogram is available for comparison.  Patient admitted to hospital for further evaluation.  Repeat limited echocardiogram with Definity contrast can be considered to further evaluate possible apical thrombus.      Incidental Findings:   See above   I reviewed the above mentioned incidental findings with the patient and/or family and they expressed understanding.    Test Results Pending at Discharge (will require follow up):   None     Outpatient Tests Requested:  None    Complications:  None    Reason for Admission: Shortness of breath    Hospital Course:   Cortney Harding is a 78 y.o. female patient with a past medical history of bladder cancer, small cell lung cancer in remission, obesity, COPD, chronic respiratory failure who originally presented to the hospital on 2/9/2024 due to shortness of breath.  The patient had been following with her PCP as an outpatient and short of breath for approximately 1 month.  During her last visit with her primary care provider she was complaining of shortness of breath and was worked up with a negative troponin, was noted to have an elevated BNP and some leg swelling and her primary care provider initiated her on oral Lasix once daily and an echo was obtained.  She had an echo performed as an outpatient which revealed new systolic heart failure with global hypokinesis and cardiology recommended coming to the ED for further evaluation.    During her hospitalization, she was seen in consultation by cardiology who diuresed her with IV diuretics with significant improvement.  There  "was discussion regarding a cardiac catheterization which was ultimately not performed given high risk of the procedure in the setting of her multiple medical comorbidities.  She was fitted for a LifeVest and was ultimately discharged home in stable condition on 2/14/2024.    Please see above list of diagnoses and related plan for additional information.     Condition at Discharge: stable    Discharge Day Visit / Exam:   Subjective: The patient is seen resting comfortably in bed.  She is eager to go home, she offers no complaints at this time.  States that her breathing has much improved.  Reports that her lower extremity swelling is also improved.  Vitals: Blood Pressure: 101/56 (02/14/24 0954)  Pulse: 98 (02/14/24 0954)  Temperature: 98.2 °F (36.8 °C) (02/14/24 0814)  Temp Source: Oral (02/13/24 1947)  Respirations: 18 (02/14/24 0814)  Height: 5' 2\" (157.5 cm) (02/11/24 1336)  Weight - Scale: 84.5 kg (186 lb 4.6 oz) (02/13/24 0537)  SpO2: 96 % (02/14/24 0954)  Exam:   Physical Exam  Vitals and nursing note reviewed.   Constitutional:       General: She is not in acute distress.     Appearance: Normal appearance. She is not ill-appearing, toxic-appearing or diaphoretic.   HENT:      Head: Normocephalic and atraumatic.   Cardiovascular:      Rate and Rhythm: Normal rate and regular rhythm.      Heart sounds: No murmur heard.     No friction rub. No gallop.   Pulmonary:      Effort: Pulmonary effort is normal. No respiratory distress.      Breath sounds: Normal breath sounds. No wheezing, rhonchi or rales.   Abdominal:      General: Abdomen is flat. Bowel sounds are normal. There is no distension.      Palpations: Abdomen is soft.      Tenderness: There is no abdominal tenderness.   Musculoskeletal:      Right lower leg: No edema.      Left lower leg: No edema.   Skin:     General: Skin is warm and dry.      Coloration: Skin is not jaundiced or pale.   Neurological:      General: No focal deficit present.      Mental " Status: She is alert. Mental status is at baseline.          Discussion with Family: Patient declined call to .     Discharge instructions/Information to patient and family:   See after visit summary for information provided to patient and family.      Provisions for Follow-Up Care:  See after visit summary for information related to follow-up care and any pertinent home health orders.      Mobility at time of Discharge:   Basic Mobility Inpatient Raw Score: 18  JH-HLM Goal: 6: Walk 10 steps or more  JH-HLM Achieved: 7: Walk 25 feet or more  HLM Goal achieved. Continue to encourage appropriate mobility.     Disposition:   Home    Planned Readmission: n/a     Discharge Statement:  I spent 45 minutes discharging the patient. This time was spent on the day of discharge. I had direct contact with the patient on the day of discharge. Greater than 50% of the total time was spent examining patient, answering all patient questions, arranging and discussing plan of care with patient as well as directly providing post-discharge instructions.  Additional time then spent on discharge activities.    Discharge Medications:  See after visit summary for reconciled discharge medications provided to patient and/or family.      **Please Note: This note may have been constructed using a voice recognition system**

## 2024-02-14 NOTE — PLAN OF CARE
Problem: Potential for Falls  Goal: Patient will remain free of falls  Description: INTERVENTIONS:  - Educate patient/family on patient safety including physical limitations  - Instruct patient to call for assistance with activity   - Consult OT/PT to assist with strengthening/mobility   - Keep Call bell within reach  - Keep bed low and locked with side rails adjusted as appropriate  - Keep care items and personal belongings within reach  - Initiate and maintain comfort rounds  - Make Fall Risk Sign visible to staff  - Offer Toileting every  Hours, in advance of need  - Initiate/Maintain alarm  - Obtain necessary fall risk management equipment:   - Apply yellow socks and bracelet for high fall risk patients  - Consider moving patient to room near nurses station  Outcome: Progressing     Problem: PAIN - ADULT  Goal: Verbalizes/displays adequate comfort level or baseline comfort level  Description: Interventions:  - Encourage patient to monitor pain and request assistance  - Assess pain using appropriate pain scale  - Administer analgesics based on type and severity of pain and evaluate response  - Implement non-pharmacological measures as appropriate and evaluate response  - Consider cultural and social influences on pain and pain management  - Notify physician/advanced practitioner if interventions unsuccessful or patient reports new pain  Outcome: Progressing     Problem: INFECTION - ADULT  Goal: Absence or prevention of progression during hospitalization  Description: INTERVENTIONS:  - Assess and monitor for signs and symptoms of infection  - Monitor lab/diagnostic results  - Monitor all insertion sites, i.e. indwelling lines, tubes, and drains  - Monitor endotracheal if appropriate and nasal secretions for changes in amount and color  - [place] appropriate cooling/warming therapies per order  - Administer medications as ordered  - Instruct and encourage patient and family to use good hand hygiene technique  -  Identify and instruct in appropriate isolation precautions for identified infection/condition  Outcome: Progressing     Problem: SAFETY ADULT  Goal: Patient will remain free of falls  Description: INTERVENTIONS:  - Educate patient/family on patient safety including physical limitations  - Instruct patient to call for assistance with activity   - Consult OT/PT to assist with strengthening/mobility   - Keep Call bell within reach  - Keep bed low and locked with side rails adjusted as appropriate  - Keep care items and personal belongings within reach  - Initiate and maintain comfort rounds  - Make Fall Risk Sign visible to staff  - Offer Toileting every  Hours, in advance of need  - Initiate/Maintain alarm  - Obtain necessary fall risk management equipment:   - Apply yellow socks and bracelet for high fall risk patients  - Consider moving patient to room near nurses station  Outcome: Progressing  Goal: Maintain or return to baseline ADL function  Description: INTERVENTIONS:  -  Assess patient's ability to carry out ADLs; assess patient's baseline for ADL function and identify physical deficits which impact ability to perform ADLs (bathing, care of mouth/teeth, toileting, grooming, dressing, etc.)  - Assess/evaluate cause of self-care deficits   - Assess range of motion  - Assess patient's mobility; develop plan if impaired  - Assess patient's need for assistive devices and provide as appropriate  - Encourage maximum independence but intervene and supervise when necessary  - Involve family in performance of ADLs  - Assess for home care needs following discharge   - Consider OT consult to assist with ADL evaluation and planning for discharge  - Provide patient education as appropriate  Outcome: Progressing  Goal: Maintains/Returns to pre admission functional level  Description: INTERVENTIONS:  - Perform AM-PAC 6 Click Basic Mobility/ Daily Activity assessment daily.  - Set and communicate daily mobility goal to care  team and patient/family/caregiver.   - Collaborate with rehabilitation services on mobility goals if consulted  - Perform Range of Motion times a day.  - Reposition patient every  hours.  - Dangle patient  times a day  - Stand patient  times a day  - Ambulate patient  times a day  - Out of bed to chair  times a day   - Out of bed for meals  times a day  - Out of bed for toileting  - Record patient progress and toleration of activity level   Outcome: Progressing     Problem: DISCHARGE PLANNING  Goal: Discharge to home or other facility with appropriate resources  Description: INTERVENTIONS:  - Identify barriers to discharge w/patient and caregiver  - Arrange for needed discharge resources and transportation as appropriate  - Identify discharge learning needs (meds, wound care, etc.)  - Arrange for interpretive services to assist at discharge as needed  - Refer to Case Management Department for coordinating discharge planning if the patient needs post-hospital services based on physician/advanced practitioner order or complex needs related to functional status, cognitive ability, or social support system  Outcome: Progressing     Problem: Knowledge Deficit  Goal: Patient/family/caregiver demonstrates understanding of disease process, treatment plan, medications, and discharge instructions  Description: Complete learning assessment and assess knowledge base.  Interventions:  - Provide teaching at level of understanding  - Provide teaching via preferred learning methods  Outcome: Progressing     Problem: CARDIOVASCULAR - ADULT  Goal: Maintains optimal cardiac output and hemodynamic stability  Description: INTERVENTIONS:  - Monitor I/O, vital signs and rhythm  - Monitor for S/S and trends of decreased cardiac output  - Administer and titrate ordered vasoactive medications to optimize hemodynamic stability  - Assess quality of pulses, skin color and temperature  - Assess for signs of decreased coronary artery perfusion  -  Instruct patient to report change in severity of symptoms  Outcome: Progressing  Goal: Absence of cardiac dysrhythmias or at baseline rhythm  Description: INTERVENTIONS:  - Continuous cardiac monitoring, vital signs, obtain 12 lead EKG if ordered  - Administer antiarrhythmic and heart rate control medications as ordered  - Monitor electrolytes and administer replacement therapy as ordered  Outcome: Progressing     Problem: Prexisting or High Potential for Compromised Skin Integrity  Goal: Skin integrity is maintained or improved  Description: INTERVENTIONS:  - Identify patients at risk for skin breakdown  - Assess and monitor skin integrity  - Assess and monitor nutrition and hydration status  - Monitor labs   - Assess for incontinence   - Turn and reposition patient  - Assist with mobility/ambulation  - Relieve pressure over bony prominences  - Avoid friction and shearing  - Provide appropriate hygiene as needed including keeping skin clean and dry  - Evaluate need for skin moisturizer/barrier cream  - Collaborate with interdisciplinary team   - Patient/family teaching  - Consider wound care consult   Outcome: Progressing     Problem: RESPIRATORY - ADULT  Goal: Achieves optimal ventilation and oxygenation  Description: INTERVENTIONS:  - Assess for changes in respiratory status  - Assess for changes in mentation and behavior  - Position to facilitate oxygenation and minimize respiratory effort  - Oxygen administered by appropriate delivery if ordered  - Initiate smoking cessation education as indicated  - Encourage broncho-pulmonary hygiene including cough, deep breathe, Incentive Spirometry  - Assess the need for suctioning and aspirate as needed  - Assess and instruct to report SOB or any respiratory difficulty  - Respiratory Therapy support as indicated  Outcome: Progressing

## 2024-02-15 ENCOUNTER — PATIENT OUTREACH (OUTPATIENT)
Dept: CASE MANAGEMENT | Facility: HOSPITAL | Age: 79
End: 2024-02-15

## 2024-02-15 DIAGNOSIS — I50.21 ACUTE SYSTOLIC HEART FAILURE (HCC): Primary | ICD-10-CM

## 2024-02-15 DIAGNOSIS — J96.11 CHRONIC RESPIRATORY FAILURE WITH HYPOXIA (HCC): ICD-10-CM

## 2024-02-15 RX ORDER — ALPRAZOLAM 0.25 MG/1
0.25 TABLET ORAL
Qty: 90 TABLET | Refills: 0 | OUTPATIENT
Start: 2024-02-15 | End: 2024-05-15

## 2024-02-15 NOTE — UTILIZATION REVIEW
NOTIFICATION OF ADMISSION DISCHARGE   This is a Notification of Discharge from [facility]. Please be advised that this patient has been discharge from our facility. Below you will find the admission and discharge date and time including the patient’s disposition.   UTILIZATION REVIEW CONTACT:  Jeaneth Friedman MA  Utilization   Network Utilization Review Department  Phone: [phone] x carefully listen to the prompts. All voicemails are confidential.  Email: [email or web address]     ADMISSION INFORMATION  PRESENTATION DATE: 2/9/2024  2:04 PM  OBERVATION ADMISSION DATE:   INPATIENT ADMISSION DATE: 2/9/24  4:12 PM   DISCHARGE DATE: 2/14/2024  3:11 PM   DISPOSITION:Home with Home Health Care    Network Utilization Review Department  ATTENTION: Please call with any questions or concerns to [phone] and carefully listen to the prompts so that you are directed to the right person. All voicemails are confidential.   For Discharge needs, contact Care Management DC Support Team at [phone] opt. 2  Send all requests for admission clinical reviews, approved or denied determinations and any other requests to dedicated fax number below belonging to the campus where the patient is receiving treatment. List of dedicated fax numbers for the Facilities:  FACILITY NAME UR FAX NUMBER   ADMISSION DENIALS (Administrative/Medical Necessity) [phone]   DISCHARGE SUPPORT TEAM ([facility]) [phone]   PARENT CHILD HEALTH (Maternity/NICU/Pediatrics) [phone]   [facility] [phone]   [facility] [phone]   [facility] [phone]   [facility] [phone]   [facility] [phone]   [facility] [phone]   General acute hospital [phone]   [facility]  [phone]   [facility] [phone]   [facility] [phone]   [facility] [phone]   [facility] [phone]

## 2024-02-15 NOTE — PROGRESS NOTES
"Heart Failure Outpatient Care Coordinator Note: Patient identified on hospital discharge  report, chart notes reviewed and referral made for HF outpatient care management. Call made to patient and role of OPCM explained and she is receptive to outreach. Overall states she is breathing much better and leg swelling is gone.     Self-management/education and teach back:  Primary learner: Patient  Following low sodium diet: Yes and reviewed foods to avoid. Will mail some tip sheets.  Following fluid restriction: reviewed 6-8 8oz. Cups and patient reports she does not go over that  Hospital discharge weight: 186.4# yesterday  Weighing daily: Has not started. Has digital scale and educated on weighing daily in am after voiding and before eating, and to keep log.  Any current symptoms: denies edema, has chronic dyspnea with moderate exertion that resolves with rest. On 02 2.5L ATC.   Knows when to call provider: Reinforced  Medication reviewed and taking all as prescribed: Yes reviewed, but missing jardiance   Knows name of diuretic: Yes  Escalation plan: Call cardiology office  HF education reviewed/reinforced including low sodium diet, 64 oz fluid restriction, activity, symptoms of decompensation and when and who to call.     Care Coordination:  Aware of cardiology follow up appointment: Updated her appointment this Monday 2/19/24 at 8 am.  Aware of PCP follow up appointment: Yes 2/20/24  Transportation:Son drives  Social Support: Lives with son. Has 2 cats that she \"loves dearly\".  Insurance/financial concerns:Denies  Home health care: [facility] referral made yesterday, no start of care date yet.  Health literacy: good  Engagement:good  Personal Goal: Breathe easy and stay independent  Additional comments: Contact information given. Will mail HF Zone tool,Important activities to do everyday, diet tips and weight log.   Patient states never received AVS when left hospital. Will see if hospital can mail to her. Call made to " Luz Marina SARGENT at [facility] and she will mail to patient

## 2024-02-15 NOTE — TELEPHONE ENCOUNTER
Patient called back, states she was transferred to the office to schedule and was kept on hold for 22 minutes with no answer. She hung up and called back. I attempted to warm transfer in to the office with no answer on either the clerical or clinical lines. Please call the patient to schedule her TCM.   She needs refill of medication dispensed in the hospital. Warm transfer to Rx refill Specialist Missy for further assistance.

## 2024-02-15 NOTE — TELEPHONE ENCOUNTER
Reason for call: Last prescribed at the ED, pt was in the hospital, only provided with 5 tablets and is requesting a 90D supply if possible  [x] Refill   [] Prior Auth  [] Other:     Office:   [x] PCP/Provider - [place]   [] Specialty/Provider -     Medication: alprazolam (xanax)    Dose/Frequency: 0.25 / nightly    Quantity: 90D if possible    Pharmacy: [facility]     Does the patient have enough for 3 days?   [] Yes   [x] No - Send as HP to POD

## 2024-02-16 ENCOUNTER — HOME CARE VISIT (OUTPATIENT)
Dept: HOME HEALTH SERVICES | Facility: HOME HEALTHCARE | Age: 79
End: 2024-02-16
Payer: COMMERCIAL

## 2024-02-16 ENCOUNTER — TELEPHONE (OUTPATIENT)
Dept: CARDIOLOGY CLINIC | Facility: CLINIC | Age: 79
End: 2024-02-16

## 2024-02-16 VITALS
SYSTOLIC BLOOD PRESSURE: 116 MMHG | RESPIRATION RATE: 28 BRPM | TEMPERATURE: 97.8 F | OXYGEN SATURATION: 96 % | DIASTOLIC BLOOD PRESSURE: 64 MMHG | HEART RATE: 86 BPM

## 2024-02-16 PROCEDURE — 400013 VN SOC

## 2024-02-16 PROCEDURE — G0299 HHS/HOSPICE OF RN EA 15 MIN: HCPCS

## 2024-02-16 NOTE — TELEPHONE ENCOUNTER
PC from [facility] who went in to see Cortney today and noticed she is not wearing her life vest. Patient does not want to wear it and understands consequences.  Vitals good at 116/68, 96%, HR 98.  She did not know of appointment with Dr Campos today at 10:00 am and missed it.  She will be at Dr Vanessa's appointment on Monday.

## 2024-02-19 ENCOUNTER — OFFICE VISIT (OUTPATIENT)
Dept: CARDIOLOGY CLINIC | Facility: CLINIC | Age: 79
End: 2024-02-19
Payer: COMMERCIAL

## 2024-02-19 VITALS
SYSTOLIC BLOOD PRESSURE: 103 MMHG | WEIGHT: 186.8 LBS | BODY MASS INDEX: 34.17 KG/M2 | DIASTOLIC BLOOD PRESSURE: 60 MMHG | HEART RATE: 88 BPM

## 2024-02-19 DIAGNOSIS — E78.5 DYSLIPIDEMIA: ICD-10-CM

## 2024-02-19 DIAGNOSIS — I42.0 DILATED CARDIOMYOPATHY (HCC): ICD-10-CM

## 2024-02-19 DIAGNOSIS — I50.9 CHF (CONGESTIVE HEART FAILURE) (HCC): ICD-10-CM

## 2024-02-19 DIAGNOSIS — I73.9 CLAUDICATION (HCC): ICD-10-CM

## 2024-02-19 DIAGNOSIS — I27.20 PULMONARY HYPERTENSION (HCC): ICD-10-CM

## 2024-02-19 DIAGNOSIS — I34.0 NONRHEUMATIC MITRAL VALVE REGURGITATION: ICD-10-CM

## 2024-02-19 DIAGNOSIS — I50.42 CHRONIC COMBINED SYSTOLIC AND DIASTOLIC HEART FAILURE (HCC): Primary | ICD-10-CM

## 2024-02-19 DIAGNOSIS — J96.11 CHRONIC RESPIRATORY FAILURE WITH HYPOXIA (HCC): ICD-10-CM

## 2024-02-19 PROCEDURE — 99214 OFFICE O/P EST MOD 30 MIN: CPT | Performed by: INTERNAL MEDICINE

## 2024-02-19 RX ORDER — METOPROLOL SUCCINATE 25 MG/1
25 TABLET, EXTENDED RELEASE ORAL DAILY
Qty: 90 TABLET | Refills: 3 | Status: SHIPPED | OUTPATIENT
Start: 2024-02-19 | End: 2024-05-19

## 2024-02-19 RX ORDER — ALPRAZOLAM 0.25 MG/1
0.25 TABLET ORAL
Qty: 5 TABLET | Refills: 0 | OUTPATIENT
Start: 2024-02-19 | End: 2024-02-29

## 2024-02-19 RX ORDER — TORSEMIDE 20 MG/1
20 TABLET ORAL DAILY
Qty: 90 TABLET | Refills: 3 | Status: SHIPPED | OUTPATIENT
Start: 2024-02-19 | End: 2024-05-19

## 2024-02-19 RX ORDER — LOSARTAN POTASSIUM 25 MG/1
25 TABLET ORAL DAILY
Qty: 90 TABLET | Refills: 3 | Status: SHIPPED | OUTPATIENT
Start: 2024-02-19 | End: 2024-05-19

## 2024-02-19 NOTE — PATIENT INSTRUCTIONS
On the day of CTA of the chest, recommend taking 2 tablets of the metoprolol medication  Obtain blood work prior to CTA  Obtain CTA and lower extremity arterial ultrasound  Check echocardiogram in 3 months  Follow-up in the office after testing

## 2024-02-19 NOTE — TELEPHONE ENCOUNTER
I called pt phone kept ringing,  to advise her that since Dr Thorpe never prescribed xanax for pt before and this was filled at the hospital she will need to see Dr Thorpe first. She does have appt schedule for 2.20.24

## 2024-02-19 NOTE — TELEPHONE ENCOUNTER
Pt would like her xanax refilled to [facility] as soon as possible because she is completely out of her medication. Thank you.

## 2024-02-20 ENCOUNTER — HOME CARE VISIT (OUTPATIENT)
Dept: HOME HEALTH SERVICES | Facility: HOME HEALTHCARE | Age: 79
End: 2024-02-20
Payer: COMMERCIAL

## 2024-02-20 ENCOUNTER — OFFICE VISIT (OUTPATIENT)
Dept: FAMILY MEDICINE CLINIC | Facility: CLINIC | Age: 79
End: 2024-02-20
Payer: COMMERCIAL

## 2024-02-20 VITALS
SYSTOLIC BLOOD PRESSURE: 130 MMHG | HEART RATE: 100 BPM | OXYGEN SATURATION: 96 % | DIASTOLIC BLOOD PRESSURE: 90 MMHG | WEIGHT: 190.6 LBS | BODY MASS INDEX: 34.86 KG/M2

## 2024-02-20 DIAGNOSIS — I50.21 ACUTE SYSTOLIC HEART FAILURE (HCC): ICD-10-CM

## 2024-02-20 DIAGNOSIS — E03.9 HYPOTHYROIDISM, UNSPECIFIED TYPE: Primary | ICD-10-CM

## 2024-02-20 DIAGNOSIS — F51.01 PRIMARY INSOMNIA: ICD-10-CM

## 2024-02-20 PROBLEM — I50.9 CHF (CONGESTIVE HEART FAILURE) (HCC): Status: ACTIVE | Noted: 2024-02-20

## 2024-02-20 PROCEDURE — 99495 TRANSJ CARE MGMT MOD F2F 14D: CPT | Performed by: INTERNAL MEDICINE

## 2024-02-20 RX ORDER — LEVOTHYROXINE SODIUM 0.15 MG/1
150 TABLET ORAL DAILY
Qty: 90 TABLET | Refills: 0 | Status: SHIPPED | OUTPATIENT
Start: 2024-02-20

## 2024-02-20 RX ORDER — ZOLPIDEM TARTRATE 5 MG/1
5 TABLET ORAL
Qty: 30 TABLET | Refills: 0 | Status: SHIPPED | OUTPATIENT
Start: 2024-02-20

## 2024-02-20 NOTE — CASE COMMUNICATION
Patient has appt with PCP today. No SN visit will be made.  Notified SC and CC of same via tiger text.

## 2024-02-20 NOTE — ASSESSMENT & PLAN NOTE
Wt Readings from Last 3 Encounters:   02/20/24 86.5 kg (190 lb 9.6 oz)   02/19/24 84.7 kg (186 lb 12.8 oz)   02/13/24 84.5 kg (186 lb 4.6 oz)     Currently resolved, she was admitted for that.  she follows up with cardiology, her weight is stable, does not report any shortness of breath which is out of her baseline.  She is scheduled for CTA of the heart, she will follow-up with cardiology.  Doing okay on current dose of torsemide.

## 2024-02-20 NOTE — ASSESSMENT & PLAN NOTE
TSH was elevated on the last blood work, her levothyroxine was increased up to 250 mcg daily, recheck TSH in 3 months.

## 2024-02-20 NOTE — PROGRESS NOTES
Assessment/Plan:    Hypothyroidism  TSH was elevated on the last blood work, her levothyroxine was increased up to 250 mcg daily, recheck TSH in 3 months.    Acute systolic heart failure (HCC)  Wt Readings from Last 3 Encounters:   02/20/24 86.5 kg (190 lb 9.6 oz)   02/19/24 84.7 kg (186 lb 12.8 oz)   02/13/24 84.5 kg (186 lb 4.6 oz)     Currently resolved, she was admitted for that.  she follows up with cardiology, her weight is stable, does not report any shortness of breath which is out of her baseline.  She is scheduled for CTA of the heart, she will follow-up with cardiology.  Doing okay on current dose of torsemide.          Primary insomnia  Will start zolpidem 5 mg daily at night, side effects discussed.     Diagnoses and all orders for this visit:    Hypothyroidism, unspecified type  -     TSH w/Reflex; Future  -     levothyroxine 150 mcg tablet; Take 1 tablet (150 mcg total) by mouth daily    Primary insomnia  -     zolpidem (AMBIEN) 5 mg tablet; Take 1 tablet (5 mg total) by mouth daily at bedtime as needed for sleep Please stop taking the medication if any drowsiness in the morning after taking the medication.    Acute systolic heart failure (HCC)          Subjective:      Patient ID: Cortney Harding is a 78 y.o. female.    Came today for follow-up after her recent admission to the hospital due to congestive heart failure exacerbation.  She followed up with cardiology team already.  She is doing great on torsemide 20 mg daily right now.  Plan is to evaluate further with CTA of her heart.  Her ejection fraction was recently close to 17% on echo.        The following portions of the patient's history were reviewed and updated as appropriate: allergies, current medications, past family history, past medical history, past social history, past surgical history, and problem list.    Review of Systems   Constitutional:  Negative for chills and fever.   HENT:  Negative for congestion.    Respiratory:  Positive  for shortness of breath (Improved). Negative for wheezing.    Cardiovascular:  Positive for leg swelling (Baseline). Negative for chest pain and palpitations.   Gastrointestinal:  Negative for abdominal pain.   Genitourinary:  Negative for dysuria, flank pain, frequency, hematuria and pelvic pain.   Musculoskeletal:  Negative for back pain.   Psychiatric/Behavioral:  Negative for confusion.          Objective:      /90   Pulse 100   Wt 86.5 kg (190 lb 9.6 oz)   SpO2 96%   Breastfeeding Unknown   BMI 34.86 kg/m²     Allergies   Allergen Reactions    Erythromycin Hives    Penicillins Hives    Sulfa Antibiotics Hives    Tetracyclines & Related Hives          Current Outpatient Medications:     albuterol (Ventolin HFA) 90 mcg/act inhaler, Inhale 2 puffs every 6 (six) hours as needed for wheezing, Disp: 54 g, Rfl: 3    Ascorbic Acid (VITAMIN C) 100 MG tablet, Take 100 mg by mouth daily, Disp: , Rfl:     aspirin 325 mg tablet, Take 0.5 tablets by mouth daily , Disp: , Rfl:     Calcium 600 MG tablet, Take 1 tablet by mouth 2 (two) times a day, Disp: , Rfl:     Chromium 200 MCG TABS, Take 1 tablet by mouth daily, Disp: , Rfl:     fluorouracil (EFUDEX) 5 % cream, Use as directed, Disp: , Rfl:     Fluticasone Furoate-Vilanterol 100-25 mcg/actuation inhaler, Inhale 1 puff daily Rinse mouth after use., Disp: 360 blister, Rfl: 0    ipratropium-albuterol (DUO-NEB) 0.5-2.5 mg/3 mL nebulizer solution, Take 3 mL by nebulization 3 (three) times a day as needed for wheezing or shortness of breath, Disp: 180 mL, Rfl: 2    levothyroxine 150 mcg tablet, Take 1 tablet (150 mcg total) by mouth daily, Disp: 90 tablet, Rfl: 0    losartan (COZAAR) 25 mg tablet, Take 1 tablet (25 mg total) by mouth daily, Disp: 90 tablet, Rfl: 3    magnesium 30 MG tablet, Take 30 mg by mouth 2 (two) times a day, Disp: , Rfl:     melatonin 1 mg, Take 10 mg by mouth daily at bedtime , Disp: , Rfl:     metoprolol succinate (TOPROL-XL) 25 mg 24 hr  tablet, Take 1 tablet (25 mg total) by mouth daily, Disp: 90 tablet, Rfl: 3    Multiple Vitamin (MULTIVITAMINS PO), Take 1 tablet by mouth daily, Disp: , Rfl:     Omega-3 Fatty Acids (FISH OIL) 1200 MG CAPS, Take by mouth, Disp: , Rfl:     OXYGEN-HELIUM IN, Inhale as needed Only uses rarely, Disp: , Rfl:     Spiriva HandiHaler 18 MCG inhalation capsule, INHALE 1 CAPSULE VIA HANDIHALER ONCE DAILY AT THE SAME TIME EVERY DAY, Disp: 90 capsule, Rfl: 1    torsemide (DEMADEX) 20 mg tablet, Take 1 tablet (20 mg total) by mouth daily, Disp: 90 tablet, Rfl: 3    zolpidem (AMBIEN) 5 mg tablet, Take 1 tablet (5 mg total) by mouth daily at bedtime as needed for sleep Please stop taking the medication if any drowsiness in the morning after taking the medication., Disp: 30 tablet, Rfl: 0    Empagliflozin (Jardiance) 10 MG TABS tablet, Take 1 tablet (10 mg total) by mouth every morning (Patient not taking: Reported on 2/20/2024), Disp: 90 tablet, Rfl: 3    fluticasone (FLONASE) 50 mcg/act nasal spray, 2 sprays into each nostril daily (Patient not taking: Reported on 2/15/2024), Disp: 1 g, Rfl: 0    loratadine-pseudoephedrine (CLARITIN-D 12-HOUR) 5-120 mg per tablet, Take 1 tablet by mouth 2 (two) times a day (Patient not taking: Reported on 2/15/2024), Disp: 60 tablet, Rfl: 0     There are no Patient Instructions on file for this visit.        Physical Exam  Constitutional:       General: She is not in acute distress.     Appearance: She is not ill-appearing or toxic-appearing.   Cardiovascular:      Heart sounds: Murmur heard.      No gallop.   Pulmonary:      Effort: No respiratory distress.      Breath sounds: No wheezing or rales.   Musculoskeletal:      Right lower leg: No edema.      Left lower leg: No edema.

## 2024-02-21 ENCOUNTER — HOME CARE VISIT (OUTPATIENT)
Dept: HOME HEALTH SERVICES | Facility: HOME HEALTHCARE | Age: 79
End: 2024-02-21
Payer: COMMERCIAL

## 2024-02-21 PROBLEM — Z00.00 MEDICARE ANNUAL WELLNESS VISIT, SUBSEQUENT: Status: RESOLVED | Noted: 2020-02-25 | Resolved: 2024-02-21

## 2024-02-22 ENCOUNTER — HOME CARE VISIT (OUTPATIENT)
Dept: HOME HEALTH SERVICES | Facility: HOME HEALTHCARE | Age: 79
End: 2024-02-22
Payer: COMMERCIAL

## 2024-02-23 ENCOUNTER — HOME CARE VISIT (OUTPATIENT)
Dept: HOME HEALTH SERVICES | Facility: HOME HEALTHCARE | Age: 79
End: 2024-02-23
Payer: COMMERCIAL

## 2024-02-25 ENCOUNTER — HOME CARE VISIT (OUTPATIENT)
Dept: HOME HEALTH SERVICES | Facility: HOME HEALTHCARE | Age: 79
End: 2024-02-25
Payer: COMMERCIAL

## 2024-02-27 ENCOUNTER — PATIENT OUTREACH (OUTPATIENT)
Dept: CASE MANAGEMENT | Facility: HOSPITAL | Age: 79
End: 2024-02-27

## 2024-02-27 ENCOUNTER — HOME CARE VISIT (OUTPATIENT)
Dept: HOME HEALTH SERVICES | Facility: HOME HEALTHCARE | Age: 79
End: 2024-02-27
Payer: COMMERCIAL

## 2024-02-27 NOTE — CASE COMMUNICATION
Sn attempted to call patient last night with visit time for today and phone continuously rang and did not have an answering machine. Arrived to home and rang doorbell and knocked multiple times. Called home number and no answer. Called son Sam's cell phone and left a message requesting a return phonecall. The patient has missed multiple SN visits and MSW/P.T. phonecall and visit attempts. She has not been seen since her SOC visit, bu t did follow up with PCP on 2/20.  There is only one newspaper at the front door, so it may be an incorrect contact phone number for the patient.

## 2024-02-27 NOTE — PROGRESS NOTES
"Heart Failure Outpatient Care Coordinator Note: Chart notes reviewed since last call. Patient saw cardiologist last week and was \"euvolemic\". Several tests ordered, don't see that they are scheduled.  TERESA HOLDER doumented patient did not answer phone or door to nurse x 2 and they closed her and notified her son.   Call made to patient and no answer. Will outreach again.   "

## 2024-02-27 NOTE — QUICK NOTE
Addendum to discharge summary on 2/14/24    Assessment/Plan:  Dilated cardiomyopathy  In setting of Acute CHF, POA, evidenced by echo results, chest x-ray findings, peripheral edema,   Treated with cardiology consult, EKG, Echocardiogram, CXR, Life Vest, Toprol-XL and Losartan.   EF 15%, continue lifevest therapy  Follow up with cardiology

## 2024-02-27 NOTE — CASE COMMUNICATION
Note informational only.  Multiple TC attempts over the past week to schedule PT eval. Unable to contact pt to schedule home PT services. DC from PT services at current time

## 2024-03-08 ENCOUNTER — PATIENT OUTREACH (OUTPATIENT)
Dept: CASE MANAGEMENT | Facility: HOSPITAL | Age: 79
End: 2024-03-08

## 2024-03-15 ENCOUNTER — PATIENT OUTREACH (OUTPATIENT)
Dept: CASE MANAGEMENT | Facility: HOSPITAL | Age: 79
End: 2024-03-15

## 2024-03-15 NOTE — PROGRESS NOTES
Heart Failure Outpatient Care Coordinator Note: Call made to patient and no answer. Will send unable to reach letter and remove self from care team.

## 2024-03-25 DIAGNOSIS — J43.9 PULMONARY EMPHYSEMA, UNSPECIFIED EMPHYSEMA TYPE (HCC): ICD-10-CM

## 2024-03-25 RX ORDER — TIOTROPIUM BROMIDE 18 UG/1
18 CAPSULE ORAL; RESPIRATORY (INHALATION) DAILY
Qty: 90 CAPSULE | Refills: 1 | Status: SHIPPED | OUTPATIENT
Start: 2024-03-25

## 2024-04-26 ENCOUNTER — HOSPITAL ENCOUNTER (OUTPATIENT)
Dept: NON INVASIVE DIAGNOSTICS | Facility: CLINIC | Age: 79
Discharge: HOME | End: 2024-04-26
Payer: COMMERCIAL

## 2024-04-26 DIAGNOSIS — I73.9 CLAUDICATION (HCC): ICD-10-CM

## 2024-04-26 PROCEDURE — 93923 UPR/LXTR ART STDY 3+ LVLS: CPT

## 2024-04-26 PROCEDURE — 93925 LOWER EXTREMITY STUDY: CPT

## 2024-04-26 PROCEDURE — 93925 LOWER EXTREMITY STUDY: CPT | Performed by: INTERNAL MEDICINE

## 2024-04-29 ENCOUNTER — TELEPHONE (OUTPATIENT)
Dept: CARDIOLOGY CLINIC | Facility: CLINIC | Age: 79
End: 2024-04-29

## 2024-04-29 NOTE — TELEPHONE ENCOUNTER
----- Message from Abhishek Vanessa DO sent at 4/28/2024  9:19 PM EDT -----  There was no evidence of severe blockage within the lower extremity vessels noted on ultrasound.  Please reach out to the patient and let her know this finding.  Thank you.

## 2024-04-29 NOTE — TELEPHONE ENCOUNTER
No returned phone calls to office from patient.     2nd Attempt to reach Mrs. Harding and there was no answer. Phone kept ringing and wouldn't switch over to voicemail. Unable to leave message.

## 2024-04-29 NOTE — TELEPHONE ENCOUNTER
First attempt to reach MsUyen Mehdi and there was no answer. Phone call would not switch over to voicemail, would just continue to ring. Unable to leave Voicemail.

## 2024-05-01 NOTE — TELEPHONE ENCOUNTER
Currently still no returned phone calls to office from patient.     3rd attempt to reach Mrs. Harding and there was no answer. Phone number just continues to ring and never switches to voicemail. Unable to leave message.

## 2024-05-16 ENCOUNTER — HOSPITAL ENCOUNTER (OUTPATIENT)
Dept: NON INVASIVE DIAGNOSTICS | Facility: HOSPITAL | Age: 79
Discharge: HOME | End: 2024-05-16
Attending: INTERNAL MEDICINE
Payer: COMMERCIAL

## 2024-05-16 VITALS
HEART RATE: 87 BPM | BODY MASS INDEX: 34.96 KG/M2 | HEIGHT: 62 IN | WEIGHT: 190 LBS | SYSTOLIC BLOOD PRESSURE: 104 MMHG | DIASTOLIC BLOOD PRESSURE: 51 MMHG

## 2024-05-16 DIAGNOSIS — I50.42 CHRONIC COMBINED SYSTOLIC AND DIASTOLIC HEART FAILURE (HCC): ICD-10-CM

## 2024-05-16 LAB
AORTIC VALVE MEAN VELOCITY: 11.1 M/S
APICAL FOUR CHAMBER EJECTION FRACTION: 13 %
AV AREA BY CONTINUOUS VTI: 1.9 CM2
AV AREA PEAK VELOCITY: 1.7 CM2
AV LVOT MEAN GRADIENT: 1 MMHG
AV LVOT PEAK GRADIENT: 2 MMHG
AV MEAN GRADIENT: 5 MMHG
AV PEAK GRADIENT: 9 MMHG
AV VALVE AREA: 1.86 CM2
AV VELOCITY RATIO: 0.48
BSA FOR ECHO PROCEDURE: 1.87 M2
DOP CALC AO PEAK VEL: 1.46 M/S
DOP CALC AO VTI: 23 CM
DOP CALC LVOT AREA: 3.46 CM2
DOP CALC LVOT CARDIAC INDEX: 1.9 L/MIN/M2
DOP CALC LVOT CARDIAC OUTPUT: 3.55 L/MIN
DOP CALC LVOT DIAMETER: 2.1 CM
DOP CALC LVOT PEAK VEL VTI: 12.39 CM
DOP CALC LVOT PEAK VEL: 0.7 M/S
DOP CALC LVOT STROKE INDEX: 22.5 ML/M2
DOP CALC LVOT STROKE VOLUME: 42
FRACTIONAL SHORTENING: 7 (ref 28–44)
INTERVENTRICULAR SEPTUM IN DIASTOLE (PARASTERNAL SHORT AXIS VIEW): 1.2 CM
INTERVENTRICULAR SEPTUM: 1.2 CM (ref 0.6–1.1)
LEFT INTERNAL DIMENSION IN SYSTOLE: 5.5 CM (ref 2.1–4)
LEFT VENTRICULAR INTERNAL DIMENSION IN DIASTOLE: 5.9 CM (ref 3.5–6)
LEFT VENTRICULAR POSTERIOR WALL IN END DIASTOLE: 1 CM
LEFT VENTRICULAR STROKE VOLUME: 30 ML
LVSV (TEICH): 30 ML
SL CV LV EF: 10
SL CV PED ECHO LEFT VENTRICLE DIASTOLIC VOLUME (MOD BIPLANE) 2D: 175 ML
SL CV PED ECHO LEFT VENTRICLE SYSTOLIC VOLUME (MOD BIPLANE) 2D: 145 ML
TRICUSPID ANNULAR PLANE SYSTOLIC EXCURSION: 1.8 CM

## 2024-05-16 PROCEDURE — 93356 MYOCRD STRAIN IMG SPCKL TRCK: CPT | Performed by: INTERNAL MEDICINE

## 2024-05-16 PROCEDURE — 93308 TTE F-UP OR LMTD: CPT | Performed by: INTERNAL MEDICINE

## 2024-05-16 PROCEDURE — 93308 TTE F-UP OR LMTD: CPT

## 2024-05-16 PROCEDURE — 93356 MYOCRD STRAIN IMG SPCKL TRCK: CPT

## 2024-05-22 ENCOUNTER — TELEPHONE (OUTPATIENT)
Dept: CARDIOLOGY CLINIC | Facility: CLINIC | Age: 79
End: 2024-05-22

## 2024-05-22 NOTE — NURSING NOTE
Unable to reach patient, sent instructions and directions in e-mail that is linked to this account, no my chart noted for this patient. See message below.  Good morning Ms. Harding,     I have not been able to reach via telephone. You are scheduled on 6/4/24 @ 1:30 pm for a CTA Cardiac in the Radiology department of the [facility].    [facility] is located at [address] [place] [address], [address] 1st floor and present to Radiology 15 minutes prior to your appointment time.     It is recommended that you come to your appointment with a  for your safety.    Blood work is needed prior to your scheduled appointment. Please do this days prior to the visit, it can take hours to get the results and might delay or cancel your scan. The order for the needed blood work is in our system, your MD has requested that you fast (Do not eat or drink anything) at least 8 hours prior to obtaining your blood work. Please go to any [facility] lab and they will happily provide this service.    Please avoid all caffeine products for 12 hours prior to the exam this includes coffee, decaf, tea, soda, or chocolate. This will be in effect from 6/4/24 at 1:30 am till your appointment time.    You are also to stop solid food 3 hours prior to the exam at 10:30 am but you can continue to drink  clear fluids (excluding coffee, decaf, tea, soda, or chocolate) prior to the exam.     If not on fluid restriction please increase fluids starting the day prior.    You can take all your regular prescribed medication as usual unless otherwise directed by your physician.     Please do not use any inhaler medication prior to the exam, if used you may need to reschedule your test.     Your ordering provider has instructed for an extra dose of your Metoprolol to be taken, please take 1 hour prior to the scan.     If anti-anxiety medication is to be taken, please take 1 hour prior to the scan.    During the study a certain  medication may be given such as a beta blocker if needed to  heart rate to under 65 beats per minute for optimal exam results. A sublingual tablet of nitroglycerin will be given during scan for vasodilation, prior to contrast administration and final scan.     If the heart rate is not able to achieve target (55-60 beats per minute) study may be cancelled after discussing with Radiologist. At a higher heart rate study may not be useful or viable.     The study can last approximately 30-45 minutes.     If you have any question or concerns, please feel free to contact me at the number below,   Kayla Baker RN  [facility] Radiology RN  [address] [place] [address]  [phone] (Office)  [phone] (Fax)  [email or web address]    Will continue attempting to contact patient, I have also left a message to her emergency contact her son Sam Harding. Awaiting response.

## 2024-05-24 NOTE — NURSING NOTE
"Cardiac CTA Questionairre      Cardiac history - Information that was gleaned from the chart, patient doesn't answer the phone. Sent eber Harding e-mail 5/22/24 and he acknowledges receiving printing out and giving to his mother.      Reason for exam: Cardiomyopathy, shortness of breath     Have you been diagnosed with heart disease? CHF     Do you have a family history of heart disease? Unknown     Have you ever experienced chest pain? NO     Have you had a CABG, angioplasty, cardiac cath, stent placement? NO     Do you have a pacemaker or defibrillator? NO     Have you ever been diagnosed with an irregular heart beat? NO     Do you have a history of having COPD or asthma? YES COPD - use of inhaler present     Is your asthma controlled?    Do you have high blood pressure? NO     Do you have diabetes? YES     Do you have high cholesterol? YES     Do you smoke? NO    Did you ever smoke? YES     General Information     Do you take any male enhancement medications such as Viagra, Levitra, or Cialis (PDE5 inhibitors)      NO    Due to nitrate given during test, this needs to be held for 3 days prior to testing and may be            resumed 24 hrs.                  Do you have allergies? Multiple see chart  Allergy to intravenous contrast or dye? NO      Do you have kidney disease?  NO                             Blood work done: Aware via email that it is needed  BUN:      CREATINE:         GFR:      Height:  5'2\"                        Weight: 190     Call placed to patient to discuss upcoming appointment at [facility] radiology department and complete consultation and Questionnaire with patient via phone but patient doesn't answer phone. Called eber Harding and he acknowledges receiving email and printing a copy for his mother. No questions asked.  Patient is having a Cardiac CTA. Reviewed patient's allergies, also reviewed medications.  Beta blocker ordered by Cardiologist and patient aware to take one hour " prior to scan time. Test instructions given, patient aware to hold all caffeine products for 12 hours prior (0130)to the exam this includes coffee, decaf, tea, soda and chocolate.  Also made aware to avoid solid food for 3 hours prior (1030) to exam and to increase fluids one day prior to exam unless contraindicated.  Patient was instructed that they may take all medications as usual unless otherwise directed by physician. Patient instructed to not use inhalers prior to exam, if uses may need to reschedule the study to another day. Discussed the pre procedure expectations, post procedure expectations, time entailed to perform the study and also the medications that may be used in exam ( beta blocker if needed to  heart rate to under 65 beats per minute for optimal exam results and NTG given during scan for vasodilation). Reminded patient of location and time expected for procedure, instructed to bring photo ID, insurance card and script. Recommendation for  to and from study given to patient.  Informed the patient that the study will last approximately 30-45 minutes. Pt verbalizes understanding of education and instructions. My number was also supplied to patient to call if any further questions or concerns should arise pre or post procedure.   Instructions also sent via e-mail to verified address and via [employer] my chart see RN note of 24.

## 2024-05-25 DIAGNOSIS — E03.9 HYPOTHYROIDISM, UNSPECIFIED TYPE: ICD-10-CM

## 2024-05-27 RX ORDER — LEVOTHYROXINE SODIUM 0.15 MG/1
150 TABLET ORAL DAILY
Qty: 90 TABLET | Refills: 1 | Status: SHIPPED | OUTPATIENT
Start: 2024-05-27

## 2024-05-31 ENCOUNTER — APPOINTMENT (OUTPATIENT)
Dept: LAB | Facility: MEDICAL CENTER | Age: 79
End: 2024-05-31
Payer: COMMERCIAL

## 2024-05-31 DIAGNOSIS — E03.9 HYPOTHYROIDISM, UNSPECIFIED TYPE: ICD-10-CM

## 2024-05-31 LAB
T4 FREE SERPL-MCNC: 0.77 NG/DL (ref 0.61–1.12)
TSH SERPL DL<=0.05 MIU/L-ACNC: 15.53 UIU/ML (ref 0.45–4.5)

## 2024-05-31 PROCEDURE — 84439 ASSAY OF FREE THYROXINE: CPT

## 2024-05-31 PROCEDURE — 36415 COLL VENOUS BLD VENIPUNCTURE: CPT

## 2024-05-31 PROCEDURE — 84443 ASSAY THYROID STIM HORMONE: CPT

## 2024-06-03 ENCOUNTER — TELEPHONE (OUTPATIENT)
Dept: CARDIOLOGY CLINIC | Facility: CLINIC | Age: 79
End: 2024-06-03

## 2024-06-03 NOTE — TELEPHONE ENCOUNTER
Faxed life[employer] and [employer] reorder to [facility] office for Dr. Vanessa to sign and have it faxed to [facility] and scanned in Epic after signed.

## 2024-06-04 ENCOUNTER — HOSPITAL ENCOUNTER (OUTPATIENT)
Dept: RADIOLOGY | Facility: HOSPITAL | Age: 79
Discharge: HOME | End: 2024-06-04
Attending: INTERNAL MEDICINE

## 2024-06-04 NOTE — NURSING NOTE
"Made aware by CT RT Oliver that BMP is still needed, called patient and was able to make her aware that her blood work was still needed. Per patient she went to [facility] on Friday the 31 of March to have her blood test and was asked what  Wanted the blood work she couldn't recall the name of her cardiologist so they only anderson blood for her primary MD that was also in the computer. Patient is unable to go get blood work done is morning so cardiac CTA was moved to 6/7 @ 1400, patient states \"I will go Wednesday or Thursday to get the blood work done.\" She also acknowledged having the email and \"following to the T.\"   "

## 2024-06-06 ENCOUNTER — APPOINTMENT (OUTPATIENT)
Dept: LAB | Facility: MEDICAL CENTER | Age: 79
End: 2024-06-06
Payer: COMMERCIAL

## 2024-06-06 DIAGNOSIS — I50.42 CHRONIC COMBINED SYSTOLIC AND DIASTOLIC HEART FAILURE (HCC): ICD-10-CM

## 2024-06-06 PROCEDURE — 36415 COLL VENOUS BLD VENIPUNCTURE: CPT

## 2024-06-06 PROCEDURE — 80048 BASIC METABOLIC PNL TOTAL CA: CPT

## 2024-06-07 ENCOUNTER — HOSPITAL ENCOUNTER (OUTPATIENT)
Dept: RADIOLOGY | Facility: HOSPITAL | Age: 79
Discharge: HOME | End: 2024-06-07
Attending: INTERNAL MEDICINE
Payer: COMMERCIAL

## 2024-06-07 VITALS
HEART RATE: 90 BPM | DIASTOLIC BLOOD PRESSURE: 67 MMHG | OXYGEN SATURATION: 92 % | SYSTOLIC BLOOD PRESSURE: 122 MMHG | RESPIRATION RATE: 22 BRPM

## 2024-06-07 DIAGNOSIS — I50.42 CHRONIC COMBINED SYSTOLIC AND DIASTOLIC HEART FAILURE (HCC): ICD-10-CM

## 2024-06-07 DIAGNOSIS — I42.0 DILATED CARDIOMYOPATHY (HCC): ICD-10-CM

## 2024-06-07 LAB
ANION GAP SERPL CALCULATED.3IONS-SCNC: 13 MMOL/L (ref 4–13)
BUN SERPL-MCNC: 18 MG/DL (ref 5–25)
CALCIUM SERPL-MCNC: 10 MG/DL (ref 8.4–10.2)
CHLORIDE SERPL-SCNC: 99 MMOL/L (ref 96–108)
CO2 SERPL-SCNC: 28 MMOL/L (ref 21–32)
CREAT SERPL-MCNC: 0.75 MG/DL (ref 0.6–1.3)
GFR SERPL CREATININE-BSD FRML MDRD: 74 ML/MIN/1.73SQ M
GLUCOSE P FAST SERPL-MCNC: 93 MG/DL (ref 65–99)
POTASSIUM SERPL-SCNC: 3.6 MMOL/L (ref 3.5–5.3)
SODIUM SERPL-SCNC: 140 MMOL/L (ref 135–147)

## 2024-06-07 PROCEDURE — G1004 CDSM NDSC: HCPCS

## 2024-06-07 PROCEDURE — 75574 CT ANGIO HRT W/3D IMAGE: CPT

## 2024-06-07 RX ORDER — NITROGLYCERIN 0.4 MG/1
0.4 TABLET SUBLINGUAL
Status: DISCONTINUED | OUTPATIENT
Start: 2024-06-07 | End: 2024-06-08 | Stop reason: HOSPADM

## 2024-06-07 RX ADMIN — NITROGLYCERIN 0.4 MG: 0.4 TABLET SUBLINGUAL at 14:33

## 2024-06-07 RX ADMIN — IOHEXOL 68 ML: 350 INJECTION, SOLUTION INTRAVENOUS at 14:49

## 2024-06-10 ENCOUNTER — TELEPHONE (OUTPATIENT)
Age: 79
End: 2024-06-10

## 2024-06-10 NOTE — TELEPHONE ENCOUNTER
Renita contacting office from [facility]'s Reading Room [phone] option # 3  To provide immediate findings on the cardiac CTA.

## 2024-06-11 NOTE — TELEPHONE ENCOUNTER
I talked to Dr Vanessa about Cat CTA scan. He looked it over and tried to call Cortney. There was no answer and she doesn't have an answering machine. He then called her son Sam and had to leave a msg for his mother or him to call the office. We are waiting for the phone call.

## 2024-06-12 ENCOUNTER — RA CDI HCC (OUTPATIENT)
Dept: OTHER | Facility: HOSPITAL | Age: 79
End: 2024-06-12

## 2024-06-12 DIAGNOSIS — E78.5 DYSLIPIDEMIA: Primary | ICD-10-CM

## 2024-06-12 DIAGNOSIS — I50.9 CHF (CONGESTIVE HEART FAILURE) (HCC): ICD-10-CM

## 2024-06-12 RX ORDER — ROSUVASTATIN CALCIUM 40 MG/1
40 TABLET, COATED ORAL DAILY
Qty: 90 TABLET | Refills: 3 | Status: SHIPPED | OUTPATIENT
Start: 2024-06-12 | End: 2024-09-10

## 2024-06-12 NOTE — PROGRESS NOTES
Relayed the results of patient's CTA of the coronary arteries demonstrating significant three-vessel disease.  Risks triple-vessel disease have been discussed with the patient and son.  At this time, as the patient was previously recommended for medical therapy by interventional cardiology and the patient not having chest discomfort, but only shortness of breath, they have elected for medical therapy alone with conservative management.  Continue Jardiance, Toprol-XL and losartan.  Additionally, would start high intensity statin with rosuvastatin 40 mg daily.  Risk and benefits of medication discussed.  I have offered the patient a second opinion with interventional cardiology, but she would like to hold off for now.  Should the patient develop any acute symptoms, recommend seeking immediate medical attention/dial 911.  Will consider repeat echocardiogram in the coming months.

## 2024-06-17 DIAGNOSIS — I50.42 CHRONIC COMBINED SYSTOLIC AND DIASTOLIC HEART FAILURE (HCC): Primary | ICD-10-CM

## 2024-06-17 NOTE — TELEPHONE ENCOUNTER
Caller: Sam Harding    Doctor: Otf    Reason for call: Sam returning Dr Vanessa's call noting that they would like to move forward with the procedure/2nd opinion option.      Call back#: [phone]

## 2024-06-17 NOTE — TELEPHONE ENCOUNTER
Talked to Dr Vanessa and he put in an ASAP 2nd op. Consult to Dr Pickering to have an office visit with him to discuss options for Cortney.

## 2024-07-02 ENCOUNTER — TELEPHONE (OUTPATIENT)
Dept: ADMINISTRATIVE | Facility: OTHER | Age: 79
End: 2024-07-02

## 2024-07-02 NOTE — TELEPHONE ENCOUNTER
07/02/24 3:09 PM    Patient was flagged by a Third Party Payer report as being due for a refill on the following medications, Jardiance. Patient was contacted to review these medications. Inbox full/ phone number disconnected; a message could not be left for the patient.     Thank you.  Lissette Stern  [facility]

## 2024-11-09 DIAGNOSIS — J43.9 PULMONARY EMPHYSEMA, UNSPECIFIED EMPHYSEMA TYPE (HCC): ICD-10-CM

## 2024-11-10 RX ORDER — FLUTICASONE FUROATE AND VILANTEROL TRIFENATATE 100; 25 UG/1; UG/1
POWDER RESPIRATORY (INHALATION)
Qty: 60 EACH | Refills: 5 | Status: SHIPPED | OUTPATIENT
Start: 2024-11-10

## 2025-01-16 ENCOUNTER — TELEPHONE (OUTPATIENT)
Age: 80
End: 2025-01-16

## 2025-01-16 NOTE — TELEPHONE ENCOUNTER
New Patient    What is the reason for the patient’s appointment? NP- Previous patient of ours for bladder cancer. Has not been seen in over 3 years but was seen in the ER for blood in her urine that turned out to be a UTI. She said she feels like she's getting another infection and is concerned about the bladder cancer coming back. She asked if she could get a urine test prior to her visit. I told her I would check with the provider and someone would get back to her.    CB: [phone]    What office location does the patient prefer? [place]    Does patient have Imaging/Lab Results:  Yes    Have patient records been requested?  If No, are the records showing in Epic:   In Epic    INSURANCE:   Do we accept the patient's insurance or is the patient Self-Pay?  [facility]    HISTORY:   Has the patient had any previous Urologist(s)? Yes, us!    Was the patient seen in the ED? Yes    Has the patient had any outside testing done? No    Does the patient have a personal history of cancer? Yes

## 2025-01-16 NOTE — TELEPHONE ENCOUNTER
Called and spoke with the patient to discuss her appointment on 1/23/2025 with Terri KHAN.  Advised patient with her history of bladder cancer and gross hematuria a few weeks ago, patient should be scheduled for cystoscopy.    Patient is in agreement to proceed with cystoscopy.  Plan to cancel 1/23/2025 appointment with Terri.   Will call patient back with a new date and time    Called the patient back and made her aware of her new appointment date and time.  1/30/2025 at 1130 am at the [place] office with Dr Greco.  Office address given to the patient.  Patient aware of cystoscopy.

## 2025-01-21 ENCOUNTER — HOSPITAL ENCOUNTER (INPATIENT)
Facility: HOSPITAL | Age: 80
LOS: 4 days | Discharge: HOME | DRG: 193 | End: 2025-01-25
Payer: COMMERCIAL

## 2025-01-21 ENCOUNTER — APPOINTMENT (EMERGENCY)
Dept: RADIOLOGY | Facility: HOSPITAL | Age: 80
DRG: 193 | End: 2025-01-21
Payer: COMMERCIAL

## 2025-01-21 DIAGNOSIS — J44.1 COPD WITH ACUTE EXACERBATION (HCC): Primary | ICD-10-CM

## 2025-01-21 DIAGNOSIS — Z91.89 AT RISK FOR PNEUMONIA: ICD-10-CM

## 2025-01-21 DIAGNOSIS — I50.9 CHF (CONGESTIVE HEART FAILURE) (HCC): ICD-10-CM

## 2025-01-21 DIAGNOSIS — J96.11 CHRONIC RESPIRATORY FAILURE WITH HYPOXIA (HCC): ICD-10-CM

## 2025-01-21 DIAGNOSIS — J06.9 VIRAL URI: ICD-10-CM

## 2025-01-21 DIAGNOSIS — I50.21 ACUTE SYSTOLIC HEART FAILURE (HCC): ICD-10-CM

## 2025-01-21 DIAGNOSIS — J43.9 PULMONARY EMPHYSEMA, UNSPECIFIED EMPHYSEMA TYPE (HCC): ICD-10-CM

## 2025-01-21 PROBLEM — R06.02 SOB (SHORTNESS OF BREATH): Status: RESOLVED | Noted: 2024-01-02 | Resolved: 2025-01-21

## 2025-01-21 PROBLEM — I25.10 CORONARY ARTERY DISEASE INVOLVING NATIVE CORONARY ARTERY OF NATIVE HEART WITHOUT ANGINA PECTORIS: Status: ACTIVE | Noted: 2025-01-21

## 2025-01-21 PROBLEM — J96.21 ACUTE ON CHRONIC RESPIRATORY FAILURE WITH HYPOXIA (HCC): Status: ACTIVE | Noted: 2024-02-10

## 2025-01-21 LAB
2HR DELTA HS TROPONIN: 18 NG/L
4HR DELTA HS TROPONIN: 67 NG/L
ALBUMIN SERPL BCG-MCNC: 4.1 G/DL (ref 3.5–5)
ALP SERPL-CCNC: 87 U/L (ref 34–104)
ALT SERPL W P-5'-P-CCNC: 26 U/L (ref 7–52)
ANION GAP SERPL CALCULATED.3IONS-SCNC: 11 MMOL/L (ref 4–13)
ANION GAP SERPL CALCULATED.3IONS-SCNC: 9 MMOL/L (ref 4–13)
APTT PPP: 34 SECONDS (ref 23–34)
AST SERPL W P-5'-P-CCNC: 34 U/L (ref 13–39)
ATRIAL RATE: 122 BPM
ATRIAL RATE: 144 BPM
BASE EX.OXY STD BLDV CALC-SCNC: 83.1 % (ref 60–80)
BASE EXCESS BLDV CALC-SCNC: -6.2 MMOL/L
BASOPHILS # BLD AUTO: 0.03 THOUSANDS/ΜL (ref 0–0.1)
BASOPHILS NFR BLD AUTO: 0 % (ref 0–1)
BILIRUB SERPL-MCNC: 0.59 MG/DL (ref 0.2–1)
BNP SERPL-MCNC: 1149 PG/ML (ref 0–100)
BUN SERPL-MCNC: 13 MG/DL (ref 5–25)
BUN SERPL-MCNC: 14 MG/DL (ref 5–25)
CALCIUM SERPL-MCNC: 8.8 MG/DL (ref 8.4–10.2)
CALCIUM SERPL-MCNC: 9 MG/DL (ref 8.4–10.2)
CARDIAC TROPONIN I PNL SERPL HS: 27 NG/L (ref ?–50)
CARDIAC TROPONIN I PNL SERPL HS: 45 NG/L (ref ?–50)
CARDIAC TROPONIN I PNL SERPL HS: 94 NG/L (ref ?–50)
CHLORIDE SERPL-SCNC: 100 MMOL/L (ref 96–108)
CHLORIDE SERPL-SCNC: 99 MMOL/L (ref 96–108)
CO2 SERPL-SCNC: 26 MMOL/L (ref 21–32)
CO2 SERPL-SCNC: 27 MMOL/L (ref 21–32)
CREAT SERPL-MCNC: 0.85 MG/DL (ref 0.6–1.3)
CREAT SERPL-MCNC: 0.91 MG/DL (ref 0.6–1.3)
EOSINOPHIL # BLD AUTO: 0.02 THOUSAND/ΜL (ref 0–0.61)
EOSINOPHIL NFR BLD AUTO: 0 % (ref 0–6)
ERYTHROCYTE [DISTWIDTH] IN BLOOD BY AUTOMATED COUNT: 13 % (ref 11.6–15.1)
ERYTHROCYTE [DISTWIDTH] IN BLOOD BY AUTOMATED COUNT: 13.2 % (ref 11.6–15.1)
FLUAV RNA RESP QL NAA+PROBE: NEGATIVE
FLUBV RNA RESP QL NAA+PROBE: NEGATIVE
GFR SERPL CREATININE-BSD FRML MDRD: 59 ML/MIN/1.73SQ M
GFR SERPL CREATININE-BSD FRML MDRD: 64 ML/MIN/1.73SQ M
GLUCOSE SERPL-MCNC: 156 MG/DL (ref 65–140)
GLUCOSE SERPL-MCNC: 203 MG/DL (ref 65–140)
GLUCOSE SERPL-MCNC: 217 MG/DL (ref 65–140)
GLUCOSE SERPL-MCNC: 240 MG/DL (ref 65–140)
GLUCOSE SERPL-MCNC: 270 MG/DL (ref 65–140)
GLUCOSE SERPL-MCNC: 289 MG/DL (ref 65–140)
HCO3 BLDV-SCNC: 20.1 MMOL/L (ref 24–30)
HCT VFR BLD AUTO: 35.5 % (ref 34.8–46.1)
HCT VFR BLD AUTO: 38.2 % (ref 34.8–46.1)
HGB BLD-MCNC: 11.7 G/DL (ref 11.5–15.4)
HGB BLD-MCNC: 12.5 G/DL (ref 11.5–15.4)
IMM GRANULOCYTES # BLD AUTO: 0.03 THOUSAND/UL (ref 0–0.2)
IMM GRANULOCYTES NFR BLD AUTO: 0 % (ref 0–2)
INR PPP: 1.11 (ref 0.85–1.19)
LACTATE SERPL-SCNC: 2.3 MMOL/L (ref 0.5–2)
LACTATE SERPL-SCNC: 3 MMOL/L (ref 0.5–2)
LYMPHOCYTES # BLD AUTO: 2.75 THOUSANDS/ΜL (ref 0.6–4.47)
LYMPHOCYTES NFR BLD AUTO: 30 % (ref 14–44)
MCH RBC QN AUTO: 33.4 PG (ref 26.8–34.3)
MCH RBC QN AUTO: 33.6 PG (ref 26.8–34.3)
MCHC RBC AUTO-ENTMCNC: 32.7 G/DL (ref 31.4–37.4)
MCHC RBC AUTO-ENTMCNC: 33 G/DL (ref 31.4–37.4)
MCV RBC AUTO: 101 FL (ref 82–98)
MCV RBC AUTO: 103 FL (ref 82–98)
MONOCYTES # BLD AUTO: 0.48 THOUSAND/ΜL (ref 0.17–1.22)
MONOCYTES NFR BLD AUTO: 5 % (ref 4–12)
NEUTROPHILS # BLD AUTO: 5.86 THOUSANDS/ΜL (ref 1.85–7.62)
NEUTS SEG NFR BLD AUTO: 65 % (ref 43–75)
NRBC BLD AUTO-RTO: 0 /100 WBCS
O2 CT BLDV-SCNC: 15.4 ML/DL
P AXIS: 73 DEGREES
P AXIS: 85 DEGREES
PCO2 BLDV: 42.9 MM HG (ref 42–50)
PH BLDV: 7.29 [PH] (ref 7.3–7.4)
PLATELET # BLD AUTO: 241 THOUSANDS/UL (ref 149–390)
PLATELET # BLD AUTO: 350 THOUSANDS/UL (ref 149–390)
PMV BLD AUTO: 10.2 FL (ref 8.9–12.7)
PMV BLD AUTO: 10.2 FL (ref 8.9–12.7)
PO2 BLDV: 54.3 MM HG (ref 35–45)
POTASSIUM SERPL-SCNC: 3.2 MMOL/L (ref 3.5–5.3)
POTASSIUM SERPL-SCNC: 3.9 MMOL/L (ref 3.5–5.3)
PR INTERVAL: 126 MS
PR INTERVAL: 158 MS
PROCALCITONIN SERPL-MCNC: 0.06 NG/ML
PROT SERPL-MCNC: 7.4 G/DL (ref 6.4–8.4)
PROTHROMBIN TIME: 14.5 SECONDS (ref 12.3–15)
QRS AXIS: 116 DEGREES
QRS AXIS: 86 DEGREES
QRSD INTERVAL: 112 MS
QRSD INTERVAL: 116 MS
QT INTERVAL: 280 MS
QT INTERVAL: 348 MS
QTC INTERVAL: 433 MS
QTC INTERVAL: 483 MS
RBC # BLD AUTO: 3.5 MILLION/UL (ref 3.81–5.12)
RBC # BLD AUTO: 3.72 MILLION/UL (ref 3.81–5.12)
RSV RNA RESP QL NAA+PROBE: NEGATIVE
SARS-COV-2 RNA RESP QL NAA+PROBE: NEGATIVE
SODIUM SERPL-SCNC: 135 MMOL/L (ref 135–147)
SODIUM SERPL-SCNC: 137 MMOL/L (ref 135–147)
T WAVE AXIS: 76 DEGREES
T WAVE AXIS: 80 DEGREES
VENTRICULAR RATE: 116 BPM
VENTRICULAR RATE: 144 BPM
WBC # BLD AUTO: 9.17 THOUSAND/UL (ref 4.31–10.16)
WBC # BLD AUTO: 9.61 THOUSAND/UL (ref 4.31–10.16)

## 2025-01-21 PROCEDURE — 94003 VENT MGMT INPAT SUBQ DAY: CPT

## 2025-01-21 PROCEDURE — 93010 ELECTROCARDIOGRAM REPORT: CPT

## 2025-01-21 PROCEDURE — 99223 1ST HOSP IP/OBS HIGH 75: CPT

## 2025-01-21 PROCEDURE — 85025 COMPLETE CBC W/AUTO DIFF WBC: CPT

## 2025-01-21 PROCEDURE — 87154 CUL TYP ID BLD PTHGN 6+ TRGT: CPT

## 2025-01-21 PROCEDURE — 84145 PROCALCITONIN (PCT): CPT

## 2025-01-21 PROCEDURE — 96375 TX/PRO/DX INJ NEW DRUG ADDON: CPT

## 2025-01-21 PROCEDURE — 94640 AIRWAY INHALATION TREATMENT: CPT

## 2025-01-21 PROCEDURE — 85730 THROMBOPLASTIN TIME PARTIAL: CPT

## 2025-01-21 PROCEDURE — 83605 ASSAY OF LACTIC ACID: CPT

## 2025-01-21 PROCEDURE — 96365 THER/PROPH/DIAG IV INF INIT: CPT

## 2025-01-21 PROCEDURE — 93005 ELECTROCARDIOGRAM TRACING: CPT

## 2025-01-21 PROCEDURE — 82805 BLOOD GASES W/O2 SATURATION: CPT

## 2025-01-21 PROCEDURE — 94760 N-INVAS EAR/PLS OXIMETRY 1: CPT

## 2025-01-21 PROCEDURE — 87040 BLOOD CULTURE FOR BACTERIA: CPT

## 2025-01-21 PROCEDURE — 71045 X-RAY EXAM CHEST 1 VIEW: CPT

## 2025-01-21 PROCEDURE — 82948 REAGENT STRIP/BLOOD GLUCOSE: CPT

## 2025-01-21 PROCEDURE — 99291 CRITICAL CARE FIRST HOUR: CPT

## 2025-01-21 PROCEDURE — 85610 PROTHROMBIN TIME: CPT

## 2025-01-21 PROCEDURE — 36415 COLL VENOUS BLD VENIPUNCTURE: CPT

## 2025-01-21 PROCEDURE — 84484 ASSAY OF TROPONIN QUANT: CPT

## 2025-01-21 PROCEDURE — 83880 ASSAY OF NATRIURETIC PEPTIDE: CPT

## 2025-01-21 PROCEDURE — 94664 DEMO&/EVAL PT USE INHALER: CPT

## 2025-01-21 PROCEDURE — 94644 CONT INHLJ TX 1ST HOUR: CPT

## 2025-01-21 PROCEDURE — 80048 BASIC METABOLIC PNL TOTAL CA: CPT

## 2025-01-21 PROCEDURE — 80053 COMPREHEN METABOLIC PANEL: CPT

## 2025-01-21 PROCEDURE — 87077 CULTURE AEROBIC IDENTIFY: CPT

## 2025-01-21 PROCEDURE — 85027 COMPLETE CBC AUTOMATED: CPT

## 2025-01-21 PROCEDURE — 99285 EMERGENCY DEPT VISIT HI MDM: CPT

## 2025-01-21 PROCEDURE — 0241U HB NFCT DS VIR RESP RNA 4 TRGT: CPT

## 2025-01-21 PROCEDURE — 94002 VENT MGMT INPAT INIT DAY: CPT

## 2025-01-21 RX ORDER — ALBUTEROL SULFATE 5 MG/ML
10 SOLUTION RESPIRATORY (INHALATION) ONCE
Status: COMPLETED | OUTPATIENT
Start: 2025-01-21 | End: 2025-01-21

## 2025-01-21 RX ORDER — LOSARTAN POTASSIUM 25 MG/1
25 TABLET ORAL DAILY
Status: DISCONTINUED | OUTPATIENT
Start: 2025-01-21 | End: 2025-01-25 | Stop reason: HOSPADM

## 2025-01-21 RX ORDER — SODIUM CHLORIDE FOR INHALATION 0.9 %
3 VIAL, NEBULIZER (ML) INHALATION EVERY 6 HOURS PRN
Status: DISCONTINUED | OUTPATIENT
Start: 2025-01-21 | End: 2025-01-23

## 2025-01-21 RX ORDER — INSULIN LISPRO 100 [IU]/ML
1-6 INJECTION, SOLUTION INTRAVENOUS; SUBCUTANEOUS
Status: DISCONTINUED | OUTPATIENT
Start: 2025-01-21 | End: 2025-01-25 | Stop reason: HOSPADM

## 2025-01-21 RX ORDER — ATORVASTATIN CALCIUM 80 MG/1
80 TABLET, FILM COATED ORAL
Status: DISCONTINUED | OUTPATIENT
Start: 2025-01-21 | End: 2025-01-25 | Stop reason: HOSPADM

## 2025-01-21 RX ORDER — ONDANSETRON 2 MG/ML
4 INJECTION INTRAMUSCULAR; INTRAVENOUS EVERY 6 HOURS PRN
Status: DISCONTINUED | OUTPATIENT
Start: 2025-01-21 | End: 2025-01-25 | Stop reason: HOSPADM

## 2025-01-21 RX ORDER — GUAIFENESIN 100 MG/5ML
200 SOLUTION ORAL EVERY 4 HOURS PRN
Status: DISCONTINUED | OUTPATIENT
Start: 2025-01-21 | End: 2025-01-23

## 2025-01-21 RX ORDER — FUROSEMIDE 10 MG/ML
50 INJECTION INTRAMUSCULAR; INTRAVENOUS ONCE
Status: COMPLETED | OUTPATIENT
Start: 2025-01-21 | End: 2025-01-21

## 2025-01-21 RX ORDER — IPRATROPIUM BROMIDE AND ALBUTEROL SULFATE .5; 3 MG/3ML; MG/3ML
1 SOLUTION RESPIRATORY (INHALATION) ONCE
Status: COMPLETED | OUTPATIENT
Start: 2025-01-21 | End: 2025-01-21

## 2025-01-21 RX ORDER — ACETAMINOPHEN 325 MG/1
975 TABLET ORAL EVERY 6 HOURS PRN
Status: DISCONTINUED | OUTPATIENT
Start: 2025-01-21 | End: 2025-01-25 | Stop reason: HOSPADM

## 2025-01-21 RX ORDER — IPRATROPIUM BROMIDE AND ALBUTEROL SULFATE 2.5; .5 MG/3ML; MG/3ML
3 SOLUTION RESPIRATORY (INHALATION)
Status: DISCONTINUED | OUTPATIENT
Start: 2025-01-21 | End: 2025-01-21

## 2025-01-21 RX ORDER — TORSEMIDE 20 MG/1
20 TABLET ORAL DAILY
Status: DISCONTINUED | OUTPATIENT
Start: 2025-01-21 | End: 2025-01-25 | Stop reason: HOSPADM

## 2025-01-21 RX ORDER — ENOXAPARIN SODIUM 100 MG/ML
40 INJECTION SUBCUTANEOUS DAILY
Status: DISCONTINUED | OUTPATIENT
Start: 2025-01-21 | End: 2025-01-25 | Stop reason: HOSPADM

## 2025-01-21 RX ORDER — LEVOTHYROXINE SODIUM 75 UG/1
150 TABLET ORAL
Status: DISCONTINUED | OUTPATIENT
Start: 2025-01-21 | End: 2025-01-25 | Stop reason: HOSPADM

## 2025-01-21 RX ORDER — MAGNESIUM SULFATE HEPTAHYDRATE 40 MG/ML
2 INJECTION, SOLUTION INTRAVENOUS ONCE
Status: COMPLETED | OUTPATIENT
Start: 2025-01-21 | End: 2025-01-21

## 2025-01-21 RX ORDER — MAGNESIUM HYDROXIDE/ALUMINUM HYDROXICE/SIMETHICONE 120; 1200; 1200 MG/30ML; MG/30ML; MG/30ML
30 SUSPENSION ORAL EVERY 6 HOURS PRN
Status: DISCONTINUED | OUTPATIENT
Start: 2025-01-21 | End: 2025-01-25 | Stop reason: HOSPADM

## 2025-01-21 RX ORDER — METHYLPREDNISOLONE SOD SUCC 125 MG
1 VIAL (EA) INJECTION ONCE
Status: COMPLETED | OUTPATIENT
Start: 2025-01-21 | End: 2025-01-21

## 2025-01-21 RX ORDER — IPRATROPIUM BROMIDE AND ALBUTEROL SULFATE 2.5; .5 MG/3ML; MG/3ML
3 SOLUTION RESPIRATORY (INHALATION)
Status: DISCONTINUED | OUTPATIENT
Start: 2025-01-22 | End: 2025-01-25 | Stop reason: HOSPADM

## 2025-01-21 RX ORDER — SODIUM CHLORIDE FOR INHALATION 0.9 %
12 VIAL, NEBULIZER (ML) INHALATION ONCE
Status: COMPLETED | OUTPATIENT
Start: 2025-01-21 | End: 2025-01-21

## 2025-01-21 RX ORDER — LEVALBUTEROL INHALATION SOLUTION 1.25 MG/3ML
1.25 SOLUTION RESPIRATORY (INHALATION) EVERY 6 HOURS PRN
Status: DISCONTINUED | OUTPATIENT
Start: 2025-01-21 | End: 2025-01-25 | Stop reason: HOSPADM

## 2025-01-21 RX ORDER — ASPIRIN 81 MG/1
81 TABLET ORAL DAILY
Status: DISCONTINUED | OUTPATIENT
Start: 2025-01-21 | End: 2025-01-25 | Stop reason: HOSPADM

## 2025-01-21 RX ORDER — POTASSIUM CHLORIDE 1500 MG/1
40 TABLET, EXTENDED RELEASE ORAL 2 TIMES DAILY
Status: COMPLETED | OUTPATIENT
Start: 2025-01-21 | End: 2025-01-21

## 2025-01-21 RX ORDER — FORMOTEROL FUMARATE DIHYDRATE 20 UG/2ML
20 SOLUTION RESPIRATORY (INHALATION)
Status: DISCONTINUED | OUTPATIENT
Start: 2025-01-21 | End: 2025-01-25 | Stop reason: HOSPADM

## 2025-01-21 RX ORDER — POLYETHYLENE GLYCOL 3350 17 G/17G
17 POWDER, FOR SOLUTION ORAL DAILY PRN
Status: DISCONTINUED | OUTPATIENT
Start: 2025-01-21 | End: 2025-01-25 | Stop reason: HOSPADM

## 2025-01-21 RX ORDER — METOPROLOL SUCCINATE 25 MG/1
25 TABLET, EXTENDED RELEASE ORAL DAILY
Status: DISCONTINUED | OUTPATIENT
Start: 2025-01-21 | End: 2025-01-25 | Stop reason: HOSPADM

## 2025-01-21 RX ADMIN — IPRATROPIUM BROMIDE AND ALBUTEROL SULFATE 3 ML: 2.5; .5 SOLUTION RESPIRATORY (INHALATION) at 19:32

## 2025-01-21 RX ADMIN — SODIUM CHLORIDE 250 ML: 0.9 INJECTION, SOLUTION INTRAVENOUS at 03:56

## 2025-01-21 RX ADMIN — DEXTROSE 2000 MG: 50 INJECTION, SOLUTION INTRAVENOUS at 04:16

## 2025-01-21 RX ADMIN — TORSEMIDE 20 MG: 20 TABLET ORAL at 12:17

## 2025-01-21 RX ADMIN — INSULIN LISPRO 1 UNITS: 100 INJECTION, SOLUTION INTRAVENOUS; SUBCUTANEOUS at 12:17

## 2025-01-21 RX ADMIN — FUROSEMIDE 50 MG: 10 INJECTION, SOLUTION INTRAVENOUS at 04:59

## 2025-01-21 RX ADMIN — PREDNISONE 50 MG: 20 TABLET ORAL at 09:53

## 2025-01-21 RX ADMIN — ATORVASTATIN CALCIUM 80 MG: 80 TABLET, FILM COATED ORAL at 15:21

## 2025-01-21 RX ADMIN — FORMOTEROL FUMARATE DIHYDRATE 20 MCG: 20 SOLUTION RESPIRATORY (INHALATION) at 19:32

## 2025-01-21 RX ADMIN — IPRATROPIUM BROMIDE AND ALBUTEROL SULFATE 3 ML: 2.5; .5 SOLUTION RESPIRATORY (INHALATION) at 13:48

## 2025-01-21 RX ADMIN — LOSARTAN POTASSIUM 25 MG: 25 TABLET, FILM COATED ORAL at 09:54

## 2025-01-21 RX ADMIN — UMECLIDINIUM 1 PUFF: 62.5 AEROSOL, POWDER ORAL at 15:19

## 2025-01-21 RX ADMIN — POTASSIUM CHLORIDE 40 MEQ: 1500 TABLET, EXTENDED RELEASE ORAL at 17:46

## 2025-01-21 RX ADMIN — POTASSIUM CHLORIDE 40 MEQ: 1500 TABLET, EXTENDED RELEASE ORAL at 09:58

## 2025-01-21 RX ADMIN — INSULIN LISPRO 4 UNITS: 100 INJECTION, SOLUTION INTRAVENOUS; SUBCUTANEOUS at 15:20

## 2025-01-21 RX ADMIN — ALBUTEROL SULFATE 10 MG: 2.5 SOLUTION RESPIRATORY (INHALATION) at 03:51

## 2025-01-21 RX ADMIN — IPRATROPIUM BROMIDE 1 MG: 0.5 SOLUTION RESPIRATORY (INHALATION) at 03:50

## 2025-01-21 RX ADMIN — ENOXAPARIN SODIUM 40 MG: 40 INJECTION SUBCUTANEOUS at 09:58

## 2025-01-21 RX ADMIN — ISODIUM CHLORIDE 12 ML: 0.03 SOLUTION RESPIRATORY (INHALATION) at 03:51

## 2025-01-21 RX ADMIN — METOPROLOL SUCCINATE 25 MG: 25 TABLET, EXTENDED RELEASE ORAL at 09:54

## 2025-01-21 RX ADMIN — IPRATROPIUM BROMIDE AND ALBUTEROL SULFATE 3 ML: 2.5; .5 SOLUTION RESPIRATORY (INHALATION) at 07:42

## 2025-01-21 RX ADMIN — LEVALBUTEROL HYDROCHLORIDE 1.25 MG: 1.25 SOLUTION RESPIRATORY (INHALATION) at 15:45

## 2025-01-21 RX ADMIN — ASPIRIN 81 MG: 81 TABLET, COATED ORAL at 09:53

## 2025-01-21 RX ADMIN — LEVOTHYROXINE SODIUM 150 MCG: 75 TABLET ORAL at 06:09

## 2025-01-21 RX ADMIN — MAGNESIUM SULFATE HEPTAHYDRATE 2 G: 40 INJECTION, SOLUTION INTRAVENOUS at 03:52

## 2025-01-21 NOTE — H&P
H&P - Hospitalist   Name: Cortney Harding 79 y.o. female I MRN: [id number]  Unit/Bed#: ED-04 I Date of Admission: 1/21/2025   Date of Service: 1/21/2025 I Hospital Day: 0     Assessment & Plan  Acute on chronic respiratory failure with hypoxia (HCC)  Presents in respiratory distress, wheezing. Secondary to either COPD exacerbation +/- component of heart failure  CXR cardiomegaly, no effusion, atelectasis vs emphysematous changes    Plan:  Plans below  Chronic obstructive pulmonary disease with acute exacerbation (HCC)  Presenting in respiratory distress, ongoing wheezing    Plan:  Solumedrol load by EMS, continue prednisone  Standing duoneb, PRN levalbuterol   BiPAP PRN  Respiratory hygiene, incentive spirometry  Acute systolic heart failure (HCC)  Wt Readings from Last 3 Encounters:   01/21/25 91.1 kg (200 lb 13.4 oz)   05/16/24 86.2 kg (190 lb)   02/20/24 86.5 kg (190 lb 9.6 oz)     Weight up, trace edema, BNP 1149  TTE LVEF 10%, lobal hypokinesis, LA dilation, trace AR, mild MR    Plan:  Diuresis: IV Lasix 50mg assess response  GDMT: continue Toprol XL 25mg daily, losartan 25mg daily  Coronary artery disease involving native coronary artery of native heart without angina pectoris  Severe three vessel CAD on cardiac CTA    VTE Pharmacologic Prophylaxis: VTE Score: 4 Moderate Risk (Score 3-4) - Pharmacological DVT Prophylaxis Ordered: enoxaparin (Lovenox).  Code Status: Level 1 - Full Code   Discussion with family:     Anticipated Length of Stay: Patient will be admitted on an inpatient basis with an anticipated length of stay of greater than 2 midnights secondary to respiratory distress.    History of Present Illness   Chief Complaint:   Chief Complaint   Patient presents with    Shortness of Breath     Pt arriving EMS from home. Per EMS, COPD exacerbation starting a few hours ago. No relief with inhaler. Duo neb and 125 Solumedrol given by EMS. Pt also w a cardiac hx.      Cortney Harding is a 79 y.o. female with a  "PMH of HTN, CAD, HFrEF, COPD who presents with shortness of breath.   History limited from patient while on BiPAP and increased work of breathing, further information obtained from chart review and discussion with ED BILL. She presents tonight from home for respiratory distress. Was sick over last few days with cough, congestion. Has tried to use her home inhalers without relief and she continued to worsen tonight. No chest pain, palpitations. Reports compliance with prescribed medications.     Review of Systems   Constitutional:  Positive for fatigue. Negative for chills and fever.   Respiratory:  Positive for cough, chest tightness, shortness of breath and wheezing.    Cardiovascular:  Negative for chest pain and palpitations.   Gastrointestinal:  Negative for abdominal pain, diarrhea, nausea and vomiting.   Neurological:  Negative for syncope.   All other systems reviewed and are negative.      Historical Information   Past Medical History:   Diagnosis Date    Anesthesia     \"constipation after surgery\"    Bladder cancer (HCC)     Cataract     left    Colon polyp     COPD (chronic obstructive pulmonary disease) (HCC)     Dry mouth     Full dentures     GERD (gastroesophageal reflux disease)     occas    History of pneumonia     Hyperlipidemia     Hypothyroidism     Lung cancer (HCC)     stage 3    Pulmonary emphysema (HCC)     Skin cancer     squamous cell of the leg    Sleep difficulties     \"gets up frequently to void\"    UTI (urinary tract infection) 12/2019    recent e coli treated and resolved    Wears glasses      Past Surgical History:   Procedure Laterality Date    CHOLECYSTECTOMY      COLONOSCOPY      CYSTOSCOPY      4/10 AND 8/10, 3/12/2015 4/18/2016  DIAGNOSTIC     CYSTOSCOPY  05/01/2020    CYSTOSCOPY  04/02/2021    CYSTOSCOPY  10/29/2021    DENTAL SURGERY      EYE SURGERY      HYSTERECTOMY      OTHER SURGICAL HISTORY      TRANSURETHERAL RESECTION     AL CYSTO W/REMOVAL OF LESIONS SMALL N/A 12/23/2019 "    Procedure: BLADDER BIOPSY; BLADDER FULGERATION with mitomycin;  Surgeon: Daniel De Oliveira MD;  Location:  MAIN OR;  Service: Urology    FL CYSTOURETHROSCOPY WITH BIOPSY N/A 2020    Procedure: CYSTOSCOPY WITH BIOPSIES, FULGURATION;  Surgeon: Daniel De Oliveira MD;  Location: [facility];  Service: Urology    FL CYSTOURETHROSCOPY WITH BIOPSY N/A 2020    Procedure: CYSTO W/BIOPSIES & FULGURATION;  Surgeon: Daniel De Oliveira MD;  Location: [facility];  Service: Urology    WISDOM TOOTH EXTRACTION       Social History     Tobacco Use    Smoking status: Former     Current packs/day: 0.00     Types: Cigarettes     Quit date: 2019     Years since quittin.0    Smokeless tobacco: Never   Vaping Use    Vaping status: Former   Substance and Sexual Activity    Alcohol use: Not Currently    Drug use: No    Sexual activity: Not on file     E-Cigarette/Vaping    E-Cigarette Use Former User      E-Cigarette/Vaping Substances    Nicotine No     THC No     CBD No     Flavoring No     Other No     Unknown No      Family History   Problem Relation Age of Onset    Cancer Mother         Mouth     Mental illness Sister     Colon cancer Paternal Grandmother     No Known Problems Daughter      Social History:  Marital Status: Single   Occupation:   Patient Pre-hospital Living Situation: Home  Patient Pre-hospital Level of Mobility: walks  Patient Pre-hospital Diet Restrictions:     Meds/Allergies   I have reviewed home medications using recent Epic encounter.  Prior to Admission medications    Medication Sig Start Date End Date Taking? Authorizing Provider   albuterol (Ventolin HFA) 90 mcg/act inhaler Inhale 2 puffs every 6 (six) hours as needed for wheezing 23   Mehul Thorpe MD   Ascorbic Acid (VITAMIN C) 100 MG tablet Take 100 mg by mouth daily    Historical Provider, MD   aspirin 325 mg tablet Take 0.5 tablets by mouth daily     Historical Provider, MD Majano Ellipta 100-25 MCG/ACT inhaler INHALE 1 PUFF  DAILY RINSE MOUTH AFTER USE. 11/10/24   Mehul Thorpe MD   Calcium 600 MG tablet Take 1 tablet by mouth 2 (two) times a day    Historical Provider, MD   Chromium 200 MCG TABS Take 1 tablet by mouth daily    Historical Provider, MD   Empagliflozin (Jardiance) 10 MG TABS tablet Take 1 tablet (10 mg total) by mouth every morning 6/12/24 9/10/24  Abhishek Vanessa DO   fluorouracil (EFUDEX) 5 % cream Use as directed 10/13/20   Historical Provider, MD   fluticasone (FLONASE) 50 mcg/act nasal spray 2 sprays into each nostril daily  Patient not taking: Reported on 2/15/2024 12/17/21   Cece Cotto PA-C   ipratropium-albuterol (DUO-NEB) 0.5-2.5 mg/3 mL nebulizer solution Take 3 mL by nebulization 3 (three) times a day as needed for wheezing or shortness of breath 11/22/23   Mehul Thorpe MD   levothyroxine 150 mcg tablet TAKE 1 TABLET BY MOUTH EVERY DAY 5/27/24   Mehul Thorpe MD   loratadine-pseudoephedrine (CLARITIN-D 12-HOUR) 5-120 mg per tablet Take 1 tablet by mouth 2 (two) times a day  Patient not taking: Reported on 2/15/2024 12/17/21   Cece Cotto PA-C   losartan (COZAAR) 25 mg tablet Take 1 tablet (25 mg total) by mouth daily 2/19/24 5/19/24  Abhishek Vanessa DO   magnesium 30 MG tablet Take 30 mg by mouth 2 (two) times a day    Historical Provider, MD   melatonin 1 mg Take 10 mg by mouth daily at bedtime     Historical Provider, MD   metoprolol succinate (TOPROL-XL) 25 mg 24 hr tablet Take 1 tablet (25 mg total) by mouth daily 2/19/24 5/19/24  Abhishek Vanessa DO   Multiple Vitamin (MULTIVITAMINS PO) Take 1 tablet by mouth daily    Historical Provider, MD   Omega-3 Fatty Acids (FISH OIL) 1200 MG CAPS Take by mouth    Historical Provider, MD   OXYGEN-HELIUM IN Inhale as needed Only uses rarely    Historical Provider, MD   rosuvastatin (CRESTOR) 40 MG tablet Take 1 tablet (40 mg total) by mouth daily 6/12/24 9/10/24  Abhishek Vanessa DO   tiotropium  (SPIRIVA) 18 mcg inhalation capsule INHALE 1 CAPSULE VIA HANDIHALER ONCE DAILY AT THE SAME TIME EVERY DAY 3/25/24   Mehul Thorpe MD   torsemide (DEMADEX) 20 mg tablet Take 1 tablet (20 mg total) by mouth daily 2/19/24 5/19/24  Abhishek Vanessa DO   zolpidem (AMBIEN) 5 mg tablet Take 1 tablet (5 mg total) by mouth daily at bedtime as needed for sleep Please stop taking the medication if any drowsiness in the morning after taking the medication. 2/20/24   Mehul Thorpe MD     Allergies   Allergen Reactions    Erythromycin Hives    Penicillins Hives    Sulfa Antibiotics Hives    Tetracyclines & Related Hives       Objective :  Temp:  [98.8 °F (37.1 °C)] 98.8 °F (37.1 °C)  HR:  [120-144] 126  BP: (126-155)/(56-83) 126/56  Resp:  [19-33] 25  SpO2:  [92 %-100 %] 100 %  O2 Device: BiPAP    Physical Exam  Vitals and nursing note reviewed.   Constitutional:       General: She is not in acute distress.     Appearance: She is well-developed.   HENT:      Head: Normocephalic and atraumatic.   Eyes:      Conjunctiva/sclera: Conjunctivae normal.   Cardiovascular:      Rate and Rhythm: Normal rate and regular rhythm.      Heart sounds: No murmur heard.  Pulmonary:      Effort: Pulmonary effort is normal. No respiratory distress.      Breath sounds: Wheezing present.   Abdominal:      Palpations: Abdomen is soft.      Tenderness: There is no abdominal tenderness.   Musculoskeletal:         General: No swelling.      Cervical back: Neck supple.   Skin:     General: Skin is warm and dry.      Capillary Refill: Capillary refill takes less than 2 seconds.   Neurological:      Mental Status: She is alert.   Psychiatric:         Mood and Affect: Mood normal.        Lines/Drains:            Lab Results: I have reviewed the following results:  Results from last 7 days   Lab Units 01/21/25  0353   WBC Thousand/uL 9.17   HEMOGLOBIN g/dL 12.5   HEMATOCRIT % 38.2   PLATELETS Thousands/uL 350   SEGS PCT % 65    LYMPHO PCT % 30   MONO PCT % 5   EOS PCT % 0     Results from last 7 days   Lab Units 01/21/25  0353   SODIUM mmol/L 135   POTASSIUM mmol/L 3.9   CHLORIDE mmol/L 99   CO2 mmol/L 27   BUN mg/dL 13   CREATININE mg/dL 0.91   ANION GAP mmol/L 9   CALCIUM mg/dL 9.0   ALBUMIN g/dL 4.1   TOTAL BILIRUBIN mg/dL 0.59   ALK PHOS U/L 87   ALT U/L 26   AST U/L 34   GLUCOSE RANDOM mg/dL 289*     Results from last 7 days   Lab Units 01/21/25  0353   INR  1.11         Lab Results   Component Value Date    HGBA1C 4.9 12/15/2022     Results from last 7 days   Lab Units 01/21/25  0413 01/21/25  0353   LACTIC ACID mmol/L  --  3.0*   PROCALCITONIN ng/ml 0.06  --        Imaging Results Review: I reviewed radiology reports from this admission including: chest xray.  Other Study Results Review: EKG was personally reviewed and my interpretation is: Sinus Tachycardia. ..    Administrative Statements       ** Please Note: This note has been constructed using a voice recognition system. **

## 2025-01-21 NOTE — ASSESSMENT & PLAN NOTE
Presents in respiratory distress, wheezing. Secondary to either COPD exacerbation +/- component of heart failure  CXR cardiomegaly, no effusion, atelectasis vs emphysematous changes    Plan:  Plans below

## 2025-01-21 NOTE — ASSESSMENT & PLAN NOTE
Presenting in respiratory distress, ongoing wheezing    Plan:  Solumedrol load by EMS, continue prednisone  Standing duoneb, PRN levalbuterol   BiPAP PRN  Respiratory hygiene, incentive spirometry

## 2025-01-21 NOTE — ASSESSMENT & PLAN NOTE
Wt Readings from Last 3 Encounters:   01/21/25 91.1 kg (200 lb 13.4 oz)   05/16/24 86.2 kg (190 lb)   02/20/24 86.5 kg (190 lb 9.6 oz)     Weight up, trace edema, BNP 1149  TTE LVEF 10%, lobal hypokinesis, LA dilation, trace AR, mild MR    Plan:  Diuresis: IV Lasix 50mg assess response  GDMT: continue Toprol XL 25mg daily, losartan 25mg daily

## 2025-01-21 NOTE — PLAN OF CARE
Problem: Potential for Falls  Goal: Patient will remain free of falls  Description: INTERVENTIONS:  - Educate patient/family on patient safety including physical limitations  - Instruct patient to call for assistance with activity   - Consult OT/PT to assist with strengthening/mobility   - Keep Call bell within reach  - Keep bed low and locked with side rails adjusted as appropriate  - Keep care items and personal belongings within reach  - Initiate and maintain comfort rounds  - Make Fall Risk Sign visible to staff  - Offer Toileting every 2 Hours, in advance of need  - Initiate/Maintain bed alarm  - Obtain necessary fall risk management equipment: bed alarm  - Apply yellow socks and bracelet for high fall risk patients  - Consider moving patient to room near nurses station  Outcome: Progressing     Problem: Prexisting or High Potential for Compromised Skin Integrity  Goal: Skin integrity is maintained or improved  Description: INTERVENTIONS:  - Identify patients at risk for skin breakdown  - Assess and monitor skin integrity  - Assess and monitor nutrition and hydration status  - Monitor labs   - Assess for incontinence   - Turn and reposition patient  - Assist with mobility/ambulation  - Relieve pressure over bony prominences  - Avoid friction and shearing  - Provide appropriate hygiene as needed including keeping skin clean and dry  - Evaluate need for skin moisturizer/barrier cream  - Collaborate with interdisciplinary team   - Patient/family teaching  - Consider wound care consult   Outcome: Progressing     Problem: RESPIRATORY - ADULT  Goal: Achieves optimal ventilation and oxygenation  Description: INTERVENTIONS:  - Assess for changes in respiratory status  - Assess for changes in mentation and behavior  - Position to facilitate oxygenation and minimize respiratory effort  - Oxygen administered by appropriate delivery if ordered  - Initiate smoking cessation education as indicated  - Encourage  broncho-pulmonary hygiene including cough, deep breathe, Incentive Spirometry  - Assess the need for suctioning and aspirate as needed  - Assess and instruct to report SOB or any respiratory difficulty  - Respiratory Therapy support as indicated  Outcome: Progressing

## 2025-01-21 NOTE — ED NOTES
Pt requesting to use the bedpan at this time. Pts soiled clothes removed and a bedpan was placed under the patient. After bedpan use, the patient was cleaned and a brief was placed underneath.      Dyana Alves  01/21/25 0543

## 2025-01-21 NOTE — ED PROVIDER NOTES
Time reflects when diagnosis was documented in both MDM as applicable and the Disposition within this note       Time User Action Codes Description Comment    1/21/2025  4:56 AM Abhishek Trejo [J44.1] COPD with acute exacerbation (HCC)     1/21/2025  4:56 AM Abhishek Trejo Add [I50.9] CHF (congestive heart failure) (HCC)     1/21/2025  4:56 AM Abhishek Trejo [J96.11] Chronic respiratory failure with hypoxia (HCC)     1/21/2025  4:58 AM Abhishek Trejo Add [J06.9] Viral URI     1/21/2025  4:58 AM Abhishek Trejo Add [Z91.89] At risk for pneumonia           ED Disposition       ED Disposition   Admit    Condition   Stable    Date/Time   Tue Jan 21, 2025  4:56 AM    Comment   Case was discussed with Ethan Chandler and the patient's admission status was agreed to be Admission Status: inpatient status to the service of Dr. Oglebsy .               Assessment & Plan       Medical Decision Making  79-year-old female with extensive cardiopulmonary history presenting in acute respiratory distress.  Exam: Patient in moderate to severe respiratory distress, BiPAP in place.  Tachypneic, increased respiratory effort, prolonged expiration.  Tachycardic in the 140s.  Appears highly anxious.  Decreased lung sounds throughout all lung fields, however some wheezing is heard in all lung fields bilaterally.  +1 pitting edema bilateral lower extremities.    Workup: CBC, CMP, lactic acid, procalcitonin, coags, VBG, BNP, troponin, COVID/flu/RSV.  CXR.  EKG with sinus tachycardia.  Management: Continuous BiPAP, heart neb, IV magnesium.    Lab work with mild acidosis of 7.3, increased lactic acid of 3.0, elevated BNP over 1100.  0-hour troponin of 27 unlikely to represent ACS.  CXR with findings of pulmonary edema and possible right lower lobe infiltrate.  As she also meets SIRS criteria will cover with Rocephin, as well as light fluids.  Need to be cautious with fluids considering very poor ejection fraction.  Negative viral panel.  No leukocytosis  or elevated procalcitonin so less likely to be indicative of bacterial pneumonia.    Patient quickly improving with BiPAP and medications.  She does still have an increased respiratory effort, tachycardic in the 120s, tachypneic.  Increasing wheezing on lung auscultation indicating improvement from initial decreased lung sounds.  Normotensive.  States that she is feeling a little bit better.  At this point feel she is stable for admission to medicine.  Discussed with Ethan Chandler from medicine.  Patient admitted to [facility].    Amount and/or Complexity of Data Reviewed  Labs: ordered.  Radiology: ordered and independent interpretation performed.    Risk  Prescription drug management.  Decision regarding hospitalization.             Medications   albuterol inhalation solution 10 mg (10 mg Nebulization Given 1/21/25 0351)   ipratropium (ATROVENT) 0.02 % inhalation solution 1 mg (1 mg Nebulization Given 1/21/25 0350)   sodium chloride 0.9 % inhalation solution 12 mL (12 mL Nebulization Given 1/21/25 0351)   magnesium sulfate 2 g/50 mL IVPB (premix) 2 g (0 g Intravenous Stopped 1/21/25 0413)   ipratropium-albuterol (FOR EMS ONLY) (DUO-NEB) 0.5-2.5 mg/3 mL inhalation solution 3 mL (0 mL Does not apply Given to EMS 1/21/25 0352)   methylPREDNISolone sodium succinate (FOR EMS ONLY) (Solu-MEDROL) 125 MG injection 125 mg (0 mg Does not apply Given to EMS 1/21/25 0400)   sodium chloride 0.9 % bolus 250 mL (0 mL Intravenous Stopped 1/21/25 0416)   ceftriaxone (ROCEPHIN) 2 g/50 mL in dextrose IVPB (0 mg Intravenous Stopped 1/21/25 0503)   furosemide (LASIX) injection 50 mg (50 mg Intravenous Given 1/21/25 0459)       ED Risk Strat Scores                          SBIRT 22yo+      Flowsheet Row Most Recent Value   Initial Alcohol Screen: US AUDIT-C     1. How often do you have a drink containing alcohol? 0 Filed at: 01/21/2025 0351   2. How many drinks containing alcohol do you have on a typical day you are drinking?  0 Filed at:  "01/21/2025 0351   3a. Male UNDER 65: How often do you have five or more drinks on one occasion? 0 Filed at: 01/21/2025 0351   3b. FEMALE Any Age, or MALE 65+: How often do you have 4 or more drinks on one occassion? 0 Filed at: 01/21/2025 0351   Audit-C Score 0 Filed at: 01/21/2025 0351   RACHEL: How many times in the past year have you...    Used an illegal drug or used a prescription medication for non-medical reasons? Never Filed at: 01/21/2025 0351                            History of Present Illness       Chief Complaint   Patient presents with    Shortness of Breath     Pt arriving EMS from home. Per EMS, COPD exacerbation starting a few hours ago. No relief with inhaler. Duo neb and 125 Solumedrol given by EMS. Pt also w a cardiac hx.        Past Medical History:   Diagnosis Date    Anesthesia     \"constipation after surgery\"    Bladder cancer (HCC)     Cataract     left    Colon polyp     COPD (chronic obstructive pulmonary disease) (HCC)     Dry mouth     Full dentures     GERD (gastroesophageal reflux disease)     occas    History of pneumonia     Hyperlipidemia     Hypothyroidism     Lung cancer (HCC)     stage 3    Pulmonary emphysema (HCC)     Skin cancer     squamous cell of the leg    Sleep difficulties     \"gets up frequently to void\"    UTI (urinary tract infection) 12/2019    recent e coli treated and resolved    Wears glasses       Past Surgical History:   Procedure Laterality Date    CHOLECYSTECTOMY      COLONOSCOPY      CYSTOSCOPY      4/10 AND 8/10, 3/12/2015 4/18/2016  DIAGNOSTIC     CYSTOSCOPY  05/01/2020    CYSTOSCOPY  04/02/2021    CYSTOSCOPY  10/29/2021    DENTAL SURGERY      EYE SURGERY      HYSTERECTOMY      OTHER SURGICAL HISTORY      TRANSURETHERAL RESECTION     MI CYSTO W/REMOVAL OF LESIONS SMALL N/A 12/23/2019    Procedure: BLADDER BIOPSY; BLADDER FULGERATION with mitomycin;  Surgeon: Daniel De Oliveira MD;  Location:  MAIN OR;  Service: Urology    MI CYSTOURETHROSCOPY WITH BIOPSY N/A " 2020    Procedure: CYSTOSCOPY WITH BIOPSIES, FULGURATION;  Surgeon: Daniel De Oliveira MD;  Location: [facility];  Service: Urology    NM CYSTOURETHROSCOPY WITH BIOPSY N/A 2020    Procedure: CYSTO W/BIOPSIES & FULGURATION;  Surgeon: Daniel De Oliveira MD;  Location: [facility];  Service: Urology    WISDOM TOOTH EXTRACTION        Family History   Problem Relation Age of Onset    Cancer Mother         Mouth     Mental illness Sister     Colon cancer Paternal Grandmother     No Known Problems Daughter       Social History     Tobacco Use    Smoking status: Former     Current packs/day: 0.00     Types: Cigarettes     Quit date: 2019     Years since quittin.0    Smokeless tobacco: Never   Vaping Use    Vaping status: Former   Substance Use Topics    Alcohol use: Not Currently    Drug use: No      E-Cigarette/Vaping    E-Cigarette Use Former User       E-Cigarette/Vaping Substances    Nicotine No     THC No     CBD No     Flavoring No     Other No     Unknown No       I have reviewed and agree with the history as documented.     79-year-old female with PMH of COPD, CHF, HTN, HLD, lung cancer presents to the ER in acute respiratory distress.  Per EMS patient has been ill with upper respiratory symptoms for a few days.  She used her rescue meds at home with no relief.  EMS administered BiPAP, DuoNeb, and Solu-Medrol.  Patient noted to be improving a small amount by the time she arrived in the ER.  She denies chest pain and nausea.  Denies prior intubations for COPD.        Review of Systems   Unable to perform ROS: Acuity of condition   Respiratory:  Positive for cough, chest tightness and shortness of breath.    Cardiovascular:  Negative for chest pain.   Gastrointestinal:  Negative for nausea.           Objective       ED Triage Vitals   Temperature Pulse Blood Pressure Respirations SpO2 Patient Position - Orthostatic VS   25 0503 25 0349 25 0356 25 0347 25 0349 25 0400    98.8 °F (37.1 °C) (!) 144 155/83 (!) 33 92 % Sitting      Temp Source Heart Rate Source BP Location FiO2 (%) Pain Score    25 0503 25 0349 25 0400 -- --    Oral Monitor Right arm        Vitals      Date and Time Temp Pulse SpO2 Resp BP Pain Score FACES Pain Rating User   25 0503 98.8 °F (37.1 °C) 126 100 % 25 126/56 -- -- AS   25 0445 -- 120 100 % 19 -- -- -- AS   25 0400 -- 133 94 % 27 155/83 -- -- AS   25 0356 -- -- -- -- 155/83 -- -- AS   25 035 -- -- 92 % -- -- -- -- BMR   25 034 -- 144 92 % -- -- -- -- AS   25 -- -- -- 33 -- -- -- AS            Physical Exam  Vitals and nursing note reviewed.   Constitutional:       General: She is in acute distress.      Appearance: She is well-developed. She is ill-appearing.      Comments: BiPAP in place.   HENT:      Head: Normocephalic and atraumatic.   Eyes:      Conjunctiva/sclera: Conjunctivae normal.   Cardiovascular:      Rate and Rhythm: Regular rhythm. Tachycardia present.   Pulmonary:      Effort: Tachypnea, accessory muscle usage, prolonged expiration and respiratory distress present.      Breath sounds: Decreased air movement present. Decreased breath sounds and wheezing present.   Abdominal:      Palpations: Abdomen is soft.      Tenderness: There is no abdominal tenderness.   Musculoskeletal:         General: No swelling.      Cervical back: Neck supple.      Right lower le+ Edema present.      Left lower le+ Edema present.   Skin:     General: Skin is warm and dry.      Capillary Refill: Capillary refill takes less than 2 seconds.   Neurological:      Mental Status: She is alert.   Psychiatric:         Mood and Affect: Mood normal.         Results Reviewed       Procedure Component Value Units Date/Time    FLU/RSV/COVID - if FLU/RSV clinically relevant (2hr TAT) [[id number]]  (Normal) Collected: 25    Lab Status: Final result Specimen: Nares from Nose Updated: 25 4366      SARS-CoV-2 Negative     INFLUENZA A PCR Negative     INFLUENZA B PCR Negative     RSV PCR Negative    Narrative:      This test has been performed using the CoV-2/Flu/RSV plus assay on the [employer] platform. This test has been validated by the  and verified by the performing laboratory.     This test is designed to amplify and detect the following: nucleocapsid (N), envelope (E), and RNA-dependent RNA polymerase (RdRP) genes of the SARS-CoV-2 genome; matrix (M), basic polymerase (PB2), and acidic protein (PA) segments of the influenza A genome; matrix (M) and non-structural protein (NS) segments of the influenza B genome, and the nucleocapsid genes of RSV A and RSV B.     Positive results are indicative of the presence of Flu A, Flu B, RSV, and/or SARS-CoV-2 RNA. Positive results for SARS-CoV-2 or suspected novel influenza should be reported to state, local, or federal health departments according to local reporting requirements.      All results should be assessed in conjunction with clinical presentation and other laboratory markers for clinical management.     FOR PEDIATRIC PATIENTS - copy/paste COVID Guidelines URL to browser: [email or web address]       Procalcitonin [[id number]]  (Normal) Collected: 01/21/25 0413    Lab Status: Final result Specimen: Blood from Arm, Right Updated: 01/21/25 0442     Procalcitonin 0.06 ng/ml     HS Troponin 0hr (reflex protocol) [[id number]]  (Normal) Collected: 01/21/25 0353    Lab Status: Final result Specimen: Blood from Arm, Left Updated: 01/21/25 0424     hs TnI 0hr 27 ng/L     HS Troponin I 2hr [[id number]]     Lab Status: No result Specimen: Blood     HS Troponin I 4hr [[id number]]     Lab Status: No result Specimen: Blood     B-Type Natriuretic Peptide(BNP) [[id number]]  (Abnormal) Collected: 01/21/25 0353    Lab Status: Final result Specimen: Blood from Arm, Left Updated: 01/21/25 0424     BNP  1,149 pg/mL     Comprehensive metabolic panel [[id number]]  (Abnormal) Collected: 01/21/25 0353    Lab Status: Final result Specimen: Blood from Arm, Left Updated: 01/21/25 0420     Sodium 135 mmol/L      Potassium 3.9 mmol/L      Chloride 99 mmol/L      CO2 27 mmol/L      ANION GAP 9 mmol/L      BUN 13 mg/dL      Creatinine 0.91 mg/dL      Glucose 289 mg/dL      Calcium 9.0 mg/dL      AST 34 U/L      ALT 26 U/L      Alkaline Phosphatase 87 U/L      Total Protein 7.4 g/dL      Albumin 4.1 g/dL      Total Bilirubin 0.59 mg/dL      eGFR 59 ml/min/1.73sq m     Narrative:      National Kidney Disease Foundation guidelines for Chronic Kidney Disease (CKD):     Stage 1 with normal or high GFR (GFR > 90 mL/min/1.73 square meters)    Stage 2 Mild CKD (GFR = 60-89 mL/min/1.73 square meters)    Stage 3A Moderate CKD (GFR = 45-59 mL/min/1.73 square meters)    Stage 3B Moderate CKD (GFR = 30-44 mL/min/1.73 square meters)    Stage 4 Severe CKD (GFR = 15-29 mL/min/1.73 square meters)    Stage 5 End Stage CKD (GFR <15 mL/min/1.73 square meters)  Note: GFR calculation is accurate only with a steady state creatinine    Protime-INR [[id number]]  (Normal) Collected: 01/21/25 0353    Lab Status: Final result Specimen: Blood from Arm, Left Updated: 01/21/25 0419     Protime 14.5 seconds      INR 1.11    Narrative:      INR Therapeutic Range    Indication                                             INR Range      Atrial Fibrillation                                               2.0-3.0  Hypercoagulable State                                    2.0.2.3  Left Ventricular Asist Device                            2.0-3.0  Mechanical Heart Valve                                  -    Aortic(with afib, MI, embolism, HF, LA enlargement,    and/or coagulopathy)                                     2.0-3.0 (2.5-3.5)     Mitral                                                             2.5-3.5  Prosthetic/Bioprosthetic Heart Valve                2.0-3.0  Venous thromboembolism (VTE: VT, PE        2.0-3.0    APTT [[id number]]  (Normal) Collected: 01/21/25 0353    Lab Status: Final result Specimen: Blood from Arm, Left Updated: 01/21/25 0419     PTT 34 seconds     Blood culture #2 [[id number]] Collected: 01/21/25 0413    Lab Status: In process Specimen: Blood from Arm, Left Updated: 01/21/25 0417    Blood culture #1 [[id number]] Collected: 01/21/25 0413    Lab Status: In process Specimen: Blood from Arm, Right Updated: 01/21/25 0417    Lactic acid, plasma (w/reflex if result > 2.0) [[id number]]  (Abnormal) Collected: 01/21/25 0353    Lab Status: Final result Specimen: Blood from Arm, Left Updated: 01/21/25 0417     LACTIC ACID 3.0 mmol/L     Narrative:      Result may be elevated if tourniquet was used during collection.    Lactic acid 2 Hours [[id number]]     Lab Status: No result Specimen: Blood     Blood gas, venous [[id number]]  (Abnormal) Collected: 01/21/25 0353    Lab Status: Final result Specimen: Blood from Arm, Left Updated: 01/21/25 0403     pH, Zackary 7.289     pCO2, Zackary 42.9 mm Hg      pO2, Zackary 54.3 mm Hg      HCO3, Zackary 20.1 mmol/L      Base Excess, Zackary -6.2 mmol/L      O2 Content, Zackary 15.4 ml/dL      O2 HGB, VENOUS 83.1 %     CBC and differential [[id number]]  (Abnormal) Collected: 01/21/25 0353    Lab Status: Final result Specimen: Blood from Arm, Left Updated: 01/21/25 0402     WBC 9.17 Thousand/uL      RBC 3.72 Million/uL      Hemoglobin 12.5 g/dL      Hematocrit 38.2 %       fL      MCH 33.6 pg      MCHC 32.7 g/dL      RDW 13.2 %      MPV 10.2 fL      Platelets 350 Thousands/uL      nRBC 0 /100 WBCs      Segmented % 65 %      Immature Grans % 0 %      Lymphocytes % 30 %      Monocytes % 5 %      Eosinophils Relative 0 %      Basophils Relative 0 %      Absolute Neutrophils 5.86 Thousands/µL      Absolute Immature Grans 0.03 Thousand/uL      Absolute Lymphocytes 2.75 Thousands/µL      Absolute Monocytes 0.48 Thousand/µL      Eosinophils Absolute  0.02 Thousand/µL      Basophils Absolute 0.03 Thousands/µL             XR chest 1 view portable   ED Interpretation by Abhishek Trejo PA-C ( [id number])   ED wet read: pulmonary edema. Possible right lower lung field infiltrate.           Procedures    ED Medication and Procedure Management   Prior to Admission Medications   Prescriptions Last Dose Informant Patient Reported? Taking?   Ascorbic Acid (VITAMIN C) 100 MG tablet  Self Yes Yes   Sig: Take 100 mg by mouth daily   Breo Ellipta 100-25 MCG/ACT inhaler 2025 Morning  No Yes   Sig: INHALE 1 PUFF DAILY RINSE MOUTH AFTER USE.   Calcium 600 MG tablet  Self Yes Yes   Sig: Take 1 tablet by mouth 2 (two) times a day   Chromium 200 MCG TABS  Self Yes Yes   Sig: Take 1 tablet by mouth daily   Empagliflozin (Jardiance) 10 MG TABS tablet   No No   Sig: Take 1 tablet (10 mg total) by mouth every morning   Multiple Vitamin (MULTIVITAMINS PO)  Self Yes No   Sig: Take 1 tablet by mouth daily   OXYGEN-HELIUM IN  Self Yes No   Sig: Inhale as needed Only uses rarely   Omega-3 Fatty Acids (FISH OIL) 1200 MG CAPS  Self Yes No   Sig: Take by mouth   albuterol (Ventolin HFA) 90 mcg/act inhaler 2025 Morning  No Yes   Sig: Inhale 2 puffs every 6 (six) hours as needed for wheezing   aspirin 325 mg tablet  Self Yes Yes   Sig: Take 0.5 tablets by mouth daily    fluorouracil (EFUDEX) 5 % cream Not Taking Self Yes No   Sig: Use as directed   Patient not taking: Reported on 2025   fluticasone (FLONASE) 50 mcg/act nasal spray Not Taking  No No   Si sprays into each nostril daily   Patient not taking: Reported on 2025   ipratropium-albuterol (DUO-NEB) 0.5-2.5 mg/3 mL nebulizer solution   No Yes   Sig: Take 3 mL by nebulization 3 (three) times a day as needed for wheezing or shortness of breath   levothyroxine 150 mcg tablet   No Yes   Sig: TAKE 1 TABLET BY MOUTH EVERY DAY   loratadine-pseudoephedrine (CLARITIN-D 12-HOUR) 5-120 mg per tablet Not Taking  No No    Sig: Take 1 tablet by mouth 2 (two) times a day   Patient not taking: Reported on 1/21/2025   losartan (COZAAR) 25 mg tablet   No No   Sig: Take 1 tablet (25 mg total) by mouth daily   magnesium 30 MG tablet  Self Yes No   Sig: Take 30 mg by mouth 2 (two) times a day   melatonin 1 mg  Self Yes No   Sig: Take 10 mg by mouth daily at bedtime    metoprolol succinate (TOPROL-XL) 25 mg 24 hr tablet   No No   Sig: Take 1 tablet (25 mg total) by mouth daily   rosuvastatin (CRESTOR) 40 MG tablet   No No   Sig: Take 1 tablet (40 mg total) by mouth daily   tiotropium (SPIRIVA) 18 mcg inhalation capsule   No No   Sig: INHALE 1 CAPSULE VIA HANDIHALER ONCE DAILY AT THE SAME TIME EVERY DAY   torsemide (DEMADEX) 20 mg tablet   No No   Sig: Take 1 tablet (20 mg total) by mouth daily   zolpidem (AMBIEN) 5 mg tablet   No No   Sig: Take 1 tablet (5 mg total) by mouth daily at bedtime as needed for sleep Please stop taking the medication if any drowsiness in the morning after taking the medication.      Facility-Administered Medications: None     Patient's Medications   Discharge Prescriptions    No medications on file     No discharge procedures on file.  ED SEPSIS DOCUMENTATION   Time reflects when diagnosis was documented in both MDM as applicable and the Disposition within this note       Time User Action Codes Description Comment    1/21/2025  4:56 AM Abhishek Trejo [J44.1] COPD with acute exacerbation (HCC)     1/21/2025  4:56 AM Abhishek Trejo [I50.9] CHF (congestive heart failure) (HCC)     1/21/2025  4:56 AM Abhishek Trejo [J96.11] Chronic respiratory failure with hypoxia (HCC)     1/21/2025  4:58 AM Abhishek Trejo [J06.9] Viral URI     1/21/2025  4:58 AM Abhishek Trejo [Z91.89] At risk for pneumonia                  Abhishek Trejo PA-C  01/21/25 0539

## 2025-01-21 NOTE — ED ATTENDING ATTESTATION
I, Henrique Garcia DO, saw and evaluated the patient. All available labs and X-rays were reviewed. I discussed the patient with the resident / non-physician and agree with the resident's / non-physician practitioner's findings and plan as documented in the resident's / non-physician practicitioner's note, except where noted. At this point, I agree with the current assessment done in the ED.     NAME: Cortney Harding  AGE: 79 y.o. SEX: female  : 1945   MRN: [id number]  ENCOUNTER: [id number]    Disposition   Principal Problem:    Acute on chronic respiratory failure with hypoxia (HCC)  Active Problems:    Chronic obstructive pulmonary disease (HCC)    Acute systolic heart failure (HCC)    Coronary artery disease involving native coronary artery of native heart without angina pectoris      ED Disposition       ED Disposition   Admit    Condition   Stable    Date/Time     4:56 AM    Comment   Case was discussed with Ethan Chandler and the patient's admission status was agreed to be Admission Status: inpatient status to the service of Dr. Oglesby .                  Assessment/Plan   Medical Decision Making  Patient with history as below presented with respiratory distress. History obtained from patient and EMS.    Differential diagnosis includes: COPD with acute aspiration, CHF with acute exacerbation, pneumothorax, arrhythmia, anemia, ACS, electrolyte disturbance, at risk for sepsis    Plan: Sepsis labs, troponin, BNP, ECG, BiPAP, nebulizer, Solu-Medrol, magnesium    I considered the patient's chronic medical conditions including their CHF and COPD in forming my differential diagnosis, plan, and my medical decision making. I also reviewed their external records including their office visit from 2024 with internal medicine.    ED summary: ECG independently interpreted by myself as below. Labs reviewed and remarkable for a elevated BNP, lactic acidosis show I suspect is in the setting of tachypnea as  "well as nebulizer administration. Independently reviewed imaging with fluid versus multifocal pneumonia, greater on the right left.  Patient initiated on ceftriaxone.  Also initiated on Lasix.  Patient was treated with below with improvement in symptoms. Reassessed the patient and with some improvement in respiratory status however remains on BiPAP. Presentation most consistent with acute respiratory distress likely combination of COPD and CHF. Discussed patient's management with medicine who agreed to admit patient under their service.    Amount and/or Complexity of Data Reviewed  Labs: ordered.  Radiology: ordered and independent interpretation performed.    Risk  Prescription drug management.  Decision regarding hospitalization.                        History of Present Illness     Patient is a 79 y.o. female with a significant past medical history of COPD, CHF, hyperlipidemia, lung cancer, presenting for evaluation of shortness of breath.  Patient reports that she has been ill with some upper respiratory symptoms over the last few days.  She feels as if this is similar to previous episodes of COPD.  She has been attempting her home nebulizer treatments without significant improvement.  She called EMS and was found to be in acute respiratory distress.  She was initiated on CPAP.  She was given Solu-Medrol and a nebulizer.  She denies any increased lower extremity swelling, she is unsure of weight gain.  She says that this feels more like her COPD than her CHF.  She reports never having been intubated or hospitalized for her COPD in the past.    Past Medical History     Past Medical History:   Diagnosis Date    Anesthesia     \"constipation after surgery\"    Bladder cancer (HCC)     Cataract     left    Colon polyp     COPD (chronic obstructive pulmonary disease) (HCC)     Dry mouth     Full dentures     GERD (gastroesophageal reflux disease)     occas    History of pneumonia     Hyperlipidemia     Hypothyroidism  " "   Lung cancer (HCC)     stage 3    Pulmonary emphysema (HCC)     Skin cancer     squamous cell of the leg    Sleep difficulties     \"gets up frequently to void\"    UTI (urinary tract infection) 2019    recent e coli treated and resolved    Wears glasses        Past Surgical History     Past Surgical History:   Procedure Laterality Date    CHOLECYSTECTOMY      COLONOSCOPY      CYSTOSCOPY      4/10 AND 8/10, 3/12/2015 2016  DIAGNOSTIC     CYSTOSCOPY  2020    CYSTOSCOPY  2021    CYSTOSCOPY  10/29/2021    DENTAL SURGERY      EYE SURGERY      HYSTERECTOMY      OTHER SURGICAL HISTORY      TRANSURETHERAL RESECTION     ND CYSTO W/REMOVAL OF LESIONS SMALL N/A 2019    Procedure: BLADDER BIOPSY; BLADDER FULGERATION with mitomycin;  Surgeon: Daniel De Oliveira MD;  Location:  MAIN OR;  Service: Urology    ND CYSTOURETHROSCOPY WITH BIOPSY N/A 2020    Procedure: CYSTOSCOPY WITH BIOPSIES, FULGURATION;  Surgeon: Daniel De Oliveira MD;  Location: [facility];  Service: Urology    ND CYSTOURETHROSCOPY WITH BIOPSY N/A 2020    Procedure: CYSTO W/BIOPSIES & FULGURATION;  Surgeon: Daniel De Oliveira MD;  Location: [facility];  Service: Urology    WISDOM TOOTH EXTRACTION         Social History     Social History     Substance and Sexual Activity   Alcohol Use Not Currently     Social History     Substance and Sexual Activity   Drug Use No     Social History     Tobacco Use   Smoking Status Former    Current packs/day: 0.00    Types: Cigarettes    Quit date: 2019    Years since quittin.0   Smokeless Tobacco Never       Family History     Family History   Problem Relation Age of Onset    Cancer Mother         Mouth     Mental illness Sister     Colon cancer Paternal Grandmother     No Known Problems Daughter        Medications Prior to Admission     Prior to Admission medications    Medication Sig Start Date End Date Taking? Authorizing Provider   albuterol (Ventolin HFA) 90 mcg/act inhaler Inhale 2 puffs " every 6 (six) hours as needed for wheezing 11/22/23   Mehul Thorpe MD   Ascorbic Acid (VITAMIN C) 100 MG tablet Take 100 mg by mouth daily    Historical Provider, MD   aspirin 325 mg tablet Take 0.5 tablets by mouth daily     Historical Provider, MD Gretel Lara 100-25 MCG/ACT inhaler INHALE 1 PUFF DAILY RINSE MOUTH AFTER USE. 11/10/24   Mehul Thorpe MD   Calcium 600 MG tablet Take 1 tablet by mouth 2 (two) times a day    Historical Provider, MD   Chromium 200 MCG TABS Take 1 tablet by mouth daily    Historical Provider, MD   Empagliflozin (Jardiance) 10 MG TABS tablet Take 1 tablet (10 mg total) by mouth every morning 6/12/24 9/10/24  Abhishek Vanessa DO   fluorouracil (EFUDEX) 5 % cream Use as directed 10/13/20   Historical Provider, MD   fluticasone (FLONASE) 50 mcg/act nasal spray 2 sprays into each nostril daily  Patient not taking: Reported on 2/15/2024 12/17/21   Cece Cotto PA-C   ipratropium-albuterol (DUO-NEB) 0.5-2.5 mg/3 mL nebulizer solution Take 3 mL by nebulization 3 (three) times a day as needed for wheezing or shortness of breath 11/22/23   Mehul Thorpe MD   levothyroxine 150 mcg tablet TAKE 1 TABLET BY MOUTH EVERY DAY 5/27/24   Mehul Tohrpe MD   loratadine-pseudoephedrine (CLARITIN-D 12-HOUR) 5-120 mg per tablet Take 1 tablet by mouth 2 (two) times a day  Patient not taking: Reported on 2/15/2024 12/17/21   Cece Cotto PA-C   losartan (COZAAR) 25 mg tablet Take 1 tablet (25 mg total) by mouth daily 2/19/24 5/19/24  Abhishek Vanessa DO   magnesium 30 MG tablet Take 30 mg by mouth 2 (two) times a day    Historical Provider, MD   melatonin 1 mg Take 10 mg by mouth daily at bedtime     Historical Provider, MD   metoprolol succinate (TOPROL-XL) 25 mg 24 hr tablet Take 1 tablet (25 mg total) by mouth daily 2/19/24 5/19/24  Abhishek Vanessa, DO   Multiple Vitamin (MULTIVITAMINS PO) Take 1 tablet by mouth daily    Historical  "Provider, MD   Omega-3 Fatty Acids (FISH OIL) 1200 MG CAPS Take by mouth    Historical Provider, MD   OXYGEN-HELIUM IN Inhale as needed Only uses rarely    Historical Provider, MD   rosuvastatin (CRESTOR) 40 MG tablet Take 1 tablet (40 mg total) by mouth daily 6/12/24 9/10/24  Abhishek Vanessa DO   tiotropium (SPIRIVA) 18 mcg inhalation capsule INHALE 1 CAPSULE VIA HANDIHALER ONCE DAILY AT THE SAME TIME EVERY DAY 3/25/24   Mehul Thorpe MD   torsemide (DEMADEX) 20 mg tablet Take 1 tablet (20 mg total) by mouth daily 2/19/24 5/19/24  Abhishek Vanessa DO   zolpidem (AMBIEN) 5 mg tablet Take 1 tablet (5 mg total) by mouth daily at bedtime as needed for sleep Please stop taking the medication if any drowsiness in the morning after taking the medication. 2/20/24   Mehul Thorpe MD       Allergies     Allergies   Allergen Reactions    Erythromycin Hives    Penicillins Hives    Sulfa Antibiotics Hives    Tetracyclines & Related Hives       Objective     Vitals:    01/21/25 0356 01/21/25 0400 01/21/25 0445 01/21/25 0503   BP: 155/83 155/83  126/56   BP Location:  Right arm  Right arm   Patient Position:    Sitting   Cuff Size:    Adult   Pulse:  (!) 133 (!) 120 (!) 126   Resp:  (!) 27 19 (!) 25   Temp:    98.8 °F (37.1 °C)   TempSrc:    Oral   SpO2:  94% 100% 100%   Weight:  91.1 kg (200 lb 13.4 oz)  91.1 kg (200 lb 13.4 oz)   Height:    5' 2\" (1.575 m)     Body mass index is 36.73 kg/m².    Intake/Output Summary (Last 24 hours) at 1/21/2025 0557  Last data filed at 1/21/2025 0503  Gross per 24 hour   Intake 350 ml   Output --   Net 350 ml     Invasive Devices       Peripheral Intravenous Line  Duration             Peripheral IV 01/21/25 Left;Proximal;Ventral (anterior) Forearm <1 day    Peripheral IV 01/21/25 Right;Ventral (anterior) Forearm <1 day                    Review of Systems   Constitutional:  Negative for fever.   Respiratory:  Positive for cough and shortness of breath.  "   Cardiovascular:  Negative for chest pain and leg swelling.   Gastrointestinal:  Negative for abdominal pain, nausea and vomiting.        Physical Exam  Constitutional:       Appearance: She is ill-appearing.   HENT:      Head: Normocephalic and atraumatic.      Right Ear: External ear normal.      Left Ear: External ear normal.      Nose: Nose normal.   Eyes:      Extraocular Movements: Extraocular movements intact.      Conjunctiva/sclera: Conjunctivae normal.   Cardiovascular:      Rate and Rhythm: Regular rhythm. Tachycardia present.      Pulses: Normal pulses.   Pulmonary:      Effort: Respiratory distress present.      Breath sounds: Wheezing present.   Abdominal:      General: Abdomen is flat.      Palpations: Abdomen is soft.      Tenderness: There is no abdominal tenderness.   Musculoskeletal:      Right lower leg: Edema present.      Left lower leg: Edema present.   Neurological:      General: No focal deficit present.          Medications   ipratropium-albuterol (DUO-NEB) 0.5-2.5 mg/3 mL inhalation solution 3 mL (has no administration in time range)   levothyroxine tablet 150 mcg (has no administration in time range)   metoprolol succinate (TOPROL-XL) 24 hr tablet 25 mg (has no administration in time range)   acetaminophen (TYLENOL) tablet 975 mg (has no administration in time range)   polyethylene glycol (MIRALAX) packet 17 g (has no administration in time range)   ondansetron (ZOFRAN) injection 4 mg (has no administration in time range)   aluminum-magnesium hydroxide-simethicone (MAALOX) oral suspension 30 mL (has no administration in time range)   guaiFENesin (ROBITUSSIN) oral liquid 200 mg (has no administration in time range)   levalbuterol (XOPENEX) inhalation solution 1.25 mg (has no administration in time range)     And   sodium chloride 0.9 % inhalation solution 3 mL (has no administration in time range)   enoxaparin (LOVENOX) subcutaneous injection 40 mg (has no administration in time range)    predniSONE tablet 50 mg (has no administration in time range)   aspirin (ECOTRIN LOW STRENGTH) EC tablet 81 mg (has no administration in time range)   losartan (COZAAR) tablet 25 mg (has no administration in time range)   atorvastatin (LIPITOR) tablet 80 mg (has no administration in time range)   albuterol inhalation solution 10 mg (10 mg Nebulization Given 1/21/25 0351)   ipratropium (ATROVENT) 0.02 % inhalation solution 1 mg (1 mg Nebulization Given 1/21/25 0350)   sodium chloride 0.9 % inhalation solution 12 mL (12 mL Nebulization Given 1/21/25 0351)   magnesium sulfate 2 g/50 mL IVPB (premix) 2 g (0 g Intravenous Stopped 1/21/25 0413)   ipratropium-albuterol (FOR EMS ONLY) (DUO-NEB) 0.5-2.5 mg/3 mL inhalation solution 3 mL (0 mL Does not apply Given to EMS 1/21/25 0352)   methylPREDNISolone sodium succinate (FOR EMS ONLY) (Solu-MEDROL) 125 MG injection 125 mg (0 mg Does not apply Given to EMS 1/21/25 0400)   sodium chloride 0.9 % bolus 250 mL (0 mL Intravenous Stopped 1/21/25 0416)   ceftriaxone (ROCEPHIN) 2 g/50 mL in dextrose IVPB (0 mg Intravenous Stopped 1/21/25 0503)   furosemide (LASIX) injection 50 mg (50 mg Intravenous Given 1/21/25 0459)        Results Reviewed       Procedure Component Value Units Date/Time    CBC (With Platelets) [[id number]]     Lab Status: No result Specimen: Blood     Basic metabolic panel [[id number]]     Lab Status: No result Specimen: Blood     FLU/RSV/COVID - if FLU/RSV clinically relevant (2hr TAT) [[id number]]  (Normal) Collected: 01/21/25 0353    Lab Status: Final result Specimen: Nares from Nose Updated: 01/21/25 0501     SARS-CoV-2 Negative     INFLUENZA A PCR Negative     INFLUENZA B PCR Negative     RSV PCR Negative    Narrative:      This test has been performed using the CoV-2/Flu/RSV plus assay on the [employer] GeneXpert platform. This test has been validated by the  and verified by the performing laboratory.     This test is designed to amplify and detect  the following: nucleocapsid (N), envelope (E), and RNA-dependent RNA polymerase (RdRP) genes of the SARS-CoV-2 genome; matrix (M), basic polymerase (PB2), and acidic protein (PA) segments of the influenza A genome; matrix (M) and non-structural protein (NS) segments of the influenza B genome, and the nucleocapsid genes of RSV A and RSV B.     Positive results are indicative of the presence of Flu A, Flu B, RSV, and/or SARS-CoV-2 RNA. Positive results for SARS-CoV-2 or suspected novel influenza should be reported to state, local, or federal health departments according to local reporting requirements.      All results should be assessed in conjunction with clinical presentation and other laboratory markers for clinical management.     FOR PEDIATRIC PATIENTS - copy/paste COVID Guidelines URL to browser: [email or web address]       Procalcitonin [[id number]]  (Normal) Collected: 01/21/25 0413    Lab Status: Final result Specimen: Blood from Arm, Right Updated: 01/21/25 0442     Procalcitonin 0.06 ng/ml     HS Troponin 0hr (reflex protocol) [[id number]]  (Normal) Collected: 01/21/25 0353    Lab Status: Final result Specimen: Blood from Arm, Left Updated: 01/21/25 0424     hs TnI 0hr 27 ng/L     HS Troponin I 2hr [[id number]]     Lab Status: No result Specimen: Blood     HS Troponin I 4hr [[id number]]     Lab Status: No result Specimen: Blood     B-Type Natriuretic Peptide(BNP) [[id number]]  (Abnormal) Collected: 01/21/25 0353    Lab Status: Final result Specimen: Blood from Arm, Left Updated: 01/21/25 0424     BNP 1,149 pg/mL     Comprehensive metabolic panel [[id number]]  (Abnormal) Collected: 01/21/25 0353    Lab Status: Final result Specimen: Blood from Arm, Left Updated: 01/21/25 0420     Sodium 135 mmol/L      Potassium 3.9 mmol/L      Chloride 99 mmol/L      CO2 27 mmol/L      ANION GAP 9 mmol/L      BUN 13 mg/dL      Creatinine 0.91 mg/dL      Glucose 289 mg/dL       Calcium 9.0 mg/dL      AST 34 U/L      ALT 26 U/L      Alkaline Phosphatase 87 U/L      Total Protein 7.4 g/dL      Albumin 4.1 g/dL      Total Bilirubin 0.59 mg/dL      eGFR 59 ml/min/1.73sq m     Narrative:      [employer] Kid[employer] guidelines for Chronic Kidney Disease (CKD):     Stage 1 with normal or high GFR (GFR > 90 mL/min/1.73 square meters)    Stage 2 Mild CKD (GFR = 60-89 mL/min/1.73 square meters)    Stage 3A Moderate CKD (GFR = 45-59 mL/min/1.73 square meters)    Stage 3B Moderate CKD (GFR = 30-44 mL/min/1.73 square meters)    Stage 4 Severe CKD (GFR = 15-29 mL/min/1.73 square meters)    Stage 5 End Stage CKD (GFR <15 mL/min/1.73 square meters)  Note: GFR calculation is accurate only with a steady state creatinine    Protime-INR [[id number]]  (Normal) Collected: 01/21/25 0353    Lab Status: Final result Specimen: Blood from Arm, Left Updated: 01/21/25 0419     Protime 14.5 seconds      INR 1.11    Narrative:      INR Therapeutic Range    Indication                                             INR Range      Atrial Fibrillation                                               2.0-3.0  Hypercoagulable State                                    2.0.2.3  Left Ventricular Asist Device                            2.0-3.0  Mechanical Heart Valve                                  -    Aortic(with afib, MI, embolism, HF, LA enlargement,    and/or coagulopathy)                                     2.0-3.0 (2.5-3.5)     Mitral                                                             2.5-3.5  Prosthetic/Bioprosthetic Heart Valve               2.0-3.0  Venous thromboembolism (VTE: VT, PE        2.0-3.0    APTT [[id number]]  (Normal) Collected: 01/21/25 0353    Lab Status: Final result Specimen: Blood from Arm, Left Updated: 01/21/25 0419     PTT 34 seconds     Blood culture #2 [[id number]] Collected: 01/21/25 0413    Lab Status: In process Specimen: Blood from Arm, Left Updated: 01/21/25 0417    Blood culture  #1 [[id number]] Collected: 01/21/25 0413    Lab Status: In process Specimen: Blood from Arm, Right Updated: 01/21/25 0417    Lactic acid, plasma (w/reflex if result > 2.0) [[id number]]  (Abnormal) Collected: 01/21/25 0353    Lab Status: Final result Specimen: Blood from Arm, Left Updated: 01/21/25 0417     LACTIC ACID 3.0 mmol/L     Narrative:      Result may be elevated if tourniquet was used during collection.    Lactic acid 2 Hours [[id number]]     Lab Status: No result Specimen: Blood     Blood gas, venous [[id number]]  (Abnormal) Collected: 01/21/25 0353    Lab Status: Final result Specimen: Blood from Arm, Left Updated: 01/21/25 0403     pH, Zackary 7.289     pCO2, Zackary 42.9 mm Hg      pO2, Zackary 54.3 mm Hg      HCO3, Zackary 20.1 mmol/L      Base Excess, Zackary -6.2 mmol/L      O2 Content, Zackary 15.4 ml/dL      O2 HGB, VENOUS 83.1 %     CBC and differential [[id number]]  (Abnormal) Collected: 01/21/25 0353    Lab Status: Final result Specimen: Blood from Arm, Left Updated: 01/21/25 0402     WBC 9.17 Thousand/uL      RBC 3.72 Million/uL      Hemoglobin 12.5 g/dL      Hematocrit 38.2 %       fL      MCH 33.6 pg      MCHC 32.7 g/dL      RDW 13.2 %      MPV 10.2 fL      Platelets 350 Thousands/uL      nRBC 0 /100 WBCs      Segmented % 65 %      Immature Grans % 0 %      Lymphocytes % 30 %      Monocytes % 5 %      Eosinophils Relative 0 %      Basophils Relative 0 %      Absolute Neutrophils 5.86 Thousands/µL      Absolute Immature Grans 0.03 Thousand/uL      Absolute Lymphocytes 2.75 Thousands/µL      Absolute Monocytes 0.48 Thousand/µL      Eosinophils Absolute 0.02 Thousand/µL      Basophils Absolute 0.03 Thousands/µL              XR chest 1 view portable   ED Interpretation by Abhishek Trejo PA-C (01/21 0413)   ED wet read: pulmonary edema. Possible right lower lung field infiltrate.            ED Course     ED Course as of 01/21/25 0557   Tue Jan 21, 2025   0552 Procedure Note: EKG  Date/Time: 01/21/25 5:52 AM    Interpreted by: Henrique Garcia   Indications / Diagnosis: SOB  ECG reviewed by me, the ED Provider: yes   The EKG demonstrates:  Rhythm: sinus tachycardia  Intervals: normal intervals  Axis: rightward axis  QRS/Blocks: normal QRS  ST Changes: No acute ST Changes, no STD/ACE.        Procedures   CriticalCare Time    Date/Time: 1/21/2025 4:30 AM    Performed by: Henrique Garcia DO  Authorized by: Henrique Garcia DO    Critical care provider statement:     Critical care time (minutes):  34    Critical care time was exclusive of:  Separately billable procedures and treating other patients    Critical care was necessary to treat or prevent imminent or life-threatening deterioration of the following conditions:  Respiratory failure    Critical care was time spent personally by me on the following activities:  Obtaining history from patient or surrogate, development of treatment plan with patient or surrogate, evaluation of patient's response to treatment, examination of patient, ordering and performing treatments and interventions, ordering and review of laboratory studies, ordering and review of radiographic studies and re-evaluation of patient's condition    I assumed direction of critical care for this patient from another provider in my specialty: no    Comments:      Life-threatening acute respiratory distress with associated tachypnea and tachycardia necessitating BiPAP administration.

## 2025-01-22 PROBLEM — J18.9 PNEUMONIA: Status: ACTIVE | Noted: 2025-01-22

## 2025-01-22 LAB
ANION GAP SERPL CALCULATED.3IONS-SCNC: 6 MMOL/L (ref 4–13)
BUN SERPL-MCNC: 19 MG/DL (ref 5–25)
CALCIUM SERPL-MCNC: 9.3 MG/DL (ref 8.4–10.2)
CHLORIDE SERPL-SCNC: 104 MMOL/L (ref 96–108)
CO2 SERPL-SCNC: 28 MMOL/L (ref 21–32)
CREAT SERPL-MCNC: 0.69 MG/DL (ref 0.6–1.3)
ERYTHROCYTE [DISTWIDTH] IN BLOOD BY AUTOMATED COUNT: 13.2 % (ref 11.6–15.1)
GFR SERPL CREATININE-BSD FRML MDRD: 81 ML/MIN/1.73SQ M
GLUCOSE SERPL-MCNC: 100 MG/DL (ref 65–140)
GLUCOSE SERPL-MCNC: 110 MG/DL (ref 65–140)
GLUCOSE SERPL-MCNC: 113 MG/DL (ref 65–140)
GLUCOSE SERPL-MCNC: 149 MG/DL (ref 65–140)
GLUCOSE SERPL-MCNC: 168 MG/DL (ref 65–140)
HCT VFR BLD AUTO: 34.8 % (ref 34.8–46.1)
HGB BLD-MCNC: 11.5 G/DL (ref 11.5–15.4)
MAGNESIUM SERPL-MCNC: 2.1 MG/DL (ref 1.9–2.7)
MCH RBC QN AUTO: 33.5 PG (ref 26.8–34.3)
MCHC RBC AUTO-ENTMCNC: 33 G/DL (ref 31.4–37.4)
MCV RBC AUTO: 102 FL (ref 82–98)
PLATELET # BLD AUTO: 253 THOUSANDS/UL (ref 149–390)
PMV BLD AUTO: 10.4 FL (ref 8.9–12.7)
POTASSIUM SERPL-SCNC: 4.4 MMOL/L (ref 3.5–5.3)
PROCALCITONIN SERPL-MCNC: 0.24 NG/ML
RBC # BLD AUTO: 3.43 MILLION/UL (ref 3.81–5.12)
SODIUM SERPL-SCNC: 138 MMOL/L (ref 135–147)
WBC # BLD AUTO: 8.36 THOUSAND/UL (ref 4.31–10.16)

## 2025-01-22 PROCEDURE — 83735 ASSAY OF MAGNESIUM: CPT

## 2025-01-22 PROCEDURE — 80048 BASIC METABOLIC PNL TOTAL CA: CPT

## 2025-01-22 PROCEDURE — 85027 COMPLETE CBC AUTOMATED: CPT

## 2025-01-22 PROCEDURE — 82948 REAGENT STRIP/BLOOD GLUCOSE: CPT

## 2025-01-22 PROCEDURE — 94640 AIRWAY INHALATION TREATMENT: CPT

## 2025-01-22 PROCEDURE — 94760 N-INVAS EAR/PLS OXIMETRY 1: CPT

## 2025-01-22 PROCEDURE — 99232 SBSQ HOSP IP/OBS MODERATE 35: CPT | Performed by: PHYSICIAN ASSISTANT

## 2025-01-22 PROCEDURE — 84145 PROCALCITONIN (PCT): CPT | Performed by: PHYSICIAN ASSISTANT

## 2025-01-22 RX ADMIN — ENOXAPARIN SODIUM 40 MG: 40 INJECTION SUBCUTANEOUS at 09:12

## 2025-01-22 RX ADMIN — IPRATROPIUM BROMIDE AND ALBUTEROL SULFATE 3 ML: 2.5; .5 SOLUTION RESPIRATORY (INHALATION) at 07:26

## 2025-01-22 RX ADMIN — UMECLIDINIUM 1 PUFF: 62.5 AEROSOL, POWDER ORAL at 09:14

## 2025-01-22 RX ADMIN — LOSARTAN POTASSIUM 25 MG: 25 TABLET, FILM COATED ORAL at 09:11

## 2025-01-22 RX ADMIN — INSULIN LISPRO 1 UNITS: 100 INJECTION, SOLUTION INTRAVENOUS; SUBCUTANEOUS at 16:50

## 2025-01-22 RX ADMIN — METOPROLOL SUCCINATE 25 MG: 25 TABLET, EXTENDED RELEASE ORAL at 09:11

## 2025-01-22 RX ADMIN — PREDNISONE 50 MG: 20 TABLET ORAL at 09:14

## 2025-01-22 RX ADMIN — ASPIRIN 81 MG: 81 TABLET, COATED ORAL at 09:11

## 2025-01-22 RX ADMIN — TORSEMIDE 20 MG: 20 TABLET ORAL at 09:11

## 2025-01-22 RX ADMIN — IPRATROPIUM BROMIDE AND ALBUTEROL SULFATE 3 ML: 2.5; .5 SOLUTION RESPIRATORY (INHALATION) at 13:44

## 2025-01-22 RX ADMIN — FORMOTEROL FUMARATE DIHYDRATE 20 MCG: 20 SOLUTION RESPIRATORY (INHALATION) at 07:26

## 2025-01-22 RX ADMIN — CEFTRIAXONE 1000 MG: 10 INJECTION, POWDER, FOR SOLUTION INTRAVENOUS at 16:50

## 2025-01-22 RX ADMIN — ATORVASTATIN CALCIUM 80 MG: 80 TABLET, FILM COATED ORAL at 16:50

## 2025-01-22 RX ADMIN — LEVOTHYROXINE SODIUM 150 MCG: 75 TABLET ORAL at 05:39

## 2025-01-22 NOTE — SPEECH THERAPY NOTE
"Speech Language/Pathology  Speech/Language Pathology  Assessment    Patient Name: Cortney Harding  Today's Date: 1/23/2025     Problem List  Principal Problem:    Acute on chronic respiratory failure with hypoxia (HCC)  Active Problems:    Chronic obstructive pulmonary disease with acute exacerbation (HCC)    Acute systolic heart failure (HCC)    Coronary artery disease involving native coronary artery of native heart without angina pectoris    Pneumonia    Past Medical History  Past Medical History:   Diagnosis Date    Anesthesia     \"constipation after surgery\"    Bladder cancer (HCC)     Cataract     left    Colon polyp     COPD (chronic obstructive pulmonary disease) (HCC)     Dry mouth     Full dentures     GERD (gastroesophageal reflux disease)     occas    History of pneumonia     Hyperlipidemia     Hypothyroidism     Lung cancer (HCC)     stage 3    Pulmonary emphysema (HCC)     Skin cancer     squamous cell of the leg    Sleep difficulties     \"gets up frequently to void\"    UTI (urinary tract infection) 12/2019    recent e coli treated and resolved    Wears glasses      Past Surgical History  Past Surgical History:   Procedure Laterality Date    CHOLECYSTECTOMY      COLONOSCOPY      CYSTOSCOPY      4/10 AND 8/10, 3/12/2015 4/18/2016  DIAGNOSTIC     CYSTOSCOPY  05/01/2020    CYSTOSCOPY  04/02/2021    CYSTOSCOPY  10/29/2021    DENTAL SURGERY      EYE SURGERY      HYSTERECTOMY      OTHER SURGICAL HISTORY      TRANSURETHERAL RESECTION     MI CYSTO W/REMOVAL OF LESIONS SMALL N/A 12/23/2019    Procedure: BLADDER BIOPSY; BLADDER FULGERATION with mitomycin;  Surgeon: Daniel De Oliveira MD;  Location:  MAIN OR;  Service: Urology    MI CYSTOURETHROSCOPY WITH BIOPSY N/A 6/1/2020    Procedure: CYSTOSCOPY WITH BIOPSIES, FULGURATION;  Surgeon: Daniel De Oliveira MD;  Location: [facility];  Service: Urology    MI CYSTOURETHROSCOPY WITH BIOPSY N/A 11/2/2020    Procedure: CYSTO W/BIOPSIES & FULGURATION;  Surgeon: Daniel" "MD Yennifer;  Location: [facility];  Service: Urology    WISDOM TOOTH EXTRACTION            Bedside Swallow Evaluation:    Summary:  Pt presented w/ denial of any speech or swallowing difficulties.  H/o gerd but denied any s/s. Upper dentures are at home.  States that they irritate her gums so she leaves them out for a few days and then wears them again for a few days. Tolerated an english muffin this am w/out difficulty, as well as some fresh fruit, coffee. Oral stage wfl for mastication, bolus control, and transfer. No cough or wet vocal quality. No overt pharyngeal s/s at this time.  Returned briefly at lunch. Denied any difficulty w/ meal.     Pt stated \"my son was sniffling and I thought I better not be getting something from him\". Reports minimal exercise at home.     Recommendations:  Diet: Regular  Liquid:thin  Meds: as tolerated. Nurse has no concerns.   Supervision:prn  Positioning:Upright   Pt to take PO/Meds only when fully alert and upright.   Oral care  Reflux precautions  Eval only, No f/u tx indicated.     Consider consult w/:  Nutrition    H&P/Admit info/ pertinent provider notes: (PMH noted above)  Chief Complaint:        Chief Complaint   Patient presents with    Shortness of Breath       Pt arriving EMS from home. Per EMS, COPD exacerbation starting a few hours ago. No relief with inhaler. Duo neb and 125 Solumedrol given by EMS. Pt also w a cardiac hx.       Cortney Harding is a 79 y.o. female with a PMH of HTN, CAD, HFrEF, COPD who presents with shortness of breath.              History limited from patient while on BiPAP and increased work of breathing, further information obtained from chart review and discussion with ED BILL. She presents tonight from home for respiratory distress. Was sick over last few days with cough, congestion. Has tried to use her home inhalers without relief and she continued to worsen tonight. No chest pain, palpitations. Reports compliance with prescribed " medications.       Special Studies:  Cxr  1/21/25:  Right lower lobe pneumonia present on a background of emphysematous change. Continued radiographic follow-up recommended to document complete interval resolution.       Procalcitonin<=0.25ng/ml   0.24  1/22   WBC  4.31-10.16 Thousand/uL   8.36  1/22     Previous MBS:  No barium studies or egd in epic    Patient's goal:none stated    Did the pt report pain? no  If yes, was nursing notified/was it addressed?    Reason for consult:  R/o aspiration  Determine safest and least restrictive diet  respiratory compromise    Precautions:  Listed as high fall risk.     Food Allergies: nkfa   Current Diet:  regular   Premorbid diet:  regular   O2 requirement:  2 L nc   Social/Prior living  Home w/ son   Voice/Speech:  wnl   Follows commands:  When focusing. very verbal.    Cognitive status:  alert     Oral [facility] exam:  Dentition:upper plate at home, has bottom plate  Lips (VII):+  Tongue (XII):+  Secretion management:wnl    Esophageal stage:  No s/s reported  H/o GERD    Results d/w:  Pt, nursing

## 2025-01-22 NOTE — CASE MANAGEMENT
Case Management Assessment & Discharge Planning Note    Patient name Cortney Harding  [facility] -* MRN [id number]  : 1945 Date 2025       Current Admission Date: 2025  Current Admission Diagnosis:Acute on chronic respiratory failure with hypoxia (HCC)   Patient Active Problem List    Diagnosis Date Noted Date Diagnosed    Coronary artery disease involving native coronary artery of native heart without angina pectoris 2025     Primary insomnia 2024     Acute on chronic respiratory failure with hypoxia (HCC) 02/10/2024     Acute systolic heart failure (HCC) 2024     CIS (carcinoma in situ of bladder) 06/15/2020     Squamous cell carcinoma of leg 2014     Non-small cell carcinoma of left lung, stage 3 (HCC) 2013     Class 2 severe obesity due to excess calories with serious comorbidity and body mass index (BMI) of 35.0 to 35.9 in adult (HCC) 2013     Adenocarcinoma of bladder (HCC) 2012     Chronic obstructive pulmonary disease with acute exacerbation (HCC) 2012     Hypothyroidism 2012       LOS (days): 1  Geometric Mean LOS (GMLOS) (days): 3.9  Days to GMLOS:2.7     OBJECTIVE:    Risk of Unplanned Readmission Score: 15.71         Current admission status: Inpatient       Preferred Pharmacy:   [id number] [place] - [address]  [address] [facility] [address]  Phone: [phone] Fax: [phone]    [facility] - [address] [facility] [address],  [place] [facility] [address]  Phone: [phone] Fax: [phone]    Primary Care Provider: Mehul Thorpe MD    Primary Insurance: [employer] [facility]  Secondary Insurance:     ASSESSMENT:  Active Health Care Proxies       Sam Harding Health Care Representative - Son   Primary Phone: [phone] (Mobile)  Home Phone: [phone]                 Advance  Directives  Does patient have a Health Care POA?: No  Was patient offered paperwork?: Yes (Declined)  Does patient currently have a Health Care decision maker?: Yes, please see Health Care Proxy section  Does patient have Advance Directives?: No  Was patient offered paperwork?: Yes (Declined)  Primary Contact: eber Vargas [phone]         Readmission Root Cause  30 Day Readmission: No    Patient Information  Admitted from:: Home  Mental Status: Alert  During Assessment patient was accompanied by: Not accompanied during assessment  Assessment information provided by:: Patient  Primary Caregiver: Self  Support Systems: Self, Son, Friend, Friends/neighbors  County of Residence: [place]  What city do you live in?: [place]  Home entry access options. Select all that apply.: Stairs  Number of steps to enter home.: 6  Do the steps have railings?: Yes  Type of Current Residence: 3 story home  Upon entering residence, is there a bedroom on the main floor (no further steps)?: No  A bedroom is located on the following floor levels of residence (select all that apply):: 2nd Floor  Upon entering residence, is there a bathroom on the main floor (no further steps)?: No (Pt has primarily been staying on first level of home)  Indicate which floors of current residence have a bathroom (select all the apply):: 2nd Floor  Number of steps to 2nd floor from main floor: One Flight  Living Arrangements: Lives w/ Children  Is patient a ?: No    Activities of Daily Living Prior to Admission  Functional Status: Independent  Completes ADLs independently?: Yes  Ambulates independently?: Yes  Does patient use assisted devices?: Yes  Assisted Devices (DME) used: Bedside Commode, Shower Chair, Home Oxygen concentrator, Portable Oxygen tanks  DME Company Name (respiratory supplies): [employer]  O2 Rate(s): 2.5L  Does patient currently own DME?: Yes  What DME does the patient currently own?: Bedside Commode, Portable Oxygen tanks, Shower  Chair, Home Oxygen concentrator  Does the patient have a history of Short-Term Rehab?: No  Does patient have a history of HHC?: Yes ([facility])  Does patient currently have HHC?: No         Patient Information Continued  Income Source: Pension/USP  Does patient receive dialysis treatments?: No  Does patient have a history of substance abuse?: No  Does patient have a history of Mental Health Diagnosis?: No         Means of Transportation  Means of Transport to [facility]:: Family transport      Social Determinants of Health (SDOH)      Flowsheet Row Most Recent Value   Housing Stability    In the last 12 months, was there a time when you were not able to pay the mortgage or rent on time? N   In the past 12 months, how many times have you moved where you were living? 0   At any time in the past 12 months, were you homeless or living in a shelter (including now)? N   Transportation Needs    In the past 12 months, has lack of transportation kept you from medical appointments or from getting medications? no   In the past 12 months, has lack of transportation kept you from meetings, work, or from getting things needed for daily living? No   Food Insecurity    Within the past 12 months, you worried that your food would run out before you got the money to buy more. Never true   Within the past 12 months, the food you bought just didn't last and you didn't have money to get more. Never true   Utilities    In the past 12 months has the electric, gas, oil, or water company threatened to shut off services in your home? No            DISCHARGE DETAILS:    Discharge planning discussed with:: Patient  Freedom of Choice: Yes  Comments - Freedom of Choice: Info provided on private pay HHAs  CM contacted family/caregiver?: Yes  Were Treatment Team discharge recommendations reviewed with patient/caregiver?: Yes  Did patient/caregiver verbalize understanding of patient care needs?: Yes  Were patient/caregiver advised of the risks  associated with not following Treatment Team discharge recommendations?: Yes         Requested Home Health Care         Is the patient interested in HHC at discharge?: No    DME Referral Provided  Referral made for DME?: No    Other Referral/Resources/Interventions Provided:  Interventions: HHA    Would you like to participate in our [facility] service program?  : No - Declined                   CM met with patient at bedside to introduce self and role with DC planning.  Patient resides with her son in a 3 story home.  Patient reports that she is primarily on the first level of the home, sleeping on the sofa, and using the sink to wash.  Patient utilizes a BSC, rollator, and shower chair.  Patient is on 2.5 L O2, home concentrator and portable tanks supplied by [employer].  Patient reports that she is running low on portable tanks, CM encouraged her to contact [employer] and they can deliver more, CM will put their number on her AVS. Patient would like some help with bathing at home, she believes her son would assist but she does not want him to.  CM discussed private HHA's as an option, information provided to patient.      CM spoke with patient's son via PC, CM provided him with contact information for [employer] to call for additional O2 tanks.  Son reports that he is willing and able to assist patient with bathing, she has not expressed this same concern to him.  CM also discussed private rik HHA's, family may explore this in the future.  CM will continue to follow.

## 2025-01-22 NOTE — ASSESSMENT & PLAN NOTE
Wt Readings from Last 3 Encounters:   01/22/25 86.4 kg (190 lb 7.6 oz)   05/16/24 86.2 kg (190 lb)   02/20/24 86.5 kg (190 lb 9.6 oz)     Weight up, trace edema, BNP 1149. Self discontinued diuretic due to frequent urination  TTE LVEF 10%, lobal hypokinesis, LA dilation, trace AR, mild MR  Continue IV lasix 50mg daily, plan to transition back to orals tomorrow  Pt reports preference with Depends used inpatient and reports increased compliance if she is able to obtain similar  GDMT: continue Toprol XL 25mg daily, losartan 25mg daily

## 2025-01-22 NOTE — UTILIZATION REVIEW
Initial Clinical Review    Admission: Date/Time/Statement:   Admission Orders (From admission, onward)       Ordered        01/21/25 0500  INPATIENT ADMISSION  Once                          Orders Placed This Encounter   Procedures    INPATIENT ADMISSION     Standing Status:   Standing     Number of Occurrences:   1     Level of Care:   Level 2 Stepdown / HOT [14]     Estimated length of stay:   More than 2 Midnights     Certification:   I certify that inpatient services are medically necessary for this patient for a duration of greater than two midnights. See H&P and MD Progress Notes for additional information about the patient's course of treatment.     ED Arrival Information       Expected   -    Arrival   1/21/2025 03:43    Acuity   Emergent              Means of arrival   Ambulance    Escorted by   [facility] ([facility])    Service   Hospitalist    Admission type   Emergency              Arrival complaint   sob             Chief Complaint   Patient presents with    Shortness of Breath     Pt arriving EMS from home. Per EMS, COPD exacerbation starting a few hours ago. No relief with inhaler. Duo neb and 125 Solumedrol given by EMS. Pt also w a cardiac hx.        Initial Presentation: 79 y.o. female presents to the ED via EMS From home with c/o SOB.  EMS gave DuoNebs and IV SoluMedrol.  PMH:  HTN, CAD, HFrEF, COPD.  In the ED she was on BIPAP.  Treated with nebs, DuoNebs, IV Mag, IV fluid bolus, IV Lasix, Toprol, Levothyroxine, Demadex PO, Insulin, PO KCL, sq Lovenox.  Labs - elevated BNP, lactic acid 3.0, glucose 289, low K 3.2, acidotic gasses, mild troponin elevation.  ECG - ST w/ PACs.  Imaging - RLL PNA w/ emphysematous changes. On exam + wheezing, alert.  Admitted to INPATIENT status to Level 2 SD with Acute on chronic hypoxic resp failure, Acute exac COPD, acute sHF, CAD - PLAN: PO Prednisone, Scheduled DuoNebs, PRN Albuterol, PRN BIPAP, resp hygiene, Incentive spirometry, IV Lasix 50 mg ,  continue home Toprol XL and Losartan.      Anticipated Length of Stay/Certification Statement: Patient will be admitted on an inpatient basis with an anticipated length of stay of greater than 2 midnights secondary to respiratory distress.     Date: 1/22   Day 2:   Acute on chronic hypoxic resp failure, Acute exac COPD, acute sHF, CAD - afebrile, VS controlled, on 2L NC oxygen.  Lactic acid trending down.  No leukocytosis.  Back to baseline oxygen at 2L.   Causes multifactorial in the setting of suspected postviral pneumonia, pulmonary congestion from self discontinuing diuretic, COPD exacerbation .  Continue nebs. Prednisone.  Pt notes marked improvement in breathings.  On exam + rales, wheezing.      Date: 1/23  Day 3: Has surpassed a 2nd midnight with active treatments and services.  Acute on chronic hypoxic resp failure, Acute exac COPD, acute sHF, CAD - pt was feeling better but had coughing fit which made her dyspneic, improved with nebs.  Still feels tight post neb.  Has rhonchi on exam. No med changes today.  Remains on oxygen and IV antibiotics.        ED Treatment-Medication Administration from 01/21/2025 0343 to 01/21/2025 1431         Date/Time Order Dose Route Action     01/21/2025 0351 albuterol inhalation solution 10 mg 10 mg Nebulization Given     01/21/2025 0350 ipratropium (ATROVENT) 0.02 % inhalation solution 1 mg 1 mg Nebulization Given     01/21/2025 0351 sodium chloride 0.9 % inhalation solution 12 mL 12 mL Nebulization Given     01/21/2025 0352 magnesium sulfate 2 g/50 mL IVPB (premix) 2 g 2 g Intravenous New Bag     01/21/2025 0352 ipratropium-albuterol (FOR EMS ONLY) (DUO-NEB) 0.5-2.5 mg/3 mL inhalation solution 3 mL 0 mL Does not apply Given to EMS     01/21/2025 0400 methylPREDNISolone sodium succinate (FOR EMS ONLY) (Solu-MEDROL) 125 MG injection 125 mg 0 mg Does not apply Given to EMS     01/21/2025 0356 sodium chloride 0.9 % bolus 250 mL 250 mL Intravenous New Bag     01/21/2025 0413  ceftriaxone (ROCEPHIN) 2 g/50 mL in dextrose IVPB 2,000 mg Intravenous New Bag     01/21/2025 0459 furosemide (LASIX) injection 50 mg 50 mg Intravenous GivenASA, {resm     01/21/2025 0742 ipratropium-albuterol (DUO-NEB) 0.5-2.5 mg/3 mL inhalation solution 3 mL 3 mL Nebulization Given     01/21/2025 1348 ipratropium-albuterol (DUO-NEB) 0.5-2.5 mg/3 mL inhalation solution 3 mL 3 mL Nebulization Given     01/21/2025 0609 levothyroxine tablet 150 mcg 150 mcg Oral Given     01/21/2025 0954 metoprolol succinate (TOPROL-XL) 24 hr tablet 25 mg 25 mg Oral Given     01/21/2025 0958 enoxaparin (LOVENOX) subcutaneous injection 40 mg 40 mg Subcutaneous Given     01/21/2025 0953 predniSONE tablet 50 mg 50 mg Oral Given     01/21/2025 0953 aspirin (ECOTRIN LOW STRENGTH) EC tablet 81 mg 81 mg Oral Given     01/21/2025 0954 losartan (COZAAR) tablet 25 mg 25 mg Oral Given     01/21/2025 0958 potassium chloride (Klor-Con M20) CR tablet 40 mEq 40 mEq Oral Given     01/21/2025 1217 insulin lispro (HumALOG/ADMELOG) 100 units/mL subcutaneous injection 1-6 Units 1 Units Subcutaneous Given     01/21/2025 1217 torsemide (DEMADEX) tablet 20 mg 20 mg Oral Given            Scheduled Medications:  aspirin, 81 mg, Oral, Daily  atorvastatin, 80 mg, Oral, Daily With Dinner  cefTRIAXone, 1,000 mg, Intravenous, Q24H  enoxaparin, 40 mg, Subcutaneous, Daily  formoterol, 20 mcg, Nebulization, Q12H  guaiFENesin, 600 mg, Oral, Q12H RADHA  insulin lispro, 1-6 Units, Subcutaneous, TID AC  ipratropium-albuterol, 3 mL, Nebulization, TID  levothyroxine, 150 mcg, Oral, Early Morning  losartan, 25 mg, Oral, Daily  metoprolol succinate, 25 mg, Oral, Daily  [START ON 1/24/2025] predniSONE, 40 mg, Oral, Daily  sodium chloride, 3 mL, Nebulization, TID  [Held by provider] torsemide, 20 mg, Oral, Daily  umeclidinium, 1 puff, Inhalation, Daily      Continuous IV Infusions:     PRN Meds:  acetaminophen, 975 mg, Oral, Q6H PRN  aluminum-magnesium hydroxide-simethicone, 30  mL, Oral, Q6H PRN  guaiFENesin, 200 mg, Oral, Q4H PRN  levalbuterol, 1.25 mg, Nebulization, Q6H PRN - x 1 1/21   And  sodium chloride, 3 mL, Nebulization, Q6H PRN -x 1 1/23  ondansetron, 4 mg, Intravenous, Q6H PRN  polyethylene glycol, 17 g, Oral, Daily PRN      ED Triage Vitals   Temperature Pulse Respirations Blood Pressure SpO2 Pain Score   01/21/25 0503 01/21/25 0349 01/21/25 0347 01/21/25 0356 01/21/25 0347 01/21/25 1452   98.8 °F (37.1 °C) (!) 144 (!) 33 155/83 98 % No Pain     Weight (last 2 days)       Date/Time Weight    01/23/25 0539 85.9 (189.38)    01/22/25 1135 86.4 (190.48)    01/21/25 0503 91.1 (200.84)    01/21/25 0400 91.1 (200.84)            Vital Signs (last 3 days)       Date/Time Temp Pulse Resp BP MAP (mmHg) SpO2 Calculated FIO2 (%) - Nasal Cannula Nasal Cannula O2 Flow Rate (L/min) O2 Device O2 Interface Device Patient Position - Orthostatic VS Lia Coma Scale Score Pain    01/23/25 1459 98.3 °F (36.8 °C) 102 20 93/55 69 93 % 28 2 L/min Nasal cannula -- Lying -- --    01/23/25 1335 -- -- -- -- -- 96 % 28 2 L/min Nasal cannula -- -- -- --    01/23/25 0831 -- -- -- -- -- 96 % 28 2 L/min Nasal cannula -- -- -- --    01/23/25 0802 97.8 °F (36.6 °C) 101 18 125/69 90 96 % 28 2 L/min Nasal cannula -- Lying -- --    01/23/25 0750 -- -- -- -- -- -- -- -- -- -- -- 15 No Pain    01/22/25 2209 98.8 °F (37.1 °C) 103 19 114/57 82 98 % 28 2 L/min Nasal cannula -- Lying -- --    01/22/25 1915 -- -- -- -- -- -- -- -- -- -- -- 15 No Pain    01/22/25 1529 98.1 °F (36.7 °C) 119 19 130/68 92 96 % 28 2 L/min None (Room air) -- Sitting -- --    01/22/25 1344 -- -- -- -- -- 94 % 28 2 L/min Nasal cannula -- -- -- --    01/22/25 0900 -- -- -- -- -- -- -- -- -- -- -- 15 No Pain    01/22/25 0747 97.3 °F (36.3 °C) 101 20 132/68 93 99 % 28 2 L/min Nasal cannula -- Sitting -- --    01/22/25 0726 -- -- -- -- -- 96 % 28 2 L/min Nasal cannula -- -- -- --    01/21/25 2332 97.1 °F (36.2 °C) 103 22 148/90 -- 95 % 30 2.5 L/min  Nasal cannula -- -- -- --    01/21/25 2313 -- -- -- -- -- -- -- -- -- Full face mask -- -- --    01/21/25 1935 -- -- -- -- -- -- 30 2.5 L/min Nasal cannula -- -- -- --    01/21/25 1934 -- -- -- -- -- 96 % -- -- -- -- -- -- --    01/21/25 1915 -- -- -- -- -- -- -- -- -- -- -- 15 No Pain    01/21/25 1545 -- -- -- -- -- 95 % 30 2.5 L/min Nasal cannula -- -- -- --    01/21/25 1500 -- -- -- -- -- -- -- -- -- -- -- 15 --    01/21/25 1452 98.1 °F (36.7 °C) 95 18 108/55 78 95 % 28 2 L/min Nasal cannula -- Sitting -- No Pain    01/21/25 1400 -- 95 18 110/57 79 97 % 30 2.5 L/min Nasal cannula -- Sitting -- --    01/21/25 1300 -- 95 20 107/50 71 98 % 28 2 L/min Nasal cannula -- Sitting -- --    01/21/25 1200 -- 88 20 122/58 83 97 % 30 2.5 L/min Nasal cannula -- Sitting -- --    01/21/25 1100 -- 96 20 131/65 92 98 % 30 2.5 L/min Nasal cannula -- Sitting -- --    01/21/25 1037 -- -- -- -- -- 99 % 30 2.5 L/min Nasal cannula -- -- -- --    01/21/25 1000 -- 96 16 128/67 89 99 % -- -- BiPAP -- Lying -- --    01/21/25 0900 -- 97 16 101/53 76 99 % -- -- BiPAP -- Sitting -- --    01/21/25 0800 -- 101 16 104/58 76 99 % -- -- BiPAP -- Sitting -- --    01/21/25 0742 -- -- -- -- -- 99 % -- -- -- Full face mask -- -- --    01/21/25 0700 -- 105 15 101/59 77 99 % -- -- BiPAP -- Sitting -- --    01/21/25 0615 -- 114 22 -- -- 98 % -- -- BiPAP -- Sitting -- --    01/21/25 0518 -- -- -- -- -- -- -- -- -- -- -- 15 --    01/21/25 0503 98.8 °F (37.1 °C) 126 25 126/56 -- 100 % -- -- BiPAP -- Sitting -- --    01/21/25 0445 -- 120 19 -- -- 100 % -- -- BiPAP -- Sitting -- --    01/21/25 0400 -- 133 27 155/83 113 94 % -- -- BiPAP -- Sitting -- --    01/21/25 0358 -- -- -- -- -- -- -- -- BiPAP -- -- -- --    01/21/25 0356 -- -- -- 155/83 -- -- -- -- -- -- -- -- --    01/21/25 0351 -- -- -- -- -- 92 % -- -- -- -- -- -- --    01/21/25 0349 -- 144 -- -- -- 92 % -- -- BiPAP -- -- -- --    01/21/25 0347 -- -- 33 -- -- 98 % -- -- -- Full face mask -- -- --               Pertinent Labs/Diagnostic Test Results:   Radiology:  XR ankle 3+ vw left   Final Interpretation by Johan Rich DO (01/23 1144)      No evidence of acute osseous abnormality.      Degenerative changes as described above.         Computerized Assisted Algorithm (CAA) may have been used to analyze all applicable images.               Workstation performed: [id number]         XR chest 1 view portable   ED Interpretation by Abhishek Trejo PA-C (01/21 0413)   ED wet read: pulmonary edema. Possible right lower lung field infiltrate.       Final Interpretation by Ang Rosa MD (01/21 0848)      Right lower lobe pneumonia present on a background of emphysematous change. Continued radiographic follow-up recommended to document complete interval resolution.            Workstation performed: [id number]           Cardiology:  ECG 12 lead   Final Result by Jones Hawley DO (01/21 0631)   Sinus tachycardia with occasional Premature atrial complexes   Incomplete left bundle branch block   Borderline ECG   When compared with ECG of 21-Jan-2025 03:48,   No significant change was found   Confirmed by Jones Hawley ([id number]) on 1/21/2025 6:31:25 AM      ECG 12 lead   Final Result by Jones Hawley DO (01/21 0557)   Poor data quality, interpretation may be adversely affected   Sinus tachycardia   Possible Left atrial enlargement   Right axis deviation   Cannot rule out Septal infarct , age undetermined   Poor anterior R wave progression is noted   Abnormal ECG   Confirmed by Jones Hawley ([id number]) on 1/21/2025 5:56:59 AM        GI:  No orders to display       Results from last 7 days   Lab Units 01/21/25  0353   SARS-COV-2  Negative     Results from last 7 days   Lab Units 01/22/25  0625 01/21/25  0602 01/21/25  0353   WBC Thousand/uL 8.36 9.61 9.17   HEMOGLOBIN g/dL 11.5 11.7 12.5   HEMATOCRIT % 34.8 35.5 38.2   PLATELETS Thousands/uL 253 241 350   TOTAL NEUT ABS Thousands/µL  --   --  5.86          Results from last 7 days   Lab Units 01/22/25  0625 01/21/25  0602 01/21/25  0353   SODIUM mmol/L 138 137 135   POTASSIUM mmol/L 4.4 3.2* 3.9   CHLORIDE mmol/L 104 100 99   CO2 mmol/L 28 26 27   ANION GAP mmol/L 6 11 9   BUN mg/dL 19 14 13   CREATININE mg/dL 0.69 0.85 0.91   EGFR ml/min/1.73sq m 81 64 59   CALCIUM mg/dL 9.3 8.8 9.0   MAGNESIUM mg/dL 2.1  --   --      Results from last 7 days   Lab Units 01/21/25  0353   AST U/L 34   ALT U/L 26   ALK PHOS U/L 87   TOTAL PROTEIN g/dL 7.4   ALBUMIN g/dL 4.1   TOTAL BILIRUBIN mg/dL 0.59     Results from last 7 days   Lab Units 01/23/25  1536 01/23/25  1131 01/23/25  0805 01/22/25  2103 01/22/25  1633 01/22/25  1135 01/22/25  0751 01/21/25  2116 01/21/25  1606 01/21/25  1519 01/21/25  1137   POC GLUCOSE mg/dl 236* 124 101 149* 168* 100 110 217* 240* 270* 156*     Results from last 7 days   Lab Units 01/22/25  0625 01/21/25  0602 01/21/25  0353   GLUCOSE RANDOM mg/dL 113 203* 289*         Results from last 7 days   Lab Units 01/21/25  0353   PH MARI  7.289*   PCO2 MARI mm Hg 42.9   PO2 MARI mm Hg 54.3*   HCO3 MARI mmol/L 20.1*   BASE EXC MARI mmol/L -6.2   O2 CONTENT MARI ml/dL 15.4   O2 HGB, VENOUS % 83.1*             Results from last 7 days   Lab Units 01/21/25  0811 01/21/25  0602 01/21/25  0353   HS TNI 0HR ng/L  --   --  27   HS TNI 2HR ng/L  --  45  --    HSTNI D2 ng/L  --  18  --    HS TNI 4HR ng/L 94*  --   --    HSTNI D4 ng/L 67*  --   --          Results from last 7 days   Lab Units 01/21/25  0353   PROTIME seconds 14.5   INR  1.11   PTT seconds 34         Results from last 7 days   Lab Units 01/22/25  0625 01/21/25  0413   PROCALCITONIN ng/ml 0.24 0.06     Results from last 7 days   Lab Units 01/21/25  0602 01/21/25  0353   LACTIC ACID mmol/L 2.3* 3.0*             Results from last 7 days   Lab Units 01/21/25  0353   BNP pg/mL 1,149*       Results from last 7 days   Lab Units 01/21/25  0353   INFLUENZA A PCR  Negative   INFLUENZA B PCR  Negative   RSV PCR   "Negative       Results from last 7 days   Lab Units 01/21/25  0413   BLOOD CULTURE  Staphylococcus coagulase negative*  No Growth at 48 hrs.   GRAM STAIN RESULT  Gram positive cocci in clusters*                   Past Medical History:   Diagnosis Date    Anesthesia     \"constipation after surgery\"    Bladder cancer (HCC)     Cataract     left    Colon polyp     COPD (chronic obstructive pulmonary disease) (HCC)     Dry mouth     Full dentures     GERD (gastroesophageal reflux disease)     occas    History of pneumonia     Hyperlipidemia     Hypothyroidism     Lung cancer (HCC)     stage 3    Pulmonary emphysema (HCC)     Skin cancer     squamous cell of the leg    Sleep difficulties     \"gets up frequently to void\"    UTI (urinary tract infection) 12/2019    recent e coli treated and resolved    Wears glasses      Present on Admission:   Acute systolic heart failure (HCC)   Chronic obstructive pulmonary disease with acute exacerbation (HCC)   Acute on chronic respiratory failure with hypoxia ([facility])   Coronary artery disease involving native coronary artery of native heart without angina pectoris      Admitting Diagnosis: CHF (congestive heart failure) (HCC) [I50.9]  SOB (shortness of breath) [R06.02]  Viral URI [J06.9]  COPD with acute exacerbation (HCC) [J44.1]  Chronic respiratory failure with hypoxia (HCC) [J96.11]  At risk for pneumonia [Z91.89]  Age/Sex: 79 y.o. female    Network Utilization Review Department  ATTENTION: Please call with any questions or concerns to [phone] and carefully listen to the prompts so that you are directed to the right person. All voicemails are confidential.   For Discharge needs, contact Care Management DC Support Team at [phone] opt. 2  Send all requests for admission clinical reviews, approved or denied determinations and any other requests to dedicated fax number below belonging to the campus where the patient is receiving treatment. List of dedicated fax numbers for " the Facilities:  FACILITY NAME UR FAX NUMBER   ADMISSION DENIALS (Administrative/Medical Necessity) [phone]   DISCHARGE SUPPORT TEAM (NETWORK) [phone]   PARENT CHILD HEALTH (Maternity/NICU/Pediatrics) [phone]   [facility] [phone]   [facility] [phone]   [facility] [phone]   [facility] [phone]   [facility] [phone]   [facility] [phone]   [facility] [phone]   [facility] [phone]   [facility] [phone]   [facility] [phone]   [facility] [phone]   [facility] [phone]

## 2025-01-22 NOTE — PLAN OF CARE
Problem: Potential for Falls  Goal: Patient will remain free of falls  Description: INTERVENTIONS:  - Educate patient/family on patient safety including physical limitations  - Instruct patient to call for assistance with activity   - Consult OT/PT to assist with strengthening/mobility   - Keep Call bell within reach  - Keep bed low and locked with side rails adjusted as appropriate  - Keep care items and personal belongings within reach  - Initiate and maintain comfort rounds  - Make Fall Risk Sign visible to staff  - Offer Toileting every 2 Hours, in advance of need  - Initiate/Maintain bed alarm  - Obtain necessary fall risk management equipment: In place   - Apply yellow socks and bracelet for high fall risk patients  - Consider moving patient to room near nurses station  Outcome: Progressing     Problem: Prexisting or High Potential for Compromised Skin Integrity  Goal: Skin integrity is maintained or improved  Description: INTERVENTIONS:  - Identify patients at risk for skin breakdown  - Assess and monitor skin integrity  - Assess and monitor nutrition and hydration status  - Monitor labs   - Assess for incontinence   - Turn and reposition patient  - Assist with mobility/ambulation  - Relieve pressure over bony prominences  - Avoid friction and shearing  - Provide appropriate hygiene as needed including keeping skin clean and dry  - Evaluate need for skin moisturizer/barrier cream  - Collaborate with interdisciplinary team   - Patient/family teaching  - Consider wound care consult   Outcome: Progressing     Problem: RESPIRATORY - ADULT  Goal: Achieves optimal ventilation and oxygenation  Description: INTERVENTIONS:  - Assess for changes in respiratory status  - Assess for changes in mentation and behavior  - Position to facilitate oxygenation and minimize respiratory effort  - Oxygen administered by appropriate delivery if ordered  - Initiate smoking cessation education as indicated  - Encourage  broncho-pulmonary hygiene including cough, deep breathe, Incentive Spirometry  - Assess the need for suctioning and aspirate as needed  - Assess and instruct to report SOB or any respiratory difficulty  - Respiratory Therapy support as indicated  Outcome: Progressing

## 2025-01-22 NOTE — ASSESSMENT & PLAN NOTE
Presenting in respiratory distress, ongoing wheezing  Continue prednisone 50mg daily x 5 day burst  Continue nebs  BiPAP PRN

## 2025-01-22 NOTE — PROGRESS NOTES
Progress Note - Hospitalist   Name: Cortney Harding 79 y.o. female I MRN: [id number]  Unit/Bed#: E4 -01 I Date of Admission: 1/21/2025   Date of Service: 1/22/2025 I Hospital Day: 1    Assessment & Plan  Acute on chronic respiratory failure with hypoxia (HCC)  Presents in respiratory distress, wheezing.  Now back to baseline 2 L oxygen with significant improvement   Likely multifactorial in the setting of suspected postviral pneumonia, pulmonary congestion from self discontinuing diuretic, COPD exacerbation   Chronic obstructive pulmonary disease with acute exacerbation (HCC)  Presenting in respiratory distress, ongoing wheezing  Continue prednisone 50mg daily x 5 day burst  Continue nebs  BiPAP PRN  Acute systolic heart failure (HCC)  Wt Readings from Last 3 Encounters:   01/22/25 86.4 kg (190 lb 7.6 oz)   05/16/24 86.2 kg (190 lb)   02/20/24 86.5 kg (190 lb 9.6 oz)     Weight up, trace edema, BNP 1149. Self discontinued diuretic due to frequent urination  TTE LVEF 10%, lobal hypokinesis, LA dilation, trace AR, mild MR  Continue IV lasix 50mg daily, plan to transition back to orals tomorrow  Pt reports preference with Depends used inpatient and reports increased compliance if she is able to obtain similar  GDMT: continue Toprol XL 25mg daily, losartan 25mg daily  Coronary artery disease involving native coronary artery of native heart without angina pectoris  Severe three vessel CAD on cardiac CTA  W/ ischemic cardiomyopathy at 10%  Pneumonia  Worsening cough/dyspnea  CXR with right lower lobe pneumonia  Speech eval  Continue ceftriaxone    VTE Pharmacologic Prophylaxis: VTE Score: 4 Moderate Risk (Score 3-4) - Pharmacological DVT Prophylaxis Ordered: enoxaparin (Lovenox).    Mobility:   Basic Mobility Inpatient Raw Score: 20  JH-HLM Goal: 6: Walk 10 steps or more  JH-HLM Achieved: 6: Walk 10 steps or more  JH-HLM Goal achieved. Continue to encourage appropriate mobility.    Patient Centered Rounds: I performed  bedside rounds with nursing staff today.   Discussions with Specialists or Other Care Team Provider: None    Education and Discussions with Family / Patient: Updated  (son) via phone.    Current Length of Stay: 1 day(s)  Current Patient Status: Inpatient   Certification Statement: The patient will continue to require additional inpatient hospital stay due to IV diuresis, steroids  Discharge Plan: Anticipate discharge in 48 hrs to home.    Code Status: Level 1 - Full Code    Subjective   Pt reports marked improvement in her breathing. She also admits to self discontinuing her diuretic due to frequent urination.    Objective :  Temp:  [97.1 °F (36.2 °C)-98.1 °F (36.7 °C)] 97.3 °F (36.3 °C)  HR:  [] 101  BP: (108-148)/(55-90) 132/68  Resp:  [18-22] 20  SpO2:  [95 %-99 %] 99 %  O2 Device: Nasal cannula  Nasal Cannula O2 Flow Rate (L/min):  [2 L/min-2.5 L/min] 2 L/min    Body mass index is 34.84 kg/m².     Input and Output Summary (last 24 hours):   No intake or output data in the 24 hours ending 01/22/25 1320    Physical Exam  Vitals and nursing note reviewed.   Constitutional:       Appearance: Normal appearance.   HENT:      Head: Normocephalic and atraumatic.      Mouth/Throat:      Mouth: Mucous membranes are moist.      Pharynx: Oropharynx is clear. No oropharyngeal exudate.   Eyes:      Extraocular Movements: Extraocular movements intact.   Cardiovascular:      Rate and Rhythm: Normal rate and regular rhythm.      Pulses: Normal pulses.      Heart sounds: Normal heart sounds. No murmur heard.     No friction rub. No gallop.   Pulmonary:      Effort: Pulmonary effort is normal. No respiratory distress.      Breath sounds: No stridor. Wheezing and rales present.   Abdominal:      General: Abdomen is flat. Bowel sounds are normal. There is no distension.      Palpations: Abdomen is soft.      Tenderness: There is no abdominal tenderness.   Musculoskeletal:      Right lower leg: No edema.      Left  lower leg: No edema.   Skin:     General: Skin is warm and dry.   Neurological:      General: No focal deficit present.      Mental Status: She is alert and oriented to person, place, and time.           Lines/Drains:              Lab Results: I have reviewed the following results:   Results from last 7 days   Lab Units 01/22/25  0625 01/21/25  0602 01/21/25  0353   WBC Thousand/uL 8.36   < > 9.17   HEMOGLOBIN g/dL 11.5   < > 12.5   HEMATOCRIT % 34.8   < > 38.2   PLATELETS Thousands/uL 253   < > 350   SEGS PCT %  --   --  65   LYMPHO PCT %  --   --  30   MONO PCT %  --   --  5   EOS PCT %  --   --  0    < > = values in this interval not displayed.     Results from last 7 days   Lab Units 01/22/25  0625 01/21/25  0602 01/21/25  0353   SODIUM mmol/L 138   < > 135   POTASSIUM mmol/L 4.4   < > 3.9   CHLORIDE mmol/L 104   < > 99   CO2 mmol/L 28   < > 27   BUN mg/dL 19   < > 13   CREATININE mg/dL 0.69   < > 0.91   ANION GAP mmol/L 6   < > 9   CALCIUM mg/dL 9.3   < > 9.0   ALBUMIN g/dL  --   --  4.1   TOTAL BILIRUBIN mg/dL  --   --  0.59   ALK PHOS U/L  --   --  87   ALT U/L  --   --  26   AST U/L  --   --  34   GLUCOSE RANDOM mg/dL 113   < > 289*    < > = values in this interval not displayed.     Results from last 7 days   Lab Units 01/21/25  0353   INR  1.11     Results from last 7 days   Lab Units 01/22/25  1135 01/22/25  0751 01/21/25  2116 01/21/25  1606 01/21/25  1519 01/21/25  1137   POC GLUCOSE mg/dl 100 110 217* 240* 270* 156*         Results from last 7 days   Lab Units 01/22/25  0625 01/21/25  0602 01/21/25  0413 01/21/25  0353   LACTIC ACID mmol/L  --  2.3*  --  3.0*   PROCALCITONIN ng/ml 0.24  --  0.06  --        Recent Cultures (last 7 days):   Results from last 7 days   Lab Units 01/21/25  0413   BLOOD CULTURE  No Growth at 24 hrs.   GRAM STAIN RESULT  Gram positive cocci in clusters*       Imaging Results Review: I reviewed radiology reports from this admission including: chest xray.  Other Study Results  Review: No additional pertinent studies reviewed.    Last 24 Hours Medication List:     Current Facility-Administered Medications:     acetaminophen (TYLENOL) tablet 975 mg, Q6H PRN    aluminum-magnesium hydroxide-simethicone (MAALOX) oral suspension 30 mL, Q6H PRN    aspirin (ECOTRIN LOW STRENGTH) EC tablet 81 mg, Daily    atorvastatin (LIPITOR) tablet 80 mg, Daily With Dinner    enoxaparin (LOVENOX) subcutaneous injection 40 mg, Daily    formoterol (PERFOROMIST) nebulizer solution 20 mcg, Q12H    guaiFENesin (ROBITUSSIN) oral liquid 200 mg, Q4H PRN    insulin lispro (HumALOG/ADMELOG) 100 units/mL subcutaneous injection 1-6 Units, TID AC **AND** Fingerstick Glucose (POCT), TID AC    ipratropium-albuterol (DUO-NEB) 0.5-2.5 mg/3 mL inhalation solution 3 mL, TID    levalbuterol (XOPENEX) inhalation solution 1.25 mg, Q6H PRN **AND** sodium chloride 0.9 % inhalation solution 3 mL, Q6H PRN    levothyroxine tablet 150 mcg, Early Morning    losartan (COZAAR) tablet 25 mg, Daily    metoprolol succinate (TOPROL-XL) 24 hr tablet 25 mg, Daily    ondansetron (ZOFRAN) injection 4 mg, Q6H PRN    polyethylene glycol (MIRALAX) packet 17 g, Daily PRN    predniSONE tablet 50 mg, Daily    torsemide (DEMADEX) tablet 20 mg, Daily    umeclidinium 62.5 mcg/actuation inhaler AEPB 1 puff, Daily    Administrative Statements   Today, Patient Was Seen By: James Bush PA-C      **Please Note: This note may have been constructed using a voice recognition system.**

## 2025-01-22 NOTE — ASSESSMENT & PLAN NOTE
Presents in respiratory distress, wheezing.  Now back to baseline 2 L oxygen with significant improvement   Likely multifactorial in the setting of suspected postviral pneumonia, pulmonary congestion from self discontinuing diuretic, COPD exacerbation

## 2025-01-22 NOTE — PLAN OF CARE
Problem: Potential for Falls  Goal: Patient will remain free of falls  Description: INTERVENTIONS:  - Educate patient/family on patient safety including physical limitations  - Instruct patient to call for assistance with activity   - Consult OT/PT to assist with strengthening/mobility   - Keep Call bell within reach  - Keep bed low and locked with side rails adjusted as appropriate  - Keep care items and personal belongings within reach  - Initiate and maintain comfort rounds  - Make Fall Risk Sign visible to staff  - Offer Toileting every 2 Hours, in advance of need  - Initiate/Maintain bed alarm  - Obtain necessary fall risk management equipment: alarm   - Apply yellow socks and bracelet for high fall risk patients  - Consider moving patient to room near nurses station  Outcome: Progressing     Problem: Prexisting or High Potential for Compromised Skin Integrity  Goal: Skin integrity is maintained or improved  Description: INTERVENTIONS:  - Identify patients at risk for skin breakdown  - Assess and monitor skin integrity  - Assess and monitor nutrition and hydration status  - Monitor labs   - Assess for incontinence   - Turn and reposition patient  - Assist with mobility/ambulation  - Relieve pressure over bony prominences  - Avoid friction and shearing  - Provide appropriate hygiene as needed including keeping skin clean and dry  - Evaluate need for skin moisturizer/barrier cream  - Collaborate with interdisciplinary team   - Patient/family teaching  - Consider wound care consult   Outcome: Progressing     Problem: RESPIRATORY - ADULT  Goal: Achieves optimal ventilation and oxygenation  Description: INTERVENTIONS:  - Assess for changes in respiratory status  - Assess for changes in mentation and behavior  - Position to facilitate oxygenation and minimize respiratory effort  - Oxygen administered by appropriate delivery if ordered  - Initiate smoking cessation education as indicated  - Encourage  broncho-pulmonary hygiene including cough, deep breathe, Incentive Spirometry  - Assess the need for suctioning and aspirate as needed  - Assess and instruct to report SOB or any respiratory difficulty  - Respiratory Therapy support as indicated  Outcome: Progressing

## 2025-01-23 ENCOUNTER — APPOINTMENT (INPATIENT)
Dept: RADIOLOGY | Facility: HOSPITAL | Age: 80
DRG: 193 | End: 2025-01-23
Payer: COMMERCIAL

## 2025-01-23 LAB
GLUCOSE SERPL-MCNC: 101 MG/DL (ref 65–140)
GLUCOSE SERPL-MCNC: 124 MG/DL (ref 65–140)
GLUCOSE SERPL-MCNC: 153 MG/DL (ref 65–140)
GLUCOSE SERPL-MCNC: 236 MG/DL (ref 65–140)

## 2025-01-23 PROCEDURE — 92610 EVALUATE SWALLOWING FUNCTION: CPT

## 2025-01-23 PROCEDURE — 99232 SBSQ HOSP IP/OBS MODERATE 35: CPT | Performed by: PHYSICIAN ASSISTANT

## 2025-01-23 PROCEDURE — 82948 REAGENT STRIP/BLOOD GLUCOSE: CPT

## 2025-01-23 PROCEDURE — 94640 AIRWAY INHALATION TREATMENT: CPT

## 2025-01-23 PROCEDURE — 94760 N-INVAS EAR/PLS OXIMETRY 1: CPT

## 2025-01-23 PROCEDURE — 73610 X-RAY EXAM OF ANKLE: CPT

## 2025-01-23 RX ORDER — SODIUM CHLORIDE FOR INHALATION 0.9 %
3 VIAL, NEBULIZER (ML) INHALATION
Status: DISCONTINUED | OUTPATIENT
Start: 2025-01-23 | End: 2025-01-23

## 2025-01-23 RX ORDER — METHYLPREDNISOLONE SODIUM SUCCINATE 125 MG/2ML
60 INJECTION, POWDER, LYOPHILIZED, FOR SOLUTION INTRAMUSCULAR; INTRAVENOUS ONCE
Status: COMPLETED | OUTPATIENT
Start: 2025-01-23 | End: 2025-01-23

## 2025-01-23 RX ORDER — PREDNISONE 20 MG/1
40 TABLET ORAL DAILY
Status: DISCONTINUED | OUTPATIENT
Start: 2025-01-24 | End: 2025-01-25 | Stop reason: HOSPADM

## 2025-01-23 RX ORDER — FUROSEMIDE 10 MG/ML
60 INJECTION INTRAMUSCULAR; INTRAVENOUS ONCE
Status: COMPLETED | OUTPATIENT
Start: 2025-01-23 | End: 2025-01-23

## 2025-01-23 RX ORDER — GUAIFENESIN 600 MG/1
600 TABLET, EXTENDED RELEASE ORAL EVERY 12 HOURS SCHEDULED
Status: DISCONTINUED | OUTPATIENT
Start: 2025-01-23 | End: 2025-01-24

## 2025-01-23 RX ADMIN — FORMOTEROL FUMARATE DIHYDRATE 20 MCG: 20 SOLUTION RESPIRATORY (INHALATION) at 20:09

## 2025-01-23 RX ADMIN — IPRATROPIUM BROMIDE AND ALBUTEROL SULFATE 3 ML: 2.5; .5 SOLUTION RESPIRATORY (INHALATION) at 13:32

## 2025-01-23 RX ADMIN — IPRATROPIUM BROMIDE AND ALBUTEROL SULFATE 3 ML: 2.5; .5 SOLUTION RESPIRATORY (INHALATION) at 20:09

## 2025-01-23 RX ADMIN — UMECLIDINIUM 1 PUFF: 62.5 AEROSOL, POWDER ORAL at 08:49

## 2025-01-23 RX ADMIN — FUROSEMIDE 60 MG: 10 INJECTION, SOLUTION INTRAVENOUS at 09:22

## 2025-01-23 RX ADMIN — LOSARTAN POTASSIUM 25 MG: 25 TABLET, FILM COATED ORAL at 08:45

## 2025-01-23 RX ADMIN — ASPIRIN 81 MG: 81 TABLET, COATED ORAL at 08:45

## 2025-01-23 RX ADMIN — FORMOTEROL FUMARATE DIHYDRATE 20 MCG: 20 SOLUTION RESPIRATORY (INHALATION) at 08:29

## 2025-01-23 RX ADMIN — ENOXAPARIN SODIUM 40 MG: 40 INJECTION SUBCUTANEOUS at 08:45

## 2025-01-23 RX ADMIN — GUAIFENESIN 600 MG: 600 TABLET, EXTENDED RELEASE ORAL at 09:22

## 2025-01-23 RX ADMIN — METOPROLOL SUCCINATE 25 MG: 25 TABLET, EXTENDED RELEASE ORAL at 08:45

## 2025-01-23 RX ADMIN — CEFTRIAXONE 1000 MG: 10 INJECTION, POWDER, FOR SOLUTION INTRAVENOUS at 16:17

## 2025-01-23 RX ADMIN — INSULIN LISPRO 3 UNITS: 100 INJECTION, SOLUTION INTRAVENOUS; SUBCUTANEOUS at 16:17

## 2025-01-23 RX ADMIN — ATORVASTATIN CALCIUM 80 MG: 80 TABLET, FILM COATED ORAL at 16:17

## 2025-01-23 RX ADMIN — METHYLPREDNISOLONE SODIUM SUCCINATE 60 MG: 125 INJECTION, POWDER, FOR SOLUTION INTRAMUSCULAR; INTRAVENOUS at 09:21

## 2025-01-23 RX ADMIN — GUAIFENESIN 600 MG: 600 TABLET, EXTENDED RELEASE ORAL at 21:12

## 2025-01-23 RX ADMIN — IPRATROPIUM BROMIDE AND ALBUTEROL SULFATE 3 ML: 2.5; .5 SOLUTION RESPIRATORY (INHALATION) at 08:29

## 2025-01-23 RX ADMIN — ISODIUM CHLORIDE 3 ML: 0.03 SOLUTION RESPIRATORY (INHALATION) at 13:32

## 2025-01-23 NOTE — ASSESSMENT & PLAN NOTE
Wt Readings from Last 3 Encounters:   01/23/25 85.9 kg (189 lb 6 oz)   05/16/24 86.2 kg (190 lb)   02/20/24 86.5 kg (190 lb 9.6 oz)     Weight up, trace edema, BNP 1149. Self discontinued diuretic due to frequent urination  TTE LVEF 10%, lobal hypokinesis, LA dilation, trace AR, mild MR  IV lasix 60mg x 1, transition back to oral diuretic tomorrow  Pt reports preference with Depends used inpatient and reports increased compliance if she is able to obtain similar  GDMT: continue Toprol XL 25mg daily, losartan 25mg daily

## 2025-01-23 NOTE — PROGRESS NOTES
Progress Note - Hospitalist   Name: Cortney Harding 79 y.o. female I MRN: [id number]  Unit/Bed#: E4 -01 I Date of Admission: 1/21/2025   Date of Service: 1/23/2025 I Hospital Day: 2    Assessment & Plan  Acute on chronic respiratory failure with hypoxia (HCC)  Presents in respiratory distress, wheezing.  Now back to baseline 2 L oxygen with significant improvement   Likely multifactorial in the setting of suspected postviral pneumonia, pulmonary congestion from self discontinuing diuretic, COPD exacerbation   Chronic obstructive pulmonary disease with acute exacerbation (HCC)  Presenting in respiratory distress, ongoing wheezing  Continue prednisone 50mg daily x 5 day burst  Continue nebs  BiPAP PRN  Acute systolic heart failure (HCC)  Wt Readings from Last 3 Encounters:   01/23/25 85.9 kg (189 lb 6 oz)   05/16/24 86.2 kg (190 lb)   02/20/24 86.5 kg (190 lb 9.6 oz)     Weight up, trace edema, BNP 1149. Self discontinued diuretic due to frequent urination  TTE LVEF 10%, lobal hypokinesis, LA dilation, trace AR, mild MR  IV lasix 60mg x 1, transition back to oral diuretic tomorrow  Pt reports preference with Depends used inpatient and reports increased compliance if she is able to obtain similar  GDMT: continue Toprol XL 25mg daily, losartan 25mg daily  Coronary artery disease involving native coronary artery of native heart without angina pectoris  Severe three vessel CAD on cardiac CTA  W/ ischemic cardiomyopathy at 10%  Pneumonia  Worsening cough/dyspnea  CXR with right lower lobe pneumonia  Speech eval  Continue ceftriaxone    VTE Pharmacologic Prophylaxis: VTE Score: 4 Moderate Risk (Score 3-4) - Pharmacological DVT Prophylaxis Ordered: enoxaparin (Lovenox).    Mobility:   Basic Mobility Inpatient Raw Score: 20  JH-HLM Goal: 6: Walk 10 steps or more  JH-HLM Achieved: 6: Walk 10 steps or more  JH-HLM Goal achieved. Continue to encourage appropriate mobility.    Patient Centered Rounds: I performed bedside  rounds with nursing staff today.   Discussions with Specialists or Other Care Team Provider: None    Education and Discussions with Family / Patient: Updated  (son) via phone.    Current Length of Stay: 2 day(s)  Current Patient Status: Inpatient   Certification Statement: The patient will continue to require additional inpatient hospital stay due to dyspnea  Discharge Plan: Anticipate discharge tomorrow to home.    Code Status: Level 1 - Full Code    Subjective   Pt reports feeling very well this morning, but then had a coughing fit and became very dyspneic, relieved by nebulizer but still feels tight    Objective :  Temp:  [97.8 °F (36.6 °C)-98.8 °F (37.1 °C)] 97.8 °F (36.6 °C)  HR:  [101-119] 101  BP: (114-130)/(57-69) 125/69  Resp:  [18-19] 18  SpO2:  [94 %-98 %] 96 %  O2 Device: Nasal cannula  Nasal Cannula O2 Flow Rate (L/min):  [2 L/min] 2 L/min    Body mass index is 34.64 kg/m².     Input and Output Summary (last 24 hours):   No intake or output data in the 24 hours ending 01/23/25 0853    Physical Exam  Vitals and nursing note reviewed.   Constitutional:       Appearance: Normal appearance.   HENT:      Head: Normocephalic and atraumatic.      Mouth/Throat:      Mouth: Mucous membranes are moist.      Pharynx: Oropharynx is clear. No oropharyngeal exudate.   Eyes:      Extraocular Movements: Extraocular movements intact.   Cardiovascular:      Rate and Rhythm: Normal rate and regular rhythm.      Pulses: Normal pulses.      Heart sounds: Normal heart sounds. No murmur heard.     No friction rub. No gallop.   Pulmonary:      Effort: Pulmonary effort is normal. No respiratory distress.      Breath sounds: Rhonchi present. No wheezing or rales.   Abdominal:      General: Abdomen is flat. Bowel sounds are normal. There is no distension.      Palpations: Abdomen is soft.      Tenderness: There is no abdominal tenderness.   Musculoskeletal:      Right lower leg: No edema.      Left lower leg: No  edema.   Skin:     General: Skin is warm and dry.   Neurological:      General: No focal deficit present.      Mental Status: She is alert and oriented to person, place, and time.           Lines/Drains:              Lab Results: I have reviewed the following results:   Results from last 7 days   Lab Units 01/22/25  0625 01/21/25  0602 01/21/25  0353   WBC Thousand/uL 8.36   < > 9.17   HEMOGLOBIN g/dL 11.5   < > 12.5   HEMATOCRIT % 34.8   < > 38.2   PLATELETS Thousands/uL 253   < > 350   SEGS PCT %  --   --  65   LYMPHO PCT %  --   --  30   MONO PCT %  --   --  5   EOS PCT %  --   --  0    < > = values in this interval not displayed.     Results from last 7 days   Lab Units 01/22/25  0625 01/21/25  0602 01/21/25  0353   SODIUM mmol/L 138   < > 135   POTASSIUM mmol/L 4.4   < > 3.9   CHLORIDE mmol/L 104   < > 99   CO2 mmol/L 28   < > 27   BUN mg/dL 19   < > 13   CREATININE mg/dL 0.69   < > 0.91   ANION GAP mmol/L 6   < > 9   CALCIUM mg/dL 9.3   < > 9.0   ALBUMIN g/dL  --   --  4.1   TOTAL BILIRUBIN mg/dL  --   --  0.59   ALK PHOS U/L  --   --  87   ALT U/L  --   --  26   AST U/L  --   --  34   GLUCOSE RANDOM mg/dL 113   < > 289*    < > = values in this interval not displayed.     Results from last 7 days   Lab Units 01/21/25  0353   INR  1.11     Results from last 7 days   Lab Units 01/23/25  0805 01/22/25  2103 01/22/25  1633 01/22/25  1135 01/22/25  0751 01/21/25  2116 01/21/25  1606 01/21/25  1519 01/21/25  1137   POC GLUCOSE mg/dl 101 149* 168* 100 110 217* 240* 270* 156*         Results from last 7 days   Lab Units 01/22/25  0625 01/21/25  0602 01/21/25  0413 01/21/25  0353   LACTIC ACID mmol/L  --  2.3*  --  3.0*   PROCALCITONIN ng/ml 0.24  --  0.06  --        Recent Cultures (last 7 days):   Results from last 7 days   Lab Units 01/21/25  0413   BLOOD CULTURE  No Growth at 48 hrs.   GRAM STAIN RESULT  Gram positive cocci in clusters*       Imaging Results Review: No pertinent imaging studies reviewed.  Other  Study Results Review: No additional pertinent studies reviewed.    Last 24 Hours Medication List:     Current Facility-Administered Medications:     acetaminophen (TYLENOL) tablet 975 mg, Q6H PRN    aluminum-magnesium hydroxide-simethicone (MAALOX) oral suspension 30 mL, Q6H PRN    aspirin (ECOTRIN LOW STRENGTH) EC tablet 81 mg, Daily    atorvastatin (LIPITOR) tablet 80 mg, Daily With Dinner    cefTRIAXone (ROCEPHIN) 1,000 mg in dextrose 5 % 50 mL IVPB, Q24H, Last Rate: 1,000 mg (01/22/25 1650)    enoxaparin (LOVENOX) subcutaneous injection 40 mg, Daily    formoterol (PERFOROMIST) nebulizer solution 20 mcg, Q12H    furosemide (LASIX) injection 60 mg, Once    guaiFENesin (MUCINEX) 12 hr tablet 600 mg, Q12H RADHA    insulin lispro (HumALOG/ADMELOG) 100 units/mL subcutaneous injection 1-6 Units, TID AC **AND** Fingerstick Glucose (POCT), TID AC    ipratropium-albuterol (DUO-NEB) 0.5-2.5 mg/3 mL inhalation solution 3 mL, TID    levalbuterol (XOPENEX) inhalation solution 1.25 mg, Q6H PRN **AND** sodium chloride 0.9 % inhalation solution 3 mL, Q6H PRN    levothyroxine tablet 150 mcg, Early Morning    losartan (COZAAR) tablet 25 mg, Daily    methylPREDNISolone sodium succinate (Solu-MEDROL) injection 60 mg, Once    metoprolol succinate (TOPROL-XL) 24 hr tablet 25 mg, Daily    ondansetron (ZOFRAN) injection 4 mg, Q6H PRN    polyethylene glycol (MIRALAX) packet 17 g, Daily PRN    predniSONE tablet 50 mg, Daily    sodium chloride 0.9 % inhalation solution 3 mL, TID    [Held by provider] torsemide (DEMADEX) tablet 20 mg, Daily    umeclidinium 62.5 mcg/actuation inhaler AEPB 1 puff, Daily    Administrative Statements   Today, Patient Was Seen By: James Bush PA-C      **Please Note: This note may have been constructed using a voice recognition system.**

## 2025-01-23 NOTE — CASE MANAGEMENT
Case Management Discharge Planning Note    Patient name Cortney Harding  Location [facility] /E4 -* MRN [id number]  : 1945 Date 2025       Current Admission Date: 2025  Current Admission Diagnosis:Acute on chronic respiratory failure with hypoxia (HCC)   Patient Active Problem List    Diagnosis Date Noted Date Diagnosed    Pneumonia 2025     Coronary artery disease involving native coronary artery of native heart without angina pectoris 2025     Primary insomnia 2024     Acute on chronic respiratory failure with hypoxia (HCC) 02/10/2024     Acute systolic heart failure (HCC) 2024     CIS (carcinoma in situ of bladder) 06/15/2020     Squamous cell carcinoma of leg 2014     Non-small cell carcinoma of left lung, stage 3 (HCC) 2013     Class 2 severe obesity due to excess calories with serious comorbidity and body mass index (BMI) of 35.0 to 35.9 in adult (HCC) 2013     Adenocarcinoma of bladder (HCC) 2012     Chronic obstructive pulmonary disease with acute exacerbation (HCC) 2012     Hypothyroidism 2012       LOS (days): 2  Geometric Mean LOS (GMLOS) (days): 4  Days to GMLOS:1.7     OBJECTIVE:  Risk of Unplanned Readmission Score: 16.49         Current admission status: Inpatient   Preferred Pharmacy:   [id number] - [place] - [address]  [address] [facility] [address]  Phone: [phone] Fax: [phone]    [facility] [id number]  [facility],  [address] [place] [address],  First [address] [facility] [address]  Phone: [phone] Fax: [phone]    Primary Care Provider: Mehul Thorpe MD    Primary Insurance: [employer] [facility]  Secondary Insurance:     DISCHARGE DETAILS:    Discharge planning discussed with:: Patient  Freedom of Choice: Yes  Comments - Freedom of Choice: info provided on private pay HHA  CM contacted family/caregiver?: Yes  Were  Treatment Team discharge recommendations reviewed with patient/caregiver?: Yes  Did patient/caregiver verbalize understanding of patient care needs?: Yes  Were patient/caregiver advised of the risks associated with not following Treatment Team discharge recommendations?: Yes    Contacts  Patient Contacts: Sam Harding-son  Relationship to Patient:: Family  Contact Method: Phone  Phone Number: [phone]  Reason/Outcome: Referral, Discharge Planning    Requested Home Health Care         Is the patient interested in HHC at discharge?: No    DME Referral Provided  Referral made for DME?: No    Other Referral/Resources/Interventions Provided:  Interventions: HHA    Would you like to participate in our [facility] service program?  : No - Declined    Treatment Team Recommendation: Home  Discharge Destination Plan:: Home  Transport at Discharge : Family                             IMM Given (Date):: 01/23/25  IMM Given to:: Patient     IMM reviewed with patient, patient agrees with discharge determination.    CM met with patient at bedside, patient reports issues with her room phone.  CM sat with patient to call her son and provided patient's son with room phone number.  CM will continue to follow.

## 2025-01-24 ENCOUNTER — APPOINTMENT (INPATIENT)
Dept: RADIOLOGY | Facility: HOSPITAL | Age: 80
DRG: 193 | End: 2025-01-24
Payer: COMMERCIAL

## 2025-01-24 LAB
ANION GAP SERPL CALCULATED.3IONS-SCNC: 5 MMOL/L (ref 4–13)
ANION GAP SERPL CALCULATED.3IONS-SCNC: 6 MMOL/L (ref 4–13)
BASE EX.OXY STD BLDV CALC-SCNC: 97.5 % (ref 60–80)
BASE EXCESS BLDV CALC-SCNC: 9.8 MMOL/L
BUN SERPL-MCNC: 27 MG/DL (ref 5–25)
BUN SERPL-MCNC: 27 MG/DL (ref 5–25)
CALCIUM SERPL-MCNC: 9.3 MG/DL (ref 8.4–10.2)
CALCIUM SERPL-MCNC: 9.4 MG/DL (ref 8.4–10.2)
CARDIAC TROPONIN I PNL SERPL HS: 32 NG/L (ref 8–18)
CHLORIDE SERPL-SCNC: 96 MMOL/L (ref 96–108)
CHLORIDE SERPL-SCNC: 96 MMOL/L (ref 96–108)
CO2 SERPL-SCNC: 36 MMOL/L (ref 21–32)
CO2 SERPL-SCNC: 38 MMOL/L (ref 21–32)
CREAT SERPL-MCNC: 0.71 MG/DL (ref 0.6–1.3)
CREAT SERPL-MCNC: 0.71 MG/DL (ref 0.6–1.3)
ERYTHROCYTE [DISTWIDTH] IN BLOOD BY AUTOMATED COUNT: 13 % (ref 11.6–15.1)
GFR SERPL CREATININE-BSD FRML MDRD: 80 ML/MIN/1.73SQ M
GFR SERPL CREATININE-BSD FRML MDRD: 80 ML/MIN/1.73SQ M
GLUCOSE SERPL-MCNC: 171 MG/DL (ref 65–140)
GLUCOSE SERPL-MCNC: 180 MG/DL (ref 65–140)
GLUCOSE SERPL-MCNC: 90 MG/DL (ref 65–140)
GLUCOSE SERPL-MCNC: 94 MG/DL (ref 65–140)
GLUCOSE SERPL-MCNC: 95 MG/DL (ref 65–140)
HCO3 BLDV-SCNC: 34.8 MMOL/L (ref 24–30)
HCT VFR BLD AUTO: 36.3 % (ref 34.8–46.1)
HGB BLD-MCNC: 11.8 G/DL (ref 11.5–15.4)
MCH RBC QN AUTO: 32.9 PG (ref 26.8–34.3)
MCHC RBC AUTO-ENTMCNC: 32.5 G/DL (ref 31.4–37.4)
MCV RBC AUTO: 101 FL (ref 82–98)
O2 CT BLDV-SCNC: 17 ML/DL
PCO2 BLDV: 49.2 MM HG (ref 42–50)
PH BLDV: 7.47 [PH] (ref 7.3–7.4)
PLATELET # BLD AUTO: 326 THOUSANDS/UL (ref 149–390)
PMV BLD AUTO: 10.3 FL (ref 8.9–12.7)
PO2 BLDV: 230 MM HG (ref 35–45)
POTASSIUM SERPL-SCNC: 4.2 MMOL/L (ref 3.5–5.3)
POTASSIUM SERPL-SCNC: 4.3 MMOL/L (ref 3.5–5.3)
RBC # BLD AUTO: 3.59 MILLION/UL (ref 3.81–5.12)
S AUREUS+CONS DNA BLD POS NAA+NON-PROBE: DETECTED
SODIUM SERPL-SCNC: 138 MMOL/L (ref 135–147)
SODIUM SERPL-SCNC: 139 MMOL/L (ref 135–147)
WBC # BLD AUTO: 9.36 THOUSAND/UL (ref 4.31–10.16)

## 2025-01-24 PROCEDURE — 80048 BASIC METABOLIC PNL TOTAL CA: CPT | Performed by: PHYSICIAN ASSISTANT

## 2025-01-24 PROCEDURE — 71045 X-RAY EXAM CHEST 1 VIEW: CPT

## 2025-01-24 PROCEDURE — 82805 BLOOD GASES W/O2 SATURATION: CPT | Performed by: PHYSICIAN ASSISTANT

## 2025-01-24 PROCEDURE — 99232 SBSQ HOSP IP/OBS MODERATE 35: CPT | Performed by: PHYSICIAN ASSISTANT

## 2025-01-24 PROCEDURE — 82948 REAGENT STRIP/BLOOD GLUCOSE: CPT

## 2025-01-24 PROCEDURE — 85027 COMPLETE CBC AUTOMATED: CPT | Performed by: PHYSICIAN ASSISTANT

## 2025-01-24 PROCEDURE — 94760 N-INVAS EAR/PLS OXIMETRY 1: CPT

## 2025-01-24 PROCEDURE — 84484 ASSAY OF TROPONIN QUANT: CPT | Performed by: PHYSICIAN ASSISTANT

## 2025-01-24 PROCEDURE — 94640 AIRWAY INHALATION TREATMENT: CPT

## 2025-01-24 RX ORDER — GUAIFENESIN 600 MG/1
1200 TABLET, EXTENDED RELEASE ORAL EVERY 12 HOURS SCHEDULED
Status: DISCONTINUED | OUTPATIENT
Start: 2025-01-24 | End: 2025-01-25 | Stop reason: HOSPADM

## 2025-01-24 RX ADMIN — ENOXAPARIN SODIUM 40 MG: 40 INJECTION SUBCUTANEOUS at 10:03

## 2025-01-24 RX ADMIN — IPRATROPIUM BROMIDE AND ALBUTEROL SULFATE 3 ML: 2.5; .5 SOLUTION RESPIRATORY (INHALATION) at 07:59

## 2025-01-24 RX ADMIN — INSULIN LISPRO 1 UNITS: 100 INJECTION, SOLUTION INTRAVENOUS; SUBCUTANEOUS at 18:11

## 2025-01-24 RX ADMIN — IPRATROPIUM BROMIDE AND ALBUTEROL SULFATE 3 ML: 2.5; .5 SOLUTION RESPIRATORY (INHALATION) at 19:42

## 2025-01-24 RX ADMIN — UMECLIDINIUM 1 PUFF: 62.5 AEROSOL, POWDER ORAL at 10:05

## 2025-01-24 RX ADMIN — METOPROLOL SUCCINATE 25 MG: 25 TABLET, EXTENDED RELEASE ORAL at 11:32

## 2025-01-24 RX ADMIN — LOSARTAN POTASSIUM 25 MG: 25 TABLET, FILM COATED ORAL at 10:02

## 2025-01-24 RX ADMIN — FORMOTEROL FUMARATE DIHYDRATE 20 MCG: 20 SOLUTION RESPIRATORY (INHALATION) at 19:42

## 2025-01-24 RX ADMIN — GUAIFENESIN 600 MG: 600 TABLET, EXTENDED RELEASE ORAL at 10:01

## 2025-01-24 RX ADMIN — LEVOTHYROXINE SODIUM 150 MCG: 75 TABLET ORAL at 05:32

## 2025-01-24 RX ADMIN — PREDNISONE 40 MG: 20 TABLET ORAL at 10:01

## 2025-01-24 RX ADMIN — FORMOTEROL FUMARATE DIHYDRATE 20 MCG: 20 SOLUTION RESPIRATORY (INHALATION) at 07:59

## 2025-01-24 RX ADMIN — ASPIRIN 81 MG: 81 TABLET, COATED ORAL at 11:32

## 2025-01-24 RX ADMIN — IPRATROPIUM BROMIDE AND ALBUTEROL SULFATE 3 ML: 2.5; .5 SOLUTION RESPIRATORY (INHALATION) at 13:12

## 2025-01-24 RX ADMIN — GUAIFENESIN 1200 MG: 600 TABLET, EXTENDED RELEASE ORAL at 22:00

## 2025-01-24 RX ADMIN — ATORVASTATIN CALCIUM 80 MG: 80 TABLET, FILM COATED ORAL at 16:58

## 2025-01-24 RX ADMIN — CEFTRIAXONE 1000 MG: 10 INJECTION, POWDER, FOR SOLUTION INTRAVENOUS at 15:23

## 2025-01-24 NOTE — ASSESSMENT & PLAN NOTE
Presenting in respiratory distress, ongoing wheezing  Continue p.o. prednisone  Continue nebs  BiPAP PRN  Increase Mucinex to 2 tablets twice daily  Unable to utilize Zithromax given history of allergy

## 2025-01-24 NOTE — ASSESSMENT & PLAN NOTE
Severe three vessel CAD on cardiac CTA, initial plan was for conservative management, patient now asking for consideration of second opinion noting she is concerned about having an acute heart attack  W/ ischemic cardiomyopathy at 10%  Recheck troponin now given this was elevated at time of admission  Will need close outpatient cardiology follow-up

## 2025-01-24 NOTE — PROGRESS NOTES
Progress Note - Hospitalist   Name: Cortney Harding 79 y.o. female I MRN: [id number]  Unit/Bed#: E4 -01 I Date of Admission: 1/21/2025   Date of Service: 1/24/2025 I Hospital Day: 3    Assessment & Plan  Acute on chronic respiratory failure with hypoxia (HCC)  Presents in respiratory distress, wheezing.  Back on 3 L nasal cannula oxygen up from baseline of 2 L nasal cannula oxygen, continues to have wheezing and coarse breath sounds  Likely multifactorial in the setting of suspected postviral pneumonia, pulmonary congestion from self discontinuing diuretic, COPD exacerbation   Recheck chest x-ray today  Chronic obstructive pulmonary disease with acute exacerbation (HCC)  Presenting in respiratory distress, ongoing wheezing  Continue p.o. prednisone  Continue nebs  BiPAP PRN  Increase Mucinex to 2 tablets twice daily  Unable to utilize Zithromax given history of allergy  Acute systolic heart failure (HCC)  Wt Readings from Last 3 Encounters:   01/24/25 84.9 kg (187 lb 2.7 oz)   05/16/24 86.2 kg (190 lb)   02/20/24 86.5 kg (190 lb 9.6 oz)     Weight up, trace edema, BNP 1149. Self discontinued diuretic due to frequent urination  TTE LVEF 10%, global hypokinesis, LA dilation, trace AR, mild MR  Received intermittent dosing of IV Lasix with plan to transition to p.o. torsemide today but given worsening renal function, will hold off for now  GDMT: continue Toprol XL 25mg daily, losartan 25mg daily  Needs close outpatient follow-up with cardiology at time of discharge  Recheck echocardiogram  Coronary artery disease involving native coronary artery of native heart without angina pectoris  Severe three vessel CAD on cardiac CTA, initial plan was for conservative management, patient now asking for consideration of second opinion noting she is concerned about having an acute heart attack  W/ ischemic cardiomyopathy at 10%  Recheck troponin now given this was elevated at time of admission  Will need close outpatient  cardiology follow-up  Pneumonia  Worsening cough/dyspnea  CXR with right lower lobe pneumonia  Speech eval  Continue ceftriaxone    VTE Pharmacologic Prophylaxis: VTE Score: 4 Moderate Risk (Score 3-4) - Pharmacological DVT Prophylaxis Ordered: enoxaparin (Lovenox).    Mobility:   Basic Mobility Inpatient Raw Score: 20  JH-HLM Goal: 6: Walk 10 steps or more  JH-HLM Achieved: 6: Walk 10 steps or more  JH-HLM Goal achieved. Continue to encourage appropriate mobility.    Patient Centered Rounds: I performed bedside rounds with nursing staff today.   Discussions with Specialists or Other Care Team Provider: None    Education and Discussions with Family / Patient: Updated  (son) via phone.    Current Length of Stay: 3 day(s)  Current Patient Status: Inpatient   Certification Statement: The patient will continue to require additional inpatient hospital stay due to acute respiratory failure with hypoxia  Discharge Plan: Anticipate discharge in 24-48 hrs to home.    Code Status: Level 1 - Full Code    Subjective   Patient seen examined at bedside.  Notes she continues to feel intermittently poorly.  She reports coughing spells.    Objective :  Temp:  [97.5 °F (36.4 °C)-98.3 °F (36.8 °C)] 97.5 °F (36.4 °C)  HR:  [] 89  BP: ()/(55-74) 122/74  Resp:  [20] 20  SpO2:  [93 %-99 %] 98 %  O2 Device: Nasal cannula  Nasal Cannula O2 Flow Rate (L/min):  [2 L/min-3.5 L/min] 3 L/min    Body mass index is 34.23 kg/m².     Input and Output Summary (last 24 hours):     Intake/Output Summary (Last 24 hours) at 1/24/2025 1437  Last data filed at 1/24/2025 0601  Gross per 24 hour   Intake 360 ml   Output 750 ml   Net -390 ml       Physical Exam  Vitals reviewed.   Constitutional:       General: She is not in acute distress.  HENT:      Head: Normocephalic and atraumatic.   Eyes:      General: No scleral icterus.     Conjunctiva/sclera: Conjunctivae normal.   Cardiovascular:      Rate and Rhythm: Normal rate and  regular rhythm.      Heart sounds: No murmur heard.  Pulmonary:      Effort: Pulmonary effort is normal. No respiratory distress.      Breath sounds: Wheezing present.      Comments: Coarse breath sounds bilaterally  Abdominal:      General: Bowel sounds are normal. There is no distension.      Palpations: Abdomen is soft.      Tenderness: There is no abdominal tenderness.   Musculoskeletal:      Cervical back: Neck supple.      Right lower leg: No edema.      Left lower leg: No edema.   Skin:     General: Skin is warm and dry.   Neurological:      Mental Status: She is alert and oriented to person, place, and time.   Psychiatric:         Mood and Affect: Mood normal.         Behavior: Behavior normal.           Lines/Drains:              Lab Results: I have reviewed the following results:   Results from last 7 days   Lab Units 01/24/25  0547 01/21/25  0602 01/21/25  0353   WBC Thousand/uL 9.36   < > 9.17   HEMOGLOBIN g/dL 11.8   < > 12.5   HEMATOCRIT % 36.3   < > 38.2   PLATELETS Thousands/uL 326   < > 350   SEGS PCT %  --   --  65   LYMPHO PCT %  --   --  30   MONO PCT %  --   --  5   EOS PCT %  --   --  0    < > = values in this interval not displayed.     Results from last 7 days   Lab Units 01/24/25  0547 01/21/25  0602 01/21/25  0353   SODIUM mmol/L 139   < > 135   POTASSIUM mmol/L 4.2   < > 3.9   CHLORIDE mmol/L 96   < > 99   CO2 mmol/L 38*   < > 27   BUN mg/dL 27*   < > 13   CREATININE mg/dL 0.71   < > 0.91   ANION GAP mmol/L 5   < > 9   CALCIUM mg/dL 9.3   < > 9.0   ALBUMIN g/dL  --   --  4.1   TOTAL BILIRUBIN mg/dL  --   --  0.59   ALK PHOS U/L  --   --  87   ALT U/L  --   --  26   AST U/L  --   --  34   GLUCOSE RANDOM mg/dL 94   < > 289*    < > = values in this interval not displayed.     Results from last 7 days   Lab Units 01/21/25  0353   INR  1.11     Results from last 7 days   Lab Units 01/24/25  1128 01/24/25  0729 01/23/25  2123 01/23/25  1536 01/23/25  1131 01/23/25  0805 01/22/25  2106  01/22/25  1633 01/22/25  1135 01/22/25  0751 01/21/25  2116 01/21/25  1606   POC GLUCOSE mg/dl 90 95 153* 236* 124 101 149* 168* 100 110 217* 240*         Results from last 7 days   Lab Units 01/22/25  0625 01/21/25  0602 01/21/25  0413 01/21/25  0353   LACTIC ACID mmol/L  --  2.3*  --  3.0*   PROCALCITONIN ng/ml 0.24  --  0.06  --        Recent Cultures (last 7 days):   Results from last 7 days   Lab Units 01/21/25  0413   BLOOD CULTURE  Staphylococcus coagulase negative*  No Growth at 72 hrs.   GRAM STAIN RESULT  Gram positive cocci in clusters*             Last 24 Hours Medication List:     Current Facility-Administered Medications:     acetaminophen (TYLENOL) tablet 975 mg, Q6H PRN    aluminum-magnesium hydroxide-simethicone (MAALOX) oral suspension 30 mL, Q6H PRN    aspirin (ECOTRIN LOW STRENGTH) EC tablet 81 mg, Daily    atorvastatin (LIPITOR) tablet 80 mg, Daily With Dinner    cefTRIAXone (ROCEPHIN) 1,000 mg in dextrose 5 % 50 mL IVPB, Q24H, Last Rate: 1,000 mg (01/23/25 1617)    enoxaparin (LOVENOX) subcutaneous injection 40 mg, Daily    formoterol (PERFOROMIST) nebulizer solution 20 mcg, Q12H    guaiFENesin (MUCINEX) 12 hr tablet 1,200 mg, Q12H RADHA    insulin lispro (HumALOG/ADMELOG) 100 units/mL subcutaneous injection 1-6 Units, TID AC **AND** Fingerstick Glucose (POCT), TID AC    ipratropium-albuterol (DUO-NEB) 0.5-2.5 mg/3 mL inhalation solution 3 mL, TID    levalbuterol (XOPENEX) inhalation solution 1.25 mg, Q6H PRN **AND** [DISCONTINUED] sodium chloride 0.9 % inhalation solution 3 mL, Q6H PRN    levothyroxine tablet 150 mcg, Early Morning    losartan (COZAAR) tablet 25 mg, Daily    metoprolol succinate (TOPROL-XL) 24 hr tablet 25 mg, Daily    ondansetron (ZOFRAN) injection 4 mg, Q6H PRN    polyethylene glycol (MIRALAX) packet 17 g, Daily PRN    predniSONE tablet 40 mg, Daily    [Held by provider] torsemide (DEMADEX) tablet 20 mg, Daily    umeclidinium 62.5 mcg/actuation inhaler AEPB 1 puff,  Daily    Administrative Statements   Today, Patient Was Seen By: Daniella Zuñiga PA-C      **Please Note: This note may have been constructed using a voice recognition system.**

## 2025-01-24 NOTE — ASSESSMENT & PLAN NOTE
Presents in respiratory distress, wheezing.  Back on 3 L nasal cannula oxygen up from baseline of 2 L nasal cannula oxygen, continues to have wheezing and coarse breath sounds  Likely multifactorial in the setting of suspected postviral pneumonia, pulmonary congestion from self discontinuing diuretic, COPD exacerbation   Recheck chest x-ray today

## 2025-01-24 NOTE — ASSESSMENT & PLAN NOTE
Wt Readings from Last 3 Encounters:   01/24/25 84.9 kg (187 lb 2.7 oz)   05/16/24 86.2 kg (190 lb)   02/20/24 86.5 kg (190 lb 9.6 oz)     Weight up, trace edema, BNP 1149. Self discontinued diuretic due to frequent urination  TTE LVEF 10%, global hypokinesis, LA dilation, trace AR, mild MR  Received intermittent dosing of IV Lasix with plan to transition to p.o. torsemide today but given worsening renal function, will hold off for now  GDMT: continue Toprol XL 25mg daily, losartan 25mg daily  Needs close outpatient follow-up with cardiology at time of discharge  Recheck echocardiogram

## 2025-01-25 VITALS
SYSTOLIC BLOOD PRESSURE: 121 MMHG | DIASTOLIC BLOOD PRESSURE: 67 MMHG | HEART RATE: 100 BPM | BODY MASS INDEX: 34.32 KG/M2 | HEIGHT: 62 IN | TEMPERATURE: 97.6 F | RESPIRATION RATE: 20 BRPM | WEIGHT: 186.51 LBS | OXYGEN SATURATION: 93 %

## 2025-01-25 LAB — GLUCOSE SERPL-MCNC: 93 MG/DL (ref 65–140)

## 2025-01-25 PROCEDURE — 82948 REAGENT STRIP/BLOOD GLUCOSE: CPT

## 2025-01-25 PROCEDURE — 99239 HOSP IP/OBS DSCHRG MGMT >30: CPT | Performed by: PHYSICIAN ASSISTANT

## 2025-01-25 PROCEDURE — 94760 N-INVAS EAR/PLS OXIMETRY 1: CPT

## 2025-01-25 PROCEDURE — 94640 AIRWAY INHALATION TREATMENT: CPT

## 2025-01-25 RX ORDER — FLUTICASONE FUROATE AND VILANTEROL 100; 25 UG/1; UG/1
1 POWDER RESPIRATORY (INHALATION) DAILY
Qty: 60 EACH | Refills: 0 | Status: SHIPPED | OUTPATIENT
Start: 2025-01-25

## 2025-01-25 RX ORDER — GUAIFENESIN 600 MG/1
600 TABLET, EXTENDED RELEASE ORAL EVERY 12 HOURS SCHEDULED
Qty: 20 TABLET | Refills: 0 | Status: SHIPPED | OUTPATIENT
Start: 2025-01-25

## 2025-01-25 RX ORDER — ALBUTEROL SULFATE 90 UG/1
2 INHALANT RESPIRATORY (INHALATION) EVERY 6 HOURS PRN
Qty: 54 G | Refills: 0 | Status: SHIPPED | OUTPATIENT
Start: 2025-01-25

## 2025-01-25 RX ORDER — PREDNISONE 10 MG/1
TABLET ORAL
Qty: 20 TABLET | Refills: 0 | Status: SHIPPED | OUTPATIENT
Start: 2025-01-26 | End: 2025-02-03

## 2025-01-25 RX ORDER — TIOTROPIUM BROMIDE 18 UG/1
18 CAPSULE ORAL; RESPIRATORY (INHALATION) DAILY
Qty: 90 CAPSULE | Refills: 0 | Status: SHIPPED | OUTPATIENT
Start: 2025-01-25

## 2025-01-25 RX ORDER — IPRATROPIUM BROMIDE AND ALBUTEROL SULFATE 2.5; .5 MG/3ML; MG/3ML
3 SOLUTION RESPIRATORY (INHALATION) 3 TIMES DAILY PRN
Qty: 180 ML | Refills: 0 | Status: SHIPPED | OUTPATIENT
Start: 2025-01-25

## 2025-01-25 RX ORDER — TORSEMIDE 20 MG/1
20 TABLET ORAL DAILY
Qty: 30 TABLET | Refills: 0 | Status: SHIPPED | OUTPATIENT
Start: 2025-01-25 | End: 2025-02-24

## 2025-01-25 RX ORDER — CEFDINIR 300 MG/1
300 CAPSULE ORAL EVERY 12 HOURS SCHEDULED
Qty: 6 CAPSULE | Refills: 0 | Status: SHIPPED | OUTPATIENT
Start: 2025-01-25 | End: 2025-01-28

## 2025-01-25 RX ADMIN — GUAIFENESIN 1200 MG: 600 TABLET, EXTENDED RELEASE ORAL at 09:12

## 2025-01-25 RX ADMIN — LOSARTAN POTASSIUM 25 MG: 25 TABLET, FILM COATED ORAL at 09:12

## 2025-01-25 RX ADMIN — IPRATROPIUM BROMIDE AND ALBUTEROL SULFATE 3 ML: 2.5; .5 SOLUTION RESPIRATORY (INHALATION) at 07:21

## 2025-01-25 RX ADMIN — ENOXAPARIN SODIUM 40 MG: 40 INJECTION SUBCUTANEOUS at 09:14

## 2025-01-25 RX ADMIN — METOPROLOL SUCCINATE 25 MG: 25 TABLET, EXTENDED RELEASE ORAL at 09:12

## 2025-01-25 RX ADMIN — FORMOTEROL FUMARATE DIHYDRATE 20 MCG: 20 SOLUTION RESPIRATORY (INHALATION) at 07:21

## 2025-01-25 RX ADMIN — ASPIRIN 81 MG: 81 TABLET, COATED ORAL at 09:13

## 2025-01-25 RX ADMIN — PREDNISONE 40 MG: 20 TABLET ORAL at 09:12

## 2025-01-25 NOTE — PLAN OF CARE
Problem: Potential for Falls  Goal: Patient will remain free of falls  Description: INTERVENTIONS:  - Educate patient/family on patient safety including physical limitations  - Instruct patient to call for assistance with activity   - Consult OT/PT to assist with strengthening/mobility   - Keep Call bell within reach  - Keep bed low and locked with side rails adjusted as appropriate  - Keep care items and personal belongings within reach  - Initiate and maintain comfort rounds  - Make Fall Risk Sign visible to staff  - Offer Toileting every 2 Hours, in advance of need  - Initiate/Maintain bed/chair alarm  - Obtain necessary fall risk management equipment: bed/chair  - Apply yellow socks and bracelet for high fall risk patients  - Consider moving patient to room near nurses station  Outcome: Progressing     Problem: Prexisting or High Potential for Compromised Skin Integrity  Goal: Skin integrity is maintained or improved  Description: INTERVENTIONS:  - Identify patients at risk for skin breakdown  - Assess and monitor skin integrity  - Assess and monitor nutrition and hydration status  - Monitor labs   - Assess for incontinence   - Turn and reposition patient  - Assist with mobility/ambulation  - Relieve pressure over bony prominences  - Avoid friction and shearing  - Provide appropriate hygiene as needed including keeping skin clean and dry  - Evaluate need for skin moisturizer/barrier cream  - Collaborate with interdisciplinary team   - Patient/family teaching  - Consider wound care consult   Outcome: Progressing     Problem: RESPIRATORY - ADULT  Goal: Achieves optimal ventilation and oxygenation  Description: INTERVENTIONS:  - Assess for changes in respiratory status  - Assess for changes in mentation and behavior  - Position to facilitate oxygenation and minimize respiratory effort  - Oxygen administered by appropriate delivery if ordered  - Initiate smoking cessation education as indicated  - Encourage  broncho-pulmonary hygiene including cough, deep breathe, Incentive Spirometry  - Assess the need for suctioning and aspirate as needed  - Assess and instruct to report SOB or any respiratory difficulty  - Respiratory Therapy support as indicated  Outcome: Progressing

## 2025-01-25 NOTE — DISCHARGE SUMMARY
Discharge Summary - Hospitalist   Name: Cortney Harding 79 y.o. female I MRN: [id number]  Unit/Bed#: E4 -01 I Date of Admission: 1/21/2025   Date of Service: 1/25/2025 I Hospital Day: 4     Assessment & Plan  Acute on chronic respiratory failure with hypoxia (HCC)  Presents in respiratory distress, wheezing.  Stable on home 2 L nasal cannula oxygen today  Likely multifactorial in the setting of suspected postviral pneumonia, pulmonary congestion from self discontinuing diuretic, COPD exacerbation   Repeat chest x-ray without evidence of CHF or pneumonia  Chronic obstructive pulmonary disease with acute exacerbation (HCC)  Presenting in respiratory distress, ongoing wheezing  Continue p.o. prednisone  Continue nebs  Continue Mucinex  Unable to utilize Zithromax given history of allergy  Acute systolic heart failure (HCC)  Wt Readings from Last 3 Encounters:   01/25/25 84.6 kg (186 lb 8.2 oz)   05/16/24 86.2 kg (190 lb)   02/20/24 86.5 kg (190 lb 9.6 oz)     Weight up, trace edema, BNP 1149. Self discontinued diuretic due to frequent urination  TTE LVEF 10%, global hypokinesis, LA dilation, trace AR, mild MR  Received intermittent dosing of IV Lasix with plan to transition to p.o. torsemide at discharge  GDMT: continue Toprol XL 25mg daily, losartan 25mg daily  Needs close outpatient follow-up with cardiology at time of discharge-placed ambulatory referral and encourage patient to call on Monday to make an appointment  Coronary artery disease involving native coronary artery of native heart without angina pectoris  Severe three vessel CAD on cardiac CTA, initial plan was for conservative management, patient now asking for consideration of second opinion noting she is concerned about having an acute heart attack  W/ ischemic cardiomyopathy at 10%  Repeat troponin 32  Will need close outpatient cardiology follow-up, encouraged her to call for follow-up  Pneumonia  Worsening cough/dyspnea  CXR with right lower lobe  pneumonia  Speech eval  Received IV Rocephin while inpatient     Medical Problems       Resolved Problems  Date Reviewed: 2/19/2024   None       Discharging Physician / Practitioner: Daniella Zuñiga PA-C  PCP: Mehul Thorpe MD  Admission Date:   Admission Orders (From admission, onward)       Ordered        01/21/25 0500  INPATIENT ADMISSION  Once                          Discharge Date: 01/25/25    Consultations During Hospital Stay:  None    Procedures Performed:   XR chest portable  Result Date: 1/24/2025  Impression: Resolution of pulmonary edema. Nothing to suggest pneumonia.    XR ankle 3+ vw left  Result Date: 1/23/2025  Impression: No evidence of acute osseous abnormality. Degenerative changes as described above.     XR chest 1 view portable  Result Date: 1/21/2025  Impression: Right lower lobe pneumonia present on a background of emphysematous change.     Significant Findings / Test Results:   None    Incidental Findings:   None    Test Results Pending at Discharge (will require follow up):   None     Outpatient Tests Requested:  Cardiology follow-up    Complications: None    Reason for Admission: Shortness of breath    Hospital Course:   Cortney Harding is a 79 y.o. female patient who originally presented to the hospital on 1/21/2025 due to shortness of breath.  Patient is present to hospital complaint of shortness of breath.  She was treated with IV Solu-Medrol and nebulizers for COPD exacerbation.  She did receive intermittent IV Lasix for her congestive heart failure with improvement.  She did require BiPAP at time of admission but was weaned down to her home nasal cannula oxygen prior to discharge.  She was discharged with prescription for new inhalers and ambulatory referral to cardiology.      Please see above list of diagnoses and related plan for additional information.     Condition at Discharge: stable    Discharge Day Visit / Exam:   Subjective: Patient seen and examined at  "bedside.  Notes she is feeling much better and is ready to go home.  Asking to be discharged as soon as possible.  Vitals: Blood Pressure: 121/67 (01/25/25 0808)  Pulse: 100 (01/25/25 0808)  Temperature: 97.6 °F (36.4 °C) (01/25/25 0808)  Temp Source: Temporal (01/25/25 0808)  Respirations: 20 (01/25/25 0808)  Height: 5' 2\" (157.5 cm) (01/21/25 0503)  Weight - Scale: 84.6 kg (186 lb 8.2 oz) (01/25/25 0325)  SpO2: 93 % (01/25/25 0808)  Physical Exam  Vitals reviewed.   Constitutional:       General: She is not in acute distress.  HENT:      Head: Normocephalic and atraumatic.   Eyes:      General: No scleral icterus.     Conjunctiva/sclera: Conjunctivae normal.   Cardiovascular:      Rate and Rhythm: Normal rate and regular rhythm.      Heart sounds: No murmur heard.  Pulmonary:      Effort: Pulmonary effort is normal. No respiratory distress.   Abdominal:      General: Bowel sounds are normal. There is no distension.      Palpations: Abdomen is soft.      Tenderness: There is no abdominal tenderness.   Musculoskeletal:      Cervical back: Neck supple.      Right lower leg: No edema.      Left lower leg: No edema.   Skin:     General: Skin is warm and dry.   Neurological:      Mental Status: She is alert and oriented to person, place, and time.   Psychiatric:         Mood and Affect: Mood normal.         Behavior: Behavior normal.          Discussion with Family: Updated  (son) via phone.    Discharge instructions/Information to patient and family:   See after visit summary for information provided to patient and family.      Provisions for Follow-Up Care:  See after visit summary for information related to follow-up care and any pertinent home health orders.      Mobility at time of Discharge:   Basic Mobility Inpatient Raw Score: 20  JH-HLM Goal: 6: Walk 10 steps or more  JH-HLM Achieved: 6: Walk 10 steps or more  HLM Goal achieved. Continue to encourage appropriate mobility.     Disposition: "   Home    Planned Readmission: none    Discharge Medications:  See after visit summary for reconciled discharge medications provided to patient and/or family.      Administrative Statements   Discharge Statement:  I have spent a total time of 35 minutes in caring for this patient on the day of the visit/encounter. .    **Please Note: This note may have been constructed using a voice recognition system**

## 2025-01-25 NOTE — ASSESSMENT & PLAN NOTE
Severe three vessel CAD on cardiac CTA, initial plan was for conservative management, patient now asking for consideration of second opinion noting she is concerned about having an acute heart attack  W/ ischemic cardiomyopathy at 10%  Repeat troponin 32  Will need close outpatient cardiology follow-up, encouraged her to call for follow-up

## 2025-01-25 NOTE — ASSESSMENT & PLAN NOTE
Wt Readings from Last 3 Encounters:   01/25/25 84.6 kg (186 lb 8.2 oz)   05/16/24 86.2 kg (190 lb)   02/20/24 86.5 kg (190 lb 9.6 oz)     Weight up, trace edema, BNP 1149. Self discontinued diuretic due to frequent urination  TTE LVEF 10%, global hypokinesis, LA dilation, trace AR, mild MR  Received intermittent dosing of IV Lasix with plan to transition to p.o. torsemide at discharge  GDMT: continue Toprol XL 25mg daily, losartan 25mg daily  Needs close outpatient follow-up with cardiology at time of discharge-placed ambulatory referral and encourage patient to call on Monday to make an appointment

## 2025-01-25 NOTE — PLAN OF CARE
Problem: Potential for Falls  Goal: Patient will remain free of falls  Description: INTERVENTIONS:  - Educate patient/family on patient safety including physical limitations  - Instruct patient to call for assistance with activity   - Consult OT/PT to assist with strengthening/mobility   - Keep Call bell within reach  - Keep bed low and locked with side rails adjusted as appropriate  - Keep care items and personal belongings within reach  - Initiate and maintain comfort rounds  - Make Fall Risk Sign visible to staff  - Offer Toileting every  Hours, in advance of need  - Initiate/Maintain alarm  - Obtain necessary fall risk management equipment:   - Apply yellow socks and bracelet for high fall risk patients  - Consider moving patient to room near nurses station  1/25/2025 1236 by Carolynn Campos RN  Outcome: Progressing  1/25/2025 1012 by Carolynn Campos RN  Outcome: Progressing     Problem: Prexisting or High Potential for Compromised Skin Integrity  Goal: Skin integrity is maintained or improved  Description: INTERVENTIONS:  - Identify patients at risk for skin breakdown  - Assess and monitor skin integrity  - Assess and monitor nutrition and hydration status  - Monitor labs   - Assess for incontinence   - Turn and reposition patient  - Assist with mobility/ambulation  - Relieve pressure over bony prominences  - Avoid friction and shearing  - Provide appropriate hygiene as needed including keeping skin clean and dry  - Evaluate need for skin moisturizer/barrier cream  - Collaborate with interdisciplinary team   - Patient/family teaching  - Consider wound care consult   1/25/2025 1236 by Carolynn Campos RN  Outcome: Progressing  1/25/2025 1012 by Carolynn Campos RN  Outcome: Progressing     Problem: RESPIRATORY - ADULT  Goal: Achieves optimal ventilation and oxygenation  Description: INTERVENTIONS:  - Assess for changes in respiratory status  - Assess for changes in mentation and behavior  - Position  to facilitate oxygenation and minimize respiratory effort  - Oxygen administered by appropriate delivery if ordered  - Initiate smoking cessation education as indicated  - Encourage broncho-pulmonary hygiene including cough, deep breathe, Incentive Spirometry  - Assess the need for suctioning and aspirate as needed  - Assess and instruct to report SOB or any respiratory difficulty  - Respiratory Therapy support as indicated  1/25/2025 1236 by Carolynn Campos RN  Outcome: Progressing  1/25/2025 1012 by Carolynn Campos RN  Outcome: Progressing

## 2025-01-25 NOTE — ASSESSMENT & PLAN NOTE
Presenting in respiratory distress, ongoing wheezing  Continue p.o. prednisone  Continue nebs  Continue Mucinex  Unable to utilize Zithromax given history of allergy

## 2025-01-25 NOTE — PLAN OF CARE
Problem: Potential for Falls  Goal: Patient will remain free of falls  Description: INTERVENTIONS:  - Educate patient/family on patient safety including physical limitations  - Instruct patient to call for assistance with activity   - Consult OT/PT to assist with strengthening/mobility   - Keep Call bell within reach  - Keep bed low and locked with side rails adjusted as appropriate  - Keep care items and personal belongings within reach  - Initiate and maintain comfort rounds  - Make Fall Risk Sign visible to staff  - Offer Toileting every  Hours, in advance of need  - Initiate/Maintain alarm  - Obtain necessary fall risk management equipment:   - Apply yellow socks and bracelet for high fall risk patients  - Consider moving patient to room near nurses station  Outcome: Progressing     Problem: Prexisting or High Potential for Compromised Skin Integrity  Goal: Skin integrity is maintained or improved  Description: INTERVENTIONS:  - Identify patients at risk for skin breakdown  - Assess and monitor skin integrity  - Assess and monitor nutrition and hydration status  - Monitor labs   - Assess for incontinence   - Turn and reposition patient  - Assist with mobility/ambulation  - Relieve pressure over bony prominences  - Avoid friction and shearing  - Provide appropriate hygiene as needed including keeping skin clean and dry  - Evaluate need for skin moisturizer/barrier cream  - Collaborate with interdisciplinary team   - Patient/family teaching  - Consider wound care consult   Outcome: Progressing     Problem: RESPIRATORY - ADULT  Goal: Achieves optimal ventilation and oxygenation  Description: INTERVENTIONS:  - Assess for changes in respiratory status  - Assess for changes in mentation and behavior  - Position to facilitate oxygenation and minimize respiratory effort  - Oxygen administered by appropriate delivery if ordered  - Initiate smoking cessation education as indicated  - Encourage broncho-pulmonary hygiene  including cough, deep breathe, Incentive Spirometry  - Assess the need for suctioning and aspirate as needed  - Assess and instruct to report SOB or any respiratory difficulty  - Respiratory Therapy support as indicated  Outcome: Progressing

## 2025-01-25 NOTE — ASSESSMENT & PLAN NOTE
Presents in respiratory distress, wheezing.  Stable on home 2 L nasal cannula oxygen today  Likely multifactorial in the setting of suspected postviral pneumonia, pulmonary congestion from self discontinuing diuretic, COPD exacerbation   Repeat chest x-ray without evidence of CHF or pneumonia

## 2025-01-26 LAB — BACTERIA BLD CULT: NORMAL

## 2025-01-27 ENCOUNTER — TRANSITIONAL CARE MANAGEMENT (OUTPATIENT)
Dept: FAMILY MEDICINE CLINIC | Facility: CLINIC | Age: 80
End: 2025-01-27

## 2025-01-27 NOTE — UTILIZATION REVIEW
NOTIFICATION OF ADMISSION DISCHARGE   This is a Notification of Discharge from [facility]. Please be advised that this patient has been discharge from our facility. Below you will find the admission and discharge date and time including the patient’s disposition.   UTILIZATION REVIEW CONTACT:  Gabriela Lujan  Utilization   Network Utilization Review Department  Phone: [phone] x carefully listen to the prompts. All voicemails are confidential.  Email: [email or web address]     ADMISSION INFORMATION  PRESENTATION DATE: 1/21/2025  3:43 AM  OBERVATION ADMISSION DATE: N/A  INPATIENT ADMISSION DATE: 1/21/25  5:00 AM   DISCHARGE DATE: 1/25/2025 12:38 PM   DISPOSITION:Home/Self Care    Network Utilization Review Department  ATTENTION: Please call with any questions or concerns to [phone] and carefully listen to the prompts so that you are directed to the right person. All voicemails are confidential.   For Discharge needs, contact Care Management DC Support Team at [phone] opt. 2  Send all requests for admission clinical reviews, approved or denied determinations and any other requests to dedicated fax number below belonging to the campus where the patient is receiving treatment. List of dedicated fax numbers for the Facilities:  FACILITY NAME UR FAX NUMBER   ADMISSION DENIALS (Administrative/Medical Necessity) [phone]   DISCHARGE SUPPORT TEAM ([facility]) [phone]   PARENT CHILD HEALTH (Maternity/NICU/Pediatrics) [phone]   [facility] [phone]   [facility] [phone]   [facility] [phone]   [facility] [phone]   [facility] [phone]   [facility] [phone]   [facility] [phone]   [facility] [phone]    [facility] [phone]   [facility] [phone]   [facility] [phone]   [facility] [phone]

## 2025-01-28 ENCOUNTER — RESULTS FOLLOW-UP (OUTPATIENT)
Dept: EMERGENCY DEPT | Facility: HOSPITAL | Age: 80
End: 2025-01-28

## 2025-01-28 LAB
ALL TARGETS: NOT DETECTED
BACTERIA BLD CULT: ABNORMAL
BACTERIA BLD CULT: ABNORMAL
GRAM STN SPEC: ABNORMAL

## 2025-01-29 ENCOUNTER — TELEPHONE (OUTPATIENT)
Dept: OTHER | Facility: OTHER | Age: 80
End: 2025-01-29

## 2025-01-30 NOTE — TELEPHONE ENCOUNTER
Patient is calling regarding cancelling an appointment.     Date/Time: 01/30/25 at 11:30 am     Patient was rescheduled: YES [ ] NO [X ]     Patient requesting call back to reschedule: YES [ ] NO [ X]    Pt will be calling back to reschedule.

## 2025-02-21 PROBLEM — J18.9 PNEUMONIA: Status: RESOLVED | Noted: 2025-01-22 | Resolved: 2025-02-21

## 2025-03-23 DIAGNOSIS — I50.9 CHF (CONGESTIVE HEART FAILURE) (HCC): ICD-10-CM

## 2025-03-25 RX ORDER — METOPROLOL SUCCINATE 25 MG/1
25 TABLET, EXTENDED RELEASE ORAL DAILY
Qty: 90 TABLET | Refills: 3 | Status: SHIPPED | OUTPATIENT
Start: 2025-03-25

## 2025-03-25 RX ORDER — LOSARTAN POTASSIUM 25 MG/1
25 TABLET ORAL DAILY
Qty: 90 TABLET | Refills: 3 | Status: SHIPPED | OUTPATIENT
Start: 2025-03-25

## 2025-04-19 DIAGNOSIS — E03.9 HYPOTHYROIDISM, UNSPECIFIED TYPE: ICD-10-CM

## 2025-04-23 RX ORDER — LEVOTHYROXINE SODIUM 150 UG/1
150 TABLET ORAL DAILY
Qty: 90 TABLET | Refills: 1 | OUTPATIENT
Start: 2025-04-23

## 2025-04-23 NOTE — TELEPHONE ENCOUNTER
I called pt son l/m (cc Ok) advising him to call and schedule pt for AWV that she hs not been seen in over a year and that she will need a visit before any refills are given. I also advised if she is no longer a pt here in our office he is to callus and let us know so we can remove the provider.

## 2025-04-30 ENCOUNTER — DOCUMENTATION (OUTPATIENT)
Dept: ADMINISTRATIVE | Facility: OTHER | Age: 80
End: 2025-04-30

## 2025-04-30 NOTE — PROGRESS NOTES
04/30/25 1:23 PM    Annual Wellness Visit outreach is not required, the practice has scheduled the AWV.    Thank you.  Dayana Vincent MA  [facility]

## 2025-05-01 ENCOUNTER — TELEPHONE (OUTPATIENT)
Dept: FAMILY MEDICINE CLINIC | Facility: CLINIC | Age: 80
End: 2025-05-01

## 2025-05-01 NOTE — TELEPHONE ENCOUNTER
Pt son reported her O2 Concentrator is not working. He called them and was not given attention to hence he requested PCP office to call and ask for assistance. As per PCP pt cannot live comfortably without this and is in distress as the device is not working.  As per [employer]:  Ownership of O2 has been transferred to Pt. Pt declined renewal and did not submit re certification documentation to bill the insurance. She must find a company that offers maintenance and have it fixed out of pocket.  I informed Pt son who reported his mother is confused and is unable to make decisions on her own he will call [facility] and ask for them to reinitiate the process. Pt son will contact us if needed.

## 2025-05-09 ENCOUNTER — OFFICE VISIT (OUTPATIENT)
Dept: URGENT CARE | Facility: MEDICAL CENTER | Age: 80
End: 2025-05-09
Payer: COMMERCIAL

## 2025-05-09 ENCOUNTER — NURSE TRIAGE (OUTPATIENT)
Age: 80
End: 2025-05-09

## 2025-05-09 VITALS
DIASTOLIC BLOOD PRESSURE: 63 MMHG | BODY MASS INDEX: 34.11 KG/M2 | SYSTOLIC BLOOD PRESSURE: 129 MMHG | RESPIRATION RATE: 16 BRPM | OXYGEN SATURATION: 95 % | HEIGHT: 62 IN | HEART RATE: 102 BPM | TEMPERATURE: 98.3 F

## 2025-05-09 DIAGNOSIS — N30.01 ACUTE CYSTITIS WITH HEMATURIA: Primary | ICD-10-CM

## 2025-05-09 DIAGNOSIS — N30.01 ACUTE CYSTITIS WITH HEMATURIA: ICD-10-CM

## 2025-05-09 LAB
SL AMB  POCT GLUCOSE, UA: ABNORMAL
SL AMB LEUKOCYTE ESTERASE,UA: ABNORMAL
SL AMB POCT BILIRUBIN,UA: ABNORMAL
SL AMB POCT BLOOD,UA: ABNORMAL
SL AMB POCT CLARITY,UA: ABNORMAL
SL AMB POCT COLOR,UA: ABNORMAL
SL AMB POCT KETONES,UA: ABNORMAL
SL AMB POCT NITRITE,UA: ABNORMAL
SL AMB POCT PH,UA: 6.5
SL AMB POCT SPECIFIC GRAVITY,UA: 1.01
SL AMB POCT URINE PROTEIN: 300
SL AMB POCT UROBILINOGEN: 0.2

## 2025-05-09 PROCEDURE — 87077 CULTURE AEROBIC IDENTIFY: CPT | Performed by: NURSE PRACTITIONER

## 2025-05-09 PROCEDURE — 87086 URINE CULTURE/COLONY COUNT: CPT | Performed by: NURSE PRACTITIONER

## 2025-05-09 PROCEDURE — 87186 SC STD MICRODIL/AGAR DIL: CPT | Performed by: NURSE PRACTITIONER

## 2025-05-09 PROCEDURE — 99204 OFFICE O/P NEW MOD 45 MIN: CPT | Performed by: NURSE PRACTITIONER

## 2025-05-09 PROCEDURE — 81002 URINALYSIS NONAUTO W/O SCOPE: CPT | Performed by: NURSE PRACTITIONER

## 2025-05-09 RX ORDER — CIPROFLOXACIN 500 MG/1
500 TABLET, FILM COATED ORAL EVERY 12 HOURS SCHEDULED
Qty: 14 TABLET | Refills: 0 | Status: SHIPPED | OUTPATIENT
Start: 2025-05-09 | End: 2025-05-16

## 2025-05-09 NOTE — PATIENT INSTRUCTIONS
Take antibiotics as directed  Can use Pyridium (over the counter) for help with suprapubic pressure, this will turn your urine orange  Increased hydration  If worsening symptoms, such as blood in urine, back or flank pain, fevers, vomiting, please go to the Emergency Room  We will call you in 1-3 days with results of your urine culture only if your treatment needs to be changed, if you do not hear from us please assume you are on the correct treatment and finish the antibiotics as directed

## 2025-05-09 NOTE — PROGRESS NOTES
[facility]        NAME: Cortney Harding is a 80 y.o. female  : 1945    MRN: [id number]  DATE: May 9, 2025  TIME: 6:34 PM    Assessment and Plan   Acute cystitis with hematuria [N30.01]  1. Acute cystitis with hematuria  ciprofloxacin (CIPRO) 500 mg tablet        Patient in NAD and VSS upon exam. Discussed results of UA in office. Will start abx for likely UTI and send culture. Will call patient with results if treatment needs to be changed. Patient seen in December for UTI, no culture noted in chart from that encounter. Discussed supportive care and return precautions. Note for work/school given as needed.      Patient Instructions       Follow up with PCP in 3-5 days.  Proceed to  ER if symptoms worsen.    If tests have been performed at [facility], our office will contact you with results if changes need to be made to the care plan discussed with you at the visit.  You can review your full results on [facility] MyChart.    Chief Complaint     Chief Complaint   Patient presents with   • Possible UTI     Pt reports burning while urinating, frequent urination and blood in urine 2x days         History of Present Illness       Started: 2 days  Positive: dysuria, frequency, urgency, hematuria,   Negative: flank pain, back pain, fever, vaginal d/c, abdominal pain  Treatment: none        Review of Systems   Review of Systems   Constitutional:  Negative for fatigue and fever.   Gastrointestinal:  Negative for abdominal pain.   Genitourinary:  Positive for dysuria, frequency, hematuria and urgency. Negative for flank pain.   Musculoskeletal:  Negative for back pain.         Current Medications       Current Outpatient Medications:   •  ciprofloxacin (CIPRO) 500 mg tablet, Take 1 tablet (500 mg total) by mouth every 12 (twelve) hours for 7 days, Disp: 14 tablet, Rfl: 0  •  albuterol (Ventolin HFA) 90 mcg/act inhaler, Inhale 2 puffs every 6 (six) hours as needed for wheezing, Disp: 54 g, Rfl: 0  •  Ascorbic  Acid (VITAMIN C) 100 MG tablet, Take 100 mg by mouth daily, Disp: , Rfl:   •  aspirin 325 mg tablet, Take 0.5 tablets by mouth daily , Disp: , Rfl:   •  Calcium 600 MG tablet, Take 1 tablet by mouth 2 (two) times a day, Disp: , Rfl:   •  Chromium 200 MCG TABS, Take 1 tablet by mouth daily, Disp: , Rfl:   •  Empagliflozin (Jardiance) 10 MG TABS tablet, Take 1 tablet (10 mg total) by mouth every morning, Disp: 90 tablet, Rfl: 3  •  Fluticasone Furoate-Vilanterol (Breo Ellipta) 100-25 mcg/actuation inhaler, Inhale 1 puff daily Rinse mouth after use., Disp: 60 each, Rfl: 0  •  guaiFENesin (MUCINEX) 600 mg 12 hr tablet, Take 1 tablet (600 mg total) by mouth every 12 (twelve) hours, Disp: 20 tablet, Rfl: 0  •  ipratropium-albuterol (DUO-NEB) 0.5-2.5 mg/3 mL nebulizer solution, Take 3 mL by nebulization 3 (three) times a day as needed for wheezing or shortness of breath, Disp: 180 mL, Rfl: 0  •  levothyroxine 150 mcg tablet, TAKE 1 TABLET BY MOUTH EVERY DAY, Disp: 90 tablet, Rfl: 1  •  losartan (COZAAR) 25 mg tablet, TAKE 1 TABLET (25 MG TOTAL) BY MOUTH DAILY., Disp: 90 tablet, Rfl: 3  •  magnesium 30 MG tablet, Take 30 mg by mouth 2 (two) times a day, Disp: , Rfl:   •  melatonin 1 mg, Take 10 mg by mouth daily at bedtime , Disp: , Rfl:   •  metoprolol succinate (TOPROL-XL) 25 mg 24 hr tablet, TAKE 1 TABLET (25 MG TOTAL) BY MOUTH DAILY., Disp: 90 tablet, Rfl: 3  •  Multiple Vitamin (MULTIVITAMINS PO), Take 1 tablet by mouth daily, Disp: , Rfl:   •  Omega-3 Fatty Acids (FISH OIL) 1200 MG CAPS, Take by mouth, Disp: , Rfl:   •  OXYGEN-HELIUM IN, Inhale as needed Only uses rarely, Disp: , Rfl:   •  rosuvastatin (CRESTOR) 40 MG tablet, Take 1 tablet (40 mg total) by mouth daily, Disp: 90 tablet, Rfl: 3  •  tiotropium (SPIRIVA) 18 mcg inhalation capsule, Place 1 capsule (18 mcg total) into inhaler and inhale daily Via Handihaler at the same time every day, Disp: 90 capsule, Rfl: 0  •  torsemide (DEMADEX) 20 mg tablet, Take 1  "tablet (20 mg total) by mouth daily, Disp: 30 tablet, Rfl: 0  •  zolpidem (AMBIEN) 5 mg tablet, Take 1 tablet (5 mg total) by mouth daily at bedtime as needed for sleep Please stop taking the medication if any drowsiness in the morning after taking the medication., Disp: 30 tablet, Rfl: 0    Current Allergies     Allergies as of 05/09/2025 - Reviewed 05/09/2025   Allergen Reaction Noted   • Erythromycin Hives 05/05/2016   • Penicillins Hives 01/17/2013   • Sulfa antibiotics Hives 05/05/2016   • Tetracyclines & related Hives 05/05/2016            The following portions of the patient's history were reviewed and updated as appropriate: allergies, current medications, past family history, past medical history, past social history, past surgical history and problem list.     Past Medical History:   Diagnosis Date   • Anesthesia     \"constipation after surgery\"   • Bladder cancer (HCC)    • Cataract     left   • Colon polyp    • COPD (chronic obstructive pulmonary disease) (HCC)    • Dry mouth    • Full dentures    • GERD (gastroesophageal reflux disease)     occas   • History of pneumonia    • Hyperlipidemia    • Hypothyroidism    • Lung cancer (HCC)     stage 3   • Pulmonary emphysema (HCC)    • Skin cancer     squamous cell of the leg   • Sleep difficulties     \"gets up frequently to void\"   • UTI (urinary tract infection) 12/2019    recent e coli treated and resolved   • Wears glasses        Past Surgical History:   Procedure Laterality Date   • CHOLECYSTECTOMY     • COLONOSCOPY     • CYSTOSCOPY      4/10 AND 8/10, 3/12/2015 4/18/2016  DIAGNOSTIC    • CYSTOSCOPY  05/01/2020   • CYSTOSCOPY  04/02/2021   • CYSTOSCOPY  10/29/2021   • DENTAL SURGERY     • EYE SURGERY     • HYSTERECTOMY     • OTHER SURGICAL HISTORY      TRANSURETHERAL RESECTION    • [place] CYSTO W/REMOVAL OF LESIONS SMALL N/A 12/23/2019    Procedure: BLADDER BIOPSY; BLADDER FULGERATION with mitomycin;  Surgeon: Daniel De Oliveira MD;  Location:  MAIN OR;  " "Service: Urology   • NH CYSTOURETHROSCOPY WITH BIOPSY N/A 6/1/2020    Procedure: CYSTOSCOPY WITH BIOPSIES, FULGURATION;  Surgeon: Daniel De Oliveira MD;  Location: [facility];  Service: Urology   • NH CYSTOURETHROSCOPY WITH BIOPSY N/A 11/2/2020    Procedure: CYSTO W/BIOPSIES & FULGURATION;  Surgeon: Daniel De Oliveira MD;  Location: [facility];  Service: Urology   • WISDOM TOOTH EXTRACTION         Family History   Problem Relation Age of Onset   • Cancer Mother         Mouth    • Mental illness Sister    • Colon cancer Paternal Grandmother    • No Known Problems Daughter          Medications have been verified.        Objective   /63   Pulse 102   Temp 98.3 °F (36.8 °C)   Resp 16   Ht 5' 2\" (1.575 m)   SpO2 95%   BMI 34.11 kg/m²   No LMP recorded. Patient is postmenopausal.       Physical Exam     Physical Exam  Constitutional:       Appearance: Normal appearance.      Comments: +strong urine smell to patient   HENT:      Head: Normocephalic and atraumatic.   Cardiovascular:      Rate and Rhythm: Normal rate.   Pulmonary:      Effort: Pulmonary effort is normal.   Abdominal:      General: Abdomen is flat. Bowel sounds are normal.      Palpations: Abdomen is soft.      Tenderness: There is no abdominal tenderness. There is no right CVA tenderness or left CVA tenderness.   Musculoskeletal:         General: Normal range of motion.   Skin:     General: Skin is warm and dry.   Neurological:      Mental Status: She is alert and oriented to person, place, and time.                   "

## 2025-05-09 NOTE — TELEPHONE ENCOUNTER
"Received call from patient with complaints of possibel UTI    Symptoms include:  Blood urine   Frequency   Pain with urination  State she had UTI 6 weeks ago and was given abx but symptoms have restarted.   Advised UC as we have no avail apts    Agreeable   Reason for Disposition   Patient wants to be seen    Answer Assessment - Initial Assessment Questions  1. SYMPTOM: \"What's the main symptom you're concerned about?\" (e.g., frequency, incontinence)      Blood in urine/ increased frequency/ pain with urination   2. ONSET: \"When did the  symptoms  start?\"      Unsure   3. PAIN: \"Is there any pain?\" If Yes, ask: \"How bad is it?\" (Scale: 1-10; mild, moderate, severe)      Yes -   4. CAUSE: \"What do you think is causing the symptoms?\"      Uti   5. OTHER SYMPTOMS: \"Do you have any other symptoms?\" (e.g., blood in urine, fever, flank pain, pain with urination)      no  6. PREGNANCY: \"Is there any chance you are pregnant?\" \"When was your last menstrual period?\"      no    Protocols used: Urinary Symptoms-Adult-[place]    "

## 2025-05-11 LAB — BACTERIA UR CULT: ABNORMAL

## 2025-07-03 NOTE — ASSESSMENT & PLAN NOTE
Interventional Radiology      Pre Op Diagnosis: Dysphagia    Post Op Diagnosis: Same    Procedure: Gastrostomy Tube Placement    Procedure performed by:  Noemi Delgado MD    Written Informed Consent Obtained: Yes  Specimen Removed: NO  Estimated Blood Loss: Minimal    Findings:     Moderate Sedation      Successful placement of 18 Fr Gastrostomy tube    Patient tolerated procedure well.     Recommendations    Connect tube to gravity drainage bag     Keep NPO with G-tube to gravity drainage for 24 hours. Start with clear liquids, then advance diet as tolerated. GIVEN PATIENT'S PREVIOUS REMOVAL OF TUBE, RECOMMEND PLACING ABDOMINAL BINDER TO PREVENT ACCIDENTAL DISLODGEMENT. If patient removes tube again, do not recommend future IR gastrostomy tube due to elevated cheryl-procedural risks including peritonitis and bleeding.     Cut the T-fasteners 10-15 days after placement if they haven't already spontaneously fallen off.     Tube Care instructions:  Flush tube with 20 mL warm water before & after each feed or medication administrations   Avoid administering whole pills through tube   Clean skin & tube daily with mild soap and water. Avoid topical antibacterials as they promote fungus growth    For tube clogging, try the following solutions before reaching out to IR:  Flushing w 50 cc warm water (opens tube 40% of the time)  Instill pancreatic enzymes  Use biopsy brush to clean the tube (can be obtained from endoscopy department)     For minor leaks, slightly tighten the bolster to the skin    For skin irritation and excoriation, consider using saline soaks or zinc oxide and keep skin open to air      Noemi Delgado MD  Interventional Radiology        Worsening cough/dyspnea  CXR with right lower lobe pneumonia  Speech eval  Received IV Rocephin while inpatient

## 2025-07-30 ENCOUNTER — OFFICE VISIT (OUTPATIENT)
Dept: FAMILY MEDICINE CLINIC | Facility: CLINIC | Age: 80
End: 2025-07-30
Payer: COMMERCIAL

## 2025-07-30 VITALS — TEMPERATURE: 95.9 F | HEART RATE: 80 BPM | OXYGEN SATURATION: 92 %

## 2025-07-30 DIAGNOSIS — R39.9 UTI SYMPTOMS: Primary | ICD-10-CM

## 2025-07-30 DIAGNOSIS — I50.43 ACUTE ON CHRONIC COMBINED SYSTOLIC (CONGESTIVE) AND DIASTOLIC (CONGESTIVE) HEART FAILURE (HCC): ICD-10-CM

## 2025-07-30 DIAGNOSIS — J43.9 PULMONARY EMPHYSEMA, UNSPECIFIED EMPHYSEMA TYPE (HCC): ICD-10-CM

## 2025-07-30 DIAGNOSIS — I50.9 CHF (CONGESTIVE HEART FAILURE) (HCC): ICD-10-CM

## 2025-07-30 LAB
SL AMB  POCT GLUCOSE, UA: ABNORMAL
SL AMB LEUKOCYTE ESTERASE,UA: ABNORMAL
SL AMB POCT BILIRUBIN,UA: ABNORMAL
SL AMB POCT BLOOD,UA: ABNORMAL
SL AMB POCT CLARITY,UA: ABNORMAL
SL AMB POCT COLOR,UA: ABNORMAL
SL AMB POCT KETONES,UA: ABNORMAL
SL AMB POCT NITRITE,UA: ABNORMAL
SL AMB POCT PH,UA: 6
SL AMB POCT SPECIFIC GRAVITY,UA: 1.03
SL AMB POCT URINE PROTEIN: ABNORMAL
SL AMB POCT UROBILINOGEN: ABNORMAL

## 2025-07-30 PROCEDURE — 99214 OFFICE O/P EST MOD 30 MIN: CPT | Performed by: FAMILY MEDICINE

## 2025-07-30 PROCEDURE — 81002 URINALYSIS NONAUTO W/O SCOPE: CPT | Performed by: FAMILY MEDICINE

## 2025-07-30 PROCEDURE — G2211 COMPLEX E/M VISIT ADD ON: HCPCS | Performed by: FAMILY MEDICINE

## 2025-07-30 RX ORDER — ALBUTEROL SULFATE 90 UG/1
2 INHALANT RESPIRATORY (INHALATION) EVERY 6 HOURS PRN
Qty: 54 G | Refills: 0 | Status: SHIPPED | OUTPATIENT
Start: 2025-07-30

## 2025-07-30 RX ORDER — CEPHALEXIN 500 MG/1
500 CAPSULE ORAL 2 TIMES DAILY
Qty: 10 CAPSULE | Refills: 0 | Status: SHIPPED | OUTPATIENT
Start: 2025-07-30 | End: 2025-08-04

## 2025-08-04 ENCOUNTER — VBI (OUTPATIENT)
Dept: ADMINISTRATIVE | Facility: OTHER | Age: 80
End: 2025-08-04

## 2025-08-13 ENCOUNTER — TELEPHONE (OUTPATIENT)
Age: 80
End: 2025-08-13

## 2025-08-16 DIAGNOSIS — E03.9 HYPOTHYROIDISM, UNSPECIFIED TYPE: ICD-10-CM

## 2025-08-18 RX ORDER — LEVOTHYROXINE SODIUM 150 UG/1
150 TABLET ORAL DAILY
Qty: 90 TABLET | Refills: 0 | Status: SHIPPED | OUTPATIENT
Start: 2025-08-18

## (undated) DEVICE — STERILE POLYISOPRENE POWDER-FREE SURGICAL GLOVES WITH EMOLLIENT COATING: Brand: PROTEXIS

## (undated) DEVICE — EVACUATOR BLADDER ELLIK DISP STRL

## (undated) DEVICE — GLOVE INDICATOR PI UNDERGLOVE SZ 8 BLUE

## (undated) DEVICE — BAG URINE DRAINAGE 2000ML ANTI RFLX LF

## (undated) DEVICE — 4-PORT MANIFOLD: Brand: NEPTUNE 2

## (undated) DEVICE — UROCATCH BAG

## (undated) DEVICE — BASIC SINGLE BASIN-LF: Brand: MEDLINE INDUSTRIES, INC.

## (undated) DEVICE — SINGLE PORT MANIFOLD: Brand: NEPTUNE 2

## (undated) DEVICE — TRANSPOSAL ULTRAFLEX DUO/QUAD ULTRA CART MANIFOLD

## (undated) DEVICE — GLOVE SRG BIOGEL 7.5

## (undated) DEVICE — PACK TUR

## (undated) DEVICE — TELFA NON-ADHERENT ABSORBENT DRESSING: Brand: TELFA

## (undated) DEVICE — SCD SEQUENTIAL COMPRESSION COMFORT SLEEVE MEDIUM KNEE LENGTH: Brand: KENDALL SCD

## (undated) DEVICE — STERILE POLYISOPRENE POWDER-FREE SURGICAL GLOVES: Brand: PROTEXIS

## (undated) DEVICE — TUBING SUCTION 5MM X 12 FT

## (undated) DEVICE — BAG DECANTER

## (undated) DEVICE — SPECIMEN CONTAINER STERILE PEEL PACK

## (undated) DEVICE — BAG URINE DRAINAGE 4000ML CONTINUOUS IRR

## (undated) DEVICE — CATHETER PLUG WITH CAP: Brand: DOVER

## (undated) DEVICE — LUBRICANT SURGILUBE TUBE 4 OZ  FLIP TOP

## (undated) DEVICE — CATH FOLEY 20FR 5ML 2 WAY SILICONE ELASTIMER